# Patient Record
Sex: MALE | Race: WHITE | NOT HISPANIC OR LATINO | Employment: OTHER | URBAN - METROPOLITAN AREA
[De-identification: names, ages, dates, MRNs, and addresses within clinical notes are randomized per-mention and may not be internally consistent; named-entity substitution may affect disease eponyms.]

---

## 2017-01-24 ENCOUNTER — LAB VISIT (OUTPATIENT)
Dept: LAB | Facility: HOSPITAL | Age: 47
End: 2017-01-24
Payer: COMMERCIAL

## 2017-01-24 DIAGNOSIS — Z76.82 ORGAN TRANSPLANT CANDIDATE: ICD-10-CM

## 2017-01-24 PROCEDURE — 86829 HLA CLASS I/II ANTIBODY QUAL: CPT | Mod: 91,PO

## 2017-01-24 PROCEDURE — 86829 HLA CLASS I/II ANTIBODY QUAL: CPT | Mod: PO

## 2017-02-01 LAB — HPRA INTERPRETATION: NORMAL

## 2017-02-16 ENCOUNTER — LAB VISIT (OUTPATIENT)
Dept: LAB | Facility: HOSPITAL | Age: 47
End: 2017-02-16
Payer: COMMERCIAL

## 2017-02-16 DIAGNOSIS — Z76.82 AWAITING ORGAN TRANSPLANT STATUS: ICD-10-CM

## 2017-02-16 PROCEDURE — 86829 HLA CLASS I/II ANTIBODY QUAL: CPT | Mod: PO

## 2017-02-16 PROCEDURE — 86829 HLA CLASS I/II ANTIBODY QUAL: CPT | Mod: 91,PO

## 2017-02-16 PROCEDURE — 86833 HLA CLASS II HIGH DEFIN QUAL: CPT | Mod: PO

## 2017-03-17 LAB — HPRA INTERPRETATION: NORMAL

## 2017-03-27 ENCOUNTER — LAB VISIT (OUTPATIENT)
Dept: LAB | Facility: HOSPITAL | Age: 47
End: 2017-03-27
Payer: COMMERCIAL

## 2017-03-27 DIAGNOSIS — Z76.82 ORGAN TRANSPLANT CANDIDATE: ICD-10-CM

## 2017-03-29 DIAGNOSIS — Z76.82 ORGAN TRANSPLANT CANDIDATE: Primary | ICD-10-CM

## 2017-03-31 ENCOUNTER — LAB VISIT (OUTPATIENT)
Dept: LAB | Facility: HOSPITAL | Age: 47
End: 2017-03-31
Payer: COMMERCIAL

## 2017-03-31 DIAGNOSIS — Z76.82 ORGAN TRANSPLANT CANDIDATE: ICD-10-CM

## 2017-03-31 LAB
CLASS II ANTIBODY COMMENTS - LUMINEX: NORMAL
CPRA %: 0
SERUM COLLECTION DT - LUMINEX CLASS II: NORMAL
SPCL2 TESTING DATE: NORMAL

## 2017-03-31 PROCEDURE — 86826 HLA X-MATCH NONCYTOTOXC ADDL: CPT | Mod: PO

## 2017-03-31 PROCEDURE — 86825 HLA X-MATH NON-CYTOTOXIC: CPT | Mod: PO

## 2017-03-31 PROCEDURE — 86825 HLA X-MATH NON-CYTOTOXIC: CPT | Mod: 91,PO

## 2017-03-31 PROCEDURE — 86826 HLA X-MATCH NONCYTOTOXC ADDL: CPT | Mod: 91,PO

## 2017-04-03 LAB
B CELL RESULTS - XM ALLO: NEGATIVE
B CELL RESULTS - XM ALLO: NEGATIVE
B MCS AVERAGE - XM ALLO: 1
B MCS AVERAGE - XM ALLO: 15.25
DONOR MRN: NORMAL
DONOR MRN: NORMAL
FXMAL TESTING DATE: NORMAL
FXMAL TESTING DATE: NORMAL
HLA FLOW CROSSMATCH (ALLO) INTERPRETATION: NORMAL
SERUM COLLECTION DT - XM ALLO: NORMAL
SERUM COLLECTION DT - XM ALLO: NORMAL
T CELL RESULTS - XM ALLO: NEGATIVE
T CELL RESULTS - XM ALLO: NEGATIVE
T MCS AVERAGE - XM ALLO: 4.5
T MCS AVERAGE - XM ALLO: 9.5

## 2017-04-06 DIAGNOSIS — Z76.82 ORGAN TRANSPLANT CANDIDATE: Primary | ICD-10-CM

## 2017-04-07 ENCOUNTER — LAB VISIT (OUTPATIENT)
Dept: LAB | Facility: HOSPITAL | Age: 47
End: 2017-04-07
Payer: COMMERCIAL

## 2017-04-07 DIAGNOSIS — Z76.82 ORGAN TRANSPLANT CANDIDATE: ICD-10-CM

## 2017-04-07 PROCEDURE — 86826 HLA X-MATCH NONCYTOTOXC ADDL: CPT | Mod: 91,PO,TXP

## 2017-04-07 PROCEDURE — 86825 HLA X-MATH NON-CYTOTOXIC: CPT | Mod: PO,TXP

## 2017-04-07 PROCEDURE — 86825 HLA X-MATH NON-CYTOTOXIC: CPT | Mod: 91,PO,TXP

## 2017-04-07 PROCEDURE — 86826 HLA X-MATCH NONCYTOTOXC ADDL: CPT | Mod: PO,TXP

## 2017-04-10 LAB
B CELL RESULTS - XM ALLO: NEGATIVE
B CELL RESULTS - XM ALLO: NEGATIVE
B MCS AVERAGE - XM ALLO: -3.75
B MCS AVERAGE - XM ALLO: 26
DONOR MRN: NORMAL
DONOR MRN: NORMAL
FXMAL TESTING DATE: NORMAL
FXMAL TESTING DATE: NORMAL
HLA FLOW CROSSMATCH (ALLO) INTERPRETATION: NORMAL
SERUM COLLECTION DT - XM ALLO: NORMAL
SERUM COLLECTION DT - XM ALLO: NORMAL
T CELL RESULTS - XM ALLO: NEGATIVE
T CELL RESULTS - XM ALLO: NEGATIVE
T MCS AVERAGE - XM ALLO: 23
T MCS AVERAGE - XM ALLO: 5.5

## 2017-04-25 ENCOUNTER — LAB VISIT (OUTPATIENT)
Dept: LAB | Facility: HOSPITAL | Age: 47
End: 2017-04-25
Payer: COMMERCIAL

## 2017-04-25 DIAGNOSIS — Z76.82 ORGAN TRANSPLANT CANDIDATE: ICD-10-CM

## 2017-04-25 PROCEDURE — 86833 HLA CLASS II HIGH DEFIN QUAL: CPT | Mod: PO,TXP

## 2017-04-25 PROCEDURE — 86832 HLA CLASS I HIGH DEFIN QUAL: CPT | Mod: PO,TXP

## 2017-05-01 LAB — HPRA INTERPRETATION: NORMAL

## 2017-05-01 PROCEDURE — 86829 HLA CLASS I/II ANTIBODY QUAL: CPT | Mod: PO,TXP

## 2017-05-01 PROCEDURE — 86829 HLA CLASS I/II ANTIBODY QUAL: CPT | Mod: 91,PO,TXP

## 2017-05-24 LAB — HPRA INTERPRETATION: NORMAL

## 2017-05-30 ENCOUNTER — LAB VISIT (OUTPATIENT)
Dept: LAB | Facility: HOSPITAL | Age: 47
End: 2017-05-30
Payer: COMMERCIAL

## 2017-05-30 DIAGNOSIS — Z76.82 ORGAN TRANSPLANT CANDIDATE: ICD-10-CM

## 2017-06-07 LAB
CLASS I ANTIBODIES - LUMINEX: NEGATIVE
CLASS II ANTIBODIES - LUMINEX: NEGATIVE
CPRA %: 0
SERUM COLLECTION DT - LUMINEX CLASS I: NORMAL
SERUM COLLECTION DT - LUMINEX CLASS II: NORMAL
SPCL1 TESTING DATE: NORMAL
SPCL2 TESTING DATE: NORMAL

## 2017-06-20 ENCOUNTER — LAB VISIT (OUTPATIENT)
Dept: LAB | Facility: HOSPITAL | Age: 47
End: 2017-06-20
Payer: COMMERCIAL

## 2017-06-20 DIAGNOSIS — Z76.82 ORGAN TRANSPLANT CANDIDATE: ICD-10-CM

## 2017-06-20 PROCEDURE — 86829 HLA CLASS I/II ANTIBODY QUAL: CPT | Mod: PO,TXP

## 2017-06-20 PROCEDURE — 86829 HLA CLASS I/II ANTIBODY QUAL: CPT | Mod: 91,PO,TXP

## 2017-06-28 NOTE — LETTER
IMPORTANT      July 6, 2017    Bryant Azevedo  [address] [place] [address]     Re: [id number]    Dear Mr/Mrs Azevedo,    Thank you for choosing [facility] as your health care provider.  We are committed to assisting you with timely insurance filing and payment of your account.  To protect your liability, updated insurance information must be given to us at the time of service and we should be notified immediately if      · Your insurance benefits/plan changes.   You become eligible for any other benefits   Your current plan/coverage terms.    Also, please bring in a copy of your insurance premium payment if you have one of the following types of insurance:    · Coverage from a USP plan.  · Coverage from the Affordable Healthcare Act Plan.  · Coverage from a COBRA plan  · Premium paid by the [employer] Kid[employer].    To ensure we have the correct insurance (Medical/Pharmacy) on file and to answer any questions regarding your benefits, please call us at [phone] or [phone] and ask to speak to the kidney  indicated below:      Transplant Dept  [place]   Heart   Dayana Marinmiliza   Kidney (A-K) and Lung  Anishherbert Carmen    Kidney (L-Z)  Swetha Carpenter   Liver    We look forward to hearing from you soon.    Sincerely,      Transplant Financial Services

## 2017-07-06 PROCEDURE — 86829 HLA CLASS I/II ANTIBODY QUAL: CPT | Mod: 91,PO,TXP

## 2017-07-06 PROCEDURE — 86829 HLA CLASS I/II ANTIBODY QUAL: CPT | Mod: PO,TXP

## 2017-07-06 NOTE — ADDENDUM NOTE
Encounter addended by: Soledad Pederson on: 7/6/2017  2:28 PM<BR>    Actions taken: Letter status changed

## 2017-07-07 LAB — HPRA INTERPRETATION: NORMAL

## 2017-07-11 ENCOUNTER — HOSPITAL ENCOUNTER (OUTPATIENT)
Dept: RADIOLOGY | Facility: HOSPITAL | Age: 47
Discharge: HOME | End: 2017-07-11
Attending: INTERNAL MEDICINE
Payer: MEDICARE

## 2017-07-11 DIAGNOSIS — Z76.82 AWAITING ORGAN TRANSPLANT STATUS: Primary | ICD-10-CM

## 2017-07-11 DIAGNOSIS — Z76.82 ORGAN TRANSPLANT CANDIDATE: ICD-10-CM

## 2017-07-11 DIAGNOSIS — Z76.82 AWAITING ORGAN TRANSPLANT STATUS: ICD-10-CM

## 2017-07-11 PROCEDURE — 76770 US EXAM ABDO BACK WALL COMP: CPT | Mod: 26,GC,TXP, | Performed by: RADIOLOGY

## 2017-07-11 PROCEDURE — 76770 US EXAM ABDO BACK WALL COMP: CPT | Mod: TC,TXP

## 2017-07-13 ENCOUNTER — TELEPHONE (OUTPATIENT)
Dept: TRANSPLANT | Facility: CLINIC | Age: 47
End: 2017-07-13

## 2017-07-13 NOTE — TELEPHONE ENCOUNTER
Renal Transplant Attending Recipient Chart Review Note:    46 y.o. male with history of ESRD presumed secondary to HTN vs congenital issues who has been on PD since 11/10/14; last seen in listed RR clinic on 11/29/16.    No recent episodes of peritonitis. Echo ok from 10/21/16; normal LHC from 11/10/15.     Renal US 9/8/15 with ascites but he is on PD --> needs repeat renal US this year     Need to confirm caregiver availability (caregiver is his mother?).     History states patient has never been a smoker but COPD was written on problem list; CXR from 10/21/16 but not best quality film, not appearing to be consistent with COPD thus this diagnosed removed from problem list.     Will need to check pre-op PD fluid cell count and culture.     Residual UOP 1.5L     Labs (5/26/17) --> Hb 16; Hct 48, albumin 4.0, potassium 4.1, platelet count 180, WBC 7.05    Need to obtain PTH.     Latoya José MD  Renal Transplant Attending      ----- Message from Daniel Castaneda RN sent at 7/12/2017  2:51 PM CDT -----  Per review,    46 year old with ESRD due to HTN?    1.  PD since 11/10/14  2. Last since 11/29/16  3.  No recent Peritonitis  4.  Needs repeat Renal U/S  5.  Confirm caregiver plan  6.  Clarify why COPD is on problem list  7.  Will need PD culture prior to pre-op  8.  Labs from 5/26/17 ok but Need PTH

## 2017-07-14 LAB — HPRA INTERPRETATION: NORMAL

## 2017-07-19 PROCEDURE — 86832 HLA CLASS I HIGH DEFIN QUAL: CPT | Mod: PO,TXP

## 2017-07-19 PROCEDURE — 86833 HLA CLASS II HIGH DEFIN QUAL: CPT | Mod: PO,TXP

## 2017-07-20 ENCOUNTER — LAB VISIT (OUTPATIENT)
Dept: LAB | Facility: HOSPITAL | Age: 47
End: 2017-07-20
Payer: COMMERCIAL

## 2017-07-20 DIAGNOSIS — Z76.82 ORGAN TRANSPLANT CANDIDATE: ICD-10-CM

## 2017-07-20 PROCEDURE — 86829 HLA CLASS I/II ANTIBODY QUAL: CPT | Mod: 91,PO,TXP

## 2017-07-20 PROCEDURE — 86829 HLA CLASS I/II ANTIBODY QUAL: CPT | Mod: PO,TXP

## 2017-08-01 DIAGNOSIS — Z76.82 ORGAN TRANSPLANT CANDIDATE: ICD-10-CM

## 2017-08-18 LAB — HPRA INTERPRETATION: NORMAL

## 2017-08-24 ENCOUNTER — LAB VISIT (OUTPATIENT)
Dept: LAB | Facility: HOSPITAL | Age: 47
End: 2017-08-24
Payer: MEDICARE

## 2017-08-24 DIAGNOSIS — Z76.82 ORGAN TRANSPLANT CANDIDATE: ICD-10-CM

## 2017-08-24 PROCEDURE — 86829 HLA CLASS I/II ANTIBODY QUAL: CPT | Mod: PO,TXP

## 2017-08-24 PROCEDURE — 86832 HLA CLASS I HIGH DEFIN QUAL: CPT | Mod: PO,TXP

## 2017-08-24 PROCEDURE — 86833 HLA CLASS II HIGH DEFIN QUAL: CPT | Mod: PO,TXP

## 2017-08-24 PROCEDURE — 86829 HLA CLASS I/II ANTIBODY QUAL: CPT | Mod: 91,PO,TXP

## 2017-09-18 LAB — HPRA INTERPRETATION: NORMAL

## 2017-09-25 ENCOUNTER — LAB VISIT (OUTPATIENT)
Dept: LAB | Facility: HOSPITAL | Age: 47
End: 2017-09-25
Payer: MEDICARE

## 2017-09-25 DIAGNOSIS — Z76.82 ORGAN TRANSPLANT CANDIDATE: ICD-10-CM

## 2017-09-25 PROCEDURE — 86829 HLA CLASS I/II ANTIBODY QUAL: CPT | Mod: PO,TXP

## 2017-09-25 PROCEDURE — 86829 HLA CLASS I/II ANTIBODY QUAL: CPT | Mod: 91,PO,TXP

## 2017-09-27 DIAGNOSIS — Z76.82 ORGAN TRANSPLANT CANDIDATE: Primary | ICD-10-CM

## 2017-10-02 LAB
CLASS I ANTIBODIES - LUMINEX: NEGATIVE
CLASS II ANTIBODIES - LUMINEX: NEGATIVE
CPRA %: 0
SERUM COLLECTION DT - LUMINEX CLASS I: NORMAL
SERUM COLLECTION DT - LUMINEX CLASS II: NORMAL
SPCL1 TESTING DATE: NORMAL
SPCL2 TESTING DATE: NORMAL
SPCLU TESTING DATE: NORMAL

## 2017-10-04 ENCOUNTER — LAB VISIT (OUTPATIENT)
Dept: LAB | Facility: HOSPITAL | Age: 47
End: 2017-10-04
Payer: MEDICARE

## 2017-10-04 DIAGNOSIS — Z76.82 ORGAN TRANSPLANT CANDIDATE: ICD-10-CM

## 2017-10-04 PROCEDURE — 86825 HLA X-MATH NON-CYTOTOXIC: CPT | Mod: 91,PO,TXP

## 2017-10-04 PROCEDURE — 86825 HLA X-MATH NON-CYTOTOXIC: CPT | Mod: PO,TXP

## 2017-10-05 LAB
B CELL RESULTS - XM ALLO: NEGATIVE
B MCS AVERAGE - XM ALLO: 9.5
DONOR MRN: NORMAL
FXMAL TESTING DATE: NORMAL
HLA FLOW CROSSMATCH (ALLO) INTERPRETATION: NORMAL
SERUM COLLECTION DT - XM ALLO: NORMAL
T CELL RESULTS - XM ALLO: NEGATIVE
T MCS AVERAGE - XM ALLO: -6

## 2017-10-16 ENCOUNTER — LAB VISIT (OUTPATIENT)
Dept: LAB | Facility: HOSPITAL | Age: 47
End: 2017-10-16
Payer: COMMERCIAL

## 2017-10-16 DIAGNOSIS — Z76.82 ORGAN TRANSPLANT CANDIDATE: ICD-10-CM

## 2017-10-16 PROCEDURE — 86829 HLA CLASS I/II ANTIBODY QUAL: CPT | Mod: PO,TXP

## 2017-10-16 PROCEDURE — 86829 HLA CLASS I/II ANTIBODY QUAL: CPT | Mod: 91,PO,TXP

## 2017-10-18 LAB — HPRA INTERPRETATION: NORMAL

## 2017-10-25 DIAGNOSIS — Z76.82 ORGAN TRANSPLANT CANDIDATE: Primary | ICD-10-CM

## 2017-11-08 LAB — HPRA INTERPRETATION: NORMAL

## 2017-11-16 DIAGNOSIS — Z76.82 ORGAN TRANSPLANT CANDIDATE: Primary | ICD-10-CM

## 2017-11-21 DIAGNOSIS — Z76.82 AWAITING ORGAN TRANSPLANT STATUS: Primary | ICD-10-CM

## 2017-12-01 ENCOUNTER — HOSPITAL ENCOUNTER (OUTPATIENT)
Dept: RADIOLOGY | Facility: HOSPITAL | Age: 47
Discharge: HOME | End: 2017-12-01
Attending: INTERNAL MEDICINE
Payer: MEDICARE

## 2017-12-01 ENCOUNTER — TELEPHONE (OUTPATIENT)
Dept: CARDIOLOGY | Facility: CLINIC | Age: 47
End: 2017-12-01

## 2017-12-01 ENCOUNTER — OFFICE VISIT (OUTPATIENT)
Dept: TRANSPLANT | Facility: CLINIC | Age: 47
End: 2017-12-01
Payer: MEDICARE

## 2017-12-01 ENCOUNTER — HOSPITAL ENCOUNTER (OUTPATIENT)
Dept: CARDIOLOGY | Facility: CLINIC | Age: 47
Discharge: HOME | End: 2017-12-01
Attending: INTERNAL MEDICINE
Payer: MEDICARE

## 2017-12-01 VITALS
SYSTOLIC BLOOD PRESSURE: 136 MMHG | HEART RATE: 56 BPM | DIASTOLIC BLOOD PRESSURE: 87 MMHG | HEIGHT: 64 IN | RESPIRATION RATE: 18 BRPM | BODY MASS INDEX: 26.2 KG/M2 | OXYGEN SATURATION: 95 % | TEMPERATURE: 98 F | WEIGHT: 153.44 LBS

## 2017-12-01 DIAGNOSIS — I12.9 RENAL HYPERTENSION: Chronic | ICD-10-CM

## 2017-12-01 DIAGNOSIS — N18.6 ANEMIA IN ESRD (END-STAGE RENAL DISEASE): Chronic | ICD-10-CM

## 2017-12-01 DIAGNOSIS — N25.81 SECONDARY HYPERPARATHYROIDISM OF RENAL ORIGIN: Chronic | ICD-10-CM

## 2017-12-01 DIAGNOSIS — I35.9 NONRHEUMATIC AORTIC VALVE DISORDER: ICD-10-CM

## 2017-12-01 DIAGNOSIS — E79.0 HYPERURICEMIA: Chronic | ICD-10-CM

## 2017-12-01 DIAGNOSIS — J45.20 MILD INTERMITTENT CHRONIC ASTHMA WITHOUT COMPLICATION: Chronic | ICD-10-CM

## 2017-12-01 DIAGNOSIS — D63.1 ANEMIA IN ESRD (END-STAGE RENAL DISEASE): Chronic | ICD-10-CM

## 2017-12-01 DIAGNOSIS — Z76.82 PATIENT ON WAITING LIST FOR KIDNEY TRANSPLANT: Chronic | ICD-10-CM

## 2017-12-01 DIAGNOSIS — E78.49 OTHER HYPERLIPIDEMIA: ICD-10-CM

## 2017-12-01 DIAGNOSIS — N18.6 ESRD (END STAGE RENAL DISEASE): Primary | Chronic | ICD-10-CM

## 2017-12-01 DIAGNOSIS — Z76.82 ORGAN TRANSPLANT CANDIDATE: ICD-10-CM

## 2017-12-01 LAB
AORTIC VALVE REGURGITATION: NORMAL
DIASTOLIC DYSFUNCTION: NO
ESTIMATED PA SYSTOLIC PRESSURE: 23.98
RETIRED EF AND QEF - SEE NOTES: 55 (ref 55–65)
TRICUSPID VALVE REGURGITATION: NORMAL

## 2017-12-01 PROCEDURE — 99215 OFFICE O/P EST HI 40 MIN: CPT | Mod: S$PBB,TXP,, | Performed by: NURSE PRACTITIONER

## 2017-12-01 PROCEDURE — 99214 OFFICE O/P EST MOD 30 MIN: CPT | Mod: PBBFAC,25,TXP | Performed by: NURSE PRACTITIONER

## 2017-12-01 PROCEDURE — 93306 TTE W/DOPPLER COMPLETE: CPT | Mod: PBBFAC,TXP | Performed by: INTERNAL MEDICINE

## 2017-12-01 PROCEDURE — 99999 PR PBB SHADOW E&M-EST. PATIENT-LVL IV: CPT | Mod: PBBFAC,TXP,, | Performed by: NURSE PRACTITIONER

## 2017-12-01 PROCEDURE — 71020 XR CHEST PA AND LATERAL: CPT | Mod: 26,TXP,, | Performed by: RADIOLOGY

## 2017-12-01 PROCEDURE — 71020 XR CHEST PA AND LATERAL: CPT | Mod: TC,TXP

## 2017-12-01 NOTE — PROGRESS NOTES
"Transplant Recipient Adult Psychosocial Assessment Update    Bryant Azevedo  [address] [place] [address]    Telephone Information:   Mobile [phone]   Home  [phone] (home)  Work  There is no work phone number on file.  E-mail  [email or web address]    Sex: male  YOB: 1970  Age: 47 y.o.    Encounter Date: 2017  [place] Citizen: yes  Primary Language: English   Needed: no    Emergency Contact:  Name: Debo Azevedo  Relationship: mother  Address: [address] [address]  Phone Numbers:  [phone] (home), n/a (work), [phone] (mobile)    Family/Social Support:   Number of dependents/: none  Marital history: never , no children, not in current relationship  Other family dynamics: Pt's mother is living,  Father is , one 1/2 sister with whom he is not close.  Pt and mother report "very small family".  Pt states now has roommates that are supportive. Pt states due to his current home being small and having a roommate, he and his mother will stay at a hotel post transplant until pt can return to home. Pt states has already looked at hotels and can afford weekly stays at a few places in [facility].     Household Composition:    -Janes West, age 52, roommate, [facility]# [phone]       Do you and your caregivers have access to reliable transportation? yes     PRIMARY CAREGIVER: Debo Azevedo will be primary caregiver, phone number [phone].      provided in-depth information to patient and caregiver regarding pre- and post-transplant caregiver role.   strongly encourages patient and caregiver to have concrete plan regarding post-transplant care giving, including back-up caregiver(s) to ensure care giving needs are met as needed.    Patient and Caregiver states understanding all aspects of caregiver role/commitment and is able/willing/committed to being caregiver to the fullest extent " necessary.    Patient and Caregiver verbalizes understanding of the education provided today and caregiver responsibilities.         remains available. Patient and Caregiver agree to contact  in a timely manner if concerns arise.      Able to take time off work without financial concerns: mother states she is retired and can travel to [facility] at any time however, she states she is uncertain whether she will have anyone to accompany her and this is a concern..     Additional Significant Others who will Assist with Transplant:    -Janes West, age 52, roommate, [facility]# [phone]     -Sandrita Tsang, age 60, friend, [facility]# [phone]    Living Will: no  Healthcare Power of : no  Advance Directives on file: <<no information> per medical record.  Verbally reviewed LW/HCPA information.   provided patient with copy of LW/HCPA documents and provided education on completion of forms.    Living Donors: Carol Jean Marie, pt's friend is currently going through workup.     Highest Education Level: 12th grade  Reading Ability: 12th grade  Reports difficulty with: N/A  Learns Best By:  Written and demonstration     Status: no  VA Benefits: no     Working for Income: No  If no, reason not working: Disability  Patient is disabled.  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    Spouse/Significant Other Employment: mother is retired    Disabled: yes: date disability began: 11/10/14, due to: ESRD.    Monthly Income:   Disability: $1975  Able to afford all costs now and if transplanted, including medications: yes SW provided education on Medicare termination after 3 years post transplant if disability is due to ESRD and possible termination of Social Security Disability income within one year after transplant. SW discussed options and resources available at this time.  Patient and Caregiver  verbalizes understanding of personal responsibilities related to transplant costs and the importance of having a financial plan to ensure that patients transplant costs are fully covered.       provided fundraising information/education. Patient and Caregiververbalizes understanding.   remains available.    Insurance:   Payor/Plan Subscr  Sex Relation Sub. Ins. ID Effective Group Num   1. MEDICARE - ME* MARYJANE BRAND 1970 Male  [id number]                                    [address]   2. BLUE CROSS BL* MARYJANE BRAND 1970 Male  [id number]                                   [address] [address]     Primary Insurance (for UNOS reporting): Public Insurance - Medicare & Choice    Secondary Insurance (for UNOS reporting): Public Insurance - Medicare & Choice Pt states is currently paying premium, however is attempting to have AKF assist. Pt states can afford after transplant.     Patient and Caregiver verbalizes clear understanding that patient may experience difficulty obtaining and/or be denied insurance coverage post-surgery. This includes and is not limited to disability insurance, life insurance, health insurance, burial insurance, long term care insurance, and other insurances.      Patient and Caregiver also reports understanding that future health concerns related to or unrelated to transplantation may not be covered by patient's insurance.  Resources and information provided and reviewed.     Patient and Caregiver provides verbal permission to release any necessary information to outside resources for patient care and discharge planning.  Resources and information provided are reviewed.      Dialysis Adherence: Patient reports he is compliant with all treatment recommendations. Please see separate note for adherence check    Infusion Service: patient utilizing? no  Home Health: patient utilizing? no  DME: PD supplies  Pulmonary/Cardiac Rehab: none    ADLS:  Pt states ability to independently accomplish all adls.    Adherence:   Pt states current and expected compliance with all healthcare recommendations..  Adherence education and counseling provided.     Per History Section:  Past Medical History:   Diagnosis Date    Chronic asthma     ESRD since 11/2014 from HTN     Hyperlipidemia     Hyperuricemia     Renal hypertension     Secondary hyperparathyroidism of renal origin      Social History   Substance Use Topics    Smoking status: Never Smoker    Smokeless tobacco: Never Used    Alcohol use No     History   Drug Use No     History   Sexual Activity    Sexual activity: Not on file       Per Today's Psychosocial:  Tobacco: none, patient denies any use.  Alcohol: rare ETOH use.  Illicit Drugs/Non-prescribed Medications: none, patient denies any use.    Patient and Caregiver states clear understanding of the potential impact of substance use as it relates to transplant candidacy and is aware of possible random substance screening.  Substance abstinence/cessation counseling, education and resources provided and reviewed.     Arrests/DWI/Treatment/Rehab: patient denies    Psychiatric History:    Mental Health: Pt was started on Paxil in July 2015 of this year and added to Zoloft.  Pt states he is feeling better since medication change.  Psychiatrist/Counselor: PCP  Medications:  See above  Suicide/Homicide Issues: Pt denies current or past suicidal/homicidal ideation.   Safety at home: Pt denies any home safety concerns.      Knowledge: Patient and Caregiver states having clear understanding and realistic expectations regarding the potential risks and potential benefits of organ transplantation and organ donation and agrees to discuss with health care team members and support system members, as well as to utilize available resources and express questions and/or concerns in order to further facilitate the pt informed decision-making.  Resources and  information provided and reviewed.    Patient and Caregiver is aware of [facility]'s affiliation and/or partnership with agencies in home health care, LTAC, SNF, [facility], and other hospitals and clinics.    Understanding: Patient and Caregiver reports having a clear understanding of the many lifetime commitments involved with being a transplant recipient, including costs, compliance, medications, lab work, procedures, appointments, concrete and financial planning, preparedness, timely and appropriate communication of concerns, abstinence (ETOH, tobacco, illicit non-prescribed drugs), adherence to all health care team recommendations, support system and caregiver involvement, appropriate and timely resource utilization and follow-through, mental health counseling as needed/recommended, and patient and caregiver responsibilities.  Social Service Handbook, resources and detailed educational information provided and reviewed.  Educational information provided.    Patient and Caregiver also reports current and expected compliance with health care regime and states having a clear understanding of the importance of compliance.      Patient and Caregiver reports a clear understanding that risks and benefits may be involved with organ transplantation and with organ donation.       Patient and Caregiver also reports clear understanding that psychosocial risk factors may affect patient, and include but are not limited to feelings of depression, generalized anxiety, anxiety regarding dependence on others, post traumatic stress disorder, feelings of guilt and other emotional and/or mental concerns, and/or exacerbation of existing mental health concerns.  Detailed resources provided and discussed.      Patient and Caregiver agrees to access appropriate resources in a timely manner as needed and/or as recommended, and to communicate concerns appropriately.  Patient and Caregiver also reports a clear understanding of treatment options  available.     Patient and Caregiver received education in a group setting.   reviewed education, provided additional information, and answered questions.    Feelings or Concerns: Pt and mother denied concerns.    Coping: Pt states he is coping well with waiting on for transplant.      Goals: get off dialysis, return to work.  Patient referred to Vocational Rehabilitation.    Interview Behavior: Patient and Caregiver presents as alert and oriented x 4, pleasant, good eye contact, well groomed, recall good, concentration/judgement good, average intelligence, calm, communicative, cooperative, asking and answering questions appropriately and mother was tearful at times when speaking of uncertainty of her ability to be accompanied to [place]..          Transplant Social Work - Candidacy  Assessment/Plan:     Psychosocial Suitability: Patient presents as a suitable candidate for kidney transplant at this time. Based on psychosocial risk factors, patient presents as a low risk, due to pt: reports adequate caregiver/transportation plan, reports financial ability to afford transplant, reports compliance with current medical regimen, and reports general understanding and motivation for transplant with adequate coping skills.   .    Recommendations/Additional Comments: Pt resides locally, however pt states due to his current home being small and having a roommate, he and his mother will stay at a hotel post transplant until pt can return to home. Pt states has already looked at hotels and can afford weekly stays at a few places in [facility].   Maryse Lainez LMSW

## 2017-12-01 NOTE — LETTER
December 4, 2017        Leslie Roche Jr.  [address]; [address]  [place] [address]  Phone: [phone]  Fax: [phone]             [facility]  [address]  [facility] [address]  Phone: [phone]   Patient: Bryant Azevedo   MR Number: [id number]   YOB: 1970   Date of Visit: 12/1/2017       Dear Dr. Leslie Roche Jr.    Thank you for referring Bryant Azevedo to me for evaluation. Attached you will find relevant portions of my assessment and plan of care.    If you have questions, please do not hesitate to call me. I look forward to following Bryant Azevedo along with you.    Sincerely,    Carline Obrien, NP    Enclosure    If you would like to receive this communication electronically, please contact [email or web address] or [phone] to request Extend Health Link access.    Extend Health Link is a tool which provides read-only access to select patient information with whom you have a relationship. Its easy to use and provides real time access to review your patients record including encounter summaries, notes, results, and demographic information.    If you feel you have received this communication in error or would no longer like to receive these types of communications, please e-mail [email or web address]

## 2017-12-01 NOTE — PROGRESS NOTES
Kidney Transplant Recipient Reevalulation    Referring Physician: Leslie Roche Jr.  Current Nephrologist: Leslie Roche Jr.  Waitlist Status: active  Dialysis Start Date: 3/7/2016    Subjective:     CC:  Annual reassessment of kidney transplant candidacy.    HPI:  Mr. Azevedo is a 47 y.o. year old White male with ESRD secondary to HTN.  He has been on the wait list for a kidney transplant at [facility] since 11/10/2014. Patient is currently on peritoneal dialysis started on  3/7/2016. Patient is dialyzing on cyclic peritoneal dialysis.  Patient reports that he is tolerating dialysis well. . He has a PD catheter. Patient denies any recent hospitalizations or ED visits. Denies peritonitis.     Overall feels well. No health concerns today.   Denies chest pain, SOB, leg pain, abdominal pain or LUTs.    12/1/2017  Chest xray  Impression    No significant intrathoracic abnormality.  No significant detrimental interval change in the appearance of the chest since 10/21/16.       12/1/2017  2 D Echo  CONCLUSIONS     1 - Normal left ventricular systolic function (EF 55-60%).     2 - Concentric remodeling.     3 - Normal left ventricular diastolic function.     4 - Normal right ventricular systolic function .     5 - The estimated PA systolic pressure is 24 mmHg.     6 - Mild aortic regurgitation.     7 - Mild tricuspid regurgitation.       7/11/2017  Retroperitoneal US  Impression   Bilateral atrophic kidneys with findings compatible with chronic medical renal disease.         Lab Results   Component Value Date    PSA 1.9 12/01/2017    PSA 1.0 10/21/2016    PSA 1.4 09/08/2015       Past Medical History:   Diagnosis Date    Chronic asthma     ESRD since 11/2014 from HTN     Hyperlipidemia     Hyperuricemia     Renal hypertension     Secondary hyperparathyroidism of renal origin        Review of Systems   Constitutional: Negative for activity change, appetite change, chills, fatigue, fever and unexpected weight  "change.   HENT: Negative for congestion, facial swelling, postnasal drip, rhinorrhea, sinus pressure, sore throat and trouble swallowing.    Eyes: Negative for pain, redness and visual disturbance.   Respiratory: Negative for cough, chest tightness, shortness of breath and wheezing.    Cardiovascular: Negative.  Negative for chest pain, palpitations and leg swelling.   Gastrointestinal: Negative for abdominal pain, diarrhea, nausea and vomiting.   Genitourinary: Negative for dysuria, flank pain and urgency.   Musculoskeletal: Negative for gait problem, neck pain and neck stiffness.   Skin: Negative for rash.   Allergic/Immunologic: Negative for environmental allergies, food allergies and immunocompromised state.   Neurological: Negative for dizziness, weakness, light-headedness and headaches.   Psychiatric/Behavioral: Negative for agitation and confusion. The patient is not nervous/anxious.        Objective:   body mass index is 26 kg/m².  /87 (BP Location: Right arm, Patient Position: Sitting, BP Method: Medium (Automatic))   Pulse (!) 56   Temp 97.7 °F (36.5 °C) (Oral)   Resp 18   Ht 5' 4.41" (1.636 m)   Wt 69.6 kg (153 lb 7 oz)   SpO2 95%   BMI 26.00 kg/m²     Physical Exam   Constitutional: He is oriented to person, place, and time. He appears well-developed and well-nourished.   HENT:   Head: Normocephalic.   Mouth/Throat: Oropharynx is clear and moist. No oropharyngeal exudate.   Eyes: Conjunctivae and EOM are normal. Pupils are equal, round, and reactive to light. No scleral icterus.   Neck: Normal range of motion. Neck supple.   Cardiovascular: Normal rate, regular rhythm and normal heart sounds.    Pulmonary/Chest: Effort normal and breath sounds normal.   Abdominal: Soft. Normal appearance and bowel sounds are normal. He exhibits no distension and no mass. There is no splenomegaly or hepatomegaly. There is no tenderness. There is no rebound, no guarding, no CVA tenderness, no tenderness at " McBurney's point and negative Chapman's sign.       Musculoskeletal: Normal range of motion. He exhibits no edema.   Lymphadenopathy:     He has no cervical adenopathy.   Neurological: He is alert and oriented to person, place, and time. He exhibits normal muscle tone. Coordination normal.   Skin: Skin is warm and dry.   Psychiatric: He has a normal mood and affect. His behavior is normal.   Vitals reviewed.      Labs:  Lab Results   Component Value Date    WBC 6.54 11/10/2015    HGB 14.7 11/10/2015    HCT 42.9 11/10/2015     11/10/2015    K 3.8 11/10/2015     11/10/2015    CO2 29 11/10/2015    BUN 45 (H) 11/10/2015    CREATININE 4.1 (H) 11/10/2015    EGFRNONAA 16.2 (A) 11/10/2015    CALCIUM 9.0 11/10/2015    PHOS 4.4 09/08/2015    ALBUMIN 3.8 09/08/2015    AST 25 09/08/2015    ALT 25 09/08/2015    .0 (H) 07/11/2017       No results found for: PREALBUMIN, BILIRUBINUA, GGT, AMYLASE, LIPASE, PROTEINUA, NITRITE, RBCUA, WBCUA    No results found for: HLAABCTYPE    Lab Results   Component Value Date    CPRA 0 08/16/2017    FV3NSZX Negative 08/16/2017    CIIAB Negative 08/16/2017    ABCMT UNDETERMINED 02/09/2017       Labs were reviewed with the patient.    Pre-transplant Workup:   Reviewed with the patient.    Assessment:     1. ESRD since 11/2014 from HTN    2. Patient on waiting list for kidney transplant    3. Renal hypertension    4. Secondary hyperparathyroidism of renal origin    5. Hyperuricemia    6. Other hyperlipidemia    7. Mild intermittent chronic asthma without complication    8. Anemia in ESRD (end-stage renal disease)        Plan:   NM stress scheduled 12/4/2017      Transplant Candidacy:   Mr. Azevedo is a suitable kidney transplant candidate.  He remains in overall stable health, and will remain active on the transplant list.    Carline Obrien NP       Follow-up:   In addition to the tests noted in the plan, Mr. Azevedo will continue to have reevaluation as per the standing pre-kidney  transplant protocol:  1. Monthly blood for PRA  2. Annual return to clinic, except HIV positive, > 65 years of age, or pancreas transplant candidates who will be scheduled to see transplant every 6 months while in pre-transplant phase  3. Annual re-testing: CXR, EKG, yearly mammograms for women over 40 and PSA for males over 40, cardiology follow-up as recommended by initial cardiology pre-transplant evaluation  4. Renal ultrasound every 2 years  5. Baseline colonoscopy after age 50 and repeated as recommended    UNOS Patient Status  Functional Status: 80% - Normal activity with effort: some symptoms of disease  Physical Capacity: No Limitations

## 2017-12-01 NOTE — PROGRESS NOTES
Patient was seen in listed [facility] transplant clinic for continued evaluation for kidney, kidney/pancreas or pancreas only transplant. The patient attended a group education session that discussed/reviewed the following aspects of transplantation: evaluation and selection committee process, UNOS waitlist management/multiple listings, types of organs offered (KDPI < 85%, KDPI > 85%, PHS increased risk, DCD), financial aspects, surgical procedures, dietary instruction pre- and post-transplant, health maintenance pre- and post-transplant, post-transplant hospitalization and outpatient follow-up, potential to participate in a research protocol, and medication management and side effects. A question and answer session was provided after the presentation.     The patient was seen by all members of the multi-disciplinary team to include: Nephrologist/PA, , Transplant Coordinator, and .      The patient reviewed and signed all consents for evaluation which were witnessed and sent to scanning into the EPIC chart.     The patient was given an education book and plan for further evaluation based on her individual assessment.      The patient was encouraged to call with any questions or concerns.

## 2017-12-04 ENCOUNTER — CLINICAL SUPPORT (OUTPATIENT)
Dept: CARDIOLOGY | Facility: CLINIC | Age: 47
End: 2017-12-04
Attending: INTERNAL MEDICINE
Payer: MEDICARE

## 2017-12-04 DIAGNOSIS — Z76.82 ORGAN TRANSPLANT CANDIDATE: ICD-10-CM

## 2017-12-04 PROCEDURE — 93018 CV STRESS TEST I&R ONLY: CPT | Mod: S$PBB,TXP,, | Performed by: INTERNAL MEDICINE

## 2017-12-04 PROCEDURE — 93016 CV STRESS TEST SUPVJ ONLY: CPT | Mod: S$PBB,TXP,, | Performed by: INTERNAL MEDICINE

## 2017-12-04 PROCEDURE — 78452 HT MUSCLE IMAGE SPECT MULT: CPT | Mod: 26,S$PBB,TXP, | Performed by: INTERNAL MEDICINE

## 2017-12-05 ENCOUNTER — TELEPHONE (OUTPATIENT)
Dept: TRANSPLANT | Facility: HOSPITAL | Age: 47
End: 2017-12-05

## 2017-12-05 NOTE — TELEPHONE ENCOUNTER
"[facility] #[id number] Peritoneal      [address] [address] [phone] (office)  [phone] (fax)     SUNITHA contacted above dialysis unit in order to discuss pts adherence with PD. SUNITHA spoke with Cat HOLDER. Cat states "he is the perfect patient" and report no issues with follow ups or pt following prescribed treatment.   "

## 2017-12-28 ENCOUNTER — LAB VISIT (OUTPATIENT)
Dept: LAB | Facility: HOSPITAL | Age: 47
End: 2017-12-28
Payer: MEDICARE

## 2017-12-28 DIAGNOSIS — Z76.82 ORGAN TRANSPLANT CANDIDATE: ICD-10-CM

## 2017-12-28 PROCEDURE — 86829 HLA CLASS I/II ANTIBODY QUAL: CPT | Mod: PO,TXP

## 2017-12-28 PROCEDURE — 86829 HLA CLASS I/II ANTIBODY QUAL: CPT | Mod: 91,PO,TXP

## 2018-01-04 LAB — HPRA INTERPRETATION: NORMAL

## 2018-01-24 ENCOUNTER — LAB VISIT (OUTPATIENT)
Dept: LAB | Facility: HOSPITAL | Age: 48
End: 2018-01-24
Payer: MEDICARE

## 2018-01-24 DIAGNOSIS — Z76.82 ORGAN TRANSPLANT CANDIDATE: ICD-10-CM

## 2018-01-24 PROCEDURE — 86829 HLA CLASS I/II ANTIBODY QUAL: CPT | Mod: 91,PO,TXP

## 2018-01-24 PROCEDURE — 86829 HLA CLASS I/II ANTIBODY QUAL: CPT | Mod: PO,TXP

## 2018-01-31 LAB — HPRA INTERPRETATION: NORMAL

## 2018-03-29 DIAGNOSIS — Z76.82 AWAITING ORGAN TRANSPLANT STATUS: Primary | ICD-10-CM

## 2018-04-18 ENCOUNTER — LAB VISIT (OUTPATIENT)
Dept: LAB | Facility: HOSPITAL | Age: 48
End: 2018-04-18
Payer: MEDICARE

## 2018-04-18 DIAGNOSIS — Z76.82 ORGAN TRANSPLANT CANDIDATE: ICD-10-CM

## 2018-04-18 PROCEDURE — 86829 HLA CLASS I/II ANTIBODY QUAL: CPT | Mod: 91,PO,TXP

## 2018-04-18 PROCEDURE — 86829 HLA CLASS I/II ANTIBODY QUAL: CPT | Mod: PO,TXP

## 2018-05-08 LAB — HPRA INTERPRETATION: NORMAL

## 2018-07-12 ENCOUNTER — PATIENT MESSAGE (OUTPATIENT)
Dept: TRANSPLANT | Facility: CLINIC | Age: 48
End: 2018-07-12

## 2018-10-01 DIAGNOSIS — Z76.82 AWAITING ORGAN TRANSPLANT STATUS: Primary | ICD-10-CM

## 2018-10-01 DIAGNOSIS — Z76.82 ORGAN TRANSPLANT CANDIDATE: ICD-10-CM

## 2018-10-01 NOTE — PROGRESS NOTES
YEARLY LIST MANAGEMENT NOTE    Bryant Azevedo's kidney transplant listing status reviewed.  Patient is due for follow-up appointments in November 2018.  Appointments will be scheduled per protocol.  HLA sample is current and being rec'd on a regular basis.

## 2018-10-10 DIAGNOSIS — Z76.82 ORGAN TRANSPLANT CANDIDATE: ICD-10-CM

## 2018-10-25 ENCOUNTER — HOSPITAL ENCOUNTER (OUTPATIENT)
Dept: RADIOLOGY | Facility: HOSPITAL | Age: 48
Discharge: HOME | End: 2018-10-25
Attending: NURSE PRACTITIONER
Payer: MEDICARE

## 2018-10-25 ENCOUNTER — HOSPITAL ENCOUNTER (OUTPATIENT)
Dept: RADIOLOGY | Facility: HOSPITAL | Age: 48
Discharge: HOME | End: 2018-10-25
Attending: INTERNAL MEDICINE
Payer: MEDICARE

## 2018-10-25 ENCOUNTER — HOSPITAL ENCOUNTER (OUTPATIENT)
Dept: CARDIOLOGY | Facility: CLINIC | Age: 48
Discharge: HOME | End: 2018-10-25
Attending: NURSE PRACTITIONER
Payer: MEDICARE

## 2018-10-25 ENCOUNTER — OFFICE VISIT (OUTPATIENT)
Dept: TRANSPLANT | Facility: CLINIC | Age: 48
End: 2018-10-25
Payer: MEDICARE

## 2018-10-25 ENCOUNTER — HOSPITAL ENCOUNTER (OUTPATIENT)
Dept: RADIOLOGY | Facility: HOSPITAL | Age: 48
Discharge: HOME | End: 2018-10-25
Attending: TRANSPLANT SURGERY
Payer: MEDICARE

## 2018-10-25 VITALS
HEART RATE: 64 BPM | RESPIRATION RATE: 18 BRPM | BODY MASS INDEX: 26.46 KG/M2 | SYSTOLIC BLOOD PRESSURE: 127 MMHG | WEIGHT: 155 LBS | HEIGHT: 64 IN | OXYGEN SATURATION: 97 % | TEMPERATURE: 98 F | DIASTOLIC BLOOD PRESSURE: 87 MMHG

## 2018-10-25 DIAGNOSIS — Z76.82 ORGAN TRANSPLANT CANDIDATE: ICD-10-CM

## 2018-10-25 DIAGNOSIS — Z76.82 AWAITING ORGAN TRANSPLANT STATUS: ICD-10-CM

## 2018-10-25 DIAGNOSIS — I10 ESSENTIAL HYPERTENSION: ICD-10-CM

## 2018-10-25 DIAGNOSIS — E78.2 MIXED HYPERLIPIDEMIA: ICD-10-CM

## 2018-10-25 DIAGNOSIS — N18.6 ESRD (END STAGE RENAL DISEASE): Primary | Chronic | ICD-10-CM

## 2018-10-25 DIAGNOSIS — I51.7 CARDIOMEGALY: ICD-10-CM

## 2018-10-25 DIAGNOSIS — N25.81 SECONDARY HYPERPARATHYROIDISM OF RENAL ORIGIN: Chronic | ICD-10-CM

## 2018-10-25 LAB
ESTIMATED PA SYSTOLIC PRESSURE: 23.43
RETIRED EF AND QEF - SEE NOTES: 60 (ref 55–65)

## 2018-10-25 PROCEDURE — 71046 X-RAY EXAM CHEST 2 VIEWS: CPT | Mod: 26,TXP,, | Performed by: RADIOLOGY

## 2018-10-25 PROCEDURE — 71046 X-RAY EXAM CHEST 2 VIEWS: CPT | Mod: TC,TXP

## 2018-10-25 PROCEDURE — 99214 OFFICE O/P EST MOD 30 MIN: CPT | Mod: PBBFAC,25,TXP | Performed by: INTERNAL MEDICINE

## 2018-10-25 PROCEDURE — 76770 US EXAM ABDO BACK WALL COMP: CPT | Mod: TC,TXP

## 2018-10-25 PROCEDURE — 72170 X-RAY EXAM OF PELVIS: CPT | Mod: 26,TXP,, | Performed by: RADIOLOGY

## 2018-10-25 PROCEDURE — 99214 OFFICE O/P EST MOD 30 MIN: CPT | Mod: S$PBB,TXP,, | Performed by: INTERNAL MEDICINE

## 2018-10-25 PROCEDURE — 76770 US EXAM ABDO BACK WALL COMP: CPT | Mod: 26,TXP,, | Performed by: RADIOLOGY

## 2018-10-25 PROCEDURE — 93306 TTE W/DOPPLER COMPLETE: CPT | Mod: PBBFAC,TXP | Performed by: INTERNAL MEDICINE

## 2018-10-25 PROCEDURE — 72170 X-RAY EXAM OF PELVIS: CPT | Mod: TC,TXP

## 2018-10-25 PROCEDURE — 99999 PR PBB SHADOW E&M-EST. PATIENT-LVL IV: CPT | Mod: PBBFAC,TXP,, | Performed by: INTERNAL MEDICINE

## 2018-10-25 NOTE — LETTER
October 29, 2018        Leslie Roche Jr.  [address]; [address]  [facility] [address]  Phone: [phone]  Fax: [phone]             [facility]  [address]  [facility] [address]  Phone: [phone]   Patient: Bryant Azevedo   MR Number: [id number]   YOB: 1970   Date of Visit: 10/25/2018       Dear Dr. Leslie Roche Jr.    Thank you for referring Bryant Azevedo to me for evaluation. Attached you will find relevant portions of my assessment and plan of care.    If you have questions, please do not hesitate to call me. I look forward to following rByant Azevedo along with you.    Sincerely,    Prakash Winchester MD    Enclosure    If you would like to receive this communication electronically, please contact [email or web address] or [phone] to request [employer] access.    [employer] Link is a tool which provides read-only access to select patient information with whom you have a relationship. Its easy to use and provides real time access to review your patients record including encounter summaries, notes, results, and demographic information.    If you feel you have received this communication in error or would no longer like to receive these types of communications, please e-mail [email or web address]

## 2018-10-25 NOTE — PROGRESS NOTES
Kidney Transplant Recipient Reevalulation    Referring Physician: Leslie Roche Jr.  Current Nephrologist: Leslie Roche Jr.  Waitlist Status: active  Dialysis Start Date: 3/7/2016    Subjective:     CC:  Annual reassessment of kidney transplant candidacy.    HPI:  Mr. Azevedo is a 47 y.o. 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  He has been on the wait list for a kidney transplant at [facility] since 11/10/2014. Patient is currently on peritoneal dialysis started on 11/2014. Patient is dialyzing on cyclic peritoneal dialysis.  Patient reports that she is tolerating dialysis well.  He has a PD catheter. Patient denies any recent hospitalizations or ED visits.      Patient feels great and he has various questions including allocation policies in other countries and strstegies to increased donation in the [place]    I provided current data on this regard    No chest pain No SOB No Claudication. Good appetite No nausea vomit or diarrhea.     Work up for today:     Kidney US: looks ok today    Pelvis X Ry No significant calcifications    Chest x Ry looks acceptable    2D echo was acceptable. Normal EF. Acceptable PA pressure    Patient denied any admissions to the hospital. No peritonitis. No exit site infections    Refers a short stay to replace his PD catheter.    Review of Systems     Skin: no skin rash  CNS; no headaches, blurred vision, seizure, or syncope  ENT: No JVD,  Adenopathies,  nasal congestion. No oral lesions  Cardiac: No chest pain, dyspnea, claudication, edema or palpitations  Respiratory: No SOB, cough, hemoptysis   Gastro-intestinal: No diarrhea, constipation, abdominal pain, nausea, vomit. No ascitis  Genitourinary: no hematuria, dysuria, frequency, frequency  Musculoskeletal: joint pain, arthritis or vasculitic changes  Psych: alert awake, oriented, No cranial nerves deficit.      Objective:   body mass index is 26.79 kg/m².    Physical Exam  "    /87 (BP Location: Right arm, Patient Position: Sitting, BP Method: Medium (Automatic))   Pulse 64   Temp 97.6 °F (36.4 °C) (Oral)   Resp 18   Ht 5' 3.78" (1.62 m)   Wt 70.3 kg (154 lb 15.7 oz)   SpO2 97%   BMI 26.79 kg/m²       Head: normocephalic  Neck: No JVD, cervical axillary, or femoral adenopathies  Heart: no murmurs, Normal s1 and s2, No gallops, no rubs, No murmurs  Lungs; CTA, good respiratory effort, no crackles  Abdomen: soft, non tender, no splenomegaly or hepatomegaly, no massess, no bruits  Extremities: No edema, skin rash, joint pain  SNC: awake, alert oriented. Cranial nerves are intact, no focalized, sensitivity and strength preserved      Labs:  Lab Results   Component Value Date    WBC 6.54 11/10/2015    HGB 14.7 11/10/2015    HCT 42.9 11/10/2015     11/10/2015    K 3.8 11/10/2015     11/10/2015    CO2 29 11/10/2015    BUN 45 (H) 11/10/2015    CREATININE 4.1 (H) 11/10/2015    EGFRNONAA 16.2 (A) 11/10/2015    CALCIUM 9.0 11/10/2015    PHOS 4.4 09/08/2015    ALBUMIN 3.8 09/08/2015    AST 25 09/08/2015    ALT 25 09/08/2015    .0 (H) 10/25/2018       No results found for: PREALBUMIN, BILIRUBINUA, GGT, AMYLASE, LIPASE, PROTEINUA, NITRITE, RBCUA, WBCUA    No results found for: HLAABCTYPE    Lab Results   Component Value Date    CPRA 0 07/18/2018    CA7WQDI Negative 07/18/2018    CIIAB Negative 08/16/2017    ABCMT WEAK DP1 07/18/2018       Labs were reviewed with the patient.    Pre-transplant Workup:   Reviewed with the patient.    Assessment:     1. ESRD since 11/2014 from HTN    2. Secondary hyperparathyroidism of renal origin    3. Mixed hyperlipidemia    4. Essential hypertension        Plan:     Transplant Candidacy:   Mr. Azevedo is a suitable kidney transplant candidate. Patient is candidate for HCV+ Tiana donor offer - yes. He remains in overall stable health, and will remain active on the transplant list.    Data available were reviewed. No contra-indications " for kidney transplant     Living donation was advised    I spoke with patient and 2 family members, who state are also the care givers     Extensive education provided    All questions answered    Prakash Winchester MD       Follow-up:   In addition to the tests noted in the plan, Mr. Azevedo will continue to have reevaluation as per the standing pre-kidney transplant protocol:  1. Monthly blood for PRA  2. Annual return to clinic, except HIV positive, > 65 years of age, or pancreas transplant candidates who will be scheduled to see transplant every 6 months while in pre-transplant phase  3. Annual re-testing: CXR, EKG, yearly mammograms for women over 40 and PSA for males over 40, cardiology follow-up as recommended by initial cardiology pre-transplant evaluation  4. Renal ultrasound every 2 years  5. Baseline colonoscopy after age 50 and repeated as recommended    UNOS Patient Status  Functional Status: 70% - Cares for self: unable to carry on normal activity or active work  Physical Capacity: No Limitations

## 2018-10-30 ENCOUNTER — PATIENT MESSAGE (OUTPATIENT)
Dept: ENDOSCOPY | Facility: HOSPITAL | Age: 48
End: 2018-10-30

## 2018-10-30 DIAGNOSIS — Z76.82 AWAITING ORGAN TRANSPLANT STATUS: Primary | ICD-10-CM

## 2018-11-09 ENCOUNTER — TELEPHONE (OUTPATIENT)
Dept: TRANSPLANT | Facility: CLINIC | Age: 48
End: 2018-11-09

## 2018-11-09 NOTE — PROGRESS NOTES
"Transplant Recipient Adult Psychosocial Assessment Update    Bryant Azevedo  [address] [place] [address]  Telephone Information:   Mobile [phone]   Home  [phone] (home)  Work  There is no work phone number on file.  E-mail  [email or web address]    Sex: male  YOB: 1970  Age: 48 y.o.    Encounter Date: 10/25/2018  [place] Citizen: yes  Primary Language: English   Needed: no    Emergency Contact:  Name: Debo Azevedo  Relationship: mother  Address: [address] [address]  Phone Numbers:  [phone] (home), n/a (work), [phone] (mobile)    Family/Social Support:   Number of dependents/: none  Marital history: never , no children, not in current relationship  Other family dynamics: Pt's mother is living,  Father is , one 1/2 sister with whom he is not close.  Pt and mother report "very small family".  He denies any cousins or distant family members who he can rely upon.    Household Composition:    The patient lives with room mate, Branden Rowe, 50; [phone].  He drives.    Do you and your caregivers have access to reliable transportation? yes     PRIMARY CAREGIVER: Debo Azevedo will be primary caregiver, phone number [phone].      provided in-depth information to patient and caregiver regarding pre- and post-transplant caregiver role.   strongly encourages patient and caregiver to have concrete plan regarding post-transplant care giving, including back-up caregiver(s) to ensure care giving needs are met as needed.    Patient and Caregiver states understanding all aspects of caregiver role/commitment and is able/willing/committed to being caregiver to the fullest extent necessary.    Patient and Caregiver verbalizes understanding of the education provided today and caregiver responsibilities.         remains available. Patient and Caregiver agree to contact  in a timely manner " "if concerns arise.      Able to take time off work without financial concerns: mother states she is retired and can travel to [facility] at any time however, she states she is uncertain whether she will have anyone to accompany her and this is a concern..     Additional Significant Others who will Assist with Transplant:    Pt's caregiver support is very limited.  He has listed his room mate (above) and two other friends:    Mehreen Rich 45, friend, [place], [place], [phone], drives, not currently working  Ena Sharan, 65, friend, [place], [phone]    SW DISCUSSED WITH KIDNEY SW TEAM AND PT'S RN COORD.  PT'S MOTHER SHOULD BE CALLED AT THE TIME OF ORGAN OFFER TO DETERMINE CAREGIVER AVAILABILITY.    At the time of assessment, pt's mother appeared visibly ill, with frequent coughing and using a walker.  Mother stated she has been sick with "a sinus infection that got in my chest".   SW again expressed concern regarding pt's caregiver plan and encouraged pt to establish as many caregivers as possible in the event mother is unable to be primary.  Friend Mehreen, who was present, stated she will be available.      Living Will: no  Healthcare Power of : no  Advance Directives on file: <<no information> per medical record.  Verbally reviewed LW/HCPA information.   provided patient with copy of LW/HCPA documents and provided education on completion of forms.    Living Donors: No.    Highest Education Level: 12th grade  Reading Ability: 12th grade  Reports difficulty with: N/A  Learns Best By:  Written and demonstration     Status: no  VA Benefits: no     Working for Income: No  If no, reason not working: Disability  Patient is disabled.  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    Spouse/Significant Other Employment: mother is retired    Disabled: yes: date disability began: 11/10/14, due to: ESRD.    Monthly Income:  SS " Disability: $2100.00  Able to afford all costs now and if transplanted, including medications: yes  Patient and Caregiver verbalizes understanding of personal responsibilities related to transplant costs and the importance of having a financial plan to ensure that patients transplant costs are fully covered.       provided fundraising information/education. Patient and Caregiververbalizes understanding.   remains available.    Insurance:   Payor/Plan Subscr  Sex Relation Sub. Ins. ID Effective Group Num   1. MEDICARE - ME* MARYJANE BRAND 1970 Male  [id number]                                    [address]   2. [employer]* MARYJANE BRAND 1970 Male  [id number]                                    [employer]     Primary Insurance (for UNOS reporting): Private Insurance  [employer] is currently paying for supplement.  Pt verbalized awareness and understands he will need to begin payment for insurance upon transplant.  Secondary Insurance (for UNOS reporting): None  Patient and Caregiver verbalizes clear understanding that patient may experience difficulty obtaining and/or be denied insurance coverage post-surgery. This includes and is not limited to disability insurance, life insurance, health insurance, burial insurance, long term care insurance, and other insurances.      Patient and Caregiver also reports understanding that future health concerns related to or unrelated to transplantation may not be covered by patient's insurance.  Resources and information provided and reviewed.     Patient and Caregiver provides verbal permission to release any necessary information to outside resources for patient care and discharge planning.  Resources and information provided are reviewed.      Dialysis Adherence: Patient reports he is compliant with all treatment recommendations. Pt has good dialysis unit compliance report.    Infusion Service: patient utilizing?  no  Home Health: patient utilizing? no  DME: PD supplies  Pulmonary/Cardiac Rehab: none   ADLS:  Pt states ability to independently accomplish all adls.    Adherence:   Pt states current and expected compliance with all healthcare recommendations..  Adherence education and counseling provided.     Per History Section:  Past Medical History:   Diagnosis Date    Chronic asthma     ESRD since 11/2014 from HTN     Hyperlipidemia     Hyperuricemia     Renal hypertension     Secondary hyperparathyroidism of renal origin      Social History     Tobacco Use    Smoking status: Never Smoker    Smokeless tobacco: Never Used   Substance Use Topics    Alcohol use: No     Alcohol/week: 0.0 oz     Social History     Substance and Sexual Activity   Drug Use No     Social History     Substance and Sexual Activity   Sexual Activity Not on file       Per Today's Psychosocial:  Tobacco: none, patient denies any use.  Alcohol: rare ETOH use.  Illicit Drugs/Non-prescribed Medications: none, patient denies any use.    Patient and Caregiver states clear understanding of the potential impact of substance use as it relates to transplant candidacy and is aware of possible random substance screening.  Substance abstinence/cessation counseling, education and resources provided and reviewed.     Arrests/DWI/Treatment/Rehab: patient denies    Psychiatric History:    Mental Health: depression and Pt states current medication regimine is keeping symptoms controlled.  Psychiatrist/Counselor: PCP  Medications:  zoloft and paxil  Suicide/Homicide Issues: Pt denies current or past suicidal/homicidal ideation.   Safety at home: Pt denies any home safety concerns.      Knowledge: Patient and Caregiver states having clear understanding and realistic expectations regarding the potential risks and potential benefits of organ transplantation and organ donation and agrees to discuss with health care team members and support system members, as well as  to utilize available resources and express questions and/or concerns in order to further facilitate the pt informed decision-making.  Resources and information provided and reviewed.    Patient and Caregiver is aware of [facility]'s affiliation and/or partnership with agencies in home health care, LTAC, SNF, [facility], and other hospitals and clinics.    Understanding: Patient and Caregiver reports having a clear understanding of the many lifetime commitments involved with being a transplant recipient, including costs, compliance, medications, lab work, procedures, appointments, concrete and financial planning, preparedness, timely and appropriate communication of concerns, abstinence (ETOH, tobacco, illicit non-prescribed drugs), adherence to all health care team recommendations, support system and caregiver involvement, appropriate and timely resource utilization and follow-through, mental health counseling as needed/recommended, and patient and caregiver responsibilities.  Social Service Handbook, resources and detailed educational information provided and reviewed.  Educational information provided.    Patient and Caregiver also reports current and expected compliance with health care regime and states having a clear understanding of the importance of compliance.      Patient and Caregiver reports a clear understanding that risks and benefits may be involved with organ transplantation and with organ donation.       Patient and Caregiver also reports clear understanding that psychosocial risk factors may affect patient, and include but are not limited to feelings of depression, generalized anxiety, anxiety regarding dependence on others, post traumatic stress disorder, feelings of guilt and other emotional and/or mental concerns, and/or exacerbation of existing mental health concerns.  Detailed resources provided and discussed.      Patient and Caregiver agrees to access appropriate resources in a timely manner as needed  and/or as recommended, and to communicate concerns appropriately.  Patient and Caregiver also reports a clear understanding of treatment options available.     Patient and Caregiver received education in a group setting.   reviewed education, provided additional information, and answered questions.    Feelings or Concerns: Pt and mother denied concerns.    Coping: Pt states he is coping well with waiting on for transplant.  He states he keeps busy volunteering at arts festivals, going to [employer] and frequently traveling home to visit.    Goals: get off dialysis, return to work.  Patient referred to Vocational Rehabilitation.    Interview Behavior: Patient and Caregiver presents as alert and oriented x 4, pleasant, good eye contact, well groomed, recall good, concentration/judgement good, average intelligence, calm, communicative, cooperative, asking and answering questions appropriately .  He was accompanied by mother who was visibly ill and friend from [place], who both verbalized support for pt's pursuit of transplant.          Transplant Social Work - Candidacy  Assessment/Plan:     Psychosocial Suitability: Patient presents as a suitable candidate for kidney transplant at this time. Based on psychosocial risk factors, patient presents as moderate risk, due to limited caregiver support..    Recommendations/Additional Comments: Confirm caregiver at the time of organ offer; recommend fundraising and alternate caregiver plan.  May benefit from [address] post transplant.    Denise Santacruz LCSW

## 2018-11-12 DIAGNOSIS — Z76.82 AWAITING ORGAN TRANSPLANT STATUS: Primary | ICD-10-CM

## 2018-11-13 ENCOUNTER — TELEPHONE (OUTPATIENT)
Dept: TRANSPLANT | Facility: CLINIC | Age: 48
End: 2018-11-13

## 2018-11-13 NOTE — TELEPHONE ENCOUNTER
" Compliance check:  Dialysis unit reports in the past three months pt has had "none" no shows, "none"  AMAs, labs , all others within acceptable limits, transportation no concerns and family support no concerns.    Reported by Jael Coughlin LMSW via fax back form    "

## 2018-11-14 DIAGNOSIS — Z12.11 SPECIAL SCREENING FOR MALIGNANT NEOPLASMS, COLON: Primary | ICD-10-CM

## 2018-11-14 DIAGNOSIS — Z99.2 DIALYSIS PATIENT: Primary | ICD-10-CM

## 2018-11-14 RX ORDER — POLYETHYLENE GLYCOL 3350, SODIUM SULFATE ANHYDROUS, SODIUM BICARBONATE, SODIUM CHLORIDE, POTASSIUM CHLORIDE 236; 22.74; 6.74; 5.86; 2.97 G/4L; G/4L; G/4L; G/4L; G/4L
4 POWDER, FOR SOLUTION ORAL ONCE
Qty: 4000 ML | Refills: 0 | Status: SHIPPED | OUTPATIENT
Start: 2018-11-14 | End: 2018-12-05

## 2018-11-28 ENCOUNTER — TELEPHONE (OUTPATIENT)
Dept: TRANSPLANT | Facility: HOSPITAL | Age: 48
End: 2018-11-28

## 2018-11-28 NOTE — TELEPHONE ENCOUNTER
" Compliance check:  Dialysis unit reports in the past three months pt has had "n/a" no shows, "n/a" AMAs, labs , no other lab concerns, transportation no concerns and family support no concerns.    Reported by Jael Coughlin via fax back sheet    "

## 2018-12-12 ENCOUNTER — ANESTHESIA (OUTPATIENT)
Dept: ENDOSCOPY | Facility: HOSPITAL | Age: 48
End: 2018-12-12
Payer: MEDICARE

## 2018-12-12 ENCOUNTER — ANESTHESIA EVENT (OUTPATIENT)
Dept: ENDOSCOPY | Facility: HOSPITAL | Age: 48
End: 2018-12-12
Payer: MEDICARE

## 2018-12-12 PROBLEM — Z12.11 SCREENING FOR COLON CANCER: Status: ACTIVE | Noted: 2018-12-12

## 2018-12-12 PROCEDURE — E9220 PRA ENDO ANESTHESIA: HCPCS | Mod: TXP,,, | Performed by: NURSE ANESTHETIST, CERTIFIED REGISTERED

## 2018-12-12 PROCEDURE — 63600175 PHARM REV CODE 636 W HCPCS: Mod: TXP | Performed by: NURSE ANESTHETIST, CERTIFIED REGISTERED

## 2018-12-12 RX ORDER — PROPOFOL 10 MG/ML
VIAL (ML) INTRAVENOUS CONTINUOUS PRN
Status: DISCONTINUED | OUTPATIENT
Start: 2018-12-12 | End: 2018-12-12

## 2018-12-12 RX ORDER — LIDOCAINE HCL/PF 100 MG/5ML
SYRINGE (ML) INTRAVENOUS
Status: DISCONTINUED | OUTPATIENT
Start: 2018-12-12 | End: 2018-12-12

## 2018-12-12 RX ORDER — PROPOFOL 10 MG/ML
VIAL (ML) INTRAVENOUS
Status: DISCONTINUED | OUTPATIENT
Start: 2018-12-12 | End: 2018-12-12

## 2018-12-12 RX ADMIN — PROPOFOL 150 MCG/KG/MIN: 10 INJECTION, EMULSION INTRAVENOUS at 11:12

## 2018-12-12 RX ADMIN — PROPOFOL 30 MG: 10 INJECTION, EMULSION INTRAVENOUS at 11:12

## 2018-12-12 RX ADMIN — LIDOCAINE HYDROCHLORIDE 60 MG: 20 INJECTION, SOLUTION INTRAVENOUS at 11:12

## 2018-12-12 RX ADMIN — PROPOFOL 70 MG: 10 INJECTION, EMULSION INTRAVENOUS at 11:12

## 2018-12-12 NOTE — TRANSFER OF CARE
"Anesthesia Transfer of Care Note    Patient: Bryant Azevedo    Procedure(s) Performed: Procedure(s) (LRB):  COLONOSCOPY (N/A)    Patient location: PACU    Anesthesia Type: general    Transport from OR: Transported from OR on room air with adequate spontaneous ventilation    Post pain: adequate analgesia    Post assessment: no apparent anesthetic complications and tolerated procedure well    Post vital signs: stable    Level of consciousness: sedated and responds to stimulation    Nausea/Vomiting: no nausea/vomiting    Complications: none    Transfer of care protocol was followed      Last vitals:   Visit Vitals  BP (!) 143/87 (BP Location: Right leg, Patient Position: Lying)   Pulse 60   Temp 36.9 °C (98.4 °F) (Temporal)   Resp 14   Ht 5' 4" (1.626 m)   Wt 68 kg (150 lb)   SpO2 95%   BMI 25.75 kg/m²     "

## 2018-12-12 NOTE — ANESTHESIA POSTPROCEDURE EVALUATION
"Anesthesia Post Evaluation    Patient: Bryant Azevedo    Procedure(s) Performed: Procedure(s) (LRB):  COLONOSCOPY (N/A)    Final Anesthesia Type: general  Patient location during evaluation: GI PACU  Patient participation: Yes- Able to Participate  Level of consciousness: awake and alert  Post-procedure vital signs: reviewed and stable  Pain management: adequate  Airway patency: patent  PONV status at discharge: No PONV  Anesthetic complications: no      Cardiovascular status: blood pressure returned to baseline  Respiratory status: spontaneous ventilation  Hydration status: euvolemic  Follow-up not needed.        Visit Vitals  /72 (Patient Position: Lying)   Pulse 63   Temp 36.8 °C (98.2 °F) (Temporal)   Resp 16   Ht 5' 4" (1.626 m)   Wt 68 kg (150 lb)   SpO2 97%   BMI 25.75 kg/m²       Pain/Bruce Score: Bruce Score: 10 (12/12/2018 11:36 AM)        "

## 2018-12-12 NOTE — ANESTHESIA PREPROCEDURE EVALUATION
12/12/2018  Bryant Azevedo is a 48 y.o., male with renal failure on peritoneal dialysis daily (K = 3.6 today).  Had recent angiogram - no blockages per patient.  No cardiac hx.    Past Medical History:   Diagnosis Date    Chronic asthma     ESRD since 11/2014 from HTN     Hyperlipidemia     Hyperuricemia     Renal hypertension     Secondary hyperparathyroidism of renal origin      Lab Results   Component Value Date    WBC 6.54 11/10/2015    HGB 14.7 11/10/2015    HCT 42.9 11/10/2015    MCV 89 11/10/2015     (L) 11/10/2015       Chemistry        Component Value Date/Time     11/10/2015 0707    K 3.6 12/12/2018 0907     11/10/2015 0707    CO2 29 11/10/2015 0707    BUN 45 (H) 11/10/2015 0707    CREATININE 4.1 (H) 11/10/2015 0707    GLU 95 11/10/2015 0707        Component Value Date/Time    CALCIUM 9.0 11/10/2015 0707    ALKPHOS 53 (L) 09/08/2015 0720    AST 25 09/08/2015 0720    ALT 25 09/08/2015 0720    BILITOT 1.1 (H) 09/08/2015 0720    ESTGFRAFRICA 18.7 (A) 11/10/2015 0707    EGFRNONAA 16.2 (A) 11/10/2015 0707            Anesthesia Evaluation    I have reviewed the Patient Summary Reports.     I have reviewed the Medications.     Review of Systems  Anesthesia Hx:  No problems with previous Anesthesia    Cardiovascular:   Exercise tolerance: good Hypertension Denies CAD.    Denies CABG/stent.  Denies Dysrhythmias.     Pulmonary:   Asthma    Renal/:   Chronic Renal Disease, Dialysis        Physical Exam  General:  Well nourished    Airway/Jaw/Neck:  Airway Findings: Mouth Opening: Small, but > 3cm Tongue: Normal  General Airway Assessment: Adult  Mallampati: II  Jaw/Neck Findings:  Neck ROM: Normal ROM      Dental:  Dental Findings: In tact   Chest/Lungs:  Chest/Lungs Findings: Clear to auscultation, Normal Respiratory Rate     Heart/Vascular:  Heart Findings: Rate: Normal  Rhythm:  Regular Rhythm  Sounds: Normal        Mental Status:  Mental Status Findings:  Cooperative, Alert and Oriented         Anesthesia Plan  Type of Anesthesia, risks & benefits discussed:  Anesthesia Type:  general  Patient's Preference:   Intra-op Monitoring Plan: standard ASA monitors  Intra-op Monitoring Plan Comments:   Post Op Pain Control Plan: multimodal analgesia  Post Op Pain Control Plan Comments:   Induction:   IV  Beta Blocker:         Informed Consent: Patient understands risks and agrees with Anesthesia plan.  Questions answered. Anesthesia consent signed with patient.  ASA Score: 3     Day of Surgery Review of History & Physical:    H&P update referred to the surgeon.         Ready For Surgery From Anesthesia Perspective.

## 2018-12-19 ENCOUNTER — TELEPHONE (OUTPATIENT)
Dept: ENDOSCOPY | Facility: HOSPITAL | Age: 48
End: 2018-12-19

## 2019-08-06 ENCOUNTER — PATIENT MESSAGE (OUTPATIENT)
Dept: TRANSPLANT | Facility: CLINIC | Age: 49
End: 2019-08-06

## 2019-10-08 DIAGNOSIS — Z76.82 ORGAN TRANSPLANT CANDIDATE: Primary | ICD-10-CM

## 2019-10-08 NOTE — PROGRESS NOTES
YEARLY LIST MANAGEMENT NOTE    Bryant Azevedo's kidney transplant listing status reviewed.  Patient is due for follow-up appointments in November 2019.  Appointments will be scheduled per protocol.  HLA sample is current and being rec'd on a regular basis.

## 2019-10-09 DIAGNOSIS — Z76.82 ORGAN TRANSPLANT CANDIDATE: Primary | ICD-10-CM

## 2019-11-15 ENCOUNTER — TELEPHONE (OUTPATIENT)
Dept: TRANSPLANT | Facility: HOSPITAL | Age: 49
End: 2019-11-15

## 2019-11-15 ENCOUNTER — HOSPITAL ENCOUNTER (INPATIENT)
Facility: HOSPITAL | Age: 49
LOS: 1 days | Discharge: HOME | DRG: 682 | End: 2019-11-15
Attending: TRANSPLANT SURGERY | Admitting: TRANSPLANT SURGERY
Payer: MEDICARE

## 2019-11-15 DIAGNOSIS — N18.6 ESRD (END STAGE RENAL DISEASE): ICD-10-CM

## 2019-11-15 DIAGNOSIS — Z01.818 PRE-OP EVALUATION: ICD-10-CM

## 2019-11-15 DIAGNOSIS — Z76.82 AWAITING ORGAN TRANSPLANT STATUS: Primary | ICD-10-CM

## 2019-11-15 DIAGNOSIS — N18.6 ESRD (END STAGE RENAL DISEASE): Primary | Chronic | ICD-10-CM

## 2019-11-15 PROCEDURE — 20600001 HC STEP DOWN PRIVATE ROOM: Mod: NTX

## 2019-11-15 PROCEDURE — 99223 1ST HOSP IP/OBS HIGH 75: CPT | Mod: AI,NTX,, | Performed by: PHYSICIAN ASSISTANT

## 2019-11-15 PROCEDURE — 99223 PR INITIAL HOSPITAL CARE,LEVL III: ICD-10-PCS | Mod: AI,NTX,, | Performed by: PHYSICIAN ASSISTANT

## 2019-11-15 RX ORDER — ACETAMINOPHEN 650 MG/20.3ML
650 LIQUID ORAL EVERY 6 HOURS PRN
Status: DISCONTINUED | OUTPATIENT
Start: 2019-11-15 | End: 2019-11-16 | Stop reason: HOSPADM

## 2019-11-15 RX ORDER — DIPHENHYDRAMINE HYDROCHLORIDE 50 MG/ML
50 INJECTION INTRAMUSCULAR; INTRAVENOUS EVERY 6 HOURS PRN
Status: DISCONTINUED | OUTPATIENT
Start: 2019-11-15 | End: 2019-11-16 | Stop reason: HOSPADM

## 2019-11-15 RX ORDER — CEFAZOLIN SODIUM 1 G/3ML
2 INJECTION, POWDER, FOR SOLUTION INTRAMUSCULAR; INTRAVENOUS
Status: DISCONTINUED | OUTPATIENT
Start: 2019-11-15 | End: 2019-11-16 | Stop reason: HOSPADM

## 2019-11-15 RX ORDER — MUPIROCIN 20 MG/G
OINTMENT TOPICAL
Status: DISCONTINUED | OUTPATIENT
Start: 2019-11-15 | End: 2019-11-16 | Stop reason: HOSPADM

## 2019-11-15 RX ORDER — HEPARIN SODIUM 5000 [USP'U]/ML
5000 INJECTION, SOLUTION INTRAVENOUS; SUBCUTANEOUS ONCE
Status: DISCONTINUED | OUTPATIENT
Start: 2019-11-15 | End: 2019-11-16 | Stop reason: HOSPADM

## 2019-11-15 NOTE — HPI
50 y/o M with ESRD secondary to HTN and congenital abnormality (unknown details) who presents for KTx. He has been on the wait list for a kidney transplant at [facility] since 11/10/2014. Patient is currently on peritoneal dialysis started on 11/2014. Patient is dialyzing on cyclic peritoneal dialysis.  Patient reports that she is tolerating dialysis well.  He has a PD catheter. Patient denies any recent hospitalizations or ED visits. Donor kidney is DBD, CMV+, GLENYS testing -, KDPI 82%. Campath induction. No OR times yet- pt's case is to follow the first recipient's case occurring at 20:30. Pre op labs and imaging testing. Surgery and anesthesia to come consent pt shortly. Monitor.

## 2019-11-15 NOTE — ASSESSMENT & PLAN NOTE
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  - Patient is currently on cyclical peritoneal dialysis started on 11/2014.  - Donor kidney is DBD, CMV+, GLENYS testing -, KDPI 82%.   - Campath induction.   - No OR times yet- pt's case is to follow the first recipient's case occurring at 20:30. Pt is NPO  - Pre op labs and imaging testing.   - Surgery and anesthesia residents to come consent pt shortly  - monitor

## 2019-11-15 NOTE — TELEPHONE ENCOUNTER
"Caregiver Plan    Per inpatient nurse coordinator, Susan Rodrigez, pt will be called in for transplant tonight and needed his caregiver plan confirmed. SUNITHA spoke with pt's mother, Debo Azevedo (cell [phone], home [phone]) and confirmed pt's mother would be able to leave [place] in the next couple hours. Pt's mother reports taking 7 1/2 hours to drive and having mother's friend, Mehreen Rich (cell [phone]) accompany mother on drive to [place]. SW could hear Mehreen Rich in the background of call confirming she would ride with pt's mother. Pt's mother reports being prepared to stay as long as needed and is prepared to stay in the  apartments with supplies if they qualify. SUNITHA warned mother that pt doesn't automatically qualify since he lives within an hour of [place]. Pt's mother reports "I'm a poor lady, I can't afford anything but we'll figure it out once we get there." SUNITHA warned pt's mother to look into a hotel or staying at pt's home if needed. Pt's mother continuously reports being "a nervous wreck" and "shaking" as a result of excitement/anxiety surrounding pt getting transplant.     SUNITHA left pt's friend, Ena Zhou, [phone] a voicemail regarding confirming role as backup caregiver. SUNITHA also spoke with pt and confirmed being able to call pt's mother, Mehreen Rich and Ena Tsang. SW told pt to let caregivers know that SW would be calling. Pt confirmed and verbalized understanding.     SUNITHA remains available at [phone].  "

## 2019-11-16 NOTE — SUBJECTIVE & OBJECTIVE
Past Medical History:   Diagnosis Date    Chronic asthma     ESRD since 11/2014 from HTN     Hyperlipidemia     Hyperuricemia     Renal hypertension     Secondary hyperparathyroidism of renal origin        Past Surgical History:   Procedure Laterality Date    CHOLECYSTECTOMY      COLONOSCOPY N/A 12/12/2018    Procedure: COLONOSCOPY;  Surgeon: Pawel Gusman MD;  Location: [facility] ([address] [place] [address]);  Service: Endoscopy;  Laterality: N/A;  peritoneal dialysis daily-labs prior-MS    PERITONEAL CATHETER INSERTION      TONSILLECTOMY         Review of patient's allergies indicates:   Allergen Reactions    Codeine Other (See Comments)     Severe abdominal cramping       Family History     Problem Relation (Age of Onset)    Cancer Father    Heart disease Maternal Grandmother    Hypertension Mother        Tobacco Use    Smoking status: Never Smoker    Smokeless tobacco: Never Used   Substance and Sexual Activity    Alcohol use: No     Alcohol/week: 0.0 standard drinks    Drug use: No    Sexual activity: Not on file       PTA Medications   Medication Sig    allopurinol (ZYLOPRIM) 100 MG tablet Take 100 mg by mouth once daily.    amlodipine (NORVASC) 5 MG tablet Take 1 tablet (5 mg total) by mouth once daily. One-2 per day or as directed (Patient taking differently: Take 5 mg by mouth 2 (two) times daily. )    calcium acetate (PHOSLO) 667 mg capsule Take 667 mg by mouth 3 (three) times daily with meals.    ferrous sulfate 325 (65 FE) MG EC tablet Take 325 mg by mouth once daily.    fluticasone-salmeterol 250-50 mcg/dose (ADVAIR) 250-50 mcg/dose diskus inhaler Inhale 1 puff into the lungs 2 (two) times daily.    furosemide (LASIX) 80 MG tablet Take 80 mg by mouth once daily.    labetalol (NORMODYNE) 100 MG tablet Take 1 tablet (100 mg total) by mouth 3 (three) times daily.    montelukast (SINGULAIR) 10 mg tablet Take 10 mg by mouth once daily.    paroxetine (PAXIL) 20 MG tablet Take 20 mg by mouth every  "evening.    sertraline (ZOLOFT) 50 MG tablet Take 50 mg by mouth once daily.       Review of Systems  Objective:     Vital Signs (Most Recent):    Vital Signs (24h Range):           There is no height or weight on file to calculate BMI.    No intake or output data in the 24 hours ending 11/15/19 1955    Physical Exam    Laboratory:  {Results:11352::"Labs within the past 24 hours have been reviewed."}    Diagnostic Results:  {Select Imagin}  "

## 2019-11-16 NOTE — H&P
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 He has been on the wait list for a kidney transplant at [facility] since 11/10/2014. Patient is currently on peritoneal dialysis started on 11/2014. Patient is dialyzing on cyclic peritoneal dialysis.  Patient reports that she is tolerating dialysis well.  He has a PD catheter. Patient denies any recent hospitalizations or ED visits. Donor kidney is DBD, CMV+, GLENYS testing -, KDPI 82%. Campath induction. No OR times yet- pt's case is to follow the first recipient's case occurring at 20:30. Pre op labs and imaging testing. Surgery and anesthesia to come consent pt shortly. Monitor.    Dialysis History: Mr. Hurtado with ESRD, requiring chronic dialysis who is on peritoneal dialysis. Patient is dialyzing on cyclic peritoneal dialysis.  Patient reports that he is tolerating dialysis well.       PTA Medications   Medication Sig    allopurinol (ZYLOPRIM) 100 MG tablet Take 100 mg by mouth once daily.    amlodipine (NORVASC) 5 MG tablet Take 1 tablet (5 mg total) by mouth once daily. One-2 per day or as directed (Patient taking differently: Take 5 mg by mouth 2 (two) times daily. )    calcium acetate (PHOSLO) 667 mg capsule Take 667 mg by mouth 3 (three) times daily with meals.    ferrous sulfate 325 (65 FE) MG EC tablet Take 325 mg by mouth once daily.    fluticasone-salmeterol 250-50 mcg/dose (ADVAIR) 250-50 mcg/dose diskus inhaler Inhale 1 puff into the lungs 2 (two) times daily.    furosemide (LASIX) 80 MG tablet Take 80 mg by mouth once daily.    labetalol (NORMODYNE) 100 MG tablet Take 1 tablet (100 mg total) by mouth 3 (three) times daily.    montelukast (SINGULAIR) 10 mg tablet Take 10 mg by mouth once daily.    paroxetine (PAXIL) 20 MG tablet Take 20  mg by mouth every evening.    sertraline (ZOLOFT) 50 MG tablet Take 50 mg by mouth once daily.       Review of patient's allergies indicates:   Allergen Reactions    Codeine Other (See Comments)     Severe abdominal cramping       Past Medical History:   Diagnosis Date    Chronic asthma     ESRD since 11/2014 from HTN     Hyperlipidemia     Hyperuricemia     Renal hypertension     Secondary hyperparathyroidism of renal origin      Past Surgical History:   Procedure Laterality Date    CHOLECYSTECTOMY      COLONOSCOPY N/A 12/12/2018    Procedure: COLONOSCOPY;  Surgeon: Pawel Gusman MD;  Location: [address];  Service: Endoscopy;  Laterality: N/A;  peritoneal dialysis daily-labs prior-MS    PERITONEAL CATHETER INSERTION      TONSILLECTOMY       Family History     Problem Relation (Age of Onset)    Cancer Father    Heart disease Maternal Grandmother    Hypertension Mother        Tobacco Use    Smoking status: Never Smoker    Smokeless tobacco: Never Used   Substance and Sexual Activity    Alcohol use: No     Alcohol/week: 0.0 standard drinks    Drug use: No    Sexual activity: Not on file        Review of Systems   Reason unable to perform ROS: NA.   Psychiatric/Behavioral: Dysphoric mood: Pending.     Objective:     Vital Signs (Most Recent):           There is no height or weight on file to calculate BMI.     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  - Patient is currently on  cyclical peritoneal dialysis started on 11/2014.  - Donor kidney is DBD, CMV+, GLENYS testing -, KDPI 82%.   - Campath induction.   - No OR times yet- pt's case is to follow the first recipient's case occurring at 20:30. Pt is NPO  - Pre op labs and imaging testing.   - Surgery and anesthesia residents to come consent pt shortly  -  monitor            The patient presents for kidney transplant.  There are no apparent contraindications to proceeding with the planned transplant.  The patient understands that the transplant could potentially be cancelled pending detailed assessment of the donor organ.  He will receive Campath induction.  A complete discussion of the transplant procedure, including risks, complications, and alternatives, as well as any donor-specific risk factors requiring specific disclosure, will be carried out by the responsible staff surgeon prior to the procedure.     Ingris Scherer PA-C  Kidney Transplant  [facility]

## 2019-11-16 NOTE — TREATMENT PLAN
Pt notified that donor case had been shut down d/t to poor donor quality by KT coordinator. Pt verbalized understanding of situation. No new questions at this time.  VSS and pt in NAD. Pt will discharge home under his own care.

## 2019-11-16 NOTE — HPI
Mr. Azevedo is a 47 y.o. 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 He has been on the wait list for a kidney transplant at [facility] since 11/10/2014. Patient is currently on peritoneal dialysis started on 11/2014. Patient is dialyzing on cyclic peritoneal dialysis.  Patient reports that he is tolerating dialysis well.

## 2019-11-16 NOTE — DISCHARGE SUMMARY
[facility]  Kidney Transplant  Discharge Summary    Patient Name: rByant Azevedo  MRN: [id number]  Admission Date: 11/15/2019  Hospital Length of Stay: 0 days  Discharge Date and Time:  11/15/2019 8:09 PM  Attending Physician: Jesse Hernandez Jr., MD   Discharging Provider: Ingris Scherer PA-C  Primary Care Provider: Primary Doctor No    HPI:   50 y/o M with ESRD secondary to HTN and congenital abnormality (unknown details) who presents for KTx. He has been on the wait list for a kidney transplant at [facility] since 11/10/2014. Patient is currently on peritoneal dialysis started on 11/2014. Patient is dialyzing on cyclic peritoneal dialysis.  Patient reports that she is tolerating dialysis well.  He has a PD catheter. Patient denies any recent hospitalizations or ED visits. Donor kidney is DBD, CMV+, GLENYS testing -, KDPI 82%. Campath induction. No OR times yet- pt's case is to follow the first recipient's case occurring at 20:30. Pre op labs and imaging testing. Surgery and anesthesia to come consent pt shortly. Monitor.     Case shut down shortly after arrival due to organ donor quality. No work-up had been initiated. Patient discharged in stable condition           Final Active Diagnoses:    Diagnosis Date Noted POA    PRINCIPAL PROBLEM:  ESRD since 11/2014 from HTN [N18.6]  Yes     Chronic      Problems Resolved During this Admission:       Treatments: none        Pending Diagnostic Studies:     None        Significant Diagnostic Studies: none    Discharged Condition: good    Disposition:     Follow Up:    Patient Instructions:   No discharge procedures on file.  Medications:  Reconciled Home Medications:      Medication List      ASK your doctor about these medications    allopurinol 100 MG tablet  Commonly known as:  ZYLOPRIM  Take 100 mg by mouth once daily.     amLODIPine 5 MG tablet  Commonly known as:  NORVASC  Take 1 tablet (5 mg total) by mouth once daily. One-2 per day or as  directed     calcium acetate 667 mg capsule  Commonly known as:  PHOSLO  Take 667 mg by mouth 3 (three) times daily with meals.     ferrous sulfate 325 (65 FE) MG EC tablet  Take 325 mg by mouth once daily.     fluticasone-salmeterol 250-50 mcg/dose 250-50 mcg/dose diskus inhaler  Commonly known as:  ADVAIR  Inhale 1 puff into the lungs 2 (two) times daily.     furosemide 80 MG tablet  Commonly known as:  LASIX  Take 80 mg by mouth once daily.     labetalol 100 MG tablet  Commonly known as:  NORMODYNE  Take 1 tablet (100 mg total) by mouth 3 (three) times daily.     montelukast 10 mg tablet  Commonly known as:  SINGULAIR  Take 10 mg by mouth once daily.     paroxetine 20 MG tablet  Commonly known as:  PAXIL  Take 20 mg by mouth every evening.     sertraline 50 MG tablet  Commonly known as:  ZOLOFT  Take 50 mg by mouth once daily.          Time spent caring for patient (Greater than 1/2 spent in direct face-to-face contact): < 30 minutes    Ingris Scherer PA-C  Kidney Transplant  [facility]

## 2019-11-16 NOTE — SUBJECTIVE & OBJECTIVE
Subjective:     Chief Complaint/Reason for Admission: Kidney Transplant    History of Present Illness:  48 y/o M with ESRD secondary to HTN and congenital abnormality (unknown details) who presents for KTx. He has been on the wait list for a kidney transplant at [facility] since 11/10/2014. Patient is currently on peritoneal dialysis started on 11/2014. Patient is dialyzing on cyclic peritoneal dialysis.  Patient reports that she is tolerating dialysis well.  He has a PD catheter. Patient denies any recent hospitalizations or ED visits. Donor kidney is DBD, CMV+, GLENYS testing -, KDPI 82%. Campath induction. No OR times yet- pt's case is to follow the first recipient's case occurring at 20:30. Pre op labs and imaging testing. Surgery and anesthesia to come consent pt shortly. Monitor.    Dialysis History: Mr. Hurtado with ESRD, requiring chronic dialysis who is on peritoneal dialysis. Patient is dialyzing on cyclic peritoneal dialysis.  Patient reports that he is tolerating dialysis well.       PTA Medications   Medication Sig    allopurinol (ZYLOPRIM) 100 MG tablet Take 100 mg by mouth once daily.    amlodipine (NORVASC) 5 MG tablet Take 1 tablet (5 mg total) by mouth once daily. One-2 per day or as directed (Patient taking differently: Take 5 mg by mouth 2 (two) times daily. )    calcium acetate (PHOSLO) 667 mg capsule Take 667 mg by mouth 3 (three) times daily with meals.    ferrous sulfate 325 (65 FE) MG EC tablet Take 325 mg by mouth once daily.    fluticasone-salmeterol 250-50 mcg/dose (ADVAIR) 250-50 mcg/dose diskus inhaler Inhale 1 puff into the lungs 2 (two) times daily.    furosemide (LASIX) 80 MG tablet Take 80 mg by mouth once daily.    labetalol (NORMODYNE) 100 MG tablet Take 1 tablet (100 mg total) by mouth 3 (three) times daily.    montelukast (SINGULAIR) 10 mg tablet Take 10 mg by mouth once daily.    paroxetine (PAXIL) 20 MG tablet Take 20 mg by mouth every evening.    sertraline (ZOLOFT) 50 MG  tablet Take 50 mg by mouth once daily.       Review of patient's allergies indicates:   Allergen Reactions    Codeine Other (See Comments)     Severe abdominal cramping       Past Medical History:   Diagnosis Date    Chronic asthma     ESRD since 11/2014 from HTN     Hyperlipidemia     Hyperuricemia     Renal hypertension     Secondary hyperparathyroidism of renal origin      Past Surgical History:   Procedure Laterality Date    CHOLECYSTECTOMY      COLONOSCOPY N/A 12/12/2018    Procedure: COLONOSCOPY;  Surgeon: Pawel Gusman MD;  Location: [address]);  Service: Endoscopy;  Laterality: N/A;  peritoneal dialysis daily-labs prior-MS    PERITONEAL CATHETER INSERTION      TONSILLECTOMY       Family History     Problem Relation (Age of Onset)    Cancer Father    Heart disease Maternal Grandmother    Hypertension Mother        Tobacco Use    Smoking status: Never Smoker    Smokeless tobacco: Never Used   Substance and Sexual Activity    Alcohol use: No     Alcohol/week: 0.0 standard drinks    Drug use: No    Sexual activity: Not on file        Review of Systems   Reason unable to perform ROS: NA.   Psychiatric/Behavioral: Dysphoric mood: Pending.     Objective:     Vital Signs (Most Recent):           There is no height or weight on file to calculate BMI.     Physical Exam     Laboratory  pending    Diagnostic Results:  pending

## 2019-11-18 ENCOUNTER — TELEPHONE (OUTPATIENT)
Dept: TRANSPLANT | Facility: CLINIC | Age: 49
End: 2019-11-18

## 2019-11-18 NOTE — TELEPHONE ENCOUNTER
LATE ENTRY FROM 11/16/19  ON-CALL NOTE    UNOS# [id number]    Notified by Ellis John, , that Bryant Azevedo is eligible for kidney offer.  Spoke with patient and identified no acute medical issues in telephone assessment. Protocol script read to patient regarding N/A, standard donor offer. Patient verbalized understanding, all questions answered, patient accepts organ offer.      Current sample of blood is not-available for crossmatch, pt will have a fresh samples drawn on admit.  Patient reports no sensitizing event since last blood sample for PRA received.    Retrospective CMX will be done.  [facility]  ________________________________________________________________________  Coordinator received a call from procurement. The Kidney biopsy was not satisfactory  The case was shut down and the patient was d/c from the hospital

## 2019-11-29 ENCOUNTER — TELEPHONE (OUTPATIENT)
Dept: CARDIOLOGY | Facility: CLINIC | Age: 49
End: 2019-11-29

## 2019-11-29 DIAGNOSIS — Z76.82 AWAITING ORGAN TRANSPLANT STATUS: Primary | ICD-10-CM

## 2019-12-03 ENCOUNTER — HOSPITAL ENCOUNTER (OUTPATIENT)
Dept: CARDIOLOGY | Facility: CLINIC | Age: 49
Discharge: HOME | End: 2019-12-03
Attending: NURSE PRACTITIONER
Payer: MEDICARE

## 2019-12-03 ENCOUNTER — HOSPITAL ENCOUNTER (OUTPATIENT)
Dept: RADIOLOGY | Facility: HOSPITAL | Age: 49
Discharge: HOME | End: 2019-12-03
Attending: NURSE PRACTITIONER
Payer: MEDICARE

## 2019-12-03 ENCOUNTER — CLINICAL SUPPORT (OUTPATIENT)
Dept: CARDIOLOGY | Facility: CLINIC | Age: 49
End: 2019-12-03
Attending: NURSE PRACTITIONER
Payer: MEDICARE

## 2019-12-03 ENCOUNTER — OFFICE VISIT (OUTPATIENT)
Dept: TRANSPLANT | Facility: CLINIC | Age: 49
End: 2019-12-03
Payer: MEDICARE

## 2019-12-03 VITALS
DIASTOLIC BLOOD PRESSURE: 90 MMHG | SYSTOLIC BLOOD PRESSURE: 136 MMHG | RESPIRATION RATE: 16 BRPM | HEART RATE: 63 BPM | OXYGEN SATURATION: 98 % | WEIGHT: 158.5 LBS | HEIGHT: 65 IN | BODY MASS INDEX: 26.41 KG/M2 | TEMPERATURE: 99 F

## 2019-12-03 VITALS
BODY MASS INDEX: 26.49 KG/M2 | HEART RATE: 68 BPM | DIASTOLIC BLOOD PRESSURE: 90 MMHG | WEIGHT: 159 LBS | SYSTOLIC BLOOD PRESSURE: 136 MMHG | HEIGHT: 65 IN

## 2019-12-03 DIAGNOSIS — Z76.82 PATIENT ON WAITING LIST FOR KIDNEY TRANSPLANT: Primary | Chronic | ICD-10-CM

## 2019-12-03 DIAGNOSIS — R21 SKIN RASH: ICD-10-CM

## 2019-12-03 DIAGNOSIS — Z76.82 ORGAN TRANSPLANT CANDIDATE: ICD-10-CM

## 2019-12-03 DIAGNOSIS — N18.6 ANEMIA IN ESRD (END-STAGE RENAL DISEASE): Chronic | ICD-10-CM

## 2019-12-03 DIAGNOSIS — N25.81 SECONDARY HYPERPARATHYROIDISM OF RENAL ORIGIN: Chronic | ICD-10-CM

## 2019-12-03 DIAGNOSIS — N18.6 ESRD (END STAGE RENAL DISEASE): Chronic | ICD-10-CM

## 2019-12-03 DIAGNOSIS — D63.1 ANEMIA IN ESRD (END-STAGE RENAL DISEASE): Chronic | ICD-10-CM

## 2019-12-03 DIAGNOSIS — I12.9 RENAL HYPERTENSION: Chronic | ICD-10-CM

## 2019-12-03 LAB
ASCENDING AORTA: 3.47 CM
AV INDEX (PROSTH): 0.66
AV MEAN GRADIENT: 6 MMHG
AV PEAK GRADIENT: 11 MMHG
AV VALVE AREA: 2.54 CM2
AV VELOCITY RATIO: 0.69
BSA FOR ECHO PROCEDURE: 1.82 M2
CV ECHO LV RWT: 0.38 CM
CV PHARM DOSE: 0.4 MG
CV STRESS BASE HR: 54 BPM
DIASTOLIC BLOOD PRESSURE: 89 MMHG
DOP CALC AO PEAK VEL: 1.67 M/S
DOP CALC AO VTI: 35.29 CM
DOP CALC LVOT AREA: 3.9 CM2
DOP CALC LVOT DIAMETER: 2.22 CM
DOP CALC LVOT PEAK VEL: 1.16 M/S
DOP CALC LVOT STROKE VOLUME: 89.76 CM3
DOP CALCLVOT PEAK VEL VTI: 23.2 CM
E WAVE DECELERATION TIME: 219.21 MSEC
E/A RATIO: 0.88
E/E' RATIO: 9.78 M/S
ECHO LV POSTERIOR WALL: 0.97 CM (ref 0.6–1.1)
END DIASTOLIC INDEX-HIGH: 170 ML/M2
END SYSTOLIC INDEX-HIGH: 70 ML/M2
FRACTIONAL SHORTENING: 37 % (ref 28–44)
INTERVENTRICULAR SEPTUM: 1.03 CM (ref 0.6–1.1)
LA MAJOR: 5.51 CM
LA MINOR: 5.49 CM
LA WIDTH: 3.89 CM
LEFT ATRIUM SIZE: 3.97 CM
LEFT ATRIUM VOLUME INDEX: 40.2 ML/M2
LEFT ATRIUM VOLUME: 72.2 CM3
LEFT INTERNAL DIMENSION IN SYSTOLE: 3.23 CM (ref 2.1–4)
LEFT VENTRICLE DIASTOLIC VOLUME INDEX: 70.55 ML/M2
LEFT VENTRICLE DIASTOLIC VOLUME: 126.6 ML
LEFT VENTRICLE MASS INDEX: 106 G/M2
LEFT VENTRICLE SYSTOLIC VOLUME INDEX: 23.3 ML/M2
LEFT VENTRICLE SYSTOLIC VOLUME: 41.75 ML
LEFT VENTRICULAR INTERNAL DIMENSION IN DIASTOLE: 5.15 CM (ref 3.5–6)
LEFT VENTRICULAR MASS: 191.07 G
LV LATERAL E/E' RATIO: 8 M/S
LV SEPTAL E/E' RATIO: 12.57 M/S
MV PEAK A VEL: 1 M/S
MV PEAK E VEL: 0.88 M/S
NUC REST DIASTOLIC VOLUME INDEX: 123
NUC REST EJECTION FRACTION: 87
NUC REST SYSTOLIC VOLUME INDEX: 16
NUC STRESS DIASTOLIC VOLUME INDEX: 140
NUC STRESS EJECTION FRACTION: 75 %
NUC STRESS SYSTOLIC VOLUME INDEX: 35
OHS CV CPX 1 MINUTE RECOVERY HEART RATE: 70 BPM
OHS CV CPX 85 PERCENT MAX PREDICTED HEART RATE MALE: 144
OHS CV CPX MAX PREDICTED HEART RATE: 169
OHS CV CPX PATIENT IS FEMALE: 0
OHS CV CPX PATIENT IS MALE: 1
OHS CV CPX PEAK DIASTOLIC BLOOD PRESSURE: 95 MMHG
OHS CV CPX PEAK HEAR RATE: 58 BPM
OHS CV CPX PEAK RATE PRESSURE PRODUCT: 7888
OHS CV CPX PEAK SYSTOLIC BLOOD PRESSURE: 136 MMHG
OHS CV CPX PERCENT MAX PREDICTED HEART RATE ACHIEVED: 34
OHS CV CPX RATE PRESSURE PRODUCT PRESENTING: 7344
PISA TR MAX VEL: 2.54 M/S
PULM VEIN S/D RATIO: 1.22
PV PEAK D VEL: 0.6 M/S
PV PEAK S VEL: 0.73 M/S
RA MAJOR: 5.16 CM
RA PRESSURE: 3 MMHG
RA WIDTH: 3.03 CM
RETIRED EF AND QEF - SEE NOTES: 51 %
RIGHT VENTRICULAR END-DIASTOLIC DIMENSION: 3 CM
SINUS: 3.64 CM
STJ: 3.38 CM
STRESS ECHO TARGET HR: 144 BPM
SYSTOLIC BLOOD PRESSURE: 136 MMHG
TDI LATERAL: 0.11 M/S
TDI SEPTAL: 0.07 M/S
TDI: 0.09 M/S
TR MAX PG: 26 MMHG
TRICUSPID ANNULAR PLANE SYSTOLIC EXCURSION: 2.37 CM
TV REST PULMONARY ARTERY PRESSURE: 29 MMHG

## 2019-12-03 PROCEDURE — 99215 PR OFFICE/OUTPT VISIT, EST, LEVL V, 40-54 MIN: ICD-10-PCS | Mod: S$PBB,TXP,, | Performed by: NURSE PRACTITIONER

## 2019-12-03 PROCEDURE — 93017 CV STRESS TEST TRACING ONLY: CPT | Mod: PBBFAC,TXP | Performed by: INTERNAL MEDICINE

## 2019-12-03 PROCEDURE — 93016 CV STRESS TEST SUPVJ ONLY: CPT | Mod: S$PBB,TXP,, | Performed by: INTERNAL MEDICINE

## 2019-12-03 PROCEDURE — 93016 STRESS TEST WITH MYOCARDIAL PERFUSION (CUPID ONLY): ICD-10-PCS | Mod: S$PBB,TXP,, | Performed by: INTERNAL MEDICINE

## 2019-12-03 PROCEDURE — 93978 VASCULAR STUDY: CPT | Mod: 26,TXP,, | Performed by: RADIOLOGY

## 2019-12-03 PROCEDURE — 72170 X-RAY EXAM OF PELVIS: CPT | Mod: 26,TXP,, | Performed by: RADIOLOGY

## 2019-12-03 PROCEDURE — 93978 VASCULAR STUDY: CPT | Mod: TC,TXP

## 2019-12-03 PROCEDURE — 72170 XR PELVIS ROUTINE AP: ICD-10-PCS | Mod: 26,TXP,, | Performed by: RADIOLOGY

## 2019-12-03 PROCEDURE — 71046 X-RAY EXAM CHEST 2 VIEWS: CPT | Mod: 26,TXP,, | Performed by: RADIOLOGY

## 2019-12-03 PROCEDURE — 93018 CV STRESS TEST I&R ONLY: CPT | Mod: S$PBB,TXP,, | Performed by: INTERNAL MEDICINE

## 2019-12-03 PROCEDURE — 71046 XR CHEST PA AND LATERAL: ICD-10-PCS | Mod: 26,TXP,, | Performed by: RADIOLOGY

## 2019-12-03 PROCEDURE — 99215 OFFICE O/P EST HI 40 MIN: CPT | Mod: PBBFAC,25,TXP | Performed by: NURSE PRACTITIONER

## 2019-12-03 PROCEDURE — 78452 STRESS TEST WITH MYOCARDIAL PERFUSION (CUPID ONLY): ICD-10-PCS | Mod: 26,S$PBB,TXP, | Performed by: INTERNAL MEDICINE

## 2019-12-03 PROCEDURE — 99215 OFFICE O/P EST HI 40 MIN: CPT | Mod: S$PBB,TXP,, | Performed by: NURSE PRACTITIONER

## 2019-12-03 PROCEDURE — 99999 PR PBB SHADOW E&M-EST. PATIENT-LVL V: ICD-10-PCS | Mod: PBBFAC,TXP,, | Performed by: NURSE PRACTITIONER

## 2019-12-03 PROCEDURE — 93018 STRESS TEST WITH MYOCARDIAL PERFUSION (CUPID ONLY): ICD-10-PCS | Mod: S$PBB,TXP,, | Performed by: INTERNAL MEDICINE

## 2019-12-03 PROCEDURE — 93306 ECHO (CUPID ONLY): ICD-10-PCS | Mod: 26,S$PBB,TXP, | Performed by: INTERNAL MEDICINE

## 2019-12-03 PROCEDURE — 78452 HT MUSCLE IMAGE SPECT MULT: CPT | Mod: 26,S$PBB,TXP, | Performed by: INTERNAL MEDICINE

## 2019-12-03 PROCEDURE — 99999 PR PBB SHADOW E&M-EST. PATIENT-LVL V: CPT | Mod: PBBFAC,TXP,, | Performed by: NURSE PRACTITIONER

## 2019-12-03 PROCEDURE — 71046 X-RAY EXAM CHEST 2 VIEWS: CPT | Mod: TC,TXP

## 2019-12-03 PROCEDURE — 93978 US DOPP ILIACS BILATERAL: ICD-10-PCS | Mod: 26,TXP,, | Performed by: RADIOLOGY

## 2019-12-03 PROCEDURE — 93306 TTE W/DOPPLER COMPLETE: CPT | Mod: PBBFAC,TXP | Performed by: INTERNAL MEDICINE

## 2019-12-03 PROCEDURE — 72170 X-RAY EXAM OF PELVIS: CPT | Mod: TC,TXP

## 2019-12-03 RX ORDER — REGADENOSON 0.08 MG/ML
0.4 INJECTION, SOLUTION INTRAVENOUS
Status: COMPLETED | OUTPATIENT
Start: 2019-12-03 | End: 2019-12-03

## 2019-12-03 RX ADMIN — REGADENOSON 0.4 MG: 0.08 INJECTION, SOLUTION INTRAVENOUS at 09:12

## 2019-12-03 NOTE — PROGRESS NOTES
Kidney Transplant Recipient Reevalulation    Referring Physician: Leslie Roche Jr.  Current Nephrologist: Leslie Roche Jr.  Waitlist Status: active  Dialysis Start Date: 3/7/2016    Subjective:     CC:  Annual reassessment of kidney transplant candidacy.    HPI:  Mr. Azevedo is a 49 y.o. year old White male with ESRD secondary to HTN.  He has been on the wait list for a kidney transplant at [facility] since 11/10/2014. Patient is currently on peritoneal dialysis started on 3/7/2016. Patient is dialyzing on cyclic peritoneal dialysis.  Patient reports that he is tolerating dialysis well. . He has a PD catheter. Denies peritonitis Patient denies any recent hospitalizations or ED visits.    Overall feels well. No health concerns today.   Denies chest pain, SOB, leg pain, abdominal pain or LUTs.  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     Has rash to lower extremities that is erythematous , with some scabbing    Reports having eczema and followed by a dermatologist is John Hauser UV light tx and zinc oxide   Dr Prater [phone]   [address] [place] [address]          Review of Systems   Constitutional: Negative for activity change, appetite change, chills, fatigue, fever and unexpected weight change.   HENT: Negative for congestion, facial swelling, postnasal drip, rhinorrhea, sinus pressure, sore throat and trouble swallowing.    Eyes: Positive for visual disturbance. Negative for pain and redness.        Wears glasses   Respiratory: Negative for cough, chest tightness, shortness of breath and wheezing.    Cardiovascular: Negative.  Negative for chest pain, palpitations and leg swelling.   Gastrointestinal: Negative for abdominal pain, diarrhea, nausea and vomiting.   Genitourinary: Negative for dysuria, flank pain and urgency.        24 hour collection [id number] ML    Musculoskeletal: Negative for gait problem, neck pain and neck stiffness.   Skin:  "Negative for rash.   Allergic/Immunologic: Negative for environmental allergies, food allergies and immunocompromised state.   Neurological: Negative for dizziness, weakness, light-headedness and headaches.   Psychiatric/Behavioral: Negative for agitation and confusion. The patient is not nervous/anxious.        Objective:   body mass index is 26.38 kg/m².  BP (!) 136/90 (BP Location: Right arm, Patient Position: Sitting, BP Method: Medium (Automatic))   Pulse 63   Temp 98.6 °F (37 °C) (Oral)   Resp 16   Ht 5' 5" (1.651 m)   Wt 71.9 kg (158 lb 8.2 oz)   SpO2 98%   BMI 26.38 kg/m²     Physical Exam   Constitutional: He is oriented to person, place, and time. He appears well-developed and well-nourished.   HENT:   Head: Normocephalic.   Mouth/Throat: Oropharynx is clear and moist. No oropharyngeal exudate.   Eyes: Pupils are equal, round, and reactive to light. Conjunctivae and EOM are normal. No scleral icterus.   Neck: Normal range of motion. Neck supple.   Cardiovascular: Normal rate, regular rhythm and normal heart sounds.   Pulmonary/Chest: Effort normal and breath sounds normal.   Abdominal: Soft. Normal appearance and bowel sounds are normal. He exhibits no distension and no mass. There is no splenomegaly or hepatomegaly. There is no tenderness. There is no rebound, no guarding, no CVA tenderness, no tenderness at McBurney's point and negative Chapman's sign.       Musculoskeletal: Normal range of motion. He exhibits no edema.   Lymphadenopathy:     He has no cervical adenopathy.   Neurological: He is alert and oriented to person, place, and time. He exhibits normal muscle tone. Coordination normal.   Skin: Skin is warm and dry. Rash noted. Rash is maculopapular. There is erythema.        Lower extremity rash bilateral , raised , erythematous, some scabbing noted.      Psychiatric: He has a normal mood and affect. His behavior is normal.   Vitals reviewed.      Labs:  Lab Results   Component Value Date    " WBC 6.54 11/10/2015    HGB 14.7 11/10/2015    HCT 42.9 11/10/2015     11/10/2015    K 3.6 12/12/2018     11/10/2015    CO2 29 11/10/2015    BUN 45 (H) 11/10/2015    CREATININE 4.1 (H) 11/10/2015    EGFRNONAA 16.2 (A) 11/10/2015    CALCIUM 9.0 11/10/2015    PHOS 4.4 09/08/2015    ALBUMIN 3.8 09/08/2015    AST 25 09/08/2015    ALT 25 09/08/2015    .0 (H) 12/03/2019       No results found for: PREALBUMIN, BILIRUBINUA, GGT, AMYLASE, LIPASE, PROTEINUA, NITRITE, RBCUA, WBCUA    No results found for: HLAABCTYPE    Lab Results   Component Value Date    CPRA 0 01/17/2019    NT5RVLW Negative 01/17/2019    CIIAB Negative 01/17/2019    ABCMT WEAK-- DP1 10/18/2018       Labs were reviewed with the patient.    Pre-transplant Workup:   Reviewed with the patient.    Assessment:     1. Patient on waiting list for kidney transplant    2. Renal hypertension    3. ESRD since 11/2014 from HTN    4. Anemia in ESRD (end-stage renal disease)    5. Secondary hyperparathyroidism of renal origin    6. Skin rash        Plan:   Lexiscan cardiac stress test pending   CXR, PXR, iliac doppler study this afternoon    Colonoscopy completed 12/12/2018 at [facility] --results not visible in Epic   Add results to work up    Get records-->rash to lower extremities    Reports having eczema and followed by a dermatologist is John Hauser UV light tx and zinc oxide   Dr Prater [phone]   [address] [place] [id number]      Transplant Candidacy:   Mr. Azevedo is a suitable kidney transplant candidate.  Meets center eligibility for accepting HCV+ donor offer - yes.  Patient educated on HCV+ donors. Bryant is willing  to accept HCV+ donor offer -  no , Pt declines.   Patient is a candidate for KDPI > 85 kidney donor offer - yes.  He remains in overall stable health, and will remain active on the transplant list.    Carline Obrien NP       Follow-up:   In addition to the tests noted in the plan, Mr. Azevedo will continue to have  reevaluation as per the standing pre-kidney transplant protocol:  1. Monthly blood for PRA  2. Annual return to clinic, except HIV positive, > 65 years of age, or pancreas transplant candidates who will be scheduled to see transplant every 6 months while in pre-transplant phase  3. Annual re-testing: CXR, EKG, yearly mammograms for women over 40 and PSA for males over 40, cardiology follow-up as recommended by initial cardiology pre-transplant evaluation  4. Renal ultrasound every 2 years  5. Baseline colonoscopy after age 50 and repeated as recommended    UNOS Patient Status  Functional Status: 60% - Requires occasional assistance but is able to care for needs  Physical Capacity: No Limitations

## 2019-12-03 NOTE — PROGRESS NOTES
PHARM.D. PRE-TRANSPLANT NOTE:    This patient's medication therapy was evaluated as part of his pre-transplant evaluation.      The following general pharmacologic concerns were noted: Resume paxil and zoloft to prevent post op withdrawal.     The following pharmacologic concerns related to HCV therapy were noted: N/A      This patient's medication profile was reviewed for considerations for DAA Hepatitis C therapy:    [x]  No current inducers of CYP 3A4 or PGP  [x]  No amiodarone on this patient's EMR profile in the last 24 months  [x]  No past or current atrial fibrillation on this patient's EMR profile       Current Outpatient Medications   Medication Sig Dispense Refill    allopurinol (ZYLOPRIM) 100 MG tablet Take 100 mg by mouth once daily.      amlodipine (NORVASC) 5 MG tablet Take 1 tablet (5 mg total) by mouth once daily. One-2 per day or as directed (Patient taking differently: Take 5 mg by mouth 2 (two) times daily. ) 90 tablet 3    calcium acetate (PHOSLO) 667 mg capsule Take 667 mg by mouth 3 (three) times daily with meals.      ferrous sulfate 325 (65 FE) MG EC tablet Take 325 mg by mouth once daily.      fluticasone-salmeterol 250-50 mcg/dose (ADVAIR) 250-50 mcg/dose diskus inhaler Inhale 1 puff into the lungs 2 (two) times daily.      furosemide (LASIX) 80 MG tablet Take 80 mg by mouth once daily.      labetalol (NORMODYNE) 100 MG tablet Take 1 tablet (100 mg total) by mouth 3 (three) times daily. 270 tablet 3    montelukast (SINGULAIR) 10 mg tablet Take 10 mg by mouth once daily.      paroxetine (PAXIL) 20 MG tablet Take 20 mg by mouth every evening.      sertraline (ZOLOFT) 50 MG tablet Take 50 mg by mouth once daily.       No current facility-administered medications for this visit.          Currently Mr. Azevedo is responsible for preparing / administering this patient's medications on a daily basis.  I am available for consultation and can be contacted, as needed by the other members of  the Kidney Transplant team.

## 2019-12-03 NOTE — LETTER
December 5, 2019        Leslie Roche Jr.  [address]; [address]  [facility] [address]  Phone: [phone]  Fax: [phone]             [facility]  [address]  [facility] [address]  Phone: [phone]   Patient: Bryant Azevedo   MR Number: [id number]   YOB: 1970   Date of Visit: 12/3/2019       Dear Dr. Leslie Roche Jr.    Thank you for referring Bryant Azevedo to me for evaluation. Attached you will find relevant portions of my assessment and plan of care.    If you have questions, please do not hesitate to call me. I look forward to following Bryant Azevedo along with you.    Sincerely,    Carline Obrien, NP    Enclosure    If you would like to receive this communication electronically, please contact [email or web address] or [phone] to request Traansmission Link access.    Traansmission Link is a tool which provides read-only access to select patient information with whom you have a relationship. Its easy to use and provides real time access to review your patients record including encounter summaries, notes, results, and demographic information.    If you feel you have received this communication in error or would no longer like to receive these types of communications, please e-mail [email or web address]

## 2020-02-04 NOTE — PROGRESS NOTES
"Transplant Recipient Adult Psychosocial Assessment UPDATE (Last Assessment completed on 10/25/2018)     OF NOTE: Pt lives locally in a one bedroom apartments with one other person. Pt's caregivers are from [place]. Discussed with SW supervisor that pt WILL NOT be able to stay in [place] and will need to make arrangements for local lodging on his own.    Bryant Azevedo  [address] [address]  Telephone Information:   Mobile [phone]   Home  [phone] (home)  Work  There is no work phone number on file.  E-mail  [email or web address]    Sex: male  YOB: 1970  Age: 49 y.o.    Encounter Date: 12/3/2019  U[place] Citizen: yes  Primary Language: English   Needed: no    Emergency Contact:  Name: Debo Azevedo  Relationship: mother  Address: [address] [place] [address]  Phone Numbers:  [phone] (home), n/a (work), [phone] (mobile)    Family/Social Support:   Number of dependents/: none  Marital history: never , no children, not in current relationship  Other family dynamics: Pt's mother is living,  Father is , one 1/2 sister with whom he is not close.  Pt and mother report "very small family".  He denies any cousins or distant family members who he can rely upon.    Household Composition:  Name: Carlos Azevedo  Age: 49  Relationship: patient  Does person drive? yes    Name: Jagjit Watson  Age: 62  Relationship: roommate/friend  Does person drive? no      Do you and your caregivers have access to reliable transportation? yes     PRIMARY CAREGIVER: Debo Azevedo (mother) will be primary caregiver, phone number [phone].      provided in-depth information to patient and caregiver regarding pre- and post-transplant caregiver role.   strongly encourages patient and caregiver to have concrete plan regarding post-transplant care giving, including back-up caregiver(s) to ensure care giving needs are met as " needed.    Patient and Caregiver states understanding all aspects of caregiver role/commitment and is able/willing/committed to being caregiver to the fullest extent necessary.    Patient and Caregiver verbalizes understanding of the education provided today and caregiver responsibilities.         remains available. Patient and Caregiver agree to contact  in a timely manner if concerns arise.      Able to take time off work without financial concerns: mother states she is retired and can travel to [facility] at any time however, she states she is uncertain whether she will have anyone to accompany her and this is a concern..     Additional Significant Others who will Assist with Transplant:  Name: Ena Watkins  Age: 65  Phone: [phone]  City: [place] State: [place]  Relationship: friend  Does person drive? yes    Name: MauricioGallito  Age: 70s  Phone: [phone] (Mauricio); [phone] (Olivier)  City: [place] State: [place]  Relationship: friends  Does person drive? yes    Name: Mehreen Rich  Age: 47  Phone: [phone]  City: [place] State: [place]  Relationship: friend  Does person drive? yes    Name: Kaylyn Archer  Age: 60  Phone: [phone]  City: [place] State: [place]  Relationship: friend  Does person drive? yes     Pt also reports friends from [place] could assist if needed.    Living Will: no  Healthcare Power of : no  Advance Directives on file: <<no information> per medical record.  Verbally reviewed LW/HCPA information.   provided patient with copy of LW/HCPA documents and provided education on completion of forms.    Living Donors: No.    Highest Education Level: 12th grade  Reading Ability: 12th grade  Reports difficulty with: N/A  Learns Best By:  Written and demonstration     Status: no  VA Benefits: no     Working for Income: yes  If yes, working activity level: Working Part Time Due to Disability  Patient is employed as a  at [employer]..    Spouse/Significant  Other Employment: Pt's mother reports she is retired.    Disabled: yes: date disability began: 11/10/14, due to: ESRD.    Monthly Income:  Salary/Wages: $840  SS Disability: $2100.00     Able to afford all costs now and if transplanted, including medications: yes. Pt reports that he saves his wages for transplant. Pt is also aware that he will need to identify his own local lodging.  Patient and Caregiver verbalizes understanding of personal responsibilities related to transplant costs and the importance of having a financial plan to ensure that patients transplant costs are fully covered.       provided fundraising information/education. Patient and Caregiver verbalizes understanding.   remains available.    Insurance:   Payor/Plan Subscr  Sex Relation Sub. Ins. ID Effective Group Num   1. MEDICARE - ME* MARYJANE BRAND 1970 Male  [id number]                                    [address]   2. [employer]* MARYJANE BRAND 1970 Male  [id number]                                    [employer] [place] [employer]     Primary Insurance (for UNOS reporting): Public Insurance - Medicare FFS (Fee For Service)    Secondary Insurance (for UNOS reporting): Private Insurance Pt reports that he was previously getting assistance from the [facility], but reports that he has not gotten a reimbursement check in 6 months. SW encouraged pt to discuss with his dialysis unit SW. Patient verbalized understanding and agreement.     Patient and Caregiver verbalizes clear understanding that patient may experience difficulty obtaining and/or be denied insurance coverage post-surgery. This includes and is not limited to disability insurance, life insurance, health insurance, burial insurance, long term care insurance, and other insurances.      Patient and Caregiver also reports understanding that future health concerns related to or unrelated to transplantation may not be covered by patient's  insurance.  Resources and information provided and reviewed.     Patient and Caregiver provides verbal permission to release any necessary information to outside resources for patient care and discharge planning.  Resources and information provided are reviewed.      Dialysis Adherence: Patient reports being on peritoneal dialysis, attending all dialysis appointments, and staying for the entire course of treatment.     SW received a faxed adherence form from pt's dialysis center indicates over last three months that the patient has had no AMAs, not had any no-shows, and no issues with caregivers, transportation, or any other psychosocial concerns..    Form also indicates:    Last intact PTH from 09/18/2019 is reported as 530.    Current dry weight is reported as 70.5kg and Most Recent Pre-treatment weight is reported as 70.1kg on 09/25/2019.    Infusion Service: patient utilizing? no  Home Health: patient utilizing? no  DME: yes PD supplies and BP Cuff  Pulmonary/Cardiac Rehab: Pt denies  ADLS:  Pt reports no difficulties with driving, walking, bathing, cooking, housekeeping, eating, shopping, and taking medication.    Adherence:   Pt reports suitable adherence with medications, dialysis, and health regimen.  Adherence education and counseling provided.     Per History Section:  Past Medical History:   Diagnosis Date    Chronic asthma     ESRD since 11/2014 from HTN     Hyperlipidemia     Hyperuricemia     Renal hypertension     Secondary hyperparathyroidism of renal origin      Social History     Tobacco Use    Smoking status: Never Smoker    Smokeless tobacco: Never Used   Substance Use Topics    Alcohol use: No     Alcohol/week: 0.0 standard drinks     Social History     Substance and Sexual Activity   Drug Use No     Social History     Substance and Sexual Activity   Sexual Activity Not on file       Per Today's Psychosocial:  Tobacco: none, patient denies any use.  Alcohol: Pt reports rare ETOH  "use.  Illicit Drugs/Non-prescribed Medications: none, patient denies any use.    Patient and Caregiver states clear understanding of the potential impact of substance use as it relates to transplant candidacy and is aware of possible random substance screening.  Substance abstinence/cessation counseling, education and resources provided and reviewed.     Arrests/DWI/Treatment/Rehab: patient denies    Psychiatric History:    Mental Health: depression and Pt states current medication regimine is keeping symptoms controlled. Pt reports that his depression is related to his current medical condition and states, "I just deal with it when it comes up". Pt states that working part-time helps because it gives him a different environment to be in and gets him out of the house.  Psychiatrist/Counselor: Pt denies seeing a mental health professional and reports being open to seeing the psych department for talk therapy if necessary.  Medications:  Pt reports taking zoloft and paxil which is is prescribed by his nephrologist: Dr. Isrrael Portillo  Suicide/Homicide Issues: Pt denies any history of or current suicidal or homicidal ideations.   Safety at home: Pt reports no current or history of safety concerns in household; including mental, physical, verbal, or sexual abuse.    Knowledge: Patient and Caregiver states having clear understanding and realistic expectations regarding the potential risks and potential benefits of organ transplantation and organ donation and agrees to discuss with health care team members and support system members, as well as to utilize available resources and express questions and/or concerns in order to further facilitate the pt informed decision-making.  Resources and information provided and reviewed.    Patient and Caregiver is aware of [facility]'s affiliation and/or partnership with agencies in home health care, LTAC, SNF, [facility], and other hospitals and clinics.    Understanding: Patient and Caregiver " reports having a clear understanding of the many lifetime commitments involved with being a transplant recipient, including costs, compliance, medications, lab work, procedures, appointments, concrete and financial planning, preparedness, timely and appropriate communication of concerns, abstinence (ETOH, tobacco, illicit non-prescribed drugs), adherence to all health care team recommendations, support system and caregiver involvement, appropriate and timely resource utilization and follow-through, mental health counseling as needed/recommended, and patient and caregiver responsibilities.  Social Service Handbook, resources and detailed educational information provided and reviewed.  Educational information provided.    Patient and Caregiver also reports current and expected compliance with health care regime and states having a clear understanding of the importance of compliance.      Patient and Caregiver reports a clear understanding that risks and benefits may be involved with organ transplantation and with organ donation.       Patient and Caregiver also reports clear understanding that psychosocial risk factors may affect patient, and include but are not limited to feelings of depression, generalized anxiety, anxiety regarding dependence on others, post traumatic stress disorder, feelings of guilt and other emotional and/or mental concerns, and/or exacerbation of existing mental health concerns.  Detailed resources provided and discussed.      Patient and Caregiver agrees to access appropriate resources in a timely manner as needed and/or as recommended, and to communicate concerns appropriately.  Patient and Caregiver also reports a clear understanding of treatment options available.     Patient and Caregiver received education in a group setting.   reviewed education, provided additional information, and answered questions.    Feelings or Concerns: Patient and Caregiver did not express any  concerns at this time. Patient reports high motivation to pursue transplant at this time.    Coping: Pt reports coping well with the transplant process at this time and reports going to [employer], and going to the [employer] to hang out with his friends (who are also a ) and enjoy the view as ways to cope.    Goals: Pt reports returning to work getting off dialysis and traveling to [facility], [place], and [place] as goals for post transplant. Patient referred to Vocational Rehabilitation.    Interview Behavior: Patient and Caregiver presents as alert and oriented x 4, pleasant, good eye contact, well groomed, recall good, concentration/judgement good, average intelligence, calm, communicative, cooperative and asking and answering questions appropriately. Pt presents with Debo Azevedo, pt's mother and ally Archer, pt's friend at pt's request.          Transplant Social Work - Candidacy  Assessment/Plan:     Psychosocial Suitability: Patient presents as a suitable candidate for kidney transplant at this time. Based on psychosocial risk factors, patient presents as low risk, due to suitable caregiver plan in place, financial plan in place, and adequate dialysis adherence.    Recommendations/Additional Comments: SW recommends that pt conduct fundraising to assist pt with pay for cost of medications, food, gas, and other transplant related needs. SW recommends that pt remain aware of potential mental health concerns and contact the team if any concerns arise. SW recommends that pt remain abstinent from tobacco, ETOH, and drug use. SW supports pt's continued adherence. SW remains available to answer any questions or concerns that arise as the pt moves through the transplant process.       Georgette Norwood LCSW

## 2020-08-05 PROCEDURE — 86833 HLA CLASS II HIGH DEFIN QUAL: CPT | Mod: PO,TXP

## 2020-08-05 PROCEDURE — 86832 HLA CLASS I HIGH DEFIN QUAL: CPT | Mod: PO,TXP

## 2020-08-05 PROCEDURE — 99001 SPECIMEN HANDLING PT-LAB: CPT | Mod: PO,TXP

## 2020-08-05 PROCEDURE — 86977 RBC SERUM PRETX INCUBJ/INHIB: CPT | Mod: 59,PO

## 2020-08-12 ENCOUNTER — LAB VISIT (OUTPATIENT)
Dept: LAB | Facility: HOSPITAL | Age: 50
End: 2020-08-12
Payer: MEDICARE

## 2020-08-12 ENCOUNTER — TELEPHONE (OUTPATIENT)
Dept: TRANSPLANT | Facility: CLINIC | Age: 50
End: 2020-08-12

## 2020-08-12 DIAGNOSIS — Z76.82 ORGAN TRANSPLANT CANDIDATE: ICD-10-CM

## 2020-08-20 LAB
HLA FREEZE AND HOLD INTERPRETATION: NORMAL
HLAFH COLLECTION DATE: NORMAL
HPRA INTERPRETATION: NORMAL

## 2020-08-26 ENCOUNTER — TELEPHONE (OUTPATIENT)
Dept: TRANSPLANT | Facility: CLINIC | Age: 50
End: 2020-08-26

## 2020-08-26 ENCOUNTER — HOSPITAL ENCOUNTER (INPATIENT)
Facility: HOSPITAL | Age: 50
LOS: 7 days | Discharge: HOME | DRG: 652 | End: 2020-09-02
Attending: TRANSPLANT SURGERY | Admitting: TRANSPLANT SURGERY
Payer: MEDICARE

## 2020-08-26 ENCOUNTER — DOCUMENTATION ONLY (OUTPATIENT)
Dept: TRANSPLANT | Facility: HOSPITAL | Age: 50
End: 2020-08-26

## 2020-08-26 DIAGNOSIS — Z79.60 LONG-TERM USE OF IMMUNOSUPPRESSANT MEDICATION: ICD-10-CM

## 2020-08-26 DIAGNOSIS — Z76.82 AWAITING ORGAN TRANSPLANT STATUS: Primary | ICD-10-CM

## 2020-08-26 DIAGNOSIS — R00.1 BRADYCARDIA: ICD-10-CM

## 2020-08-26 DIAGNOSIS — D63.1 ANEMIA IN ESRD (END-STAGE RENAL DISEASE): Chronic | ICD-10-CM

## 2020-08-26 DIAGNOSIS — Z29.89 PROPHYLACTIC IMMUNOTHERAPY: ICD-10-CM

## 2020-08-26 DIAGNOSIS — Z91.89 AT RISK FOR OPPORTUNISTIC INFECTIONS: ICD-10-CM

## 2020-08-26 DIAGNOSIS — I12.9 RENAL HYPERTENSION: Chronic | ICD-10-CM

## 2020-08-26 DIAGNOSIS — N18.6 ANEMIA IN ESRD (END-STAGE RENAL DISEASE): Chronic | ICD-10-CM

## 2020-08-26 DIAGNOSIS — N18.6 ESRD (END STAGE RENAL DISEASE): ICD-10-CM

## 2020-08-26 DIAGNOSIS — Z94.0 S/P KIDNEY TRANSPLANT: Primary | ICD-10-CM

## 2020-08-26 DIAGNOSIS — Z01.811 PRE-OP CHEST EXAM: ICD-10-CM

## 2020-08-26 PROCEDURE — 20600001 HC STEP DOWN PRIVATE ROOM: Mod: NTX

## 2020-08-26 NOTE — ASSESSMENT & PLAN NOTE
- ESRD 2/2 HTN admitted for KTx 8/27  - PD since 2014; dry weight 70 kg; native UOP ~1200 ml/day  - Pre op labs, EKG, CXR, Covid test pending  - PD fluid to be sent for wbc, gram stain, cx  - NPO   - Simulect induction   - OR 0500 with Dr. Mendoza

## 2020-08-26 NOTE — TELEPHONE ENCOUNTER
ON-CALL NOTE    UNOS# [id number]    Notified by Amandeep John, , that Bryant Azevedo is eligible for kidney offer.  Spoke with patient and identified no acute medical issues with telephone assessment. Protocol script read to patient regarding N/A, standard donor offer. Patient verbalized understanding, all questions answered, patient accepts organ offer. Notified by Amandeep John that virtual crossmatch is negative.  Current sample of blood is available from date 8/6/20 for crossmatch.  Patient reports no sensitizing event since last blood sample for PRA received. Notified Derrek in [facility] Lab to perform a retrospective  crossmatch per guideline.    Patient notified of plan and states understanding. He is driving from [place], [place] and is expected to arrive between midnight and 1:00am.

## 2020-08-26 NOTE — HPI
Mr. Carlos Azevedo is a 48 y/o M with ESRD secondary to HTN and congenital abnormality (unknown details) who presents for kidney transplant He has been on the wait list for a kidney transplant at [facility] since 11/10/2014. Patient is currently on peritoneal dialysis started on 11/2014. Patient is dialyzing on cyclic peritoneal dialysis. Patient reports that she is tolerating dialysis well. Dry conrado ~70 kg. Native UOP ~1200 ml/day. He has a R PD catheter. Of note, patient has a rash to BLE that is erythematous, with some scabbing. He reports chronic contact dermatitis vs. eczema and is followed by a dermatologist (Dr. Prater) is [place]. He has been treated with UV light and zinc oxide in the past. He reports transplant team is aware of rash. Chart reviewed and rash was noted in transplant clinic 12/3/19 with photos appearing more severe than current rash. He was deemed a suitable transplant candidate at that time. Patient denies any recent hospitalizations or ED visits. Pre op labs and imaging. Tentative OR time 0500 with primary surgeon Dr. Mendoza.    Hospital course: Pt is now s/p DCD KTxp on 8/27/20 (CMV D+/R-, CIT ~ 22 hours; simulect induction). Surgery notable for 3 arteries. 5 day farah. 1 ELEUTERIO drain. Post op renal US satisfactory.     Interval history: NAEON. Cr slightly increased today. Output > intake. Encourage po intake. Fluid challenge today. Continue to monitor.

## 2020-08-26 NOTE — TELEPHONE ENCOUNTER
SW contact pt regarding his financial plan and caregiver plan.    Pt reports that his caregiver will still be his mother and she is planning to drive in from [facility].    Pt currently has no Part D Coverage until 9/1/2020. Team is aware and reports that Christopher Rich is available to use until pt's coverage becomes active.    Pt reports that he is currently staying with his mother since his roommate lost their job and pt unable to afford apartment on his own.       SW discussed plan with SW supervisor; KASSY Ray. CHRISTAL, MPH via phone and Dept. Manager: LAURE Alfonso regarding pt case.    SW remains available to pt, pt's family, and transplant team at [phone].

## 2020-08-26 NOTE — TELEPHONE ENCOUNTER
" SW contacted pt's mother, Debo Azevedo, who will be primary caregiver to discuss caregiver plan.  Mother stated she was packing at her home in [place], [place] and was headed to [place] with Mehreen Rich 45, friend, ISABEL Thayer, [phone].  Mother stated that pt was living with her at her home and had left to drive to [place] about 45 minutes ago (4:10 PM).  Mother repeatedly asked SW why she was asking questions about caregiver plan and also whether the donor kidney had been "checked out" prior to her driving to [place].  SW stated she was unable to determine the status of kidney and coordinator would keep in touch with pt regarding any potential issues.  Mother stated pt has not done fundraising and they plan to stay at [facility].  She had no further questions for sw.  SW remains available.   "

## 2020-08-27 ENCOUNTER — ANESTHESIA (OUTPATIENT)
Dept: SURGERY | Facility: HOSPITAL | Age: 50
DRG: 652 | End: 2020-08-27
Payer: MEDICARE

## 2020-08-27 ENCOUNTER — ANESTHESIA EVENT (OUTPATIENT)
Dept: SURGERY | Facility: HOSPITAL | Age: 50
DRG: 652 | End: 2020-08-27
Payer: MEDICARE

## 2020-08-27 LAB
25(OH)D3+25(OH)D2 SERPL-MCNC: 16 NG/ML (ref 30–96)
ABO + RH BLD: NORMAL
ALBUMIN SERPL BCP-MCNC: 2.8 G/DL (ref 3.5–5.2)
ALBUMIN SERPL BCP-MCNC: 2.9 G/DL (ref 3.5–5.2)
ALBUMIN SERPL BCP-MCNC: 3.1 G/DL (ref 3.5–5.2)
ALBUMIN SERPL BCP-MCNC: 3.4 G/DL (ref 3.5–5.2)
ALP SERPL-CCNC: 77 U/L (ref 55–135)
ALT SERPL W/O P-5'-P-CCNC: 27 U/L (ref 10–44)
ANION GAP SERPL CALC-SCNC: 11 MMOL/L (ref 8–16)
ANION GAP SERPL CALC-SCNC: 11 MMOL/L (ref 8–16)
ANION GAP SERPL CALC-SCNC: 12 MMOL/L (ref 8–16)
ANION GAP SERPL CALC-SCNC: 13 MMOL/L (ref 8–16)
APPEARANCE FLD: NORMAL
APTT BLDCRRT: 27.9 SEC (ref 21–32)
AST SERPL-CCNC: 34 U/L (ref 10–40)
BASOPHILS # BLD AUTO: 0.01 K/UL (ref 0–0.2)
BASOPHILS # BLD AUTO: 0.03 K/UL (ref 0–0.2)
BASOPHILS NFR BLD: 0.1 % (ref 0–1.9)
BASOPHILS NFR BLD: 0.3 % (ref 0–1.9)
BILIRUB SERPL-MCNC: 0.3 MG/DL (ref 0.1–1)
BLD GP AB SCN CELLS X3 SERPL QL: NORMAL
BODY FLD TYPE: NORMAL
BUN SERPL-MCNC: 64 MG/DL (ref 6–20)
BUN SERPL-MCNC: 65 MG/DL (ref 6–20)
BUN SERPL-MCNC: 66 MG/DL (ref 6–20)
BUN SERPL-MCNC: 70 MG/DL (ref 6–20)
CALCIUM SERPL-MCNC: 7.7 MG/DL (ref 8.7–10.5)
CALCIUM SERPL-MCNC: 7.7 MG/DL (ref 8.7–10.5)
CALCIUM SERPL-MCNC: 7.8 MG/DL (ref 8.7–10.5)
CALCIUM SERPL-MCNC: 8.8 MG/DL (ref 8.7–10.5)
CHLORIDE SERPL-SCNC: 103 MMOL/L (ref 95–110)
CHLORIDE SERPL-SCNC: 104 MMOL/L (ref 95–110)
CHLORIDE SERPL-SCNC: 105 MMOL/L (ref 95–110)
CHLORIDE SERPL-SCNC: 105 MMOL/L (ref 95–110)
CHOLEST SERPL-MCNC: 146 MG/DL (ref 120–199)
CHOLEST/HDLC SERPL: 5.2 {RATIO} (ref 2–5)
CLASS I ANTIBODIES - LUMINEX: NEGATIVE
CLASS II ANTIBODIES - LUMINEX: NEGATIVE
CO2 SERPL-SCNC: 20 MMOL/L (ref 23–29)
CO2 SERPL-SCNC: 21 MMOL/L (ref 23–29)
CO2 SERPL-SCNC: 22 MMOL/L (ref 23–29)
CO2 SERPL-SCNC: 23 MMOL/L (ref 23–29)
COLOR FLD: NORMAL
CPRA %: 0
CREAT SERPL-MCNC: 6.6 MG/DL (ref 0.5–1.4)
CREAT SERPL-MCNC: 6.7 MG/DL (ref 0.5–1.4)
CREAT SERPL-MCNC: 6.7 MG/DL (ref 0.5–1.4)
CREAT SERPL-MCNC: 7.2 MG/DL (ref 0.5–1.4)
CREAT UR-MCNC: 120 MG/DL (ref 23–375)
DIFFERENTIAL METHOD: ABNORMAL
DIFFERENTIAL METHOD: ABNORMAL
EOSINOPHIL # BLD AUTO: 0 K/UL (ref 0–0.5)
EOSINOPHIL # BLD AUTO: 0.7 K/UL (ref 0–0.5)
EOSINOPHIL NFR BLD: 0 % (ref 0–8)
EOSINOPHIL NFR BLD: 7.3 % (ref 0–8)
EOSINOPHIL NFR FLD MANUAL: 6 %
ERYTHROCYTE [DISTWIDTH] IN BLOOD BY AUTOMATED COUNT: 12.6 % (ref 11.5–14.5)
ERYTHROCYTE [DISTWIDTH] IN BLOOD BY AUTOMATED COUNT: 12.8 % (ref 11.5–14.5)
EST. GFR  (AFRICAN AMERICAN): 10.1 ML/MIN/1.73 M^2
EST. GFR  (AFRICAN AMERICAN): 10.1 ML/MIN/1.73 M^2
EST. GFR  (AFRICAN AMERICAN): 10.2 ML/MIN/1.73 M^2
EST. GFR  (AFRICAN AMERICAN): 9.2 ML/MIN/1.73 M^2
EST. GFR  (NON AFRICAN AMERICAN): 8 ML/MIN/1.73 M^2
EST. GFR  (NON AFRICAN AMERICAN): 8.7 ML/MIN/1.73 M^2
EST. GFR  (NON AFRICAN AMERICAN): 8.7 ML/MIN/1.73 M^2
EST. GFR  (NON AFRICAN AMERICAN): 8.9 ML/MIN/1.73 M^2
GLUCOSE SERPL-MCNC: 140 MG/DL (ref 70–110)
GLUCOSE SERPL-MCNC: 161 MG/DL (ref 70–110)
GLUCOSE SERPL-MCNC: 165 MG/DL (ref 70–110)
GLUCOSE SERPL-MCNC: 93 MG/DL (ref 70–110)
HBV CORE IGM SERPL QL IA: NEGATIVE
HBV SURFACE AG SERPL QL IA: NEGATIVE
HCT VFR BLD AUTO: 36.5 % (ref 40–54)
HCT VFR BLD AUTO: 38.5 % (ref 40–54)
HCT VFR BLD AUTO: 40.6 % (ref 40–54)
HDLC SERPL-MCNC: 28 MG/DL (ref 40–75)
HDLC SERPL: 19.2 % (ref 20–50)
HGB BLD-MCNC: 12.7 G/DL (ref 14–18)
HGB BLD-MCNC: 13.2 G/DL (ref 14–18)
HIV 1+2 AB+HIV1 P24 AG SERPL QL IA: NEGATIVE
IMM GRANULOCYTES # BLD AUTO: 0.03 K/UL (ref 0–0.04)
IMM GRANULOCYTES # BLD AUTO: 0.1 K/UL (ref 0–0.04)
IMM GRANULOCYTES NFR BLD AUTO: 0.3 % (ref 0–0.5)
IMM GRANULOCYTES NFR BLD AUTO: 0.6 % (ref 0–0.5)
INR PPP: 1 (ref 0.8–1.2)
LDH SERPL L TO P-CCNC: 223 U/L (ref 110–260)
LDLC SERPL CALC-MCNC: 58.4 MG/DL (ref 63–159)
LYMPHOCYTES # BLD AUTO: 0.5 K/UL (ref 1–4.8)
LYMPHOCYTES # BLD AUTO: 1.7 K/UL (ref 1–4.8)
LYMPHOCYTES NFR BLD: 19 % (ref 18–48)
LYMPHOCYTES NFR BLD: 2.6 % (ref 18–48)
LYMPHOCYTES NFR FLD MANUAL: 9 %
MAGNESIUM SERPL-MCNC: 2.3 MG/DL (ref 1.6–2.6)
MCH RBC QN AUTO: 30.8 PG (ref 27–31)
MCH RBC QN AUTO: 31 PG (ref 27–31)
MCHC RBC AUTO-ENTMCNC: 32.5 G/DL (ref 32–36)
MCHC RBC AUTO-ENTMCNC: 33 G/DL (ref 32–36)
MCV RBC AUTO: 93 FL (ref 82–98)
MCV RBC AUTO: 95 FL (ref 82–98)
MESOTHL CELL NFR FLD MANUAL: 1 %
MONOCYTES # BLD AUTO: 0.4 K/UL (ref 0.3–1)
MONOCYTES # BLD AUTO: 0.8 K/UL (ref 0.3–1)
MONOCYTES NFR BLD: 2.1 % (ref 4–15)
MONOCYTES NFR BLD: 8.6 % (ref 4–15)
MONOS+MACROS NFR FLD MANUAL: 82 %
NEUTROPHILS # BLD AUTO: 16.6 K/UL (ref 1.8–7.7)
NEUTROPHILS # BLD AUTO: 5.9 K/UL (ref 1.8–7.7)
NEUTROPHILS NFR BLD: 64.5 % (ref 38–73)
NEUTROPHILS NFR BLD: 94.6 % (ref 38–73)
NEUTROPHILS NFR FLD MANUAL: 2 %
NONHDLC SERPL-MCNC: 118 MG/DL
NRBC BLD-RTO: 0 /100 WBC
NRBC BLD-RTO: 0 /100 WBC
PHOSPHATE SERPL-MCNC: 3.3 MG/DL (ref 2.7–4.5)
PHOSPHATE SERPL-MCNC: 3.4 MG/DL (ref 2.7–4.5)
PHOSPHATE SERPL-MCNC: 3.7 MG/DL (ref 2.7–4.5)
PHOSPHATE SERPL-MCNC: 7.1 MG/DL (ref 2.7–4.5)
PLATELET # BLD AUTO: 159 K/UL (ref 150–350)
PLATELET # BLD AUTO: 164 K/UL (ref 150–350)
PMV BLD AUTO: 11.7 FL (ref 9.2–12.9)
PMV BLD AUTO: 12 FL (ref 9.2–12.9)
POCT GLUCOSE: 148 MG/DL (ref 70–110)
POCT GLUCOSE: 185 MG/DL (ref 70–110)
POTASSIUM SERPL-SCNC: 3.2 MMOL/L (ref 3.5–5.1)
POTASSIUM SERPL-SCNC: 3.3 MMOL/L (ref 3.5–5.1)
POTASSIUM SERPL-SCNC: 3.9 MMOL/L (ref 3.5–5.1)
POTASSIUM SERPL-SCNC: 4 MMOL/L (ref 3.5–5.1)
PROT SERPL-MCNC: 6.3 G/DL (ref 6–8.4)
PROT UR-MCNC: 27 MG/DL (ref 0–15)
PROT/CREAT UR: 0.23 MG/G{CREAT} (ref 0–0.2)
PROTHROMBIN TIME: 11 SEC (ref 9–12.5)
PTH-INTACT SERPL-MCNC: 325 PG/ML (ref 9–77)
RBC # BLD AUTO: 4.13 M/UL (ref 4.6–6.2)
RBC # BLD AUTO: 4.26 M/UL (ref 4.6–6.2)
SARS-COV-2 RDRP RESP QL NAA+PROBE: NEGATIVE
SERUM COLLECTION DT - LUMINEX CLASS I: NORMAL
SERUM COLLECTION DT - LUMINEX CLASS II: NORMAL
SODIUM SERPL-SCNC: 136 MMOL/L (ref 136–145)
SODIUM SERPL-SCNC: 136 MMOL/L (ref 136–145)
SODIUM SERPL-SCNC: 138 MMOL/L (ref 136–145)
SODIUM SERPL-SCNC: 140 MMOL/L (ref 136–145)
SPCL1 TESTING DATE: NORMAL
SPCL2 TESTING DATE: NORMAL
SPLUA TESTING DATE: NORMAL
TRIGL SERPL-MCNC: 298 MG/DL (ref 30–150)
URATE SERPL-MCNC: 6.5 MG/DL (ref 3.4–7)
WBC # BLD AUTO: 17.5 K/UL (ref 3.9–12.7)
WBC # BLD AUTO: 9.15 K/UL (ref 3.9–12.7)
WBC # FLD: 122 /CU MM

## 2020-08-27 PROCEDURE — 63600175 PHARM REV CODE 636 W HCPCS

## 2020-08-27 PROCEDURE — 82306 VITAMIN D 25 HYDROXY: CPT

## 2020-08-27 PROCEDURE — 50605 PR URETEROTOMY TO INSERT STENT: ICD-10-PCS | Mod: 51,LT,GC, | Performed by: TRANSPLANT SURGERY

## 2020-08-27 PROCEDURE — 25000003 PHARM REV CODE 250: Performed by: TRANSPLANT SURGERY

## 2020-08-27 PROCEDURE — D9220A PRA ANESTHESIA: ICD-10-PCS | Mod: CRNA,,, | Performed by: NURSE ANESTHETIST, CERTIFIED REGISTERED

## 2020-08-27 PROCEDURE — 83615 LACTATE (LD) (LDH) ENZYME: CPT

## 2020-08-27 PROCEDURE — 27100025 HC TUBING, SET FLUID WARMER: Mod: NTX | Performed by: ANESTHESIOLOGY

## 2020-08-27 PROCEDURE — 80069 RENAL FUNCTION PANEL: CPT

## 2020-08-27 PROCEDURE — 84156 ASSAY OF PROTEIN URINE: CPT

## 2020-08-27 PROCEDURE — C2617 STENT, NON-COR, TEM W/O DEL: HCPCS | Performed by: TRANSPLANT SURGERY

## 2020-08-27 PROCEDURE — 85025 COMPLETE CBC W/AUTO DIFF WBC: CPT | Mod: 91

## 2020-08-27 PROCEDURE — 63600175 PHARM REV CODE 636 W HCPCS: Performed by: SURGERY

## 2020-08-27 PROCEDURE — U0002 COVID-19 LAB TEST NON-CDC: HCPCS

## 2020-08-27 PROCEDURE — 50323 PR TRANSPLANT,PREP CADAVER RENAL GRAFT: ICD-10-PCS | Mod: 51,LT,GC, | Performed by: TRANSPLANT SURGERY

## 2020-08-27 PROCEDURE — 63600175 PHARM REV CODE 636 W HCPCS: Performed by: TRANSPLANT SURGERY

## 2020-08-27 PROCEDURE — 87205 SMEAR GRAM STAIN: CPT

## 2020-08-27 PROCEDURE — 81200001 HC KIDNEY ACQUISITION - CADAVER

## 2020-08-27 PROCEDURE — 71000033 HC RECOVERY, INTIAL HOUR: Performed by: TRANSPLANT SURGERY

## 2020-08-27 PROCEDURE — 63600175 PHARM REV CODE 636 W HCPCS: Performed by: NURSE ANESTHETIST, CERTIFIED REGISTERED

## 2020-08-27 PROCEDURE — 87070 CULTURE OTHR SPECIMN AEROBIC: CPT

## 2020-08-27 PROCEDURE — 86706 HEP B SURFACE ANTIBODY: CPT

## 2020-08-27 PROCEDURE — 83970 ASSAY OF PARATHORMONE: CPT

## 2020-08-27 PROCEDURE — 86705 HEP B CORE ANTIBODY IGM: CPT

## 2020-08-27 PROCEDURE — 71000015 HC POSTOP RECOV 1ST HR: Performed by: TRANSPLANT SURGERY

## 2020-08-27 PROCEDURE — 85730 THROMBOPLASTIN TIME PARTIAL: CPT

## 2020-08-27 PROCEDURE — 80053 COMPREHEN METABOLIC PANEL: CPT

## 2020-08-27 PROCEDURE — 71000016 HC POSTOP RECOV ADDL HR: Performed by: TRANSPLANT SURGERY

## 2020-08-27 PROCEDURE — 63600175 PHARM REV CODE 636 W HCPCS: Performed by: PHYSICIAN ASSISTANT

## 2020-08-27 PROCEDURE — 25000003 PHARM REV CODE 250: Performed by: SURGERY

## 2020-08-27 PROCEDURE — 80061 LIPID PANEL: CPT

## 2020-08-27 PROCEDURE — 89051 BODY FLUID CELL COUNT: CPT

## 2020-08-27 PROCEDURE — 93010 EKG 12-LEAD: ICD-10-PCS | Mod: ,,, | Performed by: INTERNAL MEDICINE

## 2020-08-27 PROCEDURE — 63600175 PHARM REV CODE 636 W HCPCS: Performed by: ANESTHESIOLOGY

## 2020-08-27 PROCEDURE — 83735 ASSAY OF MAGNESIUM: CPT

## 2020-08-27 PROCEDURE — 86703 HIV-1/HIV-2 1 RESULT ANTBDY: CPT

## 2020-08-27 PROCEDURE — 25000003 PHARM REV CODE 250: Performed by: PHYSICIAN ASSISTANT

## 2020-08-27 PROCEDURE — 87340 HEPATITIS B SURFACE AG IA: CPT

## 2020-08-27 PROCEDURE — 87075 CULTR BACTERIA EXCEPT BLOOD: CPT

## 2020-08-27 PROCEDURE — 20600001 HC STEP DOWN PRIVATE ROOM

## 2020-08-27 PROCEDURE — D9220A PRA ANESTHESIA: Mod: CRNA,,, | Performed by: NURSE ANESTHETIST, CERTIFIED REGISTERED

## 2020-08-27 PROCEDURE — 50605 INSERT URETERAL SUPPORT: CPT | Mod: 51,LT,GC, | Performed by: TRANSPLANT SURGERY

## 2020-08-27 PROCEDURE — 25000003 PHARM REV CODE 250: Performed by: NURSE ANESTHETIST, CERTIFIED REGISTERED

## 2020-08-27 PROCEDURE — 81300002 HC KIDNEY TRANSPORT, GROUND 4-5 HOURS

## 2020-08-27 PROCEDURE — 84100 ASSAY OF PHOSPHORUS: CPT

## 2020-08-27 PROCEDURE — 93005 ELECTROCARDIOGRAM TRACING: CPT | Mod: NTX

## 2020-08-27 PROCEDURE — D9220A PRA ANESTHESIA: Mod: ANES,,, | Performed by: ANESTHESIOLOGY

## 2020-08-27 PROCEDURE — S5010 5% DEXTROSE AND 0.45% SALINE: HCPCS | Performed by: SURGERY

## 2020-08-27 PROCEDURE — 93010 ELECTROCARDIOGRAM REPORT: CPT | Mod: ,,, | Performed by: INTERNAL MEDICINE

## 2020-08-27 PROCEDURE — 25000003 PHARM REV CODE 250: Mod: NTX | Performed by: PHYSICIAN ASSISTANT

## 2020-08-27 PROCEDURE — 37000009 HC ANESTHESIA EA ADD 15 MINS: Performed by: TRANSPLANT SURGERY

## 2020-08-27 PROCEDURE — 50360 PR TRANSPLANTATION OF KIDNEY: ICD-10-PCS | Mod: LT,GC,, | Performed by: TRANSPLANT SURGERY

## 2020-08-27 PROCEDURE — 36415 COLL VENOUS BLD VENIPUNCTURE: CPT

## 2020-08-27 PROCEDURE — D9220A PRA ANESTHESIA: ICD-10-PCS | Mod: ANES,,, | Performed by: ANESTHESIOLOGY

## 2020-08-27 PROCEDURE — 85014 HEMATOCRIT: CPT

## 2020-08-27 PROCEDURE — 86850 RBC ANTIBODY SCREEN: CPT

## 2020-08-27 PROCEDURE — 63600175 PHARM REV CODE 636 W HCPCS: Mod: NTX | Performed by: PHYSICIAN ASSISTANT

## 2020-08-27 PROCEDURE — 27200950 HC CAPD SUPPORT: Mod: NTX

## 2020-08-27 PROCEDURE — 37000008 HC ANESTHESIA 1ST 15 MINUTES: Performed by: TRANSPLANT SURGERY

## 2020-08-27 PROCEDURE — 85610 PROTHROMBIN TIME: CPT

## 2020-08-27 PROCEDURE — 50360 RNL ALTRNSPLJ W/O RCP NFRCT: CPT | Mod: LT,GC,, | Performed by: TRANSPLANT SURGERY

## 2020-08-27 PROCEDURE — C1729 CATH, DRAINAGE: HCPCS | Performed by: TRANSPLANT SURGERY

## 2020-08-27 PROCEDURE — 87522 HEPATITIS C REVRS TRNSCRPJ: CPT

## 2020-08-27 PROCEDURE — 36000931 HC OR TIME LEV VII EA ADD 15 MIN: Performed by: TRANSPLANT SURGERY

## 2020-08-27 PROCEDURE — 27201423 OPTIME MED/SURG SUP & DEVICES STERILE SUPPLY: Performed by: TRANSPLANT SURGERY

## 2020-08-27 PROCEDURE — 84550 ASSAY OF BLOOD/URIC ACID: CPT

## 2020-08-27 PROCEDURE — 50328 PR TRANSPLANT,PREP RENAL GRAFT/ARTERIAL: ICD-10-PCS | Mod: 51,LT,GC, | Performed by: TRANSPLANT SURGERY

## 2020-08-27 PROCEDURE — 80069 RENAL FUNCTION PANEL: CPT | Mod: 91

## 2020-08-27 PROCEDURE — 36000930 HC OR TIME LEV VII 1ST 15 MIN: Performed by: TRANSPLANT SURGERY

## 2020-08-27 DEVICE — STENT DOUBLE J 7FRX12CM
Type: IMPLANTABLE DEVICE | Site: URETER | Status: NON-FUNCTIONAL
Removed: 2020-09-14

## 2020-08-27 RX ORDER — PREGABALIN 75 MG/1
75 CAPSULE ORAL
Status: DISCONTINUED | OUTPATIENT
Start: 2020-08-27 | End: 2020-08-27 | Stop reason: HOSPADM

## 2020-08-27 RX ORDER — VALGANCICLOVIR 450 MG/1
450 TABLET, FILM COATED ORAL EVERY MORNING
Status: DISCONTINUED | OUTPATIENT
Start: 2020-09-06 | End: 2020-09-02 | Stop reason: HOSPADM

## 2020-08-27 RX ORDER — VALGANCICLOVIR 450 MG/1
900 TABLET, FILM COATED ORAL DAILY
Qty: 60 TABLET | Refills: 5 | Status: SHIPPED | OUTPATIENT
Start: 2020-08-27 | End: 2020-09-02

## 2020-08-27 RX ORDER — SULFAMETHOXAZOLE AND TRIMETHOPRIM 400; 80 MG/1; MG/1
1 TABLET ORAL DAILY
Qty: 30 TABLET | Refills: 11 | Status: SHIPPED | OUTPATIENT
Start: 2020-08-27 | End: 2020-09-02

## 2020-08-27 RX ORDER — CEFAZOLIN SODIUM 1 G/3ML
INJECTION, POWDER, FOR SOLUTION INTRAMUSCULAR; INTRAVENOUS
Status: DISCONTINUED | OUTPATIENT
Start: 2020-08-27 | End: 2020-08-27 | Stop reason: HOSPADM

## 2020-08-27 RX ORDER — PROPOFOL 10 MG/ML
VIAL (ML) INTRAVENOUS
Status: DISCONTINUED | OUTPATIENT
Start: 2020-08-27 | End: 2020-08-27

## 2020-08-27 RX ORDER — FAMOTIDINE 20 MG/1
20 TABLET, FILM COATED ORAL NIGHTLY
Status: DISCONTINUED | OUTPATIENT
Start: 2020-08-27 | End: 2020-09-02 | Stop reason: HOSPADM

## 2020-08-27 RX ORDER — SULFAMETHOXAZOLE AND TRIMETHOPRIM 400; 80 MG/1; MG/1
1 TABLET ORAL EVERY MORNING
Status: DISCONTINUED | OUTPATIENT
Start: 2020-08-28 | End: 2020-08-31

## 2020-08-27 RX ORDER — LIDOCAINE HCL/PF 100 MG/5ML
SYRINGE (ML) INTRAVENOUS
Status: DISCONTINUED | OUTPATIENT
Start: 2020-08-27 | End: 2020-08-27

## 2020-08-27 RX ORDER — MYCOPHENOLATE MOFETIL 250 MG/1
1000 CAPSULE ORAL 2 TIMES DAILY
Qty: 240 CAPSULE | Refills: 11 | Status: SHIPPED | OUTPATIENT
Start: 2020-08-27 | End: 2020-10-15 | Stop reason: SDUPTHER

## 2020-08-27 RX ORDER — FENTANYL CITRATE 50 UG/ML
25 INJECTION, SOLUTION INTRAMUSCULAR; INTRAVENOUS EVERY 5 MIN PRN
Status: COMPLETED | OUTPATIENT
Start: 2020-08-27 | End: 2020-08-27

## 2020-08-27 RX ORDER — DIPHENHYDRAMINE HYDROCHLORIDE 50 MG/ML
INJECTION INTRAMUSCULAR; INTRAVENOUS
Status: DISCONTINUED | OUTPATIENT
Start: 2020-08-27 | End: 2020-08-27

## 2020-08-27 RX ORDER — PREDNISONE 5 MG/1
TABLET ORAL
Qty: 120 TABLET | Refills: 11 | Status: SHIPPED | OUTPATIENT
Start: 2020-08-27 | End: 2020-10-15 | Stop reason: SDUPTHER

## 2020-08-27 RX ORDER — OXYCODONE HYDROCHLORIDE 5 MG/1
5 TABLET ORAL EVERY 6 HOURS PRN
Status: DISCONTINUED | OUTPATIENT
Start: 2020-08-27 | End: 2020-09-02 | Stop reason: HOSPADM

## 2020-08-27 RX ORDER — MUPIROCIN 20 MG/G
OINTMENT TOPICAL
Status: DISCONTINUED | OUTPATIENT
Start: 2020-08-27 | End: 2020-08-27 | Stop reason: HOSPADM

## 2020-08-27 RX ORDER — TRAMADOL HYDROCHLORIDE 50 MG/1
50 TABLET ORAL EVERY 6 HOURS PRN
Status: DISCONTINUED | OUTPATIENT
Start: 2020-08-27 | End: 2020-09-02 | Stop reason: HOSPADM

## 2020-08-27 RX ORDER — FENTANYL CITRATE 50 UG/ML
INJECTION, SOLUTION INTRAMUSCULAR; INTRAVENOUS
Status: COMPLETED
Start: 2020-08-27 | End: 2020-08-27

## 2020-08-27 RX ORDER — HEPARIN SODIUM 5000 [USP'U]/ML
5000 INJECTION, SOLUTION INTRAVENOUS; SUBCUTANEOUS ONCE
Status: COMPLETED | OUTPATIENT
Start: 2020-08-27 | End: 2020-08-27

## 2020-08-27 RX ORDER — BUPIVACAINE HYDROCHLORIDE 2.5 MG/ML
INJECTION, SOLUTION EPIDURAL; INFILTRATION; INTRACAUDAL
Status: DISCONTINUED | OUTPATIENT
Start: 2020-08-27 | End: 2020-08-27 | Stop reason: HOSPADM

## 2020-08-27 RX ORDER — KETAMINE HCL IN 0.9 % NACL 50 MG/5 ML
SYRINGE (ML) INTRAVENOUS
Status: DISCONTINUED | OUTPATIENT
Start: 2020-08-27 | End: 2020-08-27

## 2020-08-27 RX ORDER — BISACODYL 5 MG
10 TABLET, DELAYED RELEASE (ENTERIC COATED) ORAL NIGHTLY
Status: DISCONTINUED | OUTPATIENT
Start: 2020-08-27 | End: 2020-09-02 | Stop reason: HOSPADM

## 2020-08-27 RX ORDER — IBUPROFEN 200 MG
24 TABLET ORAL
Status: DISCONTINUED | OUTPATIENT
Start: 2020-08-27 | End: 2020-09-02 | Stop reason: HOSPADM

## 2020-08-27 RX ORDER — MONTELUKAST SODIUM 10 MG/1
10 TABLET ORAL DAILY
Status: DISCONTINUED | OUTPATIENT
Start: 2020-08-27 | End: 2020-09-02 | Stop reason: HOSPADM

## 2020-08-27 RX ORDER — MUPIROCIN 20 MG/G
1 OINTMENT TOPICAL 2 TIMES DAILY
Status: DISCONTINUED | OUTPATIENT
Start: 2020-08-27 | End: 2020-09-01

## 2020-08-27 RX ORDER — ACETAMINOPHEN 650 MG/20.3ML
LIQUID ORAL
Status: DISCONTINUED | OUTPATIENT
Start: 2020-08-27 | End: 2020-08-27

## 2020-08-27 RX ORDER — CISATRACURIUM BESYLATE 10 MG/ML
INJECTION, SOLUTION INTRAVENOUS
Status: DISCONTINUED | OUTPATIENT
Start: 2020-08-27 | End: 2020-08-27

## 2020-08-27 RX ORDER — MYCOPHENOLATE MOFETIL 250 MG/1
1000 CAPSULE ORAL 2 TIMES DAILY
Status: DISCONTINUED | OUTPATIENT
Start: 2020-08-27 | End: 2020-09-02 | Stop reason: HOSPADM

## 2020-08-27 RX ORDER — TACROLIMUS 1 MG/1
3 CAPSULE ORAL 2 TIMES DAILY
Status: DISCONTINUED | OUTPATIENT
Start: 2020-08-27 | End: 2020-08-28

## 2020-08-27 RX ORDER — IBUPROFEN 200 MG
16 TABLET ORAL
Status: DISCONTINUED | OUTPATIENT
Start: 2020-08-27 | End: 2020-09-02 | Stop reason: HOSPADM

## 2020-08-27 RX ORDER — GLYCOPYRROLATE 0.2 MG/ML
INJECTION INTRAMUSCULAR; INTRAVENOUS
Status: DISCONTINUED | OUTPATIENT
Start: 2020-08-27 | End: 2020-08-27

## 2020-08-27 RX ORDER — HEPARIN SODIUM 10000 [USP'U]/ML
INJECTION, SOLUTION INTRAVENOUS; SUBCUTANEOUS
Status: DISCONTINUED | OUTPATIENT
Start: 2020-08-27 | End: 2020-08-27 | Stop reason: HOSPADM

## 2020-08-27 RX ORDER — INSULIN ASPART 100 [IU]/ML
0-5 INJECTION, SOLUTION INTRAVENOUS; SUBCUTANEOUS
Status: DISCONTINUED | OUTPATIENT
Start: 2020-08-27 | End: 2020-09-02 | Stop reason: HOSPADM

## 2020-08-27 RX ORDER — SODIUM CHLORIDE 9 MG/ML
INJECTION, SOLUTION INTRAVENOUS CONTINUOUS
Status: DISCONTINUED | OUTPATIENT
Start: 2020-08-27 | End: 2020-08-28

## 2020-08-27 RX ORDER — POTASSIUM CHLORIDE 7.45 MG/ML
10 INJECTION INTRAVENOUS
Status: COMPLETED | OUTPATIENT
Start: 2020-08-27 | End: 2020-08-27

## 2020-08-27 RX ORDER — ONDANSETRON 2 MG/ML
4 INJECTION INTRAMUSCULAR; INTRAVENOUS ONCE AS NEEDED
Status: DISCONTINUED | OUTPATIENT
Start: 2020-08-27 | End: 2020-09-02 | Stop reason: HOSPADM

## 2020-08-27 RX ORDER — GLUCAGON 1 MG
1 KIT INJECTION CONTINUOUS PRN
Status: DISCONTINUED | OUTPATIENT
Start: 2020-08-27 | End: 2020-09-02 | Stop reason: HOSPADM

## 2020-08-27 RX ORDER — FUROSEMIDE 10 MG/ML
INJECTION INTRAMUSCULAR; INTRAVENOUS
Status: DISCONTINUED | OUTPATIENT
Start: 2020-08-27 | End: 2020-08-27

## 2020-08-27 RX ORDER — EPHEDRINE SULFATE 50 MG/ML
INJECTION, SOLUTION INTRAVENOUS
Status: DISCONTINUED | OUTPATIENT
Start: 2020-08-27 | End: 2020-08-27

## 2020-08-27 RX ORDER — MANNITOL 20 G/100ML
INJECTION, SOLUTION INTRAVENOUS
Status: DISCONTINUED | OUTPATIENT
Start: 2020-08-27 | End: 2020-08-27

## 2020-08-27 RX ORDER — ACETAMINOPHEN 500 MG
1000 TABLET ORAL EVERY 8 HOURS
Status: COMPLETED | OUTPATIENT
Start: 2020-08-27 | End: 2020-08-29

## 2020-08-27 RX ORDER — LABETALOL 100 MG/1
100 TABLET, FILM COATED ORAL 3 TIMES DAILY
Status: ACTIVE | OUTPATIENT
Start: 2020-08-27 | End: 2020-08-27

## 2020-08-27 RX ORDER — HEPARIN SODIUM 5000 [USP'U]/ML
5000 INJECTION, SOLUTION INTRAVENOUS; SUBCUTANEOUS EVERY 8 HOURS
Status: DISCONTINUED | OUTPATIENT
Start: 2020-08-27 | End: 2020-09-02 | Stop reason: HOSPADM

## 2020-08-27 RX ORDER — FENTANYL CITRATE 50 UG/ML
INJECTION, SOLUTION INTRAMUSCULAR; INTRAVENOUS
Status: DISCONTINUED | OUTPATIENT
Start: 2020-08-27 | End: 2020-08-27

## 2020-08-27 RX ORDER — MIDAZOLAM HYDROCHLORIDE 1 MG/ML
INJECTION INTRAMUSCULAR; INTRAVENOUS
Status: DISCONTINUED | OUTPATIENT
Start: 2020-08-27 | End: 2020-08-27

## 2020-08-27 RX ORDER — PHENYLEPHRINE HYDROCHLORIDE 10 MG/ML
INJECTION INTRAVENOUS
Status: DISCONTINUED | OUTPATIENT
Start: 2020-08-27 | End: 2020-08-27

## 2020-08-27 RX ORDER — NEOSTIGMINE METHYLSULFATE 0.5 MG/ML
INJECTION, SOLUTION INTRAVENOUS
Status: DISCONTINUED | OUTPATIENT
Start: 2020-08-27 | End: 2020-08-27

## 2020-08-27 RX ORDER — AMLODIPINE BESYLATE 5 MG/1
5 TABLET ORAL DAILY
Status: DISCONTINUED | OUTPATIENT
Start: 2020-08-27 | End: 2020-09-02

## 2020-08-27 RX ORDER — ONDANSETRON 2 MG/ML
INJECTION INTRAMUSCULAR; INTRAVENOUS
Status: DISCONTINUED | OUTPATIENT
Start: 2020-08-27 | End: 2020-08-27

## 2020-08-27 RX ORDER — DOCUSATE SODIUM 100 MG/1
100 CAPSULE, LIQUID FILLED ORAL 3 TIMES DAILY
Status: DISCONTINUED | OUTPATIENT
Start: 2020-08-27 | End: 2020-09-02 | Stop reason: HOSPADM

## 2020-08-27 RX ORDER — SODIUM CHLORIDE 0.9 % (FLUSH) 0.9 %
3 SYRINGE (ML) INJECTION
Status: DISCONTINUED | OUTPATIENT
Start: 2020-08-27 | End: 2020-08-27 | Stop reason: HOSPADM

## 2020-08-27 RX ORDER — PREDNISONE 20 MG/1
20 TABLET ORAL DAILY
Status: DISCONTINUED | OUTPATIENT
Start: 2020-08-30 | End: 2020-09-02 | Stop reason: HOSPADM

## 2020-08-27 RX ORDER — METHYLPREDNISOLONE SOD SUCC 125 MG
125 VIAL (EA) INJECTION ONCE
Status: COMPLETED | OUTPATIENT
Start: 2020-08-29 | End: 2020-08-29

## 2020-08-27 RX ORDER — CEFAZOLIN SODIUM 1 G/3ML
2 INJECTION, POWDER, FOR SOLUTION INTRAMUSCULAR; INTRAVENOUS
Status: COMPLETED | OUTPATIENT
Start: 2020-08-27 | End: 2020-08-27

## 2020-08-27 RX ORDER — NYSTATIN 100000 [USP'U]/ML
500000 SUSPENSION ORAL
Status: DISCONTINUED | OUTPATIENT
Start: 2020-08-27 | End: 2020-09-02 | Stop reason: HOSPADM

## 2020-08-27 RX ORDER — NYSTATIN 100000 [USP'U]/ML
500000 SUSPENSION ORAL
Qty: 480 ML | Refills: 0 | Status: SHIPPED | OUTPATIENT
Start: 2020-08-27 | End: 2020-11-04 | Stop reason: ALTCHOICE

## 2020-08-27 RX ORDER — TACROLIMUS 1 MG/1
6 CAPSULE ORAL EVERY 12 HOURS
Qty: 360 CAPSULE | Refills: 11 | Status: SHIPPED | OUTPATIENT
Start: 2020-08-27 | End: 2020-09-10 | Stop reason: SDUPTHER

## 2020-08-27 RX ORDER — METHYLPREDNISOLONE SODIUM SUCCINATE 1 G/16ML
INJECTION INTRAMUSCULAR; INTRAVENOUS
Status: DISCONTINUED | OUTPATIENT
Start: 2020-08-27 | End: 2020-08-27

## 2020-08-27 RX ORDER — DEXTROSE MONOHYDRATE AND SODIUM CHLORIDE 5; .45 G/100ML; G/100ML
INJECTION, SOLUTION INTRAVENOUS CONTINUOUS
Status: DISCONTINUED | OUTPATIENT
Start: 2020-08-27 | End: 2020-08-28

## 2020-08-27 RX ORDER — SERTRALINE HYDROCHLORIDE 50 MG/1
50 TABLET, FILM COATED ORAL DAILY
Status: DISCONTINUED | OUTPATIENT
Start: 2020-08-27 | End: 2020-08-31

## 2020-08-27 RX ADMIN — EPHEDRINE SULFATE 5 MG: 50 INJECTION INTRAVENOUS at 09:08

## 2020-08-27 RX ADMIN — SODIUM CHLORIDE 20 MG: 9 INJECTION, SOLUTION INTRAVENOUS at 06:08

## 2020-08-27 RX ADMIN — PHENYLEPHRINE HYDROCHLORIDE 50 MCG: 10 INJECTION INTRAVENOUS at 07:08

## 2020-08-27 RX ADMIN — PHENYLEPHRINE HYDROCHLORIDE 100 MCG: 10 INJECTION INTRAVENOUS at 08:08

## 2020-08-27 RX ADMIN — CISATRACURIUM BESYLATE 2 MG: 10 INJECTION INTRAVENOUS at 07:08

## 2020-08-27 RX ADMIN — ACETAMINOPHEN 1000 MG: 500 TABLET ORAL at 01:08

## 2020-08-27 RX ADMIN — METHYLPREDNISOLONE SODIUM SUCCINATE 500 MG: 1 INJECTION, POWDER, LYOPHILIZED, FOR SOLUTION INTRAMUSCULAR; INTRAVENOUS at 06:08

## 2020-08-27 RX ADMIN — DEXTROSE AND SODIUM CHLORIDE: 5; .45 INJECTION, SOLUTION INTRAVENOUS at 10:08

## 2020-08-27 RX ADMIN — SODIUM CHLORIDE, SODIUM GLUCONATE, SODIUM ACETATE, POTASSIUM CHLORIDE, MAGNESIUM CHLORIDE, SODIUM PHOSPHATE, DIBASIC, AND POTASSIUM PHOSPHATE: .53; .5; .37; .037; .03; .012; .00082 INJECTION, SOLUTION INTRAVENOUS at 05:08

## 2020-08-27 RX ADMIN — POTASSIUM CHLORIDE 10 MEQ: 7.46 INJECTION, SOLUTION INTRAVENOUS at 01:08

## 2020-08-27 RX ADMIN — EPHEDRINE SULFATE 5 MG: 50 INJECTION INTRAVENOUS at 07:08

## 2020-08-27 RX ADMIN — NYSTATIN 500000 UNITS: 100000 SUSPENSION ORAL at 01:08

## 2020-08-27 RX ADMIN — HEPARIN SODIUM 5000 UNITS: 5000 INJECTION INTRAVENOUS; SUBCUTANEOUS at 01:08

## 2020-08-27 RX ADMIN — MYCOPHENOLATE MOFETIL 1000 MG: 250 CAPSULE ORAL at 10:08

## 2020-08-27 RX ADMIN — EPHEDRINE SULFATE 5 MG: 50 INJECTION INTRAVENOUS at 06:08

## 2020-08-27 RX ADMIN — SODIUM CHLORIDE, SODIUM GLUCONATE, SODIUM ACETATE, POTASSIUM CHLORIDE, MAGNESIUM CHLORIDE, SODIUM PHOSPHATE, DIBASIC, AND POTASSIUM PHOSPHATE: .53; .5; .37; .037; .03; .012; .00082 INJECTION, SOLUTION INTRAVENOUS at 08:08

## 2020-08-27 RX ADMIN — CISATRACURIUM BESYLATE 12 MG: 10 INJECTION INTRAVENOUS at 05:08

## 2020-08-27 RX ADMIN — Medication 20 MG: at 06:08

## 2020-08-27 RX ADMIN — SODIUM CHLORIDE 75 ML: 0.9 INJECTION, SOLUTION INTRAVENOUS at 10:08

## 2020-08-27 RX ADMIN — DIPHENHYDRAMINE HYDROCHLORIDE 50 MG: 50 INJECTION, SOLUTION INTRAMUSCULAR; INTRAVENOUS at 05:08

## 2020-08-27 RX ADMIN — FENTANYL CITRATE 25 MCG: 50 INJECTION INTRAMUSCULAR; INTRAVENOUS at 12:08

## 2020-08-27 RX ADMIN — PHENYLEPHRINE HYDROCHLORIDE 50 MCG: 10 INJECTION INTRAVENOUS at 08:08

## 2020-08-27 RX ADMIN — DOCUSATE SODIUM 100 MG: 100 CAPSULE, LIQUID FILLED ORAL at 01:08

## 2020-08-27 RX ADMIN — FENTANYL CITRATE 100 MCG: 50 INJECTION, SOLUTION INTRAMUSCULAR; INTRAVENOUS at 05:08

## 2020-08-27 RX ADMIN — CISATRACURIUM BESYLATE 2 MG: 10 INJECTION INTRAVENOUS at 08:08

## 2020-08-27 RX ADMIN — ACETAMINOPHEN 1000 MG: 500 TABLET ORAL at 08:08

## 2020-08-27 RX ADMIN — DOCUSATE SODIUM 100 MG: 100 CAPSULE, LIQUID FILLED ORAL at 08:08

## 2020-08-27 RX ADMIN — HEPARIN SODIUM 5000 UNITS: 5000 INJECTION INTRAVENOUS; SUBCUTANEOUS at 08:08

## 2020-08-27 RX ADMIN — ACETAMINOPHEN 650 MG: 160 SOLUTION ORAL at 05:08

## 2020-08-27 RX ADMIN — FENTANYL CITRATE 25 MCG: 50 INJECTION, SOLUTION INTRAMUSCULAR; INTRAVENOUS at 09:08

## 2020-08-27 RX ADMIN — THERA TABS 1 TABLET: TAB at 10:08

## 2020-08-27 RX ADMIN — GLYCOPYRROLATE 0.6 MG: 0.2 INJECTION, SOLUTION INTRAMUSCULAR; INTRAVENOUS at 09:08

## 2020-08-27 RX ADMIN — POTASSIUM CHLORIDE 10 MEQ: 7.46 INJECTION, SOLUTION INTRAVENOUS at 03:08

## 2020-08-27 RX ADMIN — CEFAZOLIN 2 G: 330 INJECTION, POWDER, FOR SOLUTION INTRAMUSCULAR; INTRAVENOUS at 09:08

## 2020-08-27 RX ADMIN — NYSTATIN 500000 UNITS: 100000 SUSPENSION ORAL at 08:08

## 2020-08-27 RX ADMIN — FAMOTIDINE 20 MG: 20 TABLET, FILM COATED ORAL at 08:08

## 2020-08-27 RX ADMIN — AMLODIPINE BESYLATE 5 MG: 5 TABLET ORAL at 05:08

## 2020-08-27 RX ADMIN — MONTELUKAST 10 MG: 10 TABLET, FILM COATED ORAL at 05:08

## 2020-08-27 RX ADMIN — FENTANYL CITRATE 25 MCG: 50 INJECTION INTRAMUSCULAR; INTRAVENOUS at 11:08

## 2020-08-27 RX ADMIN — FENTANYL CITRATE 25 MCG: 50 INJECTION, SOLUTION INTRAMUSCULAR; INTRAVENOUS at 10:08

## 2020-08-27 RX ADMIN — MYCOPHENOLATE MOFETIL 1000 MG: 250 CAPSULE ORAL at 08:08

## 2020-08-27 RX ADMIN — SERTRALINE HYDROCHLORIDE 50 MG: 50 TABLET ORAL at 11:08

## 2020-08-27 RX ADMIN — PROPOFOL 180 MG: 10 INJECTION, EMULSION INTRAVENOUS at 05:08

## 2020-08-27 RX ADMIN — NYSTATIN 500000 UNITS: 100000 SUSPENSION ORAL at 05:08

## 2020-08-27 RX ADMIN — TACROLIMUS 3 MG: 1 CAPSULE ORAL at 05:08

## 2020-08-27 RX ADMIN — GLYCOPYRROLATE 0.2 MG: 0.2 INJECTION, SOLUTION INTRAMUSCULAR; INTRAVENOUS at 06:08

## 2020-08-27 RX ADMIN — Medication 10 MG: at 08:08

## 2020-08-27 RX ADMIN — MIDAZOLAM HYDROCHLORIDE 2 MG: 1 INJECTION, SOLUTION INTRAMUSCULAR; INTRAVENOUS at 05:08

## 2020-08-27 RX ADMIN — MUPIROCIN 1 G: 20 OINTMENT TOPICAL at 08:08

## 2020-08-27 RX ADMIN — OXYCODONE 5 MG: 5 TABLET ORAL at 10:08

## 2020-08-27 RX ADMIN — ONDANSETRON 4 MG: 2 INJECTION, SOLUTION INTRAMUSCULAR; INTRAVENOUS at 09:08

## 2020-08-27 RX ADMIN — LIDOCAINE HYDROCHLORIDE 80 MG: 20 INJECTION, SOLUTION INTRAVENOUS at 05:08

## 2020-08-27 RX ADMIN — FUROSEMIDE 100 MG: 10 INJECTION, SOLUTION INTRAMUSCULAR; INTRAVENOUS at 07:08

## 2020-08-27 RX ADMIN — CISATRACURIUM BESYLATE 2 MG: 10 INJECTION INTRAVENOUS at 06:08

## 2020-08-27 RX ADMIN — Medication 10 MG: at 07:08

## 2020-08-27 RX ADMIN — SODIUM CHLORIDE, SODIUM GLUCONATE, SODIUM ACETATE, POTASSIUM CHLORIDE, MAGNESIUM CHLORIDE, SODIUM PHOSPHATE, DIBASIC, AND POTASSIUM PHOSPHATE: .53; .5; .37; .037; .03; .012; .00082 INJECTION, SOLUTION INTRAVENOUS at 06:08

## 2020-08-27 RX ADMIN — MUPIROCIN 1 G: 20 OINTMENT TOPICAL at 11:08

## 2020-08-27 RX ADMIN — PHENYLEPHRINE HYDROCHLORIDE 50 MCG: 10 INJECTION INTRAVENOUS at 09:08

## 2020-08-27 RX ADMIN — MANNITOL 25 G: 20 INJECTION, SOLUTION INTRAVENOUS at 07:08

## 2020-08-27 RX ADMIN — NEOSTIGMINE METHYLSULFATE 4.5 MG: 0.5 INJECTION INTRAVENOUS at 09:08

## 2020-08-27 RX ADMIN — SODIUM CHLORIDE 500 ML: 0.9 INJECTION, SOLUTION INTRAVENOUS at 04:08

## 2020-08-27 RX ADMIN — CEFAZOLIN 2 G: 330 INJECTION, POWDER, FOR SOLUTION INTRAMUSCULAR; INTRAVENOUS at 06:08

## 2020-08-27 RX ADMIN — TACROLIMUS 3 MG: 1 CAPSULE ORAL at 10:08

## 2020-08-27 RX ADMIN — BISACODYL 10 MG: 5 TABLET, COATED ORAL at 08:08

## 2020-08-27 RX ADMIN — EPHEDRINE SULFATE 5 MG: 50 INJECTION INTRAVENOUS at 05:08

## 2020-08-27 RX ADMIN — OXYCODONE 5 MG: 5 TABLET ORAL at 04:08

## 2020-08-27 NOTE — ANESTHESIA PROCEDURE NOTES
Intubation  Performed by: Sushma Kong CRNA  Authorized by: Alex Bah MD     Intubation:     Induction:  Intravenous    Intubated:  Postinduction    Mask Ventilation:  Easy with oral airway    Attempts:  1    Attempted By:  CRNA    Method of Intubation:  Direct    Blade:  De Jesus 2    Laryngeal View Grade: Grade I - full view of chords      Difficult Airway Encountered?: No      Complications:  None    Airway Device:  Oral endotracheal tube    Airway Device Size:  7.5    Style/Cuff Inflation:  Cuffed    Inflation Amount (mL):  6    Tube secured:  22    Secured at:  The lips    Placement Verified By:  Capnometry    Complicating Factors:  None    Findings Post-Intubation:  BS equal bilateral and atraumatic/condition of teeth unchanged

## 2020-08-27 NOTE — OP NOTE
Pre-operative Discussion Note  Kidney Transplant Surgery    Pre op discussion occurred in the pre-op area at 0500    Bryant Azevedo is a 49 y.o. male with ESRD, requiring chronic dialysis admitted for kidney transplant.  I discussed the planned procedure in detail, including expected hospital course and outcomes, benefits, risks, and potential complications.  Complications discussed included death, graft failure, bleeding, infection, vascular thrombosis, and rejection.  I discussed the risks of anesthesia, as well as the potential need for re-operation.  The possibility of other complications not specifically mentioned was also discussed.  Also, I discussed the need for lifelong immunosuppression and the possibility of serious complications from immunosuppressive drugs.    The discussion included the risks that the patient will incur if he elects to not have the proposed procedure.    Relevant donor-specific risk factors were disclosed and discussed with the patient, including:   DCD: I discussed the use of organs recovered by donation after cardiac death (DCD), including an increased chance of delayed graft function, with similar long-term outcomes to non-DCD organs.  The patient is willing to consider such grafts.    Specific PHS Increased Risk Behavior criteria for the organ donor include:  None    COVID-19: I discussed the possibility of COVID-19 transmission with the patient. Although GLENYS testing is available for the virus using a technique which in theory should be very accurate, there is no data yet regarding the likelihood of a  false-negative test leading to virus transmission. Based on accuracy of testing for other viruses, it is expected that this risk is extremely small.     I also discussed that transplant immunosuppression will increase susceptibility to COVID-19 and other viruses, and that although we use stringent precautions to protect patients from infection, it is possible for a transplant  recipient to contract this infection. If COVID-19 infection should occur, it would be a serious matter with a significant risk of death.    All questions were answered.  The patient and available family members voice understanding and agree to proceed with the transplant.    UNOS Patient Status  Note on scores:  ICU = 10 = total assistance  TSU = 20-30 = partial assistance  Outpatient admitted for transplant requiring medical care in last year = 40-50 = partial assistance  Scores 60 or higher indicate no assistance, meaning no need for medical care in last year. This would be very unusual for a transplant candidate.    UNOS Patient Status  Functional Status: 50% - Requires considerable assistance and frequent medical care  Physical Capacity: No Limitations

## 2020-08-27 NOTE — OP NOTE
Operative Report    Date of Procedure: 8/27/2020    Surgeon: Seferino Mendoza MD  First Assistant: Brittni Vazquez MD    Pre-operative Diagnosis: Allograft kidney for transplantation  Post-operative Diagnosis: Same    Procedure(s) Performed: Back Table Preparation of Kidney, Complex    Anesthesia: Not applicable  Estimated Blood Loss: Not applicable  Fluids Administered: Not applicable    Findings: as described below   Drains: not applicable    Preamble  Indications: This report describes only the backbench preparation of the kidney prior to transplantation.  The transplant operation itself is described in a separate report.    ABO Confirmation: Immediately following arrival of the donor organ and prior to implantation, a formal ABO confirmation was done according to hospital and UNOS policies.  I confirmed the UNOS ID number of the donor organ and the donor and recipient ABO types, directly verifying these data by comparison with the UNOS Match Run report.  This confirmation was personally done by an attending surgeon and circulating nurse, and is officially documented elsewhere.    Time-Out: A complete time out was carried out prior to the procedure, with confirmation of patient identity, correct procedure, correct operative site, appropriate antibiotic prophylaxis, review of any known allergies, and presence of all needed equipment.    Procedure in Detail  Prior to starting the operation, the left kidney  was prepared on the back table. Arterial anatomy was triple. Two main arteries on a patch and a smaller lower pole artery on a patch. The aortic patch between the vessels was resected and the patches joined at the common edge with 6-0 Prolene, creating a single aortic patch with all 3 vessels. Venous anatomy was single. Ureteral anatomy was single. Back table vascular reconstruction was required, consisting of arterial reconstruction.  Unneeded fat was removed from the kidney, the vessels were cleaned of adherent  tissue and tested for leaks, and the kidney was maintained at ice temperature in organ preservation solution until it was brought to the operative field.

## 2020-08-27 NOTE — SUBJECTIVE & OBJECTIVE
Subjective:     Chief Complaint/Reason for Admission: ESRD; Kidney Transplant    History of Present Illness:  Mr. Carlos Azevedo is a 50 y/o M with ESRD secondary to HTN and congenital abnormality (unknown details) who presents for kidney transplant He has been on the wait list for a kidney transplant at [facility] since 11/10/2014. Patient is currently on peritoneal dialysis started on 11/2014. Patient is dialyzing on cyclic peritoneal dialysis. Patient reports that she is tolerating dialysis well. Dry conrado ~70 kg. Native UOP ~1200 ml/day. He has a R PD catheter. Of note, patient has a rash to BLE that is erythematous, with some scabbing. He reports chronic contact dermatitis vs. eczema and is followed by a dermatologist (Dr. Prater) is [place]. He has been treated with UV light and zinc oxide in the past. He reports transplant team is aware of rash. Chart reviewed and rash was noted in transplant clinic 12/3/19 with photos appearing more severe than current rash. He was deemed a suitable transplant candidate at that time. Patient denies any recent hospitalizations or ED visits. Pre op labs and imaging. Tentative OR time 0500 with primary surgeon Dr. Mendoza.    Dialysis History: Mr. Hurtado with ESRD, requiring chronic dialysis who is on peritoneal dialysis started on 11/10/14. Patient is dialyzing on cyclic peritoneal dialysis.  Patient reports that he is tolerating dialysis well.   Date of Last Dialysis: 8/26    Native urine output per day: ~1200 mL    Previous Transplant: no    PTA Medications   Medication Sig    allopurinol (ZYLOPRIM) 100 MG tablet Take 100 mg by mouth once daily.    amlodipine (NORVASC) 5 MG tablet Take 1 tablet (5 mg total) by mouth once daily. One-2 per day or as directed (Patient taking differently: Take 5 mg by mouth 2 (two) times daily. )    calcium acetate (PHOSLO) 667 mg capsule Take 667 mg by mouth 3 (three) times daily with meals.    ferrous sulfate 325 (65 FE) MG EC tablet Take 325 mg  by mouth once daily.    fluticasone-salmeterol 250-50 mcg/dose (ADVAIR) 250-50 mcg/dose diskus inhaler Inhale 1 puff into the lungs 2 (two) times daily.    furosemide (LASIX) 80 MG tablet Take 80 mg by mouth once daily.    labetalol (NORMODYNE) 100 MG tablet Take 1 tablet (100 mg total) by mouth 3 (three) times daily.    montelukast (SINGULAIR) 10 mg tablet Take 10 mg by mouth once daily.    paroxetine (PAXIL) 20 MG tablet Take 20 mg by mouth every evening.    sertraline (ZOLOFT) 50 MG tablet Take 50 mg by mouth once daily.       Review of patient's allergies indicates:   Allergen Reactions    Antihistamines - alkylamine Other (See Comments)     End stage renal     Codeine Other (See Comments)     Severe abdominal cramping    Nsaids (non-steroidal anti-inflammatory drug) Other (See Comments)     End stage renal        Past Medical History:   Diagnosis Date    Chronic asthma     ESRD since 11/2014 from HTN     Hyperlipidemia     Hyperuricemia     Renal hypertension     Secondary hyperparathyroidism of renal origin      Past Surgical History:   Procedure Laterality Date    CHOLECYSTECTOMY      COLONOSCOPY N/A 12/12/2018    Procedure: COLONOSCOPY;  Surgeon: Pawel Gusman MD;  Location: [address]);  Service: Endoscopy;  Laterality: N/A;  peritoneal dialysis daily-labs prior-MS    PERITONEAL CATHETER INSERTION      TONSILLECTOMY       Family History     Problem Relation (Age of Onset)    Cancer Father    Heart disease Maternal Grandmother    Hypertension Mother        Tobacco Use    Smoking status: Never Smoker    Smokeless tobacco: Never Used   Substance and Sexual Activity    Alcohol use: No     Alcohol/week: 0.0 standard drinks    Drug use: No    Sexual activity: Not on file        Review of Systems   Constitutional: Negative for activity change, appetite change, chills and fever.   HENT: Negative for facial swelling and trouble swallowing.    Eyes: Negative for photophobia and  "redness.   Respiratory: Negative for cough, shortness of breath, wheezing and stridor.    Cardiovascular: Positive for leg swelling. Negative for chest pain and palpitations.   Gastrointestinal: Negative for abdominal pain, constipation, nausea and vomiting.   Genitourinary: Negative for difficulty urinating and dysuria.   Musculoskeletal: Negative for neck pain and neck stiffness.   Skin: Positive for rash (BLE).   Neurological: Negative for dizziness, tremors, light-headedness and headaches.   Psychiatric/Behavioral: Negative for agitation, behavioral problems, confusion, decreased concentration and dysphoric mood.     Objective:     Vital Signs (Most Recent):  Temp: 98.6 °F (37 °C) (08/27/20 0015)  Pulse: 84 (08/27/20 0015)  Resp: 17 (08/27/20 0015)  BP: (!) 144/84 (08/27/20 0015)  SpO2: 100 % (08/27/20 0015)  Height: 5' 4" (162.6 cm)  Weight: 68.2 kg (150 lb 6.4 oz)  Body mass index is 25.82 kg/m².     Physical Exam  Vitals signs and nursing note reviewed.   Constitutional:       General: He is not in acute distress.  HENT:      Head: Normocephalic and atraumatic.   Eyes:      General: No scleral icterus.        Right eye: No discharge.         Left eye: No discharge.      Pupils: Pupils are equal, round, and reactive to light.   Cardiovascular:      Rate and Rhythm: Normal rate and regular rhythm.      Pulses: Normal pulses.      Heart sounds: No murmur.   Pulmonary:      Effort: Pulmonary effort is normal. No respiratory distress.      Breath sounds: No stridor. Wheezing (few scattered) present. No rales.   Abdominal:      General: Bowel sounds are normal. There is no distension.      Tenderness: There is no abdominal tenderness. There is no guarding.      Comments: R PD catheter no s/s/i   Musculoskeletal:         General: No tenderness.      Comments: Trace BLE edema   Skin:     General: Skin is warm.          Neurological:      General: No focal deficit present.      Mental Status: He is alert and oriented " to person, place, and time.   Psychiatric:         Mood and Affect: Mood normal.         Behavior: Behavior normal.         Thought Content: Thought content normal.         Laboratory  CBC:   Recent Labs   Lab 08/27/20  0049   WBC 9.15   RBC 4.13*   HGB 12.7*   HCT 38.5*      MCV 93   MCH 30.8   MCHC 33.0     CMP:   Pending     Diagnostic Results:  pending

## 2020-08-27 NOTE — PROGRESS NOTES
PD samples taken from peritoneal catheter, drained about 20mls. Patient manually drained himself prior to admission.    Lab samples given to primary nurse.

## 2020-08-27 NOTE — NURSING
Pt arrived to TSU from PACU via pt bed and 2 transporters. JR Cabezas notified of pts arrival to floor and PA came to bedside. Pt AAOx4, VSS, afebrile. Sats 93-94% on RA - 2L NC placed and sats 98%. Pt c/o incisional pain - RLQ incision CDI with surgical dsg intact - no drainage. RLQ ELEUTERIO with sanguinous output and clots - JR Kenyon aware - dsg over site CDI. Teds/SCDs in place - wounds noted to BLE - wound care consult in place and team aware per admit note. Godwin to gravity in place - CDI - clear yellow urine. I=O continued - instructed to notify transplant team if UO <100 in one hour. Peripheral IV's CDI. Pt oriented to room and instructed to use call light for assistance - pt demonstrated and verbalized understanding. Safety precautions maintained. Will continue to monitor pt, proactively round on pt, and adjust care as needed.

## 2020-08-27 NOTE — CONSULTS
"  [facility]  Adult Nutrition  Consult Note  Shannon Cheng, Provisional LDN  SUMMARY     Recommendations    1. Continue regular diet to encourage PO intake   2. RD will monitor    Goals: 1. PO intake >75% over the course of 7 days  Nutrition Goal Status: new  Communication of RD Recs: (POC)    Reason for Assessment    Reason For Assessment: consult  Diagnosis: (Post kidney transplant)  Relevant Medical History: ESRD, HTN, asthma  Interdisciplinary Rounds: did not attend  General Information Comments: 50 y/o male post kidney transplant. Will f/u with pt after transport to [facility] to provide post transplant nutrition education and NFPE.  Nutrition Discharge Planning: Will provide post transplant nutrition education at RD f/u    Nutrition Risk Screen    Nutrition Risk Screen: no indicators present    Nutrition/Diet History    Patient Reported Diet/Restrictions/Preferences: (Unable to obtain)  Food Allergies: NKFA    Anthropometrics    Temp: 98.2 °F (36.8 °C)  Height Method: Stated  Height: 5' 4" (162.6 cm)  Height (inches): 64 in  Weight Method: Standard Scale  Weight: 68.2 kg (150 lb 5.7 oz)  Weight (lb): 150.36 lb  Ideal Body Weight (IBW), Male: 130 lb  % Ideal Body Weight, Male (lb): 115.66 %  BMI (Calculated): 25.8  BMI Grade: 25 - 29.9 - overweight       Lab/Procedures/Meds    Pertinent Labs Reviewed: reviewed  Pertinent Labs Comments: K 3.2, CO2 22, BUN 64, glu 140, GFR 10.2  Pertinent Medications Reviewed: reviewed  Pertinent Medications Comments: amlodipine, docusate Na, heparin, methylprednisolone, MVT, KCl, tacrolimus, predisone, mycophenolate, D5    Estimated/Assessed Needs    Weight Used For Calorie Calculations: 68.2 kg (150 lb 5.7 oz)  Energy Calorie Requirements (kcal): 1705 kcals/day (25 kcals/kg)  Energy Need Method: Kcal/kg  Protein Requirements:  g (1.2-1.5 g/kg)  Weight Used For Protein Calculations: 68.2 kg (150 lb 5.7 oz)  Fluid Requirements (mL): 1 mL/kcal or per " MD  Estimated Fluid Requirement Method: RDA Method  RDA Method (mL): 1705  CHO Requirement: N/A      Nutrition Prescription Ordered    Current Diet Order: Regular    Evaluation of Received Nutrient/Fluid Intake    Energy Calories Required: not meeting needs  Protein Required: not meeting needs  Fluid Required: not meeting needs  Comments: LBM: 8.26.20  % Intake of Estimated Energy Needs: 0 - 25 %  % Meal Intake: 0 - 25 %    Nutrition Risk    Level of Risk/Frequency of Follow-up: high(2 x weekly)     Assessment and Plan    Nutrition Problem  Inadequate oral intake    Related to (etiology):   Recent diet advancement    Signs and Symptoms (as evidenced by):   S/p kidney transplant    Interventions (treatment strategy):  1.) Collaboration with providers  2.) General/healthful diet    Nutrition Diagnosis Status:   New      Monitor and Evaluation    Food and Nutrient Intake: energy intake  Food and Nutrient Adminstration: diet order  Anthropometric Measurements: weight  Biochemical Data, Medical Tests and Procedures: electrolyte and renal panel, glucose/endocrine profile  Nutrition-Focused Physical Findings: overall appearance     Malnutrition Assessment  Koby Score: 22    Nutrition Follow-Up    RD Follow-up?: Yes

## 2020-08-27 NOTE — OP NOTE
Operative Report    Date of Procedure: 8/27/2020  Date of Transplant (UNOS): 8/27/20    Surgeons:  Surgeon(s) and Role:     * Seferino Mendoza MD - Primary     * ROSHAN Ahumada MD - Resident - Assisting     * Brittni Vazquez MD - Fellow    First Assistant Attestation:  The indicated resident served as first assistant for this procedure.    Pre-operative Diagnosis: ESRD, requiring chronic dialysis secondary to Other, Specify - unknown  Post-operative Diagnosis: Same    Procedure(s) Performed:   1. Back Table Preparation of Left Kidney    2. Donation after Circulatory Death  Kidney transplant    Anesthesia: General endotracheal    Preamble  Indications and Patient Counseling: The patient is a 49 y.o. year-old male with end-stage kidney disease secondary to Other, Specify - unknown who has been evaluated for a kidney transplant.  The procedure was thoroughly discussed with the patient, including potential risks, complications, and alternatives.  Specific complications mentioned included death, graft non-function, bleeding, infection, and rejection, as well as the possibility of other complications not specifically mentioned.    Donor Risk Factors: Prior to the operation, the patient was advised of any donor-specific risk factors requiring specific disclosure.  Factors in this case included donation after cardiac death.      Specific PHS Increased Risk Behavior criteria for the organ donor include:  None    All questions were answered, the patient voiced appropriate understanding, and he agreed to proceed with the planned procedure.    ABO Confirmation: Immediately following arrival of the donor organ and prior to implantation, a formal ABO confirmation was done according to hospital and UNOS policies.  I confirmed the UNOS ID number ([id number]) of the donor organ and the donor and recipient ABO types, directly verifying these data by comparison with the UNOS Match Run report ([id number]).  This confirmation was  personally done by an attending surgeon and circulating nurse, and is officially documented elsewhere.    Time-Out: A complete time out was carried out prior to incision, with confirmation of patient identity, correct procedure, correct operative site, appropriate antibiotic prophylaxis, review of any known allergies, and presence of all needed equipment.    Procedure in Detail  Prior to starting the operation, the left kidney  was prepared on the back table. Arterial anatomy was triple. Venous anatomy was single. Ureteral anatomy was single. Back table vascular reconstruction was required, consisting of arterial reconstruction.  Unneeded fat was removed from the kidney, the vessels were cleaned of adherent tissue and tested for leaks, and the kidney was maintained at ice temperature in organ preservation solution until it was brought to the operative field.     The patient was brought into the operating room and placed in a supine position on the OR table.  After the induction of general endotracheal anesthesia, lines were placed by the anesthesiologist.  The urinary bladder was catheterized and irrigated with antibiotic solution.  There was no tension on the axillae and all pressure points were padded.  Sequential compression boots were used as were Hugo Huggers.  The abdomen was prepped and draped in the usual sterile fashion.  Skin was incised over the right with a knife and deepened with electrocautery.  The peritoneum and its contents were swept medially, exposing the right external iliac artery and the right external iliac vein.  The Bookwalter retractor was used to provide exposure.  Overlying lymphatics were ligated or cauterized and the vessels were dissected free for a length compatible with anastomosis.  The kidney was brought to the OR table at 8/27/2020  7:11 AM.  Venous control was obtained with a vascular clamp.  A venotomy was made, the vein irrigated, and an end renal to right external iliac vein  anastomosis was created with 5-0 polypropylene.  Arterial control was obtained with a vascular clamp.  Arteriotomy was made, the artery irrigated, and an end renal to right external iliac artery anastomosis was created with 5-0 polypropylene.  The kidney was unclamped and reperfused at 8/27/2020  7:49 AM.  Reperfusion quality was good. Intraoperative urine production was not observed.  After hemostasis was obtained, a Lich uretero-neocystostomy was created.  The bladder was filled and identified, opened, and the anastomosis created using 6-0 PDS.  The bladder muscle was closed over the distal ureter to create an antireflux tunnel.  A ureteral stent was used.  With the kidney well perfused and sitting appropriately without tension on the anastomoses, viscera were replaced in their usual position.  The wound was closed after a final check for hemostasis.  Overall, the graft quality was assessed to be good. At the end of the case the needle, sponge and instrument counts were all correct.  Sterile dressings were applied and the patient was brought to the recovery room/ICU in good condition.    Estimated Blood Loss: 100 mL  Fluids Administered:    Drains: 15fBlake drains x 1  Specimens: none    Findings:    Organ Transplanted: Left Kidney    Arterial Anatomy: triple  Number of Arteries: 3  Configuration of Multiple Arteries: common patch   Venous Anatomy: single  Number of Veins: 1  Ureteral Anatomy: single  Number of Ureters: 1  Reperfusion Quality: good  Overall Graft Quality: good  Intraoperative Urine Production: no  Godwin: not to be removed before 5 days.  Ureteral Stent: Yes    Ischemic Times:   Anastomosis (warm ischemia) time: 38 minutes   Cold ischemia time: 1,261 minutes  Total ischemia time: 1299 minutes    Donor Data:  UNOS ID: [id number]   UNOS Match Run: [id number]   Donor Type: Donation after Circulatory Death    Donor CMV Status: Positive   Donor HBcAB: Negative   Donor HCV Status: Negative

## 2020-08-27 NOTE — PLAN OF CARE
Shannon Cheng, Provisional LDN    Recommendations    1. Continue regular diet to encourage PO intake   2. RD will monitor    Goals: 1. PO intake >75% over the course of 7 days  Nutrition Goal Status: new  Communication of RD Recs: (POC)

## 2020-08-27 NOTE — PLAN OF CARE
Pt is AAOx3.Pt is ambulatory and independent.Pt able to perform all ADL's without assistance. Pt is in good spirits.  Pt denies pian and/or discomfort at this time.Pt turns independently, pt is aware of bony area and pressure reduction positions V/S stable, within normal limits.Pt remains injury and fall free, non skid footwear donned, call light within reach, personal items within reach, bed in low/locked position, pt able to voice needs all needs voiced have been met at this time.

## 2020-08-27 NOTE — H&P
[facility]  Kidney Transplant  H&P      Subjective:     Chief Complaint/Reason for Admission: ESRD; Kidney Transplant    History of Present Illness:  Mr. Carlos Azevedo is a 50 y/o M with ESRD secondary to HTN and congenital abnormality (unknown details) who presents for kidney transplant He has been on the wait list for a kidney transplant at [facility] since 11/10/2014. Patient is currently on peritoneal dialysis started on 11/2014. Patient is dialyzing on cyclic peritoneal dialysis. Patient reports that she is tolerating dialysis well. Dry conrado ~70 kg. Native UOP ~1200 ml/day. He has a R PD catheter. Of note, patient has a rash to BLE that is erythematous, with some scabbing. He reports chronic contact dermatitis vs. eczema and is followed by a dermatologist (Dr. Prater) is [place]. He has been treated with UV light and zinc oxide in the past. He reports transplant team is aware of rash. Chart reviewed and rash was noted in transplant clinic 12/3/19 with photos appearing more severe than current rash. He was deemed a suitable transplant candidate at that time. Patient denies any recent hospitalizations or ED visits. Pre op labs and imaging. Tentative OR time 0500 with primary surgeon Dr. Mendoza.    Dialysis History: Mr. Hurtado with ESRD, requiring chronic dialysis who is on peritoneal dialysis started on 11/10/14. Patient is dialyzing on cyclic peritoneal dialysis.  Patient reports that he is tolerating dialysis well.   Date of Last Dialysis: 8/26    Native urine output per day: ~1200 mL    Previous Transplant: no    PTA Medications   Medication Sig    allopurinol (ZYLOPRIM) 100 MG tablet Take 100 mg by mouth once daily.    amlodipine (NORVASC) 5 MG tablet Take 1 tablet (5 mg total) by mouth once daily. One-2 per day or as directed (Patient taking differently: Take 5 mg by mouth 2 (two) times daily. )    calcium acetate (PHOSLO) 667 mg capsule Take 667 mg by mouth 3 (three) times daily with  meals.    ferrous sulfate 325 (65 FE) MG EC tablet Take 325 mg by mouth once daily.    fluticasone-salmeterol 250-50 mcg/dose (ADVAIR) 250-50 mcg/dose diskus inhaler Inhale 1 puff into the lungs 2 (two) times daily.    furosemide (LASIX) 80 MG tablet Take 80 mg by mouth once daily.    labetalol (NORMODYNE) 100 MG tablet Take 1 tablet (100 mg total) by mouth 3 (three) times daily.    montelukast (SINGULAIR) 10 mg tablet Take 10 mg by mouth once daily.    paroxetine (PAXIL) 20 MG tablet Take 20 mg by mouth every evening.    sertraline (ZOLOFT) 50 MG tablet Take 50 mg by mouth once daily.       Review of patient's allergies indicates:   Allergen Reactions    Antihistamines - alkylamine Other (See Comments)     End stage renal     Codeine Other (See Comments)     Severe abdominal cramping    Nsaids (non-steroidal anti-inflammatory drug) Other (See Comments)     End stage renal        Past Medical History:   Diagnosis Date    Chronic asthma     ESRD since 11/2014 from HTN     Hyperlipidemia     Hyperuricemia     Renal hypertension     Secondary hyperparathyroidism of renal origin      Past Surgical History:   Procedure Laterality Date    CHOLECYSTECTOMY      COLONOSCOPY N/A 12/12/2018    Procedure: COLONOSCOPY;  Surgeon: Pawel Gusman MD;  Location: [facility] ([address] [place] [address]);  Service: Endoscopy;  Laterality: N/A;  peritoneal dialysis daily-labs prior-MS    PERITONEAL CATHETER INSERTION      TONSILLECTOMY       Family History     Problem Relation (Age of Onset)    Cancer Father    Heart disease Maternal Grandmother    Hypertension Mother        Tobacco Use    Smoking status: Never Smoker    Smokeless tobacco: Never Used   Substance and Sexual Activity    Alcohol use: No     Alcohol/week: 0.0 standard drinks    Drug use: No    Sexual activity: Not on file        Review of Systems   Constitutional: Negative for activity change, appetite change, chills and fever.   HENT: Negative for facial swelling and  "trouble swallowing.    Eyes: Negative for photophobia and redness.   Respiratory: Negative for cough, shortness of breath, wheezing and stridor.    Cardiovascular: Positive for leg swelling. Negative for chest pain and palpitations.   Gastrointestinal: Negative for abdominal pain, constipation, nausea and vomiting.   Genitourinary: Negative for difficulty urinating and dysuria.   Musculoskeletal: Negative for neck pain and neck stiffness.   Skin: Positive for rash (BLE).   Neurological: Negative for dizziness, tremors, light-headedness and headaches.   Psychiatric/Behavioral: Negative for agitation, behavioral problems, confusion, decreased concentration and dysphoric mood.     Objective:     Vital Signs (Most Recent):  Temp: 98.6 °F (37 °C) (08/27/20 0015)  Pulse: 84 (08/27/20 0015)  Resp: 17 (08/27/20 0015)  BP: (!) 144/84 (08/27/20 0015)  SpO2: 100 % (08/27/20 0015)  Height: 5' 4" (162.6 cm)  Weight: 68.2 kg (150 lb 6.4 oz)  Body mass index is 25.82 kg/m².     Physical Exam  Vitals signs and nursing note reviewed.   Constitutional:       General: He is not in acute distress.  HENT:      Head: Normocephalic and atraumatic.   Eyes:      General: No scleral icterus.        Right eye: No discharge.         Left eye: No discharge.      Pupils: Pupils are equal, round, and reactive to light.   Cardiovascular:      Rate and Rhythm: Normal rate and regular rhythm.      Pulses: Normal pulses.      Heart sounds: No murmur.   Pulmonary:      Effort: Pulmonary effort is normal. No respiratory distress.      Breath sounds: No stridor. Wheezing (few scattered) present. No rales.   Abdominal:      General: Bowel sounds are normal. There is no distension.      Tenderness: There is no abdominal tenderness. There is no guarding.      Comments: R PD catheter no s/s/i   Musculoskeletal:         General: No tenderness.      Comments: Trace BLE edema   Skin:     General: Skin is warm.          Neurological:      General: No focal " deficit present.      Mental Status: He is alert and oriented to person, place, and time.   Psychiatric:         Mood and Affect: Mood normal.         Behavior: Behavior normal.         Thought Content: Thought content normal.         Laboratory  CBC:   Recent Labs   Lab 08/27/20  0049   WBC 9.15   RBC 4.13*   HGB 12.7*   HCT 38.5*      MCV 93   MCH 30.8   MCHC 33.0     CMP:   Pending     Diagnostic Results:  pending    Assessment/Plan:     Renal hypertension  - Amlodipine and Labetalol AM doses ordered  - Monitor BP post op and resume meds as appropriate    ESRD since 11/2014 from HTN  - ESRD 2/2 HTN admitted for KTx 8/27  - PD since 2014; dry weight 70 kg; native UOP ~1200 ml/day  - Pre op labs, EKG, CXR, Covid test pending  - PD fluid to be sent for wbc, gram stain, cx  - NPO   - Simulect induction   - OR 0500 with Dr. Mendoza            The patient presents for kidney transplant.  There are no apparent contraindications to proceeding with the planned transplant.  The patient understands that the transplant could potentially be cancelled pending detailed assessment of the donor organ.  He will receive Simulect induction.  A complete discussion of the transplant procedure, including risks, complications, and alternatives, as well as any donor-specific risk factors requiring specific disclosure, will be carried out by the responsible staff surgeon prior to the procedure.       Sydnie Streeter PA-C  Kidney Transplant  [facility]

## 2020-08-27 NOTE — PROGRESS NOTES
TRANSPLANT NOTE:      ORGAN: LEFT KIDNEY    Disease Etiology: Other, Specify - unknown  Donor CMV Status:  Positive  Donor HCV Status:  Negative  Donor HBcAb:  Negative  Donor HBV GLENYS: Negative  Donor HCV GLENYS: Negative  Peak cPRA % (within 1 year of transplant): 0%      Bryant Azevedo is a 49 y.o. male s/p  Donation after Circulatory Death    kidney transplant on 8/27/2020 (Kidney) for Other, Specify - unknown.   This patient received methylprednisolone with basiliximab for induction.  This patients immunosuppression will include a steroid taper per protocol to 5mg daily, Prograf, and Cellcept for maintenance.  Opportunistic infection prophylaxis will include Valcyte for 6 months (CMV D + / R - ), Bactrim for 1 year, and nystatin for 4 weeks.  Patient is to begin self medications today, and I plan to meet with this patient and his/her support person on POD #2 to review the medication section of the Kidney Transplant Education Manual.  I have reviewed the pre-op medications and have restarted those, as appropriate.      Vera Taylor, PharmD  PGY1 Pharmacy Practice Resident

## 2020-08-27 NOTE — TRANSFER OF CARE
"Anesthesia Transfer of Care Note    Patient: Bryant Azevedo    Procedure(s) Performed: Procedure(s) (LRB):  TRANSPLANT, KIDNEY (N/A)    Patient location: PACU    Anesthesia Type: general    Transport from OR: Transported from OR on 6-10 L/min O2 by face mask with adequate spontaneous ventilation    Post pain: adequate analgesia    Post assessment: no apparent anesthetic complications and tolerated procedure well    Post vital signs: stable    Level of consciousness: sedated    Nausea/Vomiting: no nausea/vomiting    Complications: none    Transfer of care protocol was followed      Last vitals:   Visit Vitals  /80 (BP Location: Right arm, Patient Position: Sitting)   Pulse 70   Temp 37.2 °C (99 °F) (Oral)   Resp 17   Ht 5' 4" (1.626 m)   Wt 68.2 kg (150 lb 6.4 oz)   SpO2 98%   BMI 25.82 kg/m²     "

## 2020-08-27 NOTE — ANESTHESIA POSTPROCEDURE EVALUATION
Anesthesia Post Evaluation    Patient: Bryant Azevedo    Procedure(s) Performed: Procedure(s) (LRB):  TRANSPLANT, KIDNEY (N/A)    Final Anesthesia Type: general    Patient location during evaluation: PACU  Patient participation: Yes- Able to Participate  Level of consciousness: awake and alert and oriented  Post-procedure vital signs: reviewed and stable  Pain management: adequate  Airway patency: patent    PONV status at discharge: No PONV  Anesthetic complications: no      Cardiovascular status: hemodynamically stable  Respiratory status: unassisted and spontaneous ventilation  Hydration status: euvolemic  Follow-up not needed.          Vitals Value Taken Time   /76 08/27/20 1155   Temp 36.8 °C (98.2 °F) 08/27/20 1100   Pulse 78 08/27/20 1157   Resp 15 08/27/20 1157   SpO2 94 % 08/27/20 1157   Vitals shown include unvalidated device data.      No case tracking events are documented in the log.      Pain/Bruce Score: Pain Rating Prior to Med Admin: 8 (8/27/2020 11:25 AM)  Bruce Score: 9 (8/27/2020 11:15 AM)

## 2020-08-27 NOTE — ANESTHESIA PREPROCEDURE EVALUATION
[facility]  Anesthesia Pre-Operative Evaluation         Patient Name: Bryant Azevedo  YOB: 1970  MRN: [id number]    SUBJECTIVE:     Pre-operative evaluation for Procedure(s) (LRB):  TRANSPLANT, KIDNEY (N/A)     08/27/2020    Bryant Azevedo is a 49 y.o. male w/ a significant PMHx of ESRD, HTN, asthma who presents for kidney transplant. Pt has been on peritoneal dialysis since 11/10/14.    Patient now presents for the above procedure(s).      LDA: None documented.    Prev airway: None documented.    Drips: None documented.    Patient Active Problem List   Diagnosis    ESRD since 11/2014 from HTN    Renal hypertension    Chronic asthma    Hyperuricemia    Secondary hyperparathyroidism of renal origin    Renal dialysis status    Abnormal stress echocardiogram    Hyperlipidemia    Preop cardiovascular exam    Patient on waiting list for kidney transplant    Anemia in ESRD (end-stage renal disease)    Screening for colon cancer       Review of patient's allergies indicates:   Allergen Reactions    Antihistamines - alkylamine Other (See Comments)     End stage renal     Codeine Other (See Comments)     Severe abdominal cramping    Nsaids (non-steroidal anti-inflammatory drug) Other (See Comments)     End stage renal        Current Outpatient Medications:    Current Facility-Administered Medications:     amLODIPine tablet 5 mg, 5 mg, Oral, Daily, Sydnie Streeter PA-C    basiliximab (SIMULECT) 20 mg in sodium chloride 0.9% 50 mL infusion, 20 mg, Intravenous, Once, Sydnie Streeter PA-C, Stopped at 08/27/20 0130    ceFAZolin injection 2 g, 2 g, Intravenous, On Call Procedure, Sydnie Streeter PA-C    labetaloL tablet 100 mg, 100 mg, Oral, TID, Sydnie Streeter PA-C    methylPREDNISolone sodium succinate (SOLU-MEDROL) 500 mg in sodium chloride 0.9% 100 mL IVPB, 500 mg, Intravenous, Once, Sydnie Streeter PA-C, Stopped at 08/27/20 0100    mupirocin 2 %  ointment, , Nasal, On Call Procedure, Sydnie Streeter PA-C    pregabalin capsule 75 mg, 75 mg, Oral, On Call Procedure, Sydnie Streeter PA-C    sertraline tablet 50 mg, 50 mg, Oral, Daily, Sydnie Streeter PA-C    Past Surgical History:   Procedure Laterality Date    CHOLECYSTECTOMY      COLONOSCOPY N/A 12/12/2018    Procedure: COLONOSCOPY;  Surgeon: Pawel Gusman MD;  Location: [address];  Service: Endoscopy;  Laterality: N/A;  peritoneal dialysis daily-labs prior-MS    PERITONEAL CATHETER INSERTION      TONSILLECTOMY         Social History     Socioeconomic History    Marital status: Single     Spouse name: Not on file    Number of children: Not on file    Years of education: Not on file    Highest education level: Not on file   Occupational History    Not on file   Social Needs    Financial resource strain: Not on file    Food insecurity     Worry: Not on file     Inability: Not on file    Transportation needs     Medical: Not on file     Non-medical: Not on file   Tobacco Use    Smoking status: Never Smoker    Smokeless tobacco: Never Used   Substance and Sexual Activity    Alcohol use: No     Alcohol/week: 0.0 standard drinks    Drug use: No    Sexual activity: Not on file   Lifestyle    Physical activity     Days per week: Not on file     Minutes per session: Not on file    Stress: Not on file   Relationships    Social connections     Talks on phone: Not on file     Gets together: Not on file     Attends Zoroastrian service: Not on file     Active member of club or organization: Not on file     Attends meetings of clubs or organizations: Not on file     Relationship status: Not on file   Other Topics Concern    Not on file   Social History Narrative    Worked as  until July 2010 (released after most recent physical)-noted fatigue. 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    Does a lot of volunteer work ([employer], [employer], kidney action  day).       OBJECTIVE:     Vital Signs Range (Last 24H):  Temp:  [37 °C (98.6 °F)]   Pulse:  [84]   Resp:  [17]   BP: (144)/(84)   SpO2:  [100 %]       Significant Labs:  Lab Results   Component Value Date    WBC 9.15 08/27/2020    HGB 12.7 (L) 08/27/2020    HCT 38.5 (L) 08/27/2020     08/27/2020    CHOL 146 08/27/2020    TRIG 298 (H) 08/27/2020    HDL 28 (L) 08/27/2020    ALT 27 08/27/2020    AST 34 08/27/2020     08/27/2020    K 3.3 (L) 08/27/2020     08/27/2020    CREATININE 7.2 (H) 08/27/2020    BUN 70 (H) 08/27/2020    CO2 23 08/27/2020    PSA 1.5 12/03/2019    INR 1.0 08/27/2020       Diagnostic Studies: No relevant studies.    EKG:   Results for orders placed or performed during the hospital encounter of 11/10/15   EKG 12-LEAD on arrival to floor    Collection Time: 11/10/15 10:12 AM    Narrative    Test Reason : R94.39  Blood Pressure : / mmHG  Vent. Rate : 059 BPM     Atrial Rate : 059 BPM     P-R Int : 188 ms          QRS Dur : 094 ms      QT Int : 440 ms       P-R-T Axes : 063 -17 068 degrees     QTc Int : 435 ms    Sinus bradycardia  Otherwise normal ECG  When compared with ECG of 10-NOV-2015 07:10,  No significant change was found  Confirmed by Nadeen Cage MD ([id number]) on 11/10/2015 11:58:47 AM    Referred By: CAROLYN GUILLORY           Confirmed By:Nadeen Cage MD       2D ECHO:  TTE:  Results for orders placed or performed during the hospital encounter of 12/03/19   Echo Color Flow Doppler? Yes   Result Value Ref Range    Ascending aorta 3.47 cm    STJ 3.38 cm    AV mean gradient 6 mmHg    Ao peak barney 1.67 m/s    Ao VTI 35.29 cm    IVS 1.03 0.6 - 1.1 cm    LA size 3.97 cm    Left Atrium Major Axis 5.51 cm    Left Atrium Minor Axis 5.49 cm    LVIDD 5.15 3.5 - 6.0 cm    LVIDS 3.23 2.1 - 4.0 cm    LVOT diameter 2.22 cm    LVOT peak VTI 23.20 cm    PW 0.97 0.6 - 1.1 cm    MV Peak A Barney 1.00 m/s    E wave decelartion time 219.21 msec    MV Peak E Barney 0.88 m/s    PV Peak D Barney 0.60 m/s    PV  Peak S Barney 0.73 m/s    RA Major Axis 5.16 cm    RA Width 3.03 cm    RVDD 3.00 cm    Sinus 3.64 cm    TAPSE 2.37 cm    TR Max Barney 2.54 m/s    TDI LATERAL 0.11 m/s    TDI SEPTAL 0.07 m/s    LA WIDTH 3.89 cm    LV Diastolic Volume 126.60 mL    LV Systolic Volume 41.75 mL    LVOT peak barney 1.16 m/s    LV LATERAL E/E' RATIO 8.00 m/s    LV SEPTAL E/E' RATIO 12.57 m/s    FS 37 %    LA volume 72.20 cm3    LV mass 191.07 g    Left Ventricle Relative Wall Thickness 0.38 cm    AV valve area 2.54 cm2    AV Velocity Ratio 0.69     AV index (prosthetic) 0.66     E/A ratio 0.88     Mean e' 0.09 m/s    Pulm vein S/D ratio 1.22     LVOT area 3.9 cm2    LVOT stroke volume 89.76 cm3    AV peak gradient 11 mmHg    E/E' ratio 9.78 m/s    LV Systolic Volume Index 23.3 mL/m2    LV Diastolic Volume Index 70.55 mL/m2    LA Volume Index 40.2 mL/m2    LV Mass Index 106 g/m2    Triscuspid Valve Regurgitation Peak Gradient 26 mmHg    BSA 1.82 m2    Right Atrial Pressure (from IVC) 3 mmHg    TV rest pulmonary artery pressure 29 mmHg    Narrative    · Normal left ventricular systolic function. The estimated ejection   fraction is 60%  · Normal LV diastolic function.  · No wall motion abnormalities.  · Mild left atrial enlargement.  · Normal right ventricular systolic function.  · Mild mitral regurgitation.  · Mild tricuspid regurgitation.  · The estimated PA systolic pressure is 29 mm Hg  · Normal central venous pressure (3 mm Hg).          WILLI:  No results found for this or any previous visit.    ASSESSMENT/PLAN:         Anesthesia Evaluation    I have reviewed the Patient Summary Reports.      I have reviewed the Medications.     Review of Systems  Anesthesia Hx:  Neg history of prior surgery. Denies Family Hx of Anesthesia complications.   Denies Personal Hx of Anesthesia complications.   Hematology/Oncology:  Hematology Normal   Oncology Normal     EENT/Dental:EENT/Dental Normal   Cardiovascular:   Hypertension    Pulmonary:   Asthma     Renal/:   Chronic Renal Disease, ESRD    Hepatic/GI:  Hepatic/GI Normal    Musculoskeletal:  Musculoskeletal Normal    Neurological:  Neurology Normal    Endocrine:  Endocrine Normal    Dermatological:  Skin Normal    Psych:  Psychiatric Normal           Physical Exam  General:  Well nourished    Airway/Jaw/Neck:  Airway Findings: Mouth Opening: Normal Tongue: Normal  General Airway Assessment: Adult  Mallampati: II  TM Distance: Normal, at least 6 cm  Jaw/Neck Findings:  Neck ROM: Normal ROM  Neck Findings: Normal    Eyes/Ears/Nose:  EYES/EARS/NOSE FINDINGS: Normal   Dental:  Dental Findings: In tact   Chest/Lungs:  Chest/Lungs Findings: Clear to auscultation, Normal Respiratory Rate     Heart/Vascular:  Heart Findings: Rate: Normal  Rhythm: Regular Rhythm        Mental Status:  Mental Status Findings: Normal        Anesthesia Plan  Type of Anesthesia, risks & benefits discussed:  Anesthesia Type:  general  Patient's Preference:   Intra-op Monitoring Plan: standard ASA monitors, arterial line and central line  Intra-op Monitoring Plan Comments:   Post Op Pain Control Plan: multimodal analgesia, IV/PO Opioids PRN and per primary service following discharge from PACU  Post Op Pain Control Plan Comments:   Induction:   IV  Beta Blocker:  Patient is not currently on a Beta-Blocker (No further documentation required).       Informed Consent: Patient understands risks and agrees with Anesthesia plan.  Questions answered. Anesthesia consent signed with patient.  ASA Score: 3     Day of Surgery Review of History & Physical: I have interviewed and examined the patient. I have reviewed the patient's H&P dated:  There are no significant changes.  H&P update referred to the surgeon.         Ready For Surgery From Anesthesia Perspective.

## 2020-08-27 NOTE — PROGRESS NOTES
Wound care consult received from nursing for assessment of BLE and left forearm. Attempted to see patient today. Nurse David reported that patient was in surgery for kidney transplant. Will follow up with patient at a later date [id number]

## 2020-08-27 NOTE — NURSING TRANSFER
Nursing Transfer Note      8/27/2020     Transfer To: [id number]    Transfer via bed    Transfer with ivf    Transported by pct x 2     Medicines sent: ointment    Chart send with patient: Yes    Notified: mother    Patient reassessed at: 1300 8/27/2020

## 2020-08-28 PROBLEM — N18.6 ESRD (END STAGE RENAL DISEASE): Status: ACTIVE | Noted: 2017-12-01

## 2020-08-28 PROBLEM — Z79.60 LONG-TERM USE OF IMMUNOSUPPRESSANT MEDICATION: Status: ACTIVE | Noted: 2020-08-28

## 2020-08-28 PROBLEM — Z94.0 S/P KIDNEY TRANSPLANT: Status: ACTIVE | Noted: 2020-08-28

## 2020-08-28 PROBLEM — Z76.82 PATIENT ON WAITING LIST FOR KIDNEY TRANSPLANT: Chronic | Status: RESOLVED | Noted: 2017-12-01 | Resolved: 2020-08-28

## 2020-08-28 PROBLEM — Z91.89 AT RISK FOR OPPORTUNISTIC INFECTIONS: Status: ACTIVE | Noted: 2020-08-28

## 2020-08-28 PROBLEM — Z12.11 SCREENING FOR COLON CANCER: Status: RESOLVED | Noted: 2018-12-12 | Resolved: 2020-08-28

## 2020-08-28 PROBLEM — Z29.89 PROPHYLACTIC IMMUNOTHERAPY: Status: ACTIVE | Noted: 2020-08-28

## 2020-08-28 LAB
ALBUMIN SERPL BCP-MCNC: 2.7 G/DL (ref 3.5–5.2)
ALBUMIN SERPL BCP-MCNC: 2.7 G/DL (ref 3.5–5.2)
ANION GAP SERPL CALC-SCNC: 10 MMOL/L (ref 8–16)
ANION GAP SERPL CALC-SCNC: 11 MMOL/L (ref 8–16)
BASOPHILS # BLD AUTO: 0.02 K/UL (ref 0–0.2)
BASOPHILS NFR BLD: 0.1 % (ref 0–1.9)
BUN SERPL-MCNC: 67 MG/DL (ref 6–20)
BUN SERPL-MCNC: 68 MG/DL (ref 6–20)
CALCIUM SERPL-MCNC: 7.6 MG/DL (ref 8.7–10.5)
CALCIUM SERPL-MCNC: 7.9 MG/DL (ref 8.7–10.5)
CHLORIDE SERPL-SCNC: 108 MMOL/L (ref 95–110)
CHLORIDE SERPL-SCNC: 108 MMOL/L (ref 95–110)
CMV IGG SERPL QL IA: NORMAL
CO2 SERPL-SCNC: 20 MMOL/L (ref 23–29)
CO2 SERPL-SCNC: 20 MMOL/L (ref 23–29)
CREAT SERPL-MCNC: 6.4 MG/DL (ref 0.5–1.4)
CREAT SERPL-MCNC: 6.7 MG/DL (ref 0.5–1.4)
DIFFERENTIAL METHOD: ABNORMAL
EOSINOPHIL # BLD AUTO: 0 K/UL (ref 0–0.5)
EOSINOPHIL NFR BLD: 0 % (ref 0–8)
ERYTHROCYTE [DISTWIDTH] IN BLOOD BY AUTOMATED COUNT: 13.1 % (ref 11.5–14.5)
EST. GFR  (AFRICAN AMERICAN): 10.1 ML/MIN/1.73 M^2
EST. GFR  (AFRICAN AMERICAN): 10.6 ML/MIN/1.73 M^2
EST. GFR  (NON AFRICAN AMERICAN): 8.7 ML/MIN/1.73 M^2
EST. GFR  (NON AFRICAN AMERICAN): 9.2 ML/MIN/1.73 M^2
GLUCOSE SERPL-MCNC: 117 MG/DL (ref 70–110)
GLUCOSE SERPL-MCNC: 119 MG/DL (ref 70–110)
HCT VFR BLD AUTO: 36.9 % (ref 40–54)
HGB BLD-MCNC: 12.2 G/DL (ref 14–18)
IMM GRANULOCYTES # BLD AUTO: 0.12 K/UL (ref 0–0.04)
IMM GRANULOCYTES NFR BLD AUTO: 0.7 % (ref 0–0.5)
LYMPHOCYTES # BLD AUTO: 0.7 K/UL (ref 1–4.8)
LYMPHOCYTES NFR BLD: 4.3 % (ref 18–48)
MAGNESIUM SERPL-MCNC: 2.3 MG/DL (ref 1.6–2.6)
MCH RBC QN AUTO: 31.1 PG (ref 27–31)
MCHC RBC AUTO-ENTMCNC: 33.1 G/DL (ref 32–36)
MCV RBC AUTO: 94 FL (ref 82–98)
MONOCYTES # BLD AUTO: 0.9 K/UL (ref 0.3–1)
MONOCYTES NFR BLD: 5 % (ref 4–15)
NEUTROPHILS # BLD AUTO: 15.3 K/UL (ref 1.8–7.7)
NEUTROPHILS NFR BLD: 89.9 % (ref 38–73)
NRBC BLD-RTO: 0 /100 WBC
PHOSPHATE SERPL-MCNC: 4.4 MG/DL (ref 2.7–4.5)
PHOSPHATE SERPL-MCNC: 5.5 MG/DL (ref 2.7–4.5)
PHOSPHATE SERPL-MCNC: 5.5 MG/DL (ref 2.7–4.5)
PLATELET # BLD AUTO: 153 K/UL (ref 150–350)
PMV BLD AUTO: 12.3 FL (ref 9.2–12.9)
POCT GLUCOSE: 114 MG/DL (ref 70–110)
POCT GLUCOSE: 123 MG/DL (ref 70–110)
POCT GLUCOSE: 144 MG/DL (ref 70–110)
POCT GLUCOSE: 147 MG/DL (ref 70–110)
POTASSIUM SERPL-SCNC: 3.9 MMOL/L (ref 3.5–5.1)
POTASSIUM SERPL-SCNC: 4 MMOL/L (ref 3.5–5.1)
RBC # BLD AUTO: 3.92 M/UL (ref 4.6–6.2)
SODIUM SERPL-SCNC: 138 MMOL/L (ref 136–145)
SODIUM SERPL-SCNC: 139 MMOL/L (ref 136–145)
TACROLIMUS BLD-MCNC: 8.1 NG/ML (ref 5–15)
WBC # BLD AUTO: 17.04 K/UL (ref 3.9–12.7)

## 2020-08-28 PROCEDURE — 97802 MEDICAL NUTRITION INDIV IN: CPT

## 2020-08-28 PROCEDURE — 25000003 PHARM REV CODE 250: Performed by: PHYSICIAN ASSISTANT

## 2020-08-28 PROCEDURE — 94799 UNLISTED PULMONARY SVC/PX: CPT

## 2020-08-28 PROCEDURE — 99233 PR SUBSEQUENT HOSPITAL CARE,LEVL III: ICD-10-PCS | Mod: ,,, | Performed by: PHYSICIAN ASSISTANT

## 2020-08-28 PROCEDURE — 83735 ASSAY OF MAGNESIUM: CPT

## 2020-08-28 PROCEDURE — 63600175 PHARM REV CODE 636 W HCPCS: Performed by: PHYSICIAN ASSISTANT

## 2020-08-28 PROCEDURE — 80197 ASSAY OF TACROLIMUS: CPT

## 2020-08-28 PROCEDURE — 94761 N-INVAS EAR/PLS OXIMETRY MLT: CPT

## 2020-08-28 PROCEDURE — 36415 COLL VENOUS BLD VENIPUNCTURE: CPT

## 2020-08-28 PROCEDURE — 20600001 HC STEP DOWN PRIVATE ROOM

## 2020-08-28 PROCEDURE — 36620 ARTERIAL: ICD-10-PCS | Mod: 59,,, | Performed by: ANESTHESIOLOGY

## 2020-08-28 PROCEDURE — 80069 RENAL FUNCTION PANEL: CPT | Mod: 91

## 2020-08-28 PROCEDURE — 99233 SBSQ HOSP IP/OBS HIGH 50: CPT | Mod: ,,, | Performed by: PHYSICIAN ASSISTANT

## 2020-08-28 PROCEDURE — 80069 RENAL FUNCTION PANEL: CPT

## 2020-08-28 PROCEDURE — 36620 INSERTION CATHETER ARTERY: CPT | Mod: 59,,, | Performed by: ANESTHESIOLOGY

## 2020-08-28 PROCEDURE — 86644 CMV ANTIBODY: CPT

## 2020-08-28 PROCEDURE — 63600175 PHARM REV CODE 636 W HCPCS: Performed by: SURGERY

## 2020-08-28 PROCEDURE — 85025 COMPLETE CBC W/AUTO DIFF WBC: CPT

## 2020-08-28 PROCEDURE — 25000003 PHARM REV CODE 250: Performed by: SURGERY

## 2020-08-28 RX ORDER — PAROXETINE HYDROCHLORIDE 20 MG/1
20 TABLET, FILM COATED ORAL NIGHTLY
Status: DISCONTINUED | OUTPATIENT
Start: 2020-08-28 | End: 2020-09-02 | Stop reason: HOSPADM

## 2020-08-28 RX ORDER — SODIUM BICARBONATE 650 MG/1
1300 TABLET ORAL 2 TIMES DAILY
Status: DISCONTINUED | OUTPATIENT
Start: 2020-08-28 | End: 2020-08-30

## 2020-08-28 RX ORDER — CALCITRIOL 0.5 UG/1
1 CAPSULE ORAL DAILY
Status: DISCONTINUED | OUTPATIENT
Start: 2020-08-28 | End: 2020-09-02 | Stop reason: HOSPADM

## 2020-08-28 RX ORDER — TACROLIMUS 1 MG/1
2 CAPSULE ORAL 2 TIMES DAILY
Status: DISCONTINUED | OUTPATIENT
Start: 2020-08-28 | End: 2020-08-30

## 2020-08-28 RX ORDER — ERGOCALCIFEROL 1.25 MG/1
50000 CAPSULE ORAL
Status: DISCONTINUED | OUTPATIENT
Start: 2020-08-28 | End: 2020-09-02 | Stop reason: HOSPADM

## 2020-08-28 RX ORDER — SODIUM CHLORIDE 9 MG/ML
INJECTION, SOLUTION INTRAVENOUS CONTINUOUS
Status: DISCONTINUED | OUTPATIENT
Start: 2020-08-28 | End: 2020-08-29

## 2020-08-28 RX ADMIN — SODIUM CHLORIDE: 0.9 INJECTION, SOLUTION INTRAVENOUS at 10:08

## 2020-08-28 RX ADMIN — MUPIROCIN 1 G: 20 OINTMENT TOPICAL at 10:08

## 2020-08-28 RX ADMIN — DOCUSATE SODIUM 100 MG: 100 CAPSULE, LIQUID FILLED ORAL at 08:08

## 2020-08-28 RX ADMIN — HEPARIN SODIUM 5000 UNITS: 5000 INJECTION INTRAVENOUS; SUBCUTANEOUS at 06:08

## 2020-08-28 RX ADMIN — NYSTATIN 500000 UNITS: 100000 SUSPENSION ORAL at 02:08

## 2020-08-28 RX ADMIN — SERTRALINE HYDROCHLORIDE 50 MG: 50 TABLET ORAL at 08:08

## 2020-08-28 RX ADMIN — DOCUSATE SODIUM 100 MG: 100 CAPSULE, LIQUID FILLED ORAL at 02:08

## 2020-08-28 RX ADMIN — OXYCODONE 5 MG: 5 TABLET ORAL at 08:08

## 2020-08-28 RX ADMIN — MYCOPHENOLATE MOFETIL 1000 MG: 250 CAPSULE ORAL at 08:08

## 2020-08-28 RX ADMIN — SODIUM BICARBONATE 650 MG TABLET 1300 MG: at 10:08

## 2020-08-28 RX ADMIN — HEPARIN SODIUM 5000 UNITS: 5000 INJECTION INTRAVENOUS; SUBCUTANEOUS at 02:08

## 2020-08-28 RX ADMIN — DEXTROSE 250 MG: 5 SOLUTION INTRAVENOUS at 08:08

## 2020-08-28 RX ADMIN — AMLODIPINE BESYLATE 5 MG: 5 TABLET ORAL at 08:08

## 2020-08-28 RX ADMIN — FAMOTIDINE 20 MG: 20 TABLET, FILM COATED ORAL at 08:08

## 2020-08-28 RX ADMIN — NYSTATIN 500000 UNITS: 100000 SUSPENSION ORAL at 07:08

## 2020-08-28 RX ADMIN — SULFAMETHOXAZOLE AND TRIMETHOPRIM 1 TABLET: 400; 80 TABLET ORAL at 08:08

## 2020-08-28 RX ADMIN — ACETAMINOPHEN 1000 MG: 500 TABLET ORAL at 06:08

## 2020-08-28 RX ADMIN — ERGOCALCIFEROL 50000 UNITS: 1.25 CAPSULE ORAL at 12:08

## 2020-08-28 RX ADMIN — ACETAMINOPHEN 500 MG: 500 TABLET ORAL at 08:08

## 2020-08-28 RX ADMIN — NYSTATIN 500000 UNITS: 100000 SUSPENSION ORAL at 08:08

## 2020-08-28 RX ADMIN — HEPARIN SODIUM 5000 UNITS: 5000 INJECTION INTRAVENOUS; SUBCUTANEOUS at 10:08

## 2020-08-28 RX ADMIN — MUPIROCIN 1 G: 20 OINTMENT TOPICAL at 08:08

## 2020-08-28 RX ADMIN — CALCITRIOL 1 MCG: 0.5 CAPSULE, LIQUID FILLED ORAL at 12:08

## 2020-08-28 RX ADMIN — TACROLIMUS 2 MG: 1 CAPSULE ORAL at 05:08

## 2020-08-28 RX ADMIN — BISACODYL 10 MG: 5 TABLET, COATED ORAL at 08:08

## 2020-08-28 RX ADMIN — SODIUM BICARBONATE 650 MG TABLET 1300 MG: at 08:08

## 2020-08-28 RX ADMIN — MONTELUKAST 10 MG: 10 TABLET, FILM COATED ORAL at 08:08

## 2020-08-28 RX ADMIN — THERA TABS 1 TABLET: TAB at 08:08

## 2020-08-28 RX ADMIN — PAROXETINE HYDROCHLORIDE 20 MG: 20 TABLET, FILM COATED ORAL at 08:08

## 2020-08-28 RX ADMIN — ACETAMINOPHEN 1000 MG: 500 TABLET ORAL at 02:08

## 2020-08-28 RX ADMIN — TACROLIMUS 3 MG: 1 CAPSULE ORAL at 08:08

## 2020-08-28 RX ADMIN — NYSTATIN 500000 UNITS: 100000 SUSPENSION ORAL at 05:08

## 2020-08-28 NOTE — ASSESSMENT & PLAN NOTE
- continue prograf, cellcept, and steroid taper per protocol  - will check daily prograf levels, monitor for toxic side effects, and adjust for therapeutic dose

## 2020-08-28 NOTE — PLAN OF CARE
Patient AAOx4, vitals WDL, afebrile.   R forearm 20g in place, infusing NS @ 75 mL/hr.  L forearm 16g in place, saline flushed and locked, dressing CDI.  Godwin in place with catheter care completed this shift, output of 1425 mL.  RLQ incision XUAN with staples intact, painted with betadine by patient.   R ELEUTERIO in place with output of 130 mL for shift of serosanguinous drainage with some clots.   Self med teaching completed this shift, patient pulled 100% successfully.   Able to get up to chair with stand-by assist.   Complaints of incisional pain 1x this shift, relieved with 5mg oxy.   Patient remains free from injury for shift, no acute skin breakdown noted.  WCTM.

## 2020-08-28 NOTE — ASSESSMENT & PLAN NOTE
- valcyte for cmv prophylaxis (start pod 10 or on d/c)  - bactrim for pcp prophylaxis  - nystatin for thrush prophylaxis

## 2020-08-28 NOTE — ASSESSMENT & PLAN NOTE
- s/p DCD KTxp on 8/28/20 (simulect induction, CMV mismatch, CIT ~ 22 hours)  - Surgery notable for 3 arteries - renal US performed and satisfactory.   - 5 day farah and 1 ELEUTERIO drain in place  - Cr with some improvement. Adequate urine output  - Continue IVF - likely d/c soon if drinking/eating well  - Daily labs, strict intake/output, daily weights

## 2020-08-28 NOTE — PROGRESS NOTES
EDUCATION NOTE:    Met with Bryant OLGA Roberto Carlos and his caregivers to provide teaching re: immunosuppressant medications.  Reviewed medication section of the Kidney Transplant Education book that was provided.  Emphasized the importance of compliance, role of the blue medication card, concerns for drug interactions, and process of obtaining refills.  Counseled regarding Prograf, Cellcept , prednisone, including directions for use, monitoring, how to handle missed doses, and side effects.  Patient and caregiver verbalized understanding and had the opportunity to ask questions.      Raghav MccrayD  PGY1 Pharmacy Practice Resident  Ext. [phone]

## 2020-08-28 NOTE — CONSULTS
[facility]  Kidney Transplant  Consult Note    Inpatient consult to Transplant Nephrology (KTM)  Consult performed by: Madai Del Valle PA-C  Consult ordered by: Brittni Vazquez MD            Subjective:     History of Present Illness:   Mr. Carlos Azevedo is a 48 y/o M with ESRD secondary to HTN and congenital abnormality (unknown details) who presents for kidney transplant He has been on the wait list for a kidney transplant at [facility] since 11/10/2014. Patient is currently on peritoneal dialysis started on 11/2014. Patient is dialyzing on cyclic peritoneal dialysis. Patient reports that she is tolerating dialysis well. Dry conrado ~70 kg. Native UOP ~1200 ml/day. He has a R PD catheter. Of note, patient has a rash to BLE that is erythematous, with some scabbing. He reports chronic contact dermatitis vs. eczema and is followed by a dermatologist (Dr. Prater) is [place]. He has been treated with UV light and zinc oxide in the past. He reports transplant team is aware of rash. Chart reviewed and rash was noted in transplant clinic 12/3/19 with photos appearing more severe than current rash. He was deemed a suitable transplant candidate at that time. Patient denies any recent hospitalizations or ED visits. Pre op labs and imaging. Tentative OR time 0500 with primary surgeon Dr. Mendoza.    Interval history: Pt is now s/p DCD KTxp on 8/27/20 (CMV D+/R-, CIT ~ 22 hours; simulect induction). Surgery notable for 3 arteries. 5 day farah. 1 ELEUTERIO drain. Post op renal US satisfactory. Cr with some improvement. Adequate urine output. Drinking fluids and tolerating diet - transition to NS @ 75 cc/hr. Electrolytes stable. Continue to monitor.       Past Medical and Surgical History: Mr. Azevedo has a past medical history of Chronic asthma, ESRD since 11/2014 from HTN, Hyperlipidemia, Hyperuricemia, Renal hypertension, and Secondary hyperparathyroidism of renal origin.  He has a past surgical history that  includes Peritoneal catheter insertion; Cholecystectomy; Tonsillectomy; Colonoscopy (N/A, 12/12/2018); and Kidney transplant (N/A, 8/27/2020).    Past Social and Family History: Mr. Azevedo reports that he has never smoked. He has never used smokeless tobacco. He reports that he does not drink alcohol or use drugs.His family history includes Cancer in his father; Heart disease in his maternal grandmother; Hypertension in his mother.    Intake/Output - Last 3 Shifts       08/26 0700 - 08/27 0659 08/27 0700 - 08/28 0659 08/28 0700 - 08/29 0659    P.O. 0 1200 598    I.V. (mL/kg) 1000 (14.7) 3858.3 (56.7) 325 (4.8)    IV Piggyback  700     Total Intake(mL/kg) 1000 (14.7) 5758.3 (84.7) 923 (13.6)    Urine (mL/kg/hr) 175 3265 (2) 1025 (2.4)    Drains  230 110    Stool 0 0     Blood  50     Total Output 175 3545 1135    Net +825 +2213.3 -212           Stool Occurrence 0 x 0 x            Review of Systems   Constitutional: Positive for activity change. Negative for appetite change, chills and fever.   Respiratory: Negative for cough and shortness of breath.    Cardiovascular: Negative for chest pain, palpitations and leg swelling.   Gastrointestinal: Positive for abdominal pain (appropriate post op pain). Negative for abdominal distention, constipation, diarrhea, nausea and vomiting.   Genitourinary: Negative for decreased urine volume, difficulty urinating, dysuria and hematuria.   Skin: Positive for rash and wound.   Allergic/Immunologic: Positive for immunocompromised state.   Neurological: Negative for dizziness and weakness.   Psychiatric/Behavioral: Negative for agitation and confusion.     Objective:     Vital Signs (Most Recent):  Temp: 98 °F (36.7 °C) (08/28/20 1117)  Pulse: 72 (08/28/20 1000)  Resp: 17 (08/28/20 1000)  BP: (!) 131/90 (08/28/20 1000)  SpO2: 96 % (08/28/20 1000) Vital Signs (24h Range):  Temp:  [97.6 °F (36.4 °C)-98.4 °F (36.9 °C)] 98 °F (36.7 °C)  Pulse:  [70-88] 72  Resp:  [13-19] 17  SpO2:  [94  "%-99 %] 96 %  BP: (113-149)/(78-91) 131/90     Weight: 68 kg (149 lb 14.6 oz)  Height: 5' 4" (162.6 cm)  Body mass index is 25.73 kg/m².    Physical Exam  Vitals signs and nursing note reviewed.   Constitutional:       General: He is not in acute distress.     Appearance: He is not diaphoretic.   HENT:      Head: Normocephalic and atraumatic.   Eyes:      General: No scleral icterus.        Right eye: No discharge.         Left eye: No discharge.   Neck:      Musculoskeletal: Normal range of motion and neck supple.   Cardiovascular:      Rate and Rhythm: Normal rate and regular rhythm.      Heart sounds: Normal heart sounds.   Pulmonary:      Effort: Pulmonary effort is normal.      Breath sounds: Normal breath sounds. No wheezing or rales.   Abdominal:      General: Bowel sounds are normal. There is no distension.      Palpations: Abdomen is soft.      Tenderness: There is abdominal tenderness (incisional). There is no guarding.       Musculoskeletal:      Right lower leg: No edema.      Left lower leg: No edema.   Skin:     General: Skin is warm and dry.      Capillary Refill: Capillary refill takes less than 2 seconds.      Findings: Lesion (BLE) and rash (BLE erythematous rash - chronic) present.   Neurological:      Mental Status: He is alert and oriented to person, place, and time.      Cranial Nerves: No cranial nerve deficit.   Psychiatric:         Thought Content: Thought content normal.         Judgment: Judgment normal.         Significant Labs:  CBC:   Recent Labs   Lab 08/27/20  0049 08/27/20  1015 08/27/20  1528 08/28/20  0625   WBC 9.15  --  17.50* 17.04*   RBC 4.13*  --  4.26* 3.92*   HGB 12.7*  --  13.2* 12.2*   HCT 38.5* 36.5* 40.6 36.9*     --  159 153   MCV 93  --  95 94   MCH 30.8  --  31.0 31.1*   MCHC 33.0  --  32.5 33.1     CMP:   Recent Labs   Lab 08/27/20  0050  08/27/20  1528 08/27/20  2147 08/28/20  0625   GLU 93   < > 161* 165* 117*   CALCIUM 8.8   < > 7.8* 7.7* 7.6*   ALBUMIN " 3.4*   < > 3.1* 2.8* 2.7*   PROT 6.3  --   --   --   --       < > 136 136 139   K 3.3*   < > 3.9 4.0 4.0   CO2 23   < > 21* 20* 20*      < > 104 105 108   BUN 70*   < > 65* 66* 68*   CREATININE 7.2*   < > 6.7* 6.7* 6.7*   ALKPHOS 77  --   --   --   --    ALT 27  --   --   --   --    AST 34  --   --   --   --     < > = values in this interval not displayed.     Labs within the past 24 hours have been reviewed.    Diagnostics:  US - Kidney:   Results for orders placed during the hospital encounter of 08/26/20   US Transplant Kidney With Doppler    Narrative EXAMINATION:  US TRANSPLANT KIDNEY WITH DOPPLER    CLINICAL HISTORY:  post op kidney transplant with 3 arteries;    TECHNIQUE:  Transplant renal ultrasound of the right lower quadrant with color flow doppler and duplex analysis.    COMPARISON:  None.    FINDINGS:  The renal transplant demonstrates no focal parenchymal abnormality. No transplant hydronephrosis. No peritransplant fluid.    Renal arterial resistive indices are as follows: Interlobar 0.65, segmental Up 0.63, segmental Mid 0.66, segmental Low 0.65.  There is no evidence of a tardus parvus waveform. The maximum velocity in the main renal artery is 78 cm/sec.  The renal artery to iliac ratio 0.58    The renal transplant venous system is unremarkable.  Partially visualized ureteral stent.  The urinary bladder is collapsed around Farah catheter.      Impression Satisfactory sonographic and Doppler evaluation of the renal allograft.    Electronically signed by resident: Chris Nuno  Date:    08/27/2020  Time:    13:39    Electronically signed by: Isrrael Johnson MD  Date:    08/27/2020  Time:    13:44     Assessment/Plan:     S/P kidney transplant  - s/p DCD KTxp on 8/28/20 (simulect induction, CMV mismatch, CIT ~ 22 hours)  - Surgery notable for 3 arteries - renal US performed and satisfactory.   - 5 day farah and 1 ELEUTERIO drain in place  - Cr with some improvement. Adequate urine output  -  Continue IVF - likely d/c soon if drinking/eating well  - Daily labs, strict intake/output, daily weights     At risk for opportunistic infections  - valcyte for cmv prophylaxis (start pod 10 or on d/c)  - bactrim for pcp prophylaxis  - nystatin for thrush prophylaxis        Long-term use of immunosuppressant medication  - see prophylactic immunotherapy      Prophylactic immunotherapy  - continue prograf, cellcept, and steroid taper per protocol  - will check daily prograf levels, monitor for toxic side effects, and adjust for therapeutic dose        Anemia in ESRD (end-stage renal disease)  - H&H stable  - no active signs/symptoms of bleeding  - monitor with daily CBC      Renal hypertension  - Continue amlodipine   - BP stable  - Monitor               Madai Del Valle PA-C  Kidney Transplant  [facility]

## 2020-08-28 NOTE — SUBJECTIVE & OBJECTIVE
Subjective:     History of Present Illness:   Mr. Carlos Azevedo is a 48 y/o M with ESRD secondary to HTN and congenital abnormality (unknown details) who presents for kidney transplant He has been on the wait list for a kidney transplant at [facility] since 11/10/2014. Patient is currently on peritoneal dialysis started on 11/2014. Patient is dialyzing on cyclic peritoneal dialysis. Patient reports that she is tolerating dialysis well. Dry conrado ~70 kg. Native UOP ~1200 ml/day. He has a R PD catheter. Of note, patient has a rash to BLE that is erythematous, with some scabbing. He reports chronic contact dermatitis vs. eczema and is followed by a dermatologist (Dr. Prater) is [place]. He has been treated with UV light and zinc oxide in the past. He reports transplant team is aware of rash. Chart reviewed and rash was noted in transplant clinic 12/3/19 with photos appearing more severe than current rash. He was deemed a suitable transplant candidate at that time. Patient denies any recent hospitalizations or ED visits. Pre op labs and imaging. Tentative OR time 0500 with primary surgeon Dr. Mendoza.    Interval history: Pt is now s/p DCD KTxp on 8/27/20 (CMV D+/R-, CIT ~ 22 hours; simulect induction). Surgery notable for 3 arteries. 5 day farah. 1 ELEUTERIO drain. Post op renal US satisfactory. Cr with some improvement. Adequate urine output. Drinking fluids and tolerating diet - transition to NS @ 75 cc/hr. Electrolytes stable. Continue to monitor.       Past Medical and Surgical History: Mr. Azevedo has a past medical history of Chronic asthma, ESRD since 11/2014 from HTN, Hyperlipidemia, Hyperuricemia, Renal hypertension, and Secondary hyperparathyroidism of renal origin.  He has a past surgical history that includes Peritoneal catheter insertion; Cholecystectomy; Tonsillectomy; Colonoscopy (N/A, 12/12/2018); and Kidney transplant (N/A, 8/27/2020).    Past Social and Family History: Mr. Azevedo reports that he has never smoked.  "He has never used smokeless tobacco. He reports that he does not drink alcohol or use drugs.His family history includes Cancer in his father; Heart disease in his maternal grandmother; Hypertension in his mother.    Intake/Output - Last 3 Shifts       08/26 0700 - 08/27 0659 08/27 0700 - 08/28 0659 08/28 0700 - 08/29 0659    P.O. 0 1200 598    I.V. (mL/kg) 1000 (14.7) 3858.3 (56.7) 325 (4.8)    IV Piggyback  700     Total Intake(mL/kg) 1000 (14.7) 5758.3 (84.7) 923 (13.6)    Urine (mL/kg/hr) 175 3265 (2) 1025 (2.4)    Drains  230 110    Stool 0 0     Blood  50     Total Output 175 3545 1135    Net +825 +2213.3 -212           Stool Occurrence 0 x 0 x            Review of Systems   Constitutional: Positive for activity change. Negative for appetite change, chills and fever.   Respiratory: Negative for cough and shortness of breath.    Cardiovascular: Negative for chest pain, palpitations and leg swelling.   Gastrointestinal: Positive for abdominal pain (appropriate post op pain). Negative for abdominal distention, constipation, diarrhea, nausea and vomiting.   Genitourinary: Negative for decreased urine volume, difficulty urinating, dysuria and hematuria.   Skin: Positive for rash and wound.   Allergic/Immunologic: Positive for immunocompromised state.   Neurological: Negative for dizziness and weakness.   Psychiatric/Behavioral: Negative for agitation and confusion.     Objective:     Vital Signs (Most Recent):  Temp: 98 °F (36.7 °C) (08/28/20 1117)  Pulse: 72 (08/28/20 1000)  Resp: 17 (08/28/20 1000)  BP: (!) 131/90 (08/28/20 1000)  SpO2: 96 % (08/28/20 1000) Vital Signs (24h Range):  Temp:  [97.6 °F (36.4 °C)-98.4 °F (36.9 °C)] 98 °F (36.7 °C)  Pulse:  [70-88] 72  Resp:  [13-19] 17  SpO2:  [94 %-99 %] 96 %  BP: (113-149)/(78-91) 131/90     Weight: 68 kg (149 lb 14.6 oz)  Height: 5' 4" (162.6 cm)  Body mass index is 25.73 kg/m².    Physical Exam  Vitals signs and nursing note reviewed.   Constitutional:       " General: He is not in acute distress.     Appearance: He is not diaphoretic.   HENT:      Head: Normocephalic and atraumatic.   Eyes:      General: No scleral icterus.        Right eye: No discharge.         Left eye: No discharge.   Neck:      Musculoskeletal: Normal range of motion and neck supple.   Cardiovascular:      Rate and Rhythm: Normal rate and regular rhythm.      Heart sounds: Normal heart sounds.   Pulmonary:      Effort: Pulmonary effort is normal.      Breath sounds: Normal breath sounds. No wheezing or rales.   Abdominal:      General: Bowel sounds are normal. There is no distension.      Palpations: Abdomen is soft.      Tenderness: There is abdominal tenderness (incisional). There is no guarding.       Musculoskeletal:      Right lower leg: No edema.      Left lower leg: No edema.   Skin:     General: Skin is warm and dry.      Capillary Refill: Capillary refill takes less than 2 seconds.      Findings: Lesion (BLE) and rash (BLE erythematous rash - chronic) present.   Neurological:      Mental Status: He is alert and oriented to person, place, and time.      Cranial Nerves: No cranial nerve deficit.   Psychiatric:         Thought Content: Thought content normal.         Judgment: Judgment normal.         Significant Labs:  CBC:   Recent Labs   Lab 08/27/20  0049 08/27/20  1015 08/27/20  1528 08/28/20  0625   WBC 9.15  --  17.50* 17.04*   RBC 4.13*  --  4.26* 3.92*   HGB 12.7*  --  13.2* 12.2*   HCT 38.5* 36.5* 40.6 36.9*     --  159 153   MCV 93  --  95 94   MCH 30.8  --  31.0 31.1*   MCHC 33.0  --  32.5 33.1     CMP:   Recent Labs   Lab 08/27/20  0050  08/27/20  1528 08/27/20  2147 08/28/20  0625   GLU 93   < > 161* 165* 117*   CALCIUM 8.8   < > 7.8* 7.7* 7.6*   ALBUMIN 3.4*   < > 3.1* 2.8* 2.7*   PROT 6.3  --   --   --   --       < > 136 136 139   K 3.3*   < > 3.9 4.0 4.0   CO2 23   < > 21* 20* 20*      < > 104 105 108   BUN 70*   < > 65* 66* 68*   CREATININE 7.2*   < > 6.7*  6.7* 6.7*   ALKPHOS 77  --   --   --   --    ALT 27  --   --   --   --    AST 34  --   --   --   --     < > = values in this interval not displayed.     Labs within the past 24 hours have been reviewed.    Diagnostics:  US - Kidney:   Results for orders placed during the hospital encounter of 08/26/20   US Transplant Kidney With Doppler    Narrative EXAMINATION:  US TRANSPLANT KIDNEY WITH DOPPLER    CLINICAL HISTORY:  post op kidney transplant with 3 arteries;    TECHNIQUE:  Transplant renal ultrasound of the right lower quadrant with color flow doppler and duplex analysis.    COMPARISON:  None.    FINDINGS:  The renal transplant demonstrates no focal parenchymal abnormality. No transplant hydronephrosis. No peritransplant fluid.    Renal arterial resistive indices are as follows: Interlobar 0.65, segmental Up 0.63, segmental Mid 0.66, segmental Low 0.65.  There is no evidence of a tardus parvus waveform. The maximum velocity in the main renal artery is 78 cm/sec.  The renal artery to iliac ratio 0.58    The renal transplant venous system is unremarkable.  Partially visualized ureteral stent.  The urinary bladder is collapsed around Godwin catheter.      Impression Satisfactory sonographic and Doppler evaluation of the renal allograft.    Electronically signed by resident: Chris Nuno  Date:    08/27/2020  Time:    13:39    Electronically signed by: Isrrael Johnson MD  Date:    08/27/2020  Time:    13:44
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
## 2020-08-28 NOTE — PLAN OF CARE
Problem: Oral Intake Inadequate  Goal: Improved Oral Intake  Outcome: Ongoing, Progressing   Recommendations     1. Continue low phosphorus diet as needed.   2. If po intake <50% x 24 hrs, provide Novasource renal ONS BID.  Goals: 1. Pt's intake meals >50% by RD follow up.  Nutrition Goal Status: new  Communication of RD Recs: (POC)

## 2020-08-28 NOTE — PROGRESS NOTES
Patient admitted for Kidney transplant.Transplant coordinator met with the patient on rounds to introduce self and explain the coordinator role. The post-transplant teaching book was given. Transplant Coordinator explained that she will follow the patient while in the hospital and assist with discharge.
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
## 2020-08-28 NOTE — PROGRESS NOTES
Admit Note     Met with patient and friend to assess needs. Patient is a 49 y.o. single male, admitted for for kidney transplant.      Patient admitted from home on 8/26/2020 .  At this time, patient presents as alert and oriented x 4, pleasant, good eye contact, well groomed, recall good, concentration/judgement good, average intelligence, calm, communicative, cooperative and asking and answering questions appropriately.  At this time, patients caregiver presents as alert and oriented x 4, pleasant, good eye contact, well groomed, recall good, concentration/judgement good, average intelligence, calm, communicative, cooperative and asking and answering questions appropriately.    Household/Family Systems     Patient resides with patient's mother, at [address] [place] [address].  Support system includes pt's mother and friend.  Patient does not have dependents that are need of being cared for.     Patients primary caregiver is Mehreen Rich, patients friend, phone number [phone] and Debo Azevedo, pt's mother, [phone].  Confirmed patients contact information is [phone] (home);   Telephone Information:   Mobile [phone]   .    During admission, patient's caregiver plans to stay in patient's room.  Confirmed patient and patients caregivers do have access to reliable transportation.    Cognitive Status/Learning     Patient reports reading ability as 12th grade and states patient does not have difficulty with reading, writing, seeing, hearing, comprehension, learning and memory.  Patient reports patient learns best by combination of verbal, written and hands on demonstration.   Needed: No.   Highest education level: High School (9-12) or GED    Vocation/Disability   .  Working for Income: No  If no, reason not working: Inability to Find Work  Patient is unable to work due to COVID. Pt was employed as a  in [place] up until COVID.    Adherence     Patient reports a high  Right wrist R-Band removed, no bleeding or swelling. Sterile hemostatic dressing applied, safety splint applied. Normal radial pulse, normal distal circulation and neuro check. Safety instructions reviewed with the patient. "level of adherence to patients health care regimen.  Adherence counseling and education provided. Patient verbalizes understanding.    Substance Use    Patient reports the following substance usage.    Tobacco: Pt reports last use was 51 years ago. Pt denies current use.  Alcohol: Pt reports "rarely" having a sip of a mixed drink.  Illicit Drugs/Non-prescribed Medications: none, patient denies any use.  Patient states clear understanding of the potential impact of substance use.  Substance abstinence/cessation counseling, education and resources provided and reviewed.     Services Utilizing/ADLS    Infusion Service: Prior to admission, patient utilizing? no  Home Health: Prior to admission, patient utilizing? no  DME: Prior to admission, yes PD equipment  Pulmonary/Cardiac Rehab: Prior to admission, no  Dialysis:  Prior to admission, yes PD  Transplant Specialty Pharmacy:  Prior to admission, no.    Prior to admission, patient reports patient was independent with ADLS and was driving.  Patient reports patient is not able to care for self at this time due to compromised medical condition (as documented in medical record) and physical weakness..  Patient indicates a willingness to care for self once medically cleared to do so.    Insurance/Medications    Insured by   Payor/Plan Subscr  Sex Relation Sub. Ins. ID Effective Group Num   1. [place] - [place]* MARYJANE BRAND 1970 Male  [id number]                                    [address]   2. [facility] MARYJANE BRAND 1970 Male  [id number]                                    [facility] ADDY      Primary Insurance (for UNOS reporting): Public Insurance - Medicare FFS (Fee For Service)  Secondary Insurance (for UNOS reporting): Private Insurance     PT DOES NOT HAVE PART D COVERAGE. Pt reports needing to complete Part D application through [employer] (PDP). Nurse grabbed pt an iPad to complete application online " today. Pt verbalized understanding importance of completing application today. Pt reports two days ago he was told by rep. that coverage would start on September 1, 2020. Pt unaware if pt's coverage will still start on September 1, 2020 if application is completed by today. Pt reports only income is SSD $2,100/month. Pt reports his mother supports herself with her [employer] from being a teacher and SSI. Pt reports having no backup plan while staying locally and being dependent on prescription drug coverage starting. Pt reports if Part D doesn't start, looking into [facility] in [place], [place] for assistance with prescription drugs.     Pt reports original drug coverage terminated because of an issue setting up auto-draft pay. Pt verbalized understanding and agreeing to obtain coverage ASAP.     Patient reports patient is able to obtain and afford medications at this time and at time of discharge pending Part D plan is finalized.    Update: Pt reports completing Medicare Part D coverage today and reports coverage should take effect on September 1, 2020. Pt reports his confirmation number is [id number].     Living Will/Healthcare Power of     Patient states patient does not have a LW and/or HCPA.   provided education regarding LW and HCPA and the completion of forms.    Coping/Mental Health    Patient is coping adequately with the aid of  family members and friends. Pt reports being frustrated with transplant process in the past due to 13 living donor offers not being compatible or work out for pt. Pt reports now feeling like a weight is lifted off his chest.  Patient denies mental health difficulties.     Discharge Planning    At time of discharge, patient plans to return to [facility] apartments under the care of pt's friend and mother.  Patients friend and mother will transport patient.  Pt's friend checking into LR today at 2:30pm at a fee of $0. Pt's caregiver reports sleeping in car  previously. Per rounds today, expected discharge date has not been medically determined at this time. Patient and patients caregiver  verbalize understanding and are involved in treatment planning and discharge process.    Additional Concerns    Patient's caretaker denies additional needs and/or concerns at this time. Patient is being followed for needs, education, resources, information, emotional support, supportive counseling, and for supportive and skilled discharge plan of care.  providing ongoing psychosocial support, education, resources and d/c planning as needed.  SW remains available.  provided resource list, patient choice, psychosocial and supportive counseling, resources, education, assistance and discharge planning with patient and caregiver involvement, ongoing SW availability and services as appropriate.  remains available. Patient's caregiver verbalizes understanding and agreement with information reviewed,  availability and how to access available resources as needed.

## 2020-08-28 NOTE — PROGRESS NOTES
"[facility]  Adult Nutrition  Progress Note    SUMMARY       Recommendations    1. Continue low phosphorus diet as needed.   2. If po intake <50% x 24 hrs, provide Novasource renal ONS BID.  Goals: 1. Pt's intake meals >50% by RD follow up.  Nutrition Goal Status: new  Communication of RD Recs: (POC)    Reason for Assessment    Reason For Assessment: RD follow-up  Diagnosis: (Post kidney transplant)  Relevant Medical History: ESRD, HTN, asthma  Interdisciplinary Rounds: did not attend  General Information Comments: Pt s/p KTx 8/27 is advanced to diet with 25% intake dinner last night and breakfast this morning. Pt on PD since 2014,reports good po intake and 10 lb wt loss x 3 months PTA due to increased physical activity, per pt. Dry wt 70 kg.  Noted with b/l LE and L arm rash. Completed NFPE today 8/28: pt appears nourished.   Nutrition Discharge Planning: Post transplant nutrition education provided 8/28/20. Food safety/drug interactions emphasized. General healthy/low salt diet recommended. Education material left.  No other needs identified. Caregiver present.      Nutrition Risk Screen    Nutrition Risk Screen: no indicators present    Nutrition/Diet History    Patient Reported Diet/Restrictions/Preferences: renal, low sodium  Food Allergies: NKFA    Anthropometrics    Temp: 98 °F (36.7 °C)  Height Method: Stated  Height: 5' 4" (162.6 cm)  Height (inches): 64 in  Weight Method: Standard Scale  Weight: 68 kg (149 lb 14.6 oz)  Weight (lb): 149.91 lb  Ideal Body Weight (IBW), Male: 130 lb  % Ideal Body Weight, Male (lb): 115.66 %  BMI (Calculated): 25.7  BMI Grade: 25 - 29.9 - overweight       Lab/Procedures/Meds    Pertinent Labs Reviewed: reviewed  Pertinent Labs Comments: CO2 20, BUN 68, Cre 6.7, Glu 117, Alb 2.7  Pertinent Medications Reviewed: reviewed  Pertinent Medications Comments: MVI, prednisone, sodium bicarb, tactrolimus, IVF, calcitriol, colace, vitamin D, famotidine, " methylprednisolone    Estimated/Assessed Needs    Weight Used For Calorie Calculations: 68.2 kg (150 lb 5.7 oz)  Energy Calorie Requirements (kcal): 1705 kcals/day (25 kcals/kg)  Energy Need Method: Kcal/kg  Protein Requirements:  g (1.2-1.5 g/kg)  Weight Used For Protein Calculations: 68.2 kg (150 lb 5.7 oz)  Fluid Requirements (mL): 1 mL/kcal or per MD  Estimated Fluid Requirement Method: RDA Method  RDA Method (mL): 1705  CHO Requirement: N/A      Nutrition Prescription Ordered    Current Diet Order: low phosphorus    Evaluation of Received Nutrient/Fluid Intake    Energy Calories Required: not meeting needs  Protein Required: not meeting needs  Fluid Required: not meeting needs  Comments: LBM 8/26  % Intake of Estimated Energy Needs: 25 - 50 %  % Meal Intake: 25 - 50 %    Nutrition Risk    Level of Risk/Frequency of Follow-up: high     Assessment and Plan    Nutrition Problem  Inadequate oral intake    Related to (etiology):   Decreased appetite    Signs and Symptoms (as evidenced by):   Pt s/p kidney transplant 8/27/20    Interventions(treatment strategy):  Collaboration of care with providers.  Nutrition education: post transplant food safety and medication interactions    Nutrition Diagnosis Status:   New       Monitor and Evaluation    Food and Nutrient Intake: energy intake, food and beverage intake  Food and Nutrient Adminstration: diet order  Knowledge/Beliefs/Attitudes: food and nutrition knowledge/skill  Anthropometric Measurements: weight, weight change, body mass index  Biochemical Data, Medical Tests and Procedures: electrolyte and renal panel, gastrointestinal profile, glucose/endocrine profile, inflammatory profile, lipid profile  Nutrition-Focused Physical Findings: overall appearance, extremities, muscles and bones, head and eyes, skin     Malnutrition Assessment             Weight Loss (Malnutrition): (6.3% x 3 months)   Orbital Region (Subcutaneous Fat Loss): well nourished  Upper Arm  Region (Subcutaneous Fat Loss): well nourished  Thoracic and Lumbar Region: well nourished   [place] Region (Muscle Loss): well nourished  Clavicle Bone Region (Muscle Loss): well nourished  Clavicle and Acromion Bone Region (Muscle Loss): well nourished  Scapular Bone Region (Muscle Loss): well nourished  Dorsal Hand (Muscle Loss): well nourished  Patellar Region (Muscle Loss): well nourished  Anterior Thigh Region (Muscle Loss): well nourished  Posterior Calf Region (Muscle Loss): well nourished                 Nutrition Follow-Up    RD Follow-up?: Yes

## 2020-08-29 LAB
ALBUMIN SERPL BCP-MCNC: 2.5 G/DL (ref 3.5–5.2)
ANION GAP SERPL CALC-SCNC: 10 MMOL/L (ref 8–16)
BASOPHILS # BLD AUTO: 0.01 K/UL (ref 0–0.2)
BASOPHILS NFR BLD: 0.1 % (ref 0–1.9)
BUN SERPL-MCNC: 76 MG/DL (ref 6–20)
CALCIUM SERPL-MCNC: 7.7 MG/DL (ref 8.7–10.5)
CHLORIDE SERPL-SCNC: 110 MMOL/L (ref 95–110)
CO2 SERPL-SCNC: 18 MMOL/L (ref 23–29)
CREAT SERPL-MCNC: 6.2 MG/DL (ref 0.5–1.4)
DIFFERENTIAL METHOD: ABNORMAL
EOSINOPHIL # BLD AUTO: 0 K/UL (ref 0–0.5)
EOSINOPHIL NFR BLD: 0 % (ref 0–8)
ERYTHROCYTE [DISTWIDTH] IN BLOOD BY AUTOMATED COUNT: 13.2 % (ref 11.5–14.5)
EST. GFR  (AFRICAN AMERICAN): 11 ML/MIN/1.73 M^2
EST. GFR  (NON AFRICAN AMERICAN): 9.6 ML/MIN/1.73 M^2
GLUCOSE SERPL-MCNC: 112 MG/DL (ref 70–110)
HCT VFR BLD AUTO: 36.3 % (ref 40–54)
HGB BLD-MCNC: 11.8 G/DL (ref 14–18)
IMM GRANULOCYTES # BLD AUTO: 0.14 K/UL (ref 0–0.04)
IMM GRANULOCYTES NFR BLD AUTO: 0.9 % (ref 0–0.5)
LYMPHOCYTES # BLD AUTO: 0.8 K/UL (ref 1–4.8)
LYMPHOCYTES NFR BLD: 5.4 % (ref 18–48)
MAGNESIUM SERPL-MCNC: 2.3 MG/DL (ref 1.6–2.6)
MCH RBC QN AUTO: 31.6 PG (ref 27–31)
MCHC RBC AUTO-ENTMCNC: 32.5 G/DL (ref 32–36)
MCV RBC AUTO: 97 FL (ref 82–98)
MONOCYTES # BLD AUTO: 0.8 K/UL (ref 0.3–1)
MONOCYTES NFR BLD: 5.1 % (ref 4–15)
NEUTROPHILS # BLD AUTO: 13.4 K/UL (ref 1.8–7.7)
NEUTROPHILS NFR BLD: 88.5 % (ref 38–73)
NRBC BLD-RTO: 0 /100 WBC
PHOSPHATE SERPL-MCNC: 5.8 MG/DL (ref 2.7–4.5)
PLATELET # BLD AUTO: 144 K/UL (ref 150–350)
PMV BLD AUTO: 12.1 FL (ref 9.2–12.9)
POCT GLUCOSE: 108 MG/DL (ref 70–110)
POCT GLUCOSE: 119 MG/DL (ref 70–110)
POCT GLUCOSE: 132 MG/DL (ref 70–110)
POCT GLUCOSE: 141 MG/DL (ref 70–110)
POTASSIUM SERPL-SCNC: 4 MMOL/L (ref 3.5–5.1)
RBC # BLD AUTO: 3.74 M/UL (ref 4.6–6.2)
SODIUM SERPL-SCNC: 138 MMOL/L (ref 136–145)
TACROLIMUS BLD-MCNC: 7.4 NG/ML (ref 5–15)
WBC # BLD AUTO: 15.15 K/UL (ref 3.9–12.7)

## 2020-08-29 PROCEDURE — 80197 ASSAY OF TACROLIMUS: CPT

## 2020-08-29 PROCEDURE — 25000003 PHARM REV CODE 250: Performed by: PHYSICIAN ASSISTANT

## 2020-08-29 PROCEDURE — 85025 COMPLETE CBC W/AUTO DIFF WBC: CPT

## 2020-08-29 PROCEDURE — 94761 N-INVAS EAR/PLS OXIMETRY MLT: CPT

## 2020-08-29 PROCEDURE — 63600175 PHARM REV CODE 636 W HCPCS: Performed by: SURGERY

## 2020-08-29 PROCEDURE — 80069 RENAL FUNCTION PANEL: CPT

## 2020-08-29 PROCEDURE — 36415 COLL VENOUS BLD VENIPUNCTURE: CPT

## 2020-08-29 PROCEDURE — 20600001 HC STEP DOWN PRIVATE ROOM

## 2020-08-29 PROCEDURE — 25000003 PHARM REV CODE 250: Performed by: SURGERY

## 2020-08-29 PROCEDURE — 63600175 PHARM REV CODE 636 W HCPCS: Performed by: PHYSICIAN ASSISTANT

## 2020-08-29 PROCEDURE — 99900035 HC TECH TIME PER 15 MIN (STAT)

## 2020-08-29 PROCEDURE — 83735 ASSAY OF MAGNESIUM: CPT

## 2020-08-29 RX ADMIN — SULFAMETHOXAZOLE AND TRIMETHOPRIM 1 TABLET: 400; 80 TABLET ORAL at 08:08

## 2020-08-29 RX ADMIN — NYSTATIN 500000 UNITS: 100000 SUSPENSION ORAL at 02:08

## 2020-08-29 RX ADMIN — MUPIROCIN 1 G: 20 OINTMENT TOPICAL at 08:08

## 2020-08-29 RX ADMIN — SODIUM BICARBONATE 650 MG TABLET 1300 MG: at 08:08

## 2020-08-29 RX ADMIN — TACROLIMUS 2 MG: 1 CAPSULE ORAL at 05:08

## 2020-08-29 RX ADMIN — NYSTATIN 500000 UNITS: 100000 SUSPENSION ORAL at 05:08

## 2020-08-29 RX ADMIN — SERTRALINE HYDROCHLORIDE 50 MG: 50 TABLET ORAL at 08:08

## 2020-08-29 RX ADMIN — HEPARIN SODIUM 5000 UNITS: 5000 INJECTION INTRAVENOUS; SUBCUTANEOUS at 06:08

## 2020-08-29 RX ADMIN — CALCITRIOL 1 MCG: 0.5 CAPSULE, LIQUID FILLED ORAL at 09:08

## 2020-08-29 RX ADMIN — TACROLIMUS 2 MG: 1 CAPSULE ORAL at 08:08

## 2020-08-29 RX ADMIN — MONTELUKAST 10 MG: 10 TABLET, FILM COATED ORAL at 08:08

## 2020-08-29 RX ADMIN — METHYLPREDNISOLONE SODIUM SUCCINATE 125 MG: 125 INJECTION, POWDER, FOR SOLUTION INTRAMUSCULAR; INTRAVENOUS at 08:08

## 2020-08-29 RX ADMIN — HEPARIN SODIUM 5000 UNITS: 5000 INJECTION INTRAVENOUS; SUBCUTANEOUS at 08:08

## 2020-08-29 RX ADMIN — PAROXETINE HYDROCHLORIDE 20 MG: 20 TABLET, FILM COATED ORAL at 08:08

## 2020-08-29 RX ADMIN — OXYCODONE 5 MG: 5 TABLET ORAL at 10:08

## 2020-08-29 RX ADMIN — THERA TABS 1 TABLET: TAB at 08:08

## 2020-08-29 RX ADMIN — NYSTATIN 500000 UNITS: 100000 SUSPENSION ORAL at 08:08

## 2020-08-29 RX ADMIN — DOCUSATE SODIUM 100 MG: 100 CAPSULE, LIQUID FILLED ORAL at 08:08

## 2020-08-29 RX ADMIN — AMLODIPINE BESYLATE 5 MG: 5 TABLET ORAL at 08:08

## 2020-08-29 RX ADMIN — HEPARIN SODIUM 5000 UNITS: 5000 INJECTION INTRAVENOUS; SUBCUTANEOUS at 02:08

## 2020-08-29 RX ADMIN — MYCOPHENOLATE MOFETIL 1000 MG: 250 CAPSULE ORAL at 08:08

## 2020-08-29 RX ADMIN — ACETAMINOPHEN 1000 MG: 500 TABLET ORAL at 06:08

## 2020-08-29 RX ADMIN — FAMOTIDINE 20 MG: 20 TABLET, FILM COATED ORAL at 08:08

## 2020-08-29 NOTE — ANESTHESIA PROCEDURE NOTES
Arterial    Diagnosis: renal failure    Patient location during procedure: done in OR  Procedure start time: 8/28/2020 5:21 AM  Timeout: 8/28/2020 5:20 AM  Procedure end time: 8/28/2020 5:26 AM    Staffing  Authorizing Provider: Suzette Mac MD  Performing Provider: Alex Bah MD    Anesthesiologist was present at the time of the procedure.    Preanesthetic Checklist  Completed: patient identified, site marked, surgical consent, pre-op evaluation, timeout performed, IV checked, risks and benefits discussed, monitors and equipment checked and anesthesia consent givenArterial  Skin Prep: chlorhexidine gluconate and isopropyl alcohol  Local Infiltration: none  Orientation: left  Location: radial  Catheter Size: 20 G  Catheter placement by Anatomical landmarks. Heme positive aspiration all ports.Insertion Attempts: 1  Assessment  Dressing: secured with tape and tegaderm

## 2020-08-29 NOTE — PROGRESS NOTES
Kidney Transplant   Progress Note      HPI: Mr. Azevedo is a 49 y.o. year old male who is s/p Kidney transplant on 8/27/20.         Interval History: No acute events overnight.       Sub/Obj: Feeling well this AM. Sleepy.  No new complaints. No nausea or vomiting. Tolerating PO intake.       Physical Exam  Vitals signs and nursing note reviewed.   Constitutional:       General: He is not in acute distress.     Appearance: He is not diaphoretic.   HENT:      Head: Normocephalic and atraumatic.   Eyes:      General: No scleral icterus.        Right eye: No discharge.         Left eye: No discharge.   Neck:      Musculoskeletal: Normal range of motion and neck supple.   Cardiovascular:      Rate and Rhythm: Normal rate and regular rhythm.      Heart sounds: Normal heart sounds.   Pulmonary:      Effort: Pulmonary effort is normal.      Breath sounds: Normal breath sounds. No wheezing or rales.   Abdominal:      General: Bowel sounds are normal. There is no distension.      Palpations: Abdomen is soft.      Tenderness: There is abdominal tenderness (incisional). There is no guarding.       Musculoskeletal:      Right lower leg: No edema.      Left lower leg: No edema.   Skin:     General: Skin is warm and dry.      Capillary Refill: Capillary refill takes less than 2 seconds.      Findings: Lesion (BLE) and rash (BLE erythematous rash - chronic) present.   Neurological:      Mental Status: He is alert and oriented to person, place, and time.      Cranial Nerves: No cranial nerve deficit.   Psychiatric:         Thought Content: Thought content normal.         Judgment: Judgment normal.       Labs:   CBC:   Recent Labs   Lab 08/29/20 0627   WBC 15.15*   RBC 3.74*   HGB 11.8*   HCT 36.3*   *   MCV 97   MCH 31.6*   MCHC 32.5     BMP:   Recent Labs   Lab 08/29/20 0627   *      K 4.0      CO2 18*   BUN 76*   CREATININE 6.2*   CALCIUM 7.7*     Coagulation:   Recent Labs   Lab 08/27/20  0050   INR 1.0    APTT 27.9     Labs within the past 24 hours have been reviewed.        Assessment/Plan:     1. S/p Kidney transplant   s/p DCD KTxp on 8/28/20 (simulect induction, CMV mismatch, CIT ~ 22 hours)  - Surgery notable for 3 arteries - renal US performed and satisfactory.   - 5 day farah and 1 ELEUTERIO drain in place  - Cr with some improvement. Adequate urine output  - Tolerating Po intake  - Daily labs, strict intake/output, daily weights      2. Long Term Immunosuppression- Cont with tacro. Draw tacro daily and adjust dose as needed to maintain a therapeutic level.     3. At Risk for Opportunistic Infections- Cont with OI prophylaxis as per protocol.     4. Anemia of chronic disease- H&H stable. Monitor.     5. Will continue to monitor      Pt seen, examined and discussed with collaborating staff transplant nephrologist.

## 2020-08-29 NOTE — PLAN OF CARE
AAOx4.  VSS on visi.  Up to toilet independently.  Pt reports passing gas but no BM yet.  Godwin catheter in place.  Good u/o - clear yellow.  RLQ incision XUAN w/ staples - taught caregiver how to paint with betadine.  RLQ ELEUTERIO site clean and dry - XUAN - minimal serosanguinous drainage.  H/H stable.  Cr trending down - 6.2 today.  Self-meds pulled by caregiver 100%.  Pt up in chair.  Call light in reach.  Instructed to call for assistance.  Will continue to monitor.

## 2020-08-30 LAB
ALBUMIN SERPL BCP-MCNC: 2.6 G/DL (ref 3.5–5.2)
ANION GAP SERPL CALC-SCNC: 11 MMOL/L (ref 8–16)
BASOPHILS # BLD AUTO: 0.04 K/UL (ref 0–0.2)
BASOPHILS NFR BLD: 0.2 % (ref 0–1.9)
BUN SERPL-MCNC: 86 MG/DL (ref 6–20)
CALCIUM SERPL-MCNC: 8.2 MG/DL (ref 8.7–10.5)
CHLORIDE SERPL-SCNC: 111 MMOL/L (ref 95–110)
CO2 SERPL-SCNC: 17 MMOL/L (ref 23–29)
CREAT SERPL-MCNC: 5.9 MG/DL (ref 0.5–1.4)
DIFFERENTIAL METHOD: ABNORMAL
EOSINOPHIL # BLD AUTO: 0.1 K/UL (ref 0–0.5)
EOSINOPHIL NFR BLD: 0.3 % (ref 0–8)
ERYTHROCYTE [DISTWIDTH] IN BLOOD BY AUTOMATED COUNT: 13.1 % (ref 11.5–14.5)
EST. GFR  (AFRICAN AMERICAN): 11.7 ML/MIN/1.73 M^2
EST. GFR  (NON AFRICAN AMERICAN): 10.1 ML/MIN/1.73 M^2
GLUCOSE SERPL-MCNC: 111 MG/DL (ref 70–110)
GLUCOSE SERPL-MCNC: 84 MG/DL (ref 70–110)
HCO3 UR-SCNC: 21.6 MMOL/L (ref 24–28)
HCT VFR BLD AUTO: 37.8 % (ref 40–54)
HCT VFR BLD CALC: 32 %PCV (ref 36–54)
HGB BLD-MCNC: 12.1 G/DL (ref 14–18)
IMM GRANULOCYTES # BLD AUTO: 0.2 K/UL (ref 0–0.04)
IMM GRANULOCYTES NFR BLD AUTO: 1.2 % (ref 0–0.5)
LYMPHOCYTES # BLD AUTO: 1.2 K/UL (ref 1–4.8)
LYMPHOCYTES NFR BLD: 7.6 % (ref 18–48)
MAGNESIUM SERPL-MCNC: 2.3 MG/DL (ref 1.6–2.6)
MCH RBC QN AUTO: 31.5 PG (ref 27–31)
MCHC RBC AUTO-ENTMCNC: 32 G/DL (ref 32–36)
MCV RBC AUTO: 98 FL (ref 82–98)
MONOCYTES # BLD AUTO: 1 K/UL (ref 0.3–1)
MONOCYTES NFR BLD: 6.3 % (ref 4–15)
NEUTROPHILS # BLD AUTO: 13.6 K/UL (ref 1.8–7.7)
NEUTROPHILS NFR BLD: 84.4 % (ref 38–73)
NRBC BLD-RTO: 0 /100 WBC
PCO2 BLDA: 36 MMHG (ref 35–45)
PH SMN: 7.39 [PH] (ref 7.35–7.45)
PHOSPHATE SERPL-MCNC: 5 MG/DL (ref 2.7–4.5)
PLATELET # BLD AUTO: 139 K/UL (ref 150–350)
PMV BLD AUTO: 12 FL (ref 9.2–12.9)
PO2 BLDA: 92 MMHG (ref 80–100)
POC BE: -3 MMOL/L
POC IONIZED CALCIUM: 1.11 MMOL/L (ref 1.06–1.42)
POC SATURATED O2: 97 % (ref 95–100)
POC TCO2: 23 MMOL/L (ref 23–27)
POCT GLUCOSE: 145 MG/DL (ref 70–110)
POCT GLUCOSE: 76 MG/DL (ref 70–110)
POCT GLUCOSE: 86 MG/DL (ref 70–110)
POCT GLUCOSE: 97 MG/DL (ref 70–110)
POTASSIUM BLD-SCNC: 3.2 MMOL/L (ref 3.5–5.1)
POTASSIUM SERPL-SCNC: 3.8 MMOL/L (ref 3.5–5.1)
RBC # BLD AUTO: 3.84 M/UL (ref 4.6–6.2)
SAMPLE: ABNORMAL
SODIUM BLD-SCNC: 140 MMOL/L (ref 136–145)
SODIUM SERPL-SCNC: 139 MMOL/L (ref 136–145)
TACROLIMUS BLD-MCNC: 5.1 NG/ML (ref 5–15)
WBC # BLD AUTO: 16.18 K/UL (ref 3.9–12.7)

## 2020-08-30 PROCEDURE — 83735 ASSAY OF MAGNESIUM: CPT

## 2020-08-30 PROCEDURE — 25000003 PHARM REV CODE 250: Performed by: INTERNAL MEDICINE

## 2020-08-30 PROCEDURE — 80069 RENAL FUNCTION PANEL: CPT

## 2020-08-30 PROCEDURE — 25000003 PHARM REV CODE 250: Performed by: PHYSICIAN ASSISTANT

## 2020-08-30 PROCEDURE — 63600175 PHARM REV CODE 636 W HCPCS: Performed by: PHYSICIAN ASSISTANT

## 2020-08-30 PROCEDURE — 63600175 PHARM REV CODE 636 W HCPCS: Performed by: SURGERY

## 2020-08-30 PROCEDURE — 85025 COMPLETE CBC W/AUTO DIFF WBC: CPT

## 2020-08-30 PROCEDURE — 36415 COLL VENOUS BLD VENIPUNCTURE: CPT

## 2020-08-30 PROCEDURE — 63600175 PHARM REV CODE 636 W HCPCS: Performed by: INTERNAL MEDICINE

## 2020-08-30 PROCEDURE — 25000003 PHARM REV CODE 250: Performed by: SURGERY

## 2020-08-30 PROCEDURE — 20600001 HC STEP DOWN PRIVATE ROOM

## 2020-08-30 PROCEDURE — 80197 ASSAY OF TACROLIMUS: CPT

## 2020-08-30 RX ORDER — TACROLIMUS 1 MG/1
1 CAPSULE ORAL ONCE
Status: COMPLETED | OUTPATIENT
Start: 2020-08-30 | End: 2020-08-30

## 2020-08-30 RX ORDER — SODIUM BICARBONATE 650 MG/1
1300 TABLET ORAL 3 TIMES DAILY
Status: DISCONTINUED | OUTPATIENT
Start: 2020-08-30 | End: 2020-09-02 | Stop reason: HOSPADM

## 2020-08-30 RX ORDER — TACROLIMUS 1 MG/1
3 CAPSULE ORAL 2 TIMES DAILY
Status: DISCONTINUED | OUTPATIENT
Start: 2020-08-30 | End: 2020-09-01

## 2020-08-30 RX ADMIN — FAMOTIDINE 20 MG: 20 TABLET, FILM COATED ORAL at 08:08

## 2020-08-30 RX ADMIN — MYCOPHENOLATE MOFETIL 1000 MG: 250 CAPSULE ORAL at 08:08

## 2020-08-30 RX ADMIN — TACROLIMUS 3 MG: 1 CAPSULE ORAL at 06:08

## 2020-08-30 RX ADMIN — THERA TABS 1 TABLET: TAB at 08:08

## 2020-08-30 RX ADMIN — MUPIROCIN 1 G: 20 OINTMENT TOPICAL at 08:08

## 2020-08-30 RX ADMIN — TACROLIMUS 2 MG: 1 CAPSULE ORAL at 08:08

## 2020-08-30 RX ADMIN — SODIUM BICARBONATE 650 MG TABLET 1300 MG: at 02:08

## 2020-08-30 RX ADMIN — AMLODIPINE BESYLATE 5 MG: 5 TABLET ORAL at 08:08

## 2020-08-30 RX ADMIN — SULFAMETHOXAZOLE AND TRIMETHOPRIM 1 TABLET: 400; 80 TABLET ORAL at 08:08

## 2020-08-30 RX ADMIN — PREDNISONE 20 MG: 20 TABLET ORAL at 08:08

## 2020-08-30 RX ADMIN — DOCUSATE SODIUM 100 MG: 100 CAPSULE, LIQUID FILLED ORAL at 08:08

## 2020-08-30 RX ADMIN — MONTELUKAST 10 MG: 10 TABLET, FILM COATED ORAL at 08:08

## 2020-08-30 RX ADMIN — NYSTATIN 500000 UNITS: 100000 SUSPENSION ORAL at 02:08

## 2020-08-30 RX ADMIN — PAROXETINE HYDROCHLORIDE 20 MG: 20 TABLET, FILM COATED ORAL at 08:08

## 2020-08-30 RX ADMIN — CALCITRIOL 1 MCG: 0.5 CAPSULE, LIQUID FILLED ORAL at 10:08

## 2020-08-30 RX ADMIN — NYSTATIN 500000 UNITS: 100000 SUSPENSION ORAL at 06:08

## 2020-08-30 RX ADMIN — OXYCODONE 5 MG: 5 TABLET ORAL at 09:08

## 2020-08-30 RX ADMIN — NYSTATIN 500000 UNITS: 100000 SUSPENSION ORAL at 08:08

## 2020-08-30 RX ADMIN — TACROLIMUS 1 MG: 1 CAPSULE ORAL at 10:08

## 2020-08-30 RX ADMIN — HEPARIN SODIUM 5000 UNITS: 5000 INJECTION INTRAVENOUS; SUBCUTANEOUS at 02:08

## 2020-08-30 RX ADMIN — SODIUM BICARBONATE 650 MG TABLET 1300 MG: at 08:08

## 2020-08-30 RX ADMIN — HEPARIN SODIUM 5000 UNITS: 5000 INJECTION INTRAVENOUS; SUBCUTANEOUS at 08:08

## 2020-08-30 NOTE — PLAN OF CARE
Patient AAOx4, vitals WDL, afebrile.   R forearm 20g and L forearm 16g in place, both saline flushed and locked, dressing CDI.  Godwin in place with output of 1750 mL for shift, catheter care performed.   RLQ incision XUAN with staples intact, painted with betadine by patient this shift.  RLQ ELEUTERIO drain with output of 20mL serosanguinous drainage.  Self meds pulled 100% successfully.  Visi monitoring in place.   Able to ambulate independently, remains free from injury for shift, no acute skin breakdown noted.

## 2020-08-30 NOTE — PROGRESS NOTES
Kidney Transplant   Progress Note    HPI: Mr. Azevedo is a 49 y.o. year old male who is s/p Kidney transplant on 8/27/20.            Interval History: No acute events overnight.         Sub/Obj: Feeling well this AM.  No new complaints. Tolerating PO intake without nausea or vomiting.  Sitting up in chair.         Physical Exam  Vitals signs and nursing note reviewed.   Constitutional:       General: He is not in acute distress.     Appearance: He is not diaphoretic.   HENT:      Head: Normocephalic and atraumatic.   Eyes:      General: No scleral icterus.        Right eye: No discharge.         Left eye: No discharge.   Neck:      Musculoskeletal: Normal range of motion and neck supple.   Cardiovascular:      Rate and Rhythm: Normal rate and regular rhythm.      Heart sounds: Normal heart sounds.   Pulmonary:      Effort: Pulmonary effort is normal.      Breath sounds: Normal breath sounds. No wheezing or rales.   Abdominal:      General: Bowel sounds are normal. There is no distension.      Palpations: Abdomen is soft.      Tenderness: There is abdominal tenderness (incisional). There is no guarding.       Musculoskeletal:      Right lower leg: No edema.      Left lower leg: No edema.   Skin:     General: Skin is warm and dry.      Capillary Refill: Capillary refill takes less than 2 seconds.      Findings: Lesion (BLE) and rash (BLE erythematous rash - chronic) present.   Neurological:      Mental Status: He is alert and oriented to person, place, and time.      Cranial Nerves: No cranial nerve deficit.   Psychiatric:         Thought Content: Thought content normal.         Judgment: Judgment normal.              Labs:   CBC:   Recent Labs   Lab 08/30/20  0619   WBC 16.18*   RBC 3.84*   HGB 12.1*   HCT 37.8*   *   MCV 98   MCH 31.5*   MCHC 32.0     CMP:   Recent Labs   Lab 08/27/20  0050  08/30/20  0618   GLU 93   < > 84   CALCIUM 8.8   < > 8.2*   ALBUMIN 3.4*   < > 2.6*   PROT 6.3  --   --       < >  139   K 3.3*   < > 3.8   CO2 23   < > 17*      < > 111*   BUN 70*   < > 86*   CREATININE 7.2*   < > 5.9*   ALKPHOS 77  --   --    ALT 27  --   --    AST 34  --   --    BILITOT 0.3  --   --     < > = values in this interval not displayed.     Labs within the past 24 hours have been reviewed.        Assessment/Plan:     1. S/p Kidney transplant   s/p DCD KTxp on 8/28/20 (simulect induction, CMV mismatch, CIT ~ 22 hours)  - Surgery notable for 3 arteries - renal US performed and satisfactory.   - 5 day farah and 1 ELEUTERIO drain in place  - Cr with some improvement. Adequate urine output  - Tolerating Po intake  - Daily labs, strict intake/output, daily weights      2. Long Term Immunosuppression- Cont with tacro. Draw tacro daily and adjust dose as needed to maintain a therapeutic level.      3. At Risk for Opportunistic Infections- Cont with OI prophylaxis as per protocol.      4. Anemia of chronic disease- H&H stable. Monitor.      5. Will continue to monitor     Possible d/c Monday     Pt seen, examined and discussed with collaborating staff transplant nephrologist.

## 2020-08-31 PROBLEM — N18.6 ESRD (END STAGE RENAL DISEASE): Status: RESOLVED | Noted: 2017-12-01 | Resolved: 2020-08-31

## 2020-08-31 LAB
ALBUMIN SERPL BCP-MCNC: 2.5 G/DL (ref 3.5–5.2)
ANION GAP SERPL CALC-SCNC: 8 MMOL/L (ref 8–16)
BACTERIA FLD AEROBE CULT: NO GROWTH
BASOPHILS # BLD AUTO: 0.05 K/UL (ref 0–0.2)
BASOPHILS NFR BLD: 0.4 % (ref 0–1.9)
BUN SERPL-MCNC: 92 MG/DL (ref 6–20)
CALCIUM SERPL-MCNC: 8.5 MG/DL (ref 8.7–10.5)
CHLORIDE SERPL-SCNC: 112 MMOL/L (ref 95–110)
CO2 SERPL-SCNC: 20 MMOL/L (ref 23–29)
CREAT SERPL-MCNC: 5.6 MG/DL (ref 0.5–1.4)
DIFFERENTIAL METHOD: ABNORMAL
EOSINOPHIL # BLD AUTO: 0.4 K/UL (ref 0–0.5)
EOSINOPHIL NFR BLD: 3 % (ref 0–8)
ERYTHROCYTE [DISTWIDTH] IN BLOOD BY AUTOMATED COUNT: 12.9 % (ref 11.5–14.5)
EST. GFR  (AFRICAN AMERICAN): 12.5 ML/MIN/1.73 M^2
EST. GFR  (NON AFRICAN AMERICAN): 10.8 ML/MIN/1.73 M^2
GLUCOSE SERPL-MCNC: 84 MG/DL (ref 70–110)
GRAM STN SPEC: NORMAL
GRAM STN SPEC: NORMAL
HBV SURFACE AB SER QL IA: POSITIVE
HBV SURFACE AB SERPL IA-ACNC: NORMAL MIU/ML
HCT VFR BLD AUTO: 37.2 % (ref 40–54)
HCV RNA SERPL NAA+PROBE-LOG IU: <1.08 LOG (10) IU/ML
HCV RNA SERPL QL NAA+PROBE: NOT DETECTED IU/ML
HCV RNA SPEC NAA+PROBE-ACNC: <12 IU/ML
HGB BLD-MCNC: 12 G/DL (ref 14–18)
IMM GRANULOCYTES # BLD AUTO: 0.2 K/UL (ref 0–0.04)
IMM GRANULOCYTES NFR BLD AUTO: 1.7 % (ref 0–0.5)
LYMPHOCYTES # BLD AUTO: 1.1 K/UL (ref 1–4.8)
LYMPHOCYTES NFR BLD: 9 % (ref 18–48)
MAGNESIUM SERPL-MCNC: 2.2 MG/DL (ref 1.6–2.6)
MCH RBC QN AUTO: 31.5 PG (ref 27–31)
MCHC RBC AUTO-ENTMCNC: 32.3 G/DL (ref 32–36)
MCV RBC AUTO: 98 FL (ref 82–98)
MONOCYTES # BLD AUTO: 1.1 K/UL (ref 0.3–1)
MONOCYTES NFR BLD: 9.5 % (ref 4–15)
NEUTROPHILS # BLD AUTO: 9 K/UL (ref 1.8–7.7)
NEUTROPHILS NFR BLD: 76.4 % (ref 38–73)
NRBC BLD-RTO: 0 /100 WBC
PHOSPHATE SERPL-MCNC: 4.1 MG/DL (ref 2.7–4.5)
PLATELET # BLD AUTO: 144 K/UL (ref 150–350)
PMV BLD AUTO: 12.1 FL (ref 9.2–12.9)
POCT GLUCOSE: 122 MG/DL (ref 70–110)
POCT GLUCOSE: 131 MG/DL (ref 70–110)
POCT GLUCOSE: 84 MG/DL (ref 70–110)
POCT GLUCOSE: 94 MG/DL (ref 70–110)
POTASSIUM SERPL-SCNC: 4.2 MMOL/L (ref 3.5–5.1)
RBC # BLD AUTO: 3.81 M/UL (ref 4.6–6.2)
SODIUM SERPL-SCNC: 140 MMOL/L (ref 136–145)
TACROLIMUS BLD-MCNC: 5.1 NG/ML (ref 5–15)
WBC # BLD AUTO: 11.73 K/UL (ref 3.9–12.7)

## 2020-08-31 PROCEDURE — 93010 ELECTROCARDIOGRAM REPORT: CPT | Mod: ,,, | Performed by: INTERNAL MEDICINE

## 2020-08-31 PROCEDURE — 94761 N-INVAS EAR/PLS OXIMETRY MLT: CPT

## 2020-08-31 PROCEDURE — 63600175 PHARM REV CODE 636 W HCPCS: Performed by: INTERNAL MEDICINE

## 2020-08-31 PROCEDURE — 80197 ASSAY OF TACROLIMUS: CPT

## 2020-08-31 PROCEDURE — 85025 COMPLETE CBC W/AUTO DIFF WBC: CPT

## 2020-08-31 PROCEDURE — 99233 PR SUBSEQUENT HOSPITAL CARE,LEVL III: ICD-10-PCS | Mod: ,,, | Performed by: PHYSICIAN ASSISTANT

## 2020-08-31 PROCEDURE — 83735 ASSAY OF MAGNESIUM: CPT

## 2020-08-31 PROCEDURE — 25000003 PHARM REV CODE 250: Performed by: PHYSICIAN ASSISTANT

## 2020-08-31 PROCEDURE — 93005 ELECTROCARDIOGRAM TRACING: CPT

## 2020-08-31 PROCEDURE — 63600175 PHARM REV CODE 636 W HCPCS: Performed by: SURGERY

## 2020-08-31 PROCEDURE — 97803 MED NUTRITION INDIV SUBSEQ: CPT

## 2020-08-31 PROCEDURE — 20600001 HC STEP DOWN PRIVATE ROOM

## 2020-08-31 PROCEDURE — 94799 UNLISTED PULMONARY SVC/PX: CPT

## 2020-08-31 PROCEDURE — 99233 SBSQ HOSP IP/OBS HIGH 50: CPT | Mod: ,,, | Performed by: PHYSICIAN ASSISTANT

## 2020-08-31 PROCEDURE — 25000003 PHARM REV CODE 250: Performed by: SURGERY

## 2020-08-31 PROCEDURE — 36415 COLL VENOUS BLD VENIPUNCTURE: CPT

## 2020-08-31 PROCEDURE — 93010 EKG 12-LEAD: ICD-10-PCS | Mod: ,,, | Performed by: INTERNAL MEDICINE

## 2020-08-31 PROCEDURE — 25000003 PHARM REV CODE 250: Performed by: INTERNAL MEDICINE

## 2020-08-31 PROCEDURE — 80069 RENAL FUNCTION PANEL: CPT

## 2020-08-31 RX ORDER — SULFAMETHOXAZOLE AND TRIMETHOPRIM 400; 80 MG/1; MG/1
1 TABLET ORAL
Status: DISCONTINUED | OUTPATIENT
Start: 2020-09-02 | End: 2020-09-02 | Stop reason: HOSPADM

## 2020-08-31 RX ORDER — SERTRALINE HYDROCHLORIDE 50 MG/1
50 TABLET, FILM COATED ORAL NIGHTLY
Status: DISCONTINUED | OUTPATIENT
Start: 2020-09-01 | End: 2020-09-02 | Stop reason: HOSPADM

## 2020-08-31 RX ADMIN — NYSTATIN 500000 UNITS: 100000 SUSPENSION ORAL at 02:08

## 2020-08-31 RX ADMIN — FAMOTIDINE 20 MG: 20 TABLET, FILM COATED ORAL at 08:08

## 2020-08-31 RX ADMIN — CALCITRIOL 1 MCG: 0.5 CAPSULE, LIQUID FILLED ORAL at 11:08

## 2020-08-31 RX ADMIN — SODIUM BICARBONATE 650 MG TABLET 1300 MG: at 08:08

## 2020-08-31 RX ADMIN — HEPARIN SODIUM 5000 UNITS: 5000 INJECTION INTRAVENOUS; SUBCUTANEOUS at 02:08

## 2020-08-31 RX ADMIN — NYSTATIN 500000 UNITS: 100000 SUSPENSION ORAL at 08:08

## 2020-08-31 RX ADMIN — HEPARIN SODIUM 5000 UNITS: 5000 INJECTION INTRAVENOUS; SUBCUTANEOUS at 08:08

## 2020-08-31 RX ADMIN — MYCOPHENOLATE MOFETIL 1000 MG: 250 CAPSULE ORAL at 08:08

## 2020-08-31 RX ADMIN — PREDNISONE 20 MG: 20 TABLET ORAL at 08:08

## 2020-08-31 RX ADMIN — MONTELUKAST 10 MG: 10 TABLET, FILM COATED ORAL at 08:08

## 2020-08-31 RX ADMIN — NYSTATIN 500000 UNITS: 100000 SUSPENSION ORAL at 05:08

## 2020-08-31 RX ADMIN — SODIUM BICARBONATE 650 MG TABLET 1300 MG: at 02:08

## 2020-08-31 RX ADMIN — TACROLIMUS 3 MG: 1 CAPSULE ORAL at 05:08

## 2020-08-31 RX ADMIN — THERA TABS 1 TABLET: TAB at 08:08

## 2020-08-31 RX ADMIN — SODIUM CHLORIDE 20 MG: 9 INJECTION, SOLUTION INTRAVENOUS at 09:08

## 2020-08-31 RX ADMIN — TACROLIMUS 3 MG: 1 CAPSULE ORAL at 08:08

## 2020-08-31 RX ADMIN — SULFAMETHOXAZOLE AND TRIMETHOPRIM 1 TABLET: 400; 80 TABLET ORAL at 08:08

## 2020-08-31 NOTE — PROGRESS NOTES
[facility]  Kidney Transplant  Progress Note      Reason for Follow-up: Reassessment of Kidney Transplant - 8/27/2020  (#1) recipient and management of immunosuppression.    ORGAN: LEFT KIDNEY    Donor Type: Donation after Circulatory Death     Donor CMV Status: Positive  Donor HBcAB:Negative  Donor HCV Status:Negative  Donor HBV GLENYS: Negative  Donor HCV GLENYS: Negative        Subjective:     History of Present Illness:   Mr. Carlos Azevedo is a 48 y/o M with ESRD secondary to HTN and congenital abnormality (unknown details) who presents for kidney transplant He has been on the wait list for a kidney transplant at [facility] since 11/10/2014. Patient is currently on peritoneal dialysis started on 11/2014. Patient is dialyzing on cyclic peritoneal dialysis. Patient reports that she is tolerating dialysis well. Dry conrado ~70 kg. Native UOP ~1200 ml/day. He has a R PD catheter. Of note, patient has a rash to BLE that is erythematous, with some scabbing. He reports chronic contact dermatitis vs. eczema and is followed by a dermatologist (Dr. Prater) is [place]. He has been treated with UV light and zinc oxide in the past. He reports transplant team is aware of rash. Chart reviewed and rash was noted in transplant clinic 12/3/19 with photos appearing more severe than current rash. He was deemed a suitable transplant candidate at that time. Patient denies any recent hospitalizations or ED visits. Pre op labs and imaging. Tentative OR time 0500 with primary surgeon Dr. Mendoza.    Interval history: Pt is now s/p DCD KTxp on 8/27/20 (CMV D+/R-, CIT ~ 22 hours; simulect induction). Surgery notable for 3 arteries. 5 day farah. 1 ELEUTERIO drain. Post op renal US satisfactory. Cr improving with good uop. Bps labile- continue to monitor standing Bps. Encourage PO intake.     Past Medical and Surgical History: Mr. Azevedo has a past medical history of Chronic asthma, ESRD since 11/2014 from HTN, Hyperlipidemia, Hyperuricemia,  Renal hypertension, and Secondary hyperparathyroidism of renal origin.  He has a past surgical history that includes Peritoneal catheter insertion; Cholecystectomy; Tonsillectomy; Colonoscopy (N/A, 12/12/2018); and Kidney transplant (N/A, 8/27/2020).    Past Social and Family History: Mr. Azevedo reports that he has never smoked. He has never used smokeless tobacco. He reports that he does not drink alcohol or use drugs.His family history includes Cancer in his father; Heart disease in his maternal grandmother; Hypertension in his mother.    Intake/Output - Last 3 Shifts       08/29 0700 - 08/30 0659 08/30 0700 - 08/31 0659 08/31 0700 - 09/01 0659    P.O. 1420 900 940    I.V. (mL/kg)       Total Intake(mL/kg) 1420 (20.9) 900 (13.2) 940 (13.8)    Urine (mL/kg/hr) 3400 (2.1) 3800 (2.3) 575 (1.4)    Drains 115 65 0    Stool 0 0 0    Total Output 3515 3865 575    Net -2095 -2965 +365           Stool Occurrence 2 x 0 x 1 x           Review of Systems   Constitutional: Positive for activity change and appetite change. Negative for chills and fever.   Respiratory: Negative for cough and shortness of breath.    Cardiovascular: Negative for chest pain, palpitations and leg swelling.   Gastrointestinal: Positive for abdominal pain (appropriate post op pain). Negative for abdominal distention, constipation, diarrhea, nausea and vomiting.   Genitourinary: Negative for decreased urine volume, difficulty urinating, dysuria and hematuria.   Skin: Positive for rash and wound.   Allergic/Immunologic: Positive for immunocompromised state.   Neurological: Negative for dizziness and weakness.   Psychiatric/Behavioral: Negative for agitation and confusion.     Objective:     Vital Signs (Most Recent):  Temp: 98.6 °F (37 °C) (08/31/20 1133)  Pulse: (!) 55 (08/31/20 1244)  Resp: 16 (08/31/20 1244)  BP: (!) 150/88 (08/31/20 1244)  SpO2: 97 % (08/31/20 1244) Vital Signs (24h Range):  Temp:  [97.9 °F (36.6 °C)-98.6 °F (37 °C)] 98.6 °F (37  "°C)  Pulse:  [52-76] 55  Resp:  [16-20] 16  SpO2:  [92 %-97 %] 97 %  BP: (107-184)/(63-96) 150/88     Weight: 68 kg (149 lb 14.6 oz)  Height: 5' 4" (162.6 cm)  Body mass index is 25.73 kg/m².    Physical Exam  Vitals signs and nursing note reviewed.   Constitutional:       General: He is not in acute distress.     Appearance: He is not diaphoretic.   HENT:      Head: Normocephalic and atraumatic.   Eyes:      General: No scleral icterus.        Right eye: No discharge.         Left eye: No discharge.   Neck:      Musculoskeletal: Normal range of motion and neck supple.   Cardiovascular:      Rate and Rhythm: Normal rate and regular rhythm.      Heart sounds: Normal heart sounds.   Pulmonary:      Effort: Pulmonary effort is normal.      Breath sounds: Normal breath sounds. No wheezing or rales.   Abdominal:      General: Bowel sounds are normal. There is no distension.      Palpations: Abdomen is soft.      Tenderness: There is abdominal tenderness (incisional). There is no guarding.       Musculoskeletal:      Right lower leg: No edema.      Left lower leg: No edema.   Skin:     General: Skin is warm and dry.      Capillary Refill: Capillary refill takes less than 2 seconds.      Findings: Lesion (BLE) and rash (BLE erythematous rash - chronic) present.   Neurological:      Mental Status: He is alert and oriented to person, place, and time.      Cranial Nerves: No cranial nerve deficit.   Psychiatric:         Thought Content: Thought content normal.         Judgment: Judgment normal.         Significant Labs:  CBC:   Recent Labs   Lab 08/29/20 0627 08/30/20  0619 08/31/20  0613   WBC 15.15* 16.18* 11.73   RBC 3.74* 3.84* 3.81*   HGB 11.8* 12.1* 12.0*   HCT 36.3* 37.8* 37.2*   * 139* 144*   MCV 97 98 98   MCH 31.6* 31.5* 31.5*   MCHC 32.5 32.0 32.3     CMP:   Recent Labs   Lab 08/27/20  0050  08/29/20  0627 08/30/20  0618 08/31/20  0613   GLU 93   < > 112* 84 84   CALCIUM 8.8   < > 7.7* 8.2* 8.5*   ALBUMIN " 3.4*   < > 2.5* 2.6* 2.5*   PROT 6.3  --   --   --   --       < > 138 139 140   K 3.3*   < > 4.0 3.8 4.2   CO2 23   < > 18* 17* 20*      < > 110 111* 112*   BUN 70*   < > 76* 86* 92*   CREATININE 7.2*   < > 6.2* 5.9* 5.6*   ALKPHOS 77  --   --   --   --    ALT 27  --   --   --   --    AST 34  --   --   --   --     < > = values in this interval not displayed.     Labs within the past 24 hours have been reviewed.    Diagnostics:  US - Kidney:   Results for orders placed during the hospital encounter of 08/26/20   US Transplant Kidney With Doppler    Narrative EXAMINATION:  US TRANSPLANT KIDNEY WITH DOPPLER    CLINICAL HISTORY:  post op kidney transplant with 3 arteries;    TECHNIQUE:  Transplant renal ultrasound of the right lower quadrant with color flow doppler and duplex analysis.    COMPARISON:  None.    FINDINGS:  The renal transplant demonstrates no focal parenchymal abnormality. No transplant hydronephrosis. No peritransplant fluid.    Renal arterial resistive indices are as follows: Interlobar 0.65, segmental Up 0.63, segmental Mid 0.66, segmental Low 0.65.  There is no evidence of a tardus parvus waveform. The maximum velocity in the main renal artery is 78 cm/sec.  The renal artery to iliac ratio 0.58    The renal transplant venous system is unremarkable.  Partially visualized ureteral stent.  The urinary bladder is collapsed around Farah catheter.      Impression Satisfactory sonographic and Doppler evaluation of the renal allograft.    Electronically signed by resident: Chris Nuno  Date:    08/27/2020  Time:    13:39    Electronically signed by: Isrrael Johnson MD  Date:    08/27/2020  Time:    13:44     Assessment/Plan:     S/P kidney transplant  - s/p DCD KTxp on 8/28/20 (simulect induction, CMV mismatch, CIT ~ 22 hours)  - Surgery notable for 3 arteries - renal US performed and satisfactory.   - 5 day farah and 1 ELEUTERIO drain in place  - Cr improving with good uop  - Daily labs, strict  intake/output, daily weights     At risk for opportunistic infections  - valcyte for cmv prophylaxis (start pod 10 or on d/c)  - bactrim for pcp prophylaxis  - nystatin for thrush prophylaxis        Long-term use of immunosuppressant medication  - see prophylactic immunotherapy      Prophylactic immunotherapy  - continue prograf, cellcept, and steroid taper per protocol  - will check daily prograf levels, monitor for toxic side effects, and adjust for therapeutic dose        Renal hypertension  - +orthostatic this morning, held norvasc  - check standing Bps only  - encourage PO intake        Discharge Planning: Not a candidate for discharge at this time.       Madai Del Valle PA-C  Kidney Transplant  [facility]

## 2020-08-31 NOTE — PROGRESS NOTES
Update    SUNITHA met with pt and friend to assess for coping, continuity of care and any needs. Pt presented alert and oriented x4, sitting up in chair at bedside and communicative. Pt's caregiver reports having some issues at the [place] with their window. SW alerted [place], manager at [place] [facility] and she reports having maintenance look into issue today. Pt denied mental health difficulties. Pt denied any concerns or needs. Financial coordinators to confirm pt's Part D coverage tomorrow.     SUNITHA remains available at [phone].

## 2020-08-31 NOTE — SUBJECTIVE & OBJECTIVE
Subjective:     History of Present Illness:   Mr. Carlos Azevedo is a 50 y/o M with ESRD secondary to HTN and congenital abnormality (unknown details) who presents for kidney transplant He has been on the wait list for a kidney transplant at [facility] since 11/10/2014. Patient is currently on peritoneal dialysis started on 11/2014. Patient is dialyzing on cyclic peritoneal dialysis. Patient reports that she is tolerating dialysis well. Dry conrado ~70 kg. Native UOP ~1200 ml/day. He has a R PD catheter. Of note, patient has a rash to BLE that is erythematous, with some scabbing. He reports chronic contact dermatitis vs. eczema and is followed by a dermatologist (Dr. Prater) is [place]. He has been treated with UV light and zinc oxide in the past. He reports transplant team is aware of rash. Chart reviewed and rash was noted in transplant clinic 12/3/19 with photos appearing more severe than current rash. He was deemed a suitable transplant candidate at that time. Patient denies any recent hospitalizations or ED visits. Pre op labs and imaging. Tentative OR time 0500 with primary surgeon Dr. Mendoza.    Interval history: Pt is now s/p DCD KTxp on 8/27/20 (CMV D+/R-, CIT ~ 22 hours; simulect induction). Surgery notable for 3 arteries. 5 day farah. 1 ELEUTERIO drain. Post op renal US satisfactory. Cr improving with good uop. Bps labile- continue to monitor standing Bps. Encourage PO intake.     Past Medical and Surgical History: Mr. Azevedo has a past medical history of Chronic asthma, ESRD since 11/2014 from HTN, Hyperlipidemia, Hyperuricemia, Renal hypertension, and Secondary hyperparathyroidism of renal origin.  He has a past surgical history that includes Peritoneal catheter insertion; Cholecystectomy; Tonsillectomy; Colonoscopy (N/A, 12/12/2018); and Kidney transplant (N/A, 8/27/2020).    Past Social and Family History: Mr. Azevedo reports that he has never smoked. He has never used smokeless tobacco. He reports that he does not  "drink alcohol or use drugs.His family history includes Cancer in his father; Heart disease in his maternal grandmother; Hypertension in his mother.    Intake/Output - Last 3 Shifts       08/29 0700 - 08/30 0659 08/30 0700 - 08/31 0659 08/31 0700 - 09/01 0659    P.O. 1420 900 940    I.V. (mL/kg)       Total Intake(mL/kg) 1420 (20.9) 900 (13.2) 940 (13.8)    Urine (mL/kg/hr) 3400 (2.1) 3800 (2.3) 575 (1.4)    Drains 115 65 0    Stool 0 0 0    Total Output 3515 3865 575    Net -2095 -2965 +365           Stool Occurrence 2 x 0 x 1 x           Review of Systems   Constitutional: Positive for activity change and appetite change. Negative for chills and fever.   Respiratory: Negative for cough and shortness of breath.    Cardiovascular: Negative for chest pain, palpitations and leg swelling.   Gastrointestinal: Positive for abdominal pain (appropriate post op pain). Negative for abdominal distention, constipation, diarrhea, nausea and vomiting.   Genitourinary: Negative for decreased urine volume, difficulty urinating, dysuria and hematuria.   Skin: Positive for rash and wound.   Allergic/Immunologic: Positive for immunocompromised state.   Neurological: Negative for dizziness and weakness.   Psychiatric/Behavioral: Negative for agitation and confusion.     Objective:     Vital Signs (Most Recent):  Temp: 98.6 °F (37 °C) (08/31/20 1133)  Pulse: (!) 55 (08/31/20 1244)  Resp: 16 (08/31/20 1244)  BP: (!) 150/88 (08/31/20 1244)  SpO2: 97 % (08/31/20 1244) Vital Signs (24h Range):  Temp:  [97.9 °F (36.6 °C)-98.6 °F (37 °C)] 98.6 °F (37 °C)  Pulse:  [52-76] 55  Resp:  [16-20] 16  SpO2:  [92 %-97 %] 97 %  BP: (107-184)/(63-96) 150/88     Weight: 68 kg (149 lb 14.6 oz)  Height: 5' 4" (162.6 cm)  Body mass index is 25.73 kg/m².    Physical Exam  Vitals signs and nursing note reviewed.   Constitutional:       General: He is not in acute distress.     Appearance: He is not diaphoretic.   HENT:      Head: Normocephalic and atraumatic. "   Eyes:      General: No scleral icterus.        Right eye: No discharge.         Left eye: No discharge.   Neck:      Musculoskeletal: Normal range of motion and neck supple.   Cardiovascular:      Rate and Rhythm: Normal rate and regular rhythm.      Heart sounds: Normal heart sounds.   Pulmonary:      Effort: Pulmonary effort is normal.      Breath sounds: Normal breath sounds. No wheezing or rales.   Abdominal:      General: Bowel sounds are normal. There is no distension.      Palpations: Abdomen is soft.      Tenderness: There is abdominal tenderness (incisional). There is no guarding.       Musculoskeletal:      Right lower leg: No edema.      Left lower leg: No edema.   Skin:     General: Skin is warm and dry.      Capillary Refill: Capillary refill takes less than 2 seconds.      Findings: Lesion (BLE) and rash (BLE erythematous rash - chronic) present.   Neurological:      Mental Status: He is alert and oriented to person, place, and time.      Cranial Nerves: No cranial nerve deficit.   Psychiatric:         Thought Content: Thought content normal.         Judgment: Judgment normal.         Significant Labs:  CBC:   Recent Labs   Lab 08/29/20  0627 08/30/20  0619 08/31/20  0613   WBC 15.15* 16.18* 11.73   RBC 3.74* 3.84* 3.81*   HGB 11.8* 12.1* 12.0*   HCT 36.3* 37.8* 37.2*   * 139* 144*   MCV 97 98 98   MCH 31.6* 31.5* 31.5*   MCHC 32.5 32.0 32.3     CMP:   Recent Labs   Lab 08/27/20  0050  08/29/20  0627 08/30/20  0618 08/31/20  0613   GLU 93   < > 112* 84 84   CALCIUM 8.8   < > 7.7* 8.2* 8.5*   ALBUMIN 3.4*   < > 2.5* 2.6* 2.5*   PROT 6.3  --   --   --   --       < > 138 139 140   K 3.3*   < > 4.0 3.8 4.2   CO2 23   < > 18* 17* 20*      < > 110 111* 112*   BUN 70*   < > 76* 86* 92*   CREATININE 7.2*   < > 6.2* 5.9* 5.6*   ALKPHOS 77  --   --   --   --    ALT 27  --   --   --   --    AST 34  --   --   --   --     < > = values in this interval not displayed.     Labs within the past 24  hours have been reviewed.    Diagnostics:  US - Kidney:   Results for orders placed during the hospital encounter of 08/26/20   US Transplant Kidney With Doppler    Narrative EXAMINATION:  US TRANSPLANT KIDNEY WITH DOPPLER    CLINICAL HISTORY:  post op kidney transplant with 3 arteries;    TECHNIQUE:  Transplant renal ultrasound of the right lower quadrant with color flow doppler and duplex analysis.    COMPARISON:  None.    FINDINGS:  The renal transplant demonstrates no focal parenchymal abnormality. No transplant hydronephrosis. No peritransplant fluid.    Renal arterial resistive indices are as follows: Interlobar 0.65, segmental Up 0.63, segmental Mid 0.66, segmental Low 0.65.  There is no evidence of a tardus parvus waveform. The maximum velocity in the main renal artery is 78 cm/sec.  The renal artery to iliac ratio 0.58    The renal transplant venous system is unremarkable.  Partially visualized ureteral stent.  The urinary bladder is collapsed around Godwin catheter.      Impression Satisfactory sonographic and Doppler evaluation of the renal allograft.    Electronically signed by resident: Chris Nuno  Date:    08/27/2020  Time:    13:39    Electronically signed by: Isrrael Johnson MD  Date:    08/27/2020  Time:    13:44

## 2020-08-31 NOTE — PLAN OF CARE
Problem: Oral Intake Inadequate  Goal: Improved Oral Intake  Outcome: Ongoing, Progressing   Recommendations     1. Continue regular diet.  Goals: 1. Pt's intake meals >50% by RD follow up.  Nutrition Goal Status: progressing towards goal  Communication of RD Recs: (POC)

## 2020-08-31 NOTE — PROGRESS NOTES
Orthostatic VS done.     Lyin/90, HR 55  Sittin/96, HR 74  Standin/72, HR 76    GLORIA Betts NP notified.

## 2020-08-31 NOTE — ASSESSMENT & PLAN NOTE
- s/p DCD KTxp on 8/28/20 (simulect induction, CMV mismatch, CIT ~ 22 hours)  - Surgery notable for 3 arteries - renal US performed and satisfactory.   - 5 day farah and 1 ELEUTERIO drain in place  - Cr improving with good uop  - Daily labs, strict intake/output, daily weights

## 2020-08-31 NOTE — PROGRESS NOTES
"[facility]  Adult Nutrition  Progress Note    SUMMARY       Recommendations    1. Continue regular diet.  Goals: 1. Pt's intake meals >50% by RD follow up.  Nutrition Goal Status: progressing towards goal  Communication of RD Recs: (POC)    Reason for Assessment    Reason For Assessment: RD follow-up  Diagnosis: (Post kidney transplant)  Relevant Medical History: ESRD, HTN, asthma  Interdisciplinary Rounds: did not attend  General Information Comments: Pt s/p KTx 8/27 has improved po intake (75%) meals, nourished per NFPE 8/28. Pt reports good po intake and no questions regarding post transplant nutrition education.  Nutrition Discharge Planning: Post transplant nutrition education provided 8/28/20. Food safety/drug interactions emphasized. General healthy/low salt diet recommended. Education material left.  No other needs identified. Caregiver present.      Nutrition Risk Screen    Nutrition Risk Screen: no indicators present    Nutrition/Diet History    Patient Reported Diet/Restrictions/Preferences: low salt, renal  Food Allergies: NKFA    Anthropometrics    Temp: 97.9 °F (36.6 °C)  Height Method: Stated  Height: 5' 4" (162.6 cm)  Height (inches): 64 in  Weight Method: Standard Scale  Weight: 68 kg (149 lb 14.6 oz)  Weight (lb): 149.91 lb  Ideal Body Weight (IBW), Male: 130 lb  % Ideal Body Weight, Male (lb): 115.66 %  BMI (Calculated): 25.7  BMI Grade: 25 - 29.9 - overweight       Lab/Procedures/Meds    Pertinent Labs Reviewed: reviewed  Pertinent Labs Comments: Cl 112, BUN 92, Cre 5.6, Alb 2.5  Pertinent Medications Reviewed: reviewed  Pertinent Medications Comments: colace, calcitriol, famotidine, MVI, prednisone, tacrolimus    Estimated/Assessed Needs    Weight Used For Calorie Calculations: 68.2 kg (150 lb 5.7 oz)  Energy Calorie Requirements (kcal): 1705 kcals/day (25 kcals/kg)  Energy Need Method: Kcal/kg  Protein Requirements:  g (1.2-1.5 g/kg)  Weight Used For Protein " Calculations: 68.2 kg (150 lb 5.7 oz)  Fluid Requirements (mL): 1 mL/kcal or per MD  Estimated Fluid Requirement Method: RDA Method  RDA Method (mL): 1705  CHO Requirement: N/A      Nutrition Prescription Ordered    Current Diet Order: regular    Evaluation of Received Nutrient/Fluid Intake    Energy Calories Required: meeting needs  Protein Required: meeting needs  Fluid Required: meeting needs  Comments: LBM 8/31  % Intake of Estimated Energy Needs: 75 - 100 %  % Meal Intake: 75 - 100 %    Nutrition Risk    Level of Risk/Frequency of Follow-up: low     Assessment and Plan    Nutrition Problem  Inadequate oral intake     Related to (etiology):   Decreased appetite     Signs and Symptoms (as evidenced by):   Pt s/p kidney transplant 8/27/20, now with improved appetite and intake.     Interventions(treatment strategy):  Collaboration of care with providers.  Nutrition education: post transplant food safety and medication interactions    Nutrition Diagnosis Status:   Resolved      Monitor and Evaluation    Food and Nutrient Intake: energy intake, food and beverage intake  Food and Nutrient Adminstration: diet order  Knowledge/Beliefs/Attitudes: food and nutrition knowledge/skill  Anthropometric Measurements: weight, weight change, body mass index  Biochemical Data, Medical Tests and Procedures: electrolyte and renal panel, gastrointestinal profile, glucose/endocrine profile, inflammatory profile, lipid profile  Nutrition-Focused Physical Findings: overall appearance, extremities, muscles and bones, head and eyes, skin     Malnutrition Assessment             Weight Loss (Malnutrition): (6.3% x 3 months)   Orbital Region (Subcutaneous Fat Loss): well nourished  Upper Arm Region (Subcutaneous Fat Loss): well nourished  Thoracic and Lumbar Region: well nourished   Confucianism Region (Muscle Loss): well nourished  Clavicle Bone Region (Muscle Loss): well nourished  Clavicle and Acromion Bone Region (Muscle Loss): well  nourished  Scapular Bone Region (Muscle Loss): well nourished  Dorsal Hand (Muscle Loss): well nourished  Patellar Region (Muscle Loss): well nourished  Anterior Thigh Region (Muscle Loss): well nourished  Posterior Calf Region (Muscle Loss): well nourished                 Nutrition Follow-Up    RD Follow-up?: Yes

## 2020-08-31 NOTE — PLAN OF CARE
AAOx4.  Pt is orthostatic - standing BPs only - held AM amlodipine.  BG monitored ACHS - no insulin needed.  Up independently.  Godwin catheter in place - good u/o.  Denies pain.  IV simulect given today post-op day 4.  RLQ incision XUAN w/ staples - painting with betadine TID.  ELEUTREIO drain to bulb suction putting out minimal serosanguinous drainage.  Self-meds 100%.  Caregiver @ bedside and attentive to pt.  Possible d/c tomorrow.  Instructed to call for assistance.  Will continue to monitor.

## 2020-09-01 LAB
ALBUMIN SERPL BCP-MCNC: 2.5 G/DL (ref 3.5–5.2)
ANION GAP SERPL CALC-SCNC: 9 MMOL/L (ref 8–16)
BASOPHILS # BLD AUTO: 0.03 K/UL (ref 0–0.2)
BASOPHILS NFR BLD: 0.3 % (ref 0–1.9)
BUN SERPL-MCNC: 96 MG/DL (ref 6–20)
CALCIUM SERPL-MCNC: 8.3 MG/DL (ref 8.7–10.5)
CHLORIDE SERPL-SCNC: 110 MMOL/L (ref 95–110)
CO2 SERPL-SCNC: 20 MMOL/L (ref 23–29)
CREAT SERPL-MCNC: 5.7 MG/DL (ref 0.5–1.4)
DIFFERENTIAL METHOD: ABNORMAL
EOSINOPHIL # BLD AUTO: 0.8 K/UL (ref 0–0.5)
EOSINOPHIL NFR BLD: 7.4 % (ref 0–8)
ERYTHROCYTE [DISTWIDTH] IN BLOOD BY AUTOMATED COUNT: 12.7 % (ref 11.5–14.5)
EST. GFR  (AFRICAN AMERICAN): 12.2 ML/MIN/1.73 M^2
EST. GFR  (NON AFRICAN AMERICAN): 10.6 ML/MIN/1.73 M^2
GLUCOSE SERPL-MCNC: 80 MG/DL (ref 70–110)
HCT VFR BLD AUTO: 37.3 % (ref 40–54)
HGB BLD-MCNC: 11.8 G/DL (ref 14–18)
IMM GRANULOCYTES # BLD AUTO: 0.48 K/UL (ref 0–0.04)
IMM GRANULOCYTES NFR BLD AUTO: 4.3 % (ref 0–0.5)
LYMPHOCYTES # BLD AUTO: 1.4 K/UL (ref 1–4.8)
LYMPHOCYTES NFR BLD: 12.5 % (ref 18–48)
MAGNESIUM SERPL-MCNC: 2.4 MG/DL (ref 1.6–2.6)
MCH RBC QN AUTO: 30.7 PG (ref 27–31)
MCHC RBC AUTO-ENTMCNC: 31.6 G/DL (ref 32–36)
MCV RBC AUTO: 97 FL (ref 82–98)
MONOCYTES # BLD AUTO: 1.1 K/UL (ref 0.3–1)
MONOCYTES NFR BLD: 9.5 % (ref 4–15)
NEUTROPHILS # BLD AUTO: 7.4 K/UL (ref 1.8–7.7)
NEUTROPHILS NFR BLD: 66 % (ref 38–73)
NRBC BLD-RTO: 0 /100 WBC
PHOSPHATE SERPL-MCNC: 5.2 MG/DL (ref 2.7–4.5)
PLATELET # BLD AUTO: 150 K/UL (ref 150–350)
PLATELET BLD QL SMEAR: ABNORMAL
PMV BLD AUTO: 12 FL (ref 9.2–12.9)
POCT GLUCOSE: 103 MG/DL (ref 70–110)
POCT GLUCOSE: 121 MG/DL (ref 70–110)
POCT GLUCOSE: 183 MG/DL (ref 70–110)
POCT GLUCOSE: 82 MG/DL (ref 70–110)
POTASSIUM SERPL-SCNC: 3.8 MMOL/L (ref 3.5–5.1)
RBC # BLD AUTO: 3.84 M/UL (ref 4.6–6.2)
SODIUM SERPL-SCNC: 139 MMOL/L (ref 136–145)
TACROLIMUS BLD-MCNC: 4.4 NG/ML (ref 5–15)
WBC # BLD AUTO: 11.14 K/UL (ref 3.9–12.7)

## 2020-09-01 PROCEDURE — 63600175 PHARM REV CODE 636 W HCPCS: Performed by: INTERNAL MEDICINE

## 2020-09-01 PROCEDURE — 25000003 PHARM REV CODE 250: Performed by: PHYSICIAN ASSISTANT

## 2020-09-01 PROCEDURE — 99233 SBSQ HOSP IP/OBS HIGH 50: CPT | Mod: ,,, | Performed by: PHYSICIAN ASSISTANT

## 2020-09-01 PROCEDURE — 85025 COMPLETE CBC W/AUTO DIFF WBC: CPT

## 2020-09-01 PROCEDURE — 36415 COLL VENOUS BLD VENIPUNCTURE: CPT

## 2020-09-01 PROCEDURE — 63600175 PHARM REV CODE 636 W HCPCS: Performed by: SURGERY

## 2020-09-01 PROCEDURE — 80197 ASSAY OF TACROLIMUS: CPT

## 2020-09-01 PROCEDURE — 20600001 HC STEP DOWN PRIVATE ROOM

## 2020-09-01 PROCEDURE — 99233 PR SUBSEQUENT HOSPITAL CARE,LEVL III: ICD-10-PCS | Mod: ,,, | Performed by: PHYSICIAN ASSISTANT

## 2020-09-01 PROCEDURE — 80069 RENAL FUNCTION PANEL: CPT

## 2020-09-01 PROCEDURE — 25000003 PHARM REV CODE 250: Performed by: INTERNAL MEDICINE

## 2020-09-01 PROCEDURE — 83735 ASSAY OF MAGNESIUM: CPT

## 2020-09-01 PROCEDURE — 25000003 PHARM REV CODE 250: Performed by: SURGERY

## 2020-09-01 PROCEDURE — 63600175 PHARM REV CODE 636 W HCPCS: Performed by: PHYSICIAN ASSISTANT

## 2020-09-01 RX ORDER — LIDOCAINE HYDROCHLORIDE 10 MG/ML
1 INJECTION INFILTRATION; PERINEURAL ONCE
Status: COMPLETED | OUTPATIENT
Start: 2020-09-01 | End: 2020-09-01

## 2020-09-01 RX ORDER — TACROLIMUS 1 MG/1
2 CAPSULE ORAL ONCE
Status: COMPLETED | OUTPATIENT
Start: 2020-09-01 | End: 2020-09-01

## 2020-09-01 RX ORDER — TACROLIMUS 1 MG/1
5 CAPSULE ORAL 2 TIMES DAILY
Status: DISCONTINUED | OUTPATIENT
Start: 2020-09-01 | End: 2020-09-02

## 2020-09-01 RX ADMIN — FAMOTIDINE 20 MG: 20 TABLET, FILM COATED ORAL at 08:09

## 2020-09-01 RX ADMIN — HEPARIN SODIUM 5000 UNITS: 5000 INJECTION INTRAVENOUS; SUBCUTANEOUS at 02:09

## 2020-09-01 RX ADMIN — SODIUM BICARBONATE 650 MG TABLET 1300 MG: at 08:09

## 2020-09-01 RX ADMIN — NYSTATIN 500000 UNITS: 100000 SUSPENSION ORAL at 08:09

## 2020-09-01 RX ADMIN — SODIUM CHLORIDE 500 ML: 0.9 INJECTION, SOLUTION INTRAVENOUS at 05:09

## 2020-09-01 RX ADMIN — PREDNISONE 20 MG: 20 TABLET ORAL at 08:09

## 2020-09-01 RX ADMIN — THERA TABS 1 TABLET: TAB at 08:09

## 2020-09-01 RX ADMIN — HEPARIN SODIUM 5000 UNITS: 5000 INJECTION INTRAVENOUS; SUBCUTANEOUS at 05:09

## 2020-09-01 RX ADMIN — SODIUM CHLORIDE 500 ML: 0.9 INJECTION, SOLUTION INTRAVENOUS at 10:09

## 2020-09-01 RX ADMIN — SERTRALINE HYDROCHLORIDE 50 MG: 50 TABLET ORAL at 08:09

## 2020-09-01 RX ADMIN — SODIUM BICARBONATE 650 MG TABLET 1300 MG: at 02:09

## 2020-09-01 RX ADMIN — NYSTATIN 500000 UNITS: 100000 SUSPENSION ORAL at 02:09

## 2020-09-01 RX ADMIN — TACROLIMUS 2 MG: 1 CAPSULE ORAL at 12:09

## 2020-09-01 RX ADMIN — LIDOCAINE HYDROCHLORIDE 1 ML: 10 INJECTION, SOLUTION INFILTRATION; PERINEURAL at 12:09

## 2020-09-01 RX ADMIN — TACROLIMUS 3 MG: 1 CAPSULE ORAL at 08:09

## 2020-09-01 RX ADMIN — CALCITRIOL 1 MCG: 0.5 CAPSULE, LIQUID FILLED ORAL at 09:09

## 2020-09-01 RX ADMIN — NYSTATIN 500000 UNITS: 100000 SUSPENSION ORAL at 05:09

## 2020-09-01 RX ADMIN — PAROXETINE HYDROCHLORIDE 20 MG: 20 TABLET, FILM COATED ORAL at 08:09

## 2020-09-01 RX ADMIN — TACROLIMUS 5 MG: 1 CAPSULE ORAL at 05:09

## 2020-09-01 RX ADMIN — MYCOPHENOLATE MOFETIL 1000 MG: 250 CAPSULE ORAL at 08:09

## 2020-09-01 RX ADMIN — MONTELUKAST 10 MG: 10 TABLET, FILM COATED ORAL at 08:09

## 2020-09-01 NOTE — ASSESSMENT & PLAN NOTE
- s/p DCD KTxp on 8/28/20 (simulect induction, CMV mismatch, CIT ~ 22 hours)  - Surgery notable for 3 arteries - renal US performed and satisfactory.   - 5 day farah and 1 ELEUTERIO drain in place  - farah removed without issues and patient able to void after removal  - Plan to remove ELEUTERIO drain this afternoon  - Cr slightly elevated. Hydrate with IVF  - Daily labs, strict intake/output, daily weights

## 2020-09-01 NOTE — PLAN OF CARE
Pt AAOx4, VSS, sats stable on RA, afebrile. Cr 5.7 this AM - 2 boluses of NS administered per order. Godwin removed this AM - pt able to void per urinal. Pt denies c/o pain. RLQ ELEUTERIO drain to be removed today. RLQ incision XUAN with staples - pt and pts wife completed wound care. BG monitored ACHS. Peripheral IV's CDI. Pt ambulates to the bathroom to void independently - pt with 1 BM so far this shift. Pt instructed to use call light for assistance - pt demonstrated and verbalized understanding - call bell placed within pt reach. Safety precautions maintained throughout shift - non skid footwear used while out of bed. Pt in no apparent distress - will continue to monitor pt, proactively round on pt, and adjust care as needed.

## 2020-09-01 NOTE — SUBJECTIVE & OBJECTIVE
Subjective:     History of Present Illness:   Mr. Carlos Azevedo is a 48 y/o M with ESRD secondary to HTN and congenital abnormality (unknown details) who presents for kidney transplant He has been on the wait list for a kidney transplant at [facility] since 11/10/2014. Patient is currently on peritoneal dialysis started on 11/2014. Patient is dialyzing on cyclic peritoneal dialysis. Patient reports that she is tolerating dialysis well. Dry conrado ~70 kg. Native UOP ~1200 ml/day. He has a R PD catheter. Of note, patient has a rash to BLE that is erythematous, with some scabbing. He reports chronic contact dermatitis vs. eczema and is followed by a dermatologist (Dr. Prater) is [place]. He has been treated with UV light and zinc oxide in the past. He reports transplant team is aware of rash. Chart reviewed and rash was noted in transplant clinic 12/3/19 with photos appearing more severe than current rash. He was deemed a suitable transplant candidate at that time. Patient denies any recent hospitalizations or ED visits. Pre op labs and imaging. Tentative OR time 0500 with primary surgeon Dr. Mendoza.    Hospital course: Pt is now s/p DCD KTxp on 8/27/20 (CMV D+/R-, CIT ~ 22 hours; simulect induction). Surgery notable for 3 arteries. 5 day farah. 1 ELEUTERIO drain. Post op renal US satisfactory.      Interval history: NAEON. Cr slightly increased today. Output > intake. Encourage po intake. Fluid challenge today. Continue to monitor.    Past Medical and Surgical History: Mr. Azevedo has a past medical history of Chronic asthma, ESRD since 11/2014 from HTN, Hyperlipidemia, Hyperuricemia, Renal hypertension, and Secondary hyperparathyroidism of renal origin.  He has a past surgical history that includes Peritoneal catheter insertion; Cholecystectomy; Tonsillectomy; Colonoscopy (N/A, 12/12/2018); and Kidney transplant (N/A, 8/27/2020).    Past Social and Family History: Mr. Azevedo reports that he has never smoked. He has never used  "smokeless tobacco. He reports that he does not drink alcohol or use drugs.His family history includes Cancer in his father; Heart disease in his maternal grandmother; Hypertension in his mother.    Intake/Output - Last 3 Shifts       08/30 0700 - 08/31 0659 08/31 0700 - 09/01 0659 09/01 0700 - 09/02 0659    P.O. 900 1800 354    IV Piggyback   500    Total Intake(mL/kg) 900 (13.2) 1800 (26.5) 854 (12.3)    Urine (mL/kg/hr) 3800 (2.3) 3150 (1.9) 400 (1.2)    Drains 65 25     Stool 0 0 0    Total Output 3865 3175 400    Net -2965 -1375 +454           Stool Occurrence 0 x 1 x 1 x           Review of Systems   Constitutional: Positive for activity change and appetite change. Negative for chills and fever.   Respiratory: Negative for cough and shortness of breath.    Cardiovascular: Negative for chest pain, palpitations and leg swelling.   Gastrointestinal: Positive for abdominal pain (appropriate post op pain). Negative for abdominal distention, constipation, diarrhea, nausea and vomiting.   Genitourinary: Negative for decreased urine volume, difficulty urinating, dysuria and hematuria.   Skin: Positive for rash and wound.   Allergic/Immunologic: Positive for immunocompromised state.   Neurological: Negative for dizziness and weakness.   Psychiatric/Behavioral: Negative for agitation and confusion.     Objective:     Vital Signs (Most Recent):  Temp: 97.7 °F (36.5 °C) (09/01/20 0810)  Pulse: 70 (09/01/20 0810)  Resp: 18 (09/01/20 0810)  BP: 117/80 (09/01/20 0810)  SpO2: 98 % (09/01/20 0810) Vital Signs (24h Range):  Temp:  [97.7 °F (36.5 °C)-98.2 °F (36.8 °C)] 97.7 °F (36.5 °C)  Pulse:  [55-80] 70  Resp:  [16-18] 18  SpO2:  [95 %-98 %] 98 %  BP: (117-150)/(80-98) 117/80     Weight: 69.7 kg (153 lb 10.6 oz)  Height: 5' 4" (162.6 cm)  Body mass index is 26.38 kg/m².    Physical Exam  Vitals signs and nursing note reviewed.   Constitutional:       General: He is not in acute distress.     Appearance: He is not diaphoretic. "   HENT:      Head: Normocephalic and atraumatic.   Eyes:      General: No scleral icterus.        Right eye: No discharge.         Left eye: No discharge.   Neck:      Musculoskeletal: Normal range of motion and neck supple.   Cardiovascular:      Rate and Rhythm: Normal rate and regular rhythm.      Heart sounds: Normal heart sounds.   Pulmonary:      Effort: Pulmonary effort is normal.      Breath sounds: Normal breath sounds. No wheezing or rales.   Abdominal:      General: Bowel sounds are normal. There is no distension.      Palpations: Abdomen is soft.      Tenderness: There is abdominal tenderness (incisional). There is no guarding.       Musculoskeletal:      Right lower leg: No edema.      Left lower leg: No edema.   Skin:     General: Skin is warm and dry.      Capillary Refill: Capillary refill takes less than 2 seconds.      Findings: Lesion (BLE) and rash (BLE erythematous rash - chronic) present.   Neurological:      Mental Status: He is alert and oriented to person, place, and time.      Cranial Nerves: No cranial nerve deficit.   Psychiatric:         Thought Content: Thought content normal.         Judgment: Judgment normal.         Significant Labs:  CBC:   Recent Labs   Lab 08/30/20  0619 08/31/20  0613 09/01/20  0612   WBC 16.18* 11.73 11.14   RBC 3.84* 3.81* 3.84*   HGB 12.1* 12.0* 11.8*   HCT 37.8* 37.2* 37.3*   * 144* 150   MCV 98 98 97   MCH 31.5* 31.5* 30.7   MCHC 32.0 32.3 31.6*     CMP:   Recent Labs   Lab 08/27/20  0050  08/30/20  0618 08/31/20  0613 09/01/20  0612   GLU 93   < > 84 84 80   CALCIUM 8.8   < > 8.2* 8.5* 8.3*   ALBUMIN 3.4*   < > 2.6* 2.5* 2.5*   PROT 6.3  --   --   --   --       < > 139 140 139   K 3.3*   < > 3.8 4.2 3.8   CO2 23   < > 17* 20* 20*      < > 111* 112* 110   BUN 70*   < > 86* 92* 96*   CREATININE 7.2*   < > 5.9* 5.6* 5.7*   ALKPHOS 77  --   --   --   --    ALT 27  --   --   --   --    AST 34  --   --   --   --     < > = values in this  interval not displayed.     Labs within the past 24 hours have been reviewed.    Diagnostics:  Imaging within the past 24 hours has been reviewed.

## 2020-09-01 NOTE — PROGRESS NOTES
Transplant Teaching Book given to patient, Bryant Azevedo, on 9/28/2020.  During the course of the hospital stay the patient received information regarding kidney transplant. Teaching and instruction were completed.  Areas that were discussed included: how to contact the Transplant Team, the importance of measuring intake of fluids and urine output, and monitoring vital signs such as blood pressure, temperature, and daily weights.  Parameters for which to report abnormal findings were given.  Appointment were provided along with the rational for the importance of lab work and clinic visits.  A written medication list was provided.  The importance of immunosuppressive medications, their common side effects, and treatment to prevent or minimize side effects has been reviewed.  Signs and symptoms of rejection and infection along with various treatments were reviewed.  The need to avoid infection was discussed.  Wound care and special consideration regarding activities of daily living were explained.  Written and verbal teaching of the above information was given.      Education was provided to the patient and his caregiver Mehreen

## 2020-09-01 NOTE — PROGRESS NOTES
Update    SW met with pt and friend, Mehreen, to assess for coping, continuity of care and any needs. Pt presented alert and oriented x4, sitting up in chair at bedside and communicative. Per Dayana, pt's Part D Medicare coverage did not start today. SW asked pt to confirm policy number for Part D coverage so financial coordinators could confirm Part D plan. When pt tried to confirm Part D policy number through [employer] (PDP), company was unable to find documentation of pt's policy information or confirmation of completed application. Pt reports if Medicare is unable to confirm Part D, he would have to complete another application and it wouldn't take effect until October 1, 2020. Pt reports continuing to look into issue and call SW back once issue is resolved. Pt reports worst case scenario, being able to put $500 cost estimate of medications on credit card. SW to follow up. SW alerted , Dayana and supervisor, Yusef.     SW remains available at [phone].

## 2020-09-01 NOTE — PROGRESS NOTES
[facility]  Kidney Transplant  Progress Note      Reason for Follow-up: Reassessment of Kidney Transplant - 8/27/2020  (#1) recipient and management of immunosuppression.    ORGAN: LEFT KIDNEY    Donor Type: Donation after Circulatory Death     Donor CMV Status: Positive  Donor HBcAB:Negative  Donor HCV Status:Negative  Donor HBV GLENYS: Negative  Donor HCV GLENYS: Negative        Subjective:     History of Present Illness:   Mr. Carlos Azevedo is a 48 y/o M with ESRD secondary to HTN and congenital abnormality (unknown details) who presents for kidney transplant He has been on the wait list for a kidney transplant at [facility] since 11/10/2014. Patient is currently on peritoneal dialysis started on 11/2014. Patient is dialyzing on cyclic peritoneal dialysis. Patient reports that she is tolerating dialysis well. Dry conrado ~70 kg. Native UOP ~1200 ml/day. He has a R PD catheter. Of note, patient has a rash to BLE that is erythematous, with some scabbing. He reports chronic contact dermatitis vs. eczema and is followed by a dermatologist (Dr. Prater) is [place]. He has been treated with UV light and zinc oxide in the past. He reports transplant team is aware of rash. Chart reviewed and rash was noted in transplant clinic 12/3/19 with photos appearing more severe than current rash. He was deemed a suitable transplant candidate at that time. Patient denies any recent hospitalizations or ED visits. Pre op labs and imaging. Tentative OR time 0500 with primary surgeon Dr. Mendoza.    Hospital course: Pt is now s/p DCD KTxp on 8/27/20 (CMV D+/R-, CIT ~ 22 hours; simulect induction). Surgery notable for 3 arteries. 5 day farah. 1 ELEUTERIO drain. Post op renal US satisfactory.      Interval history: NAEON. Cr slightly increased today. Output > intake. Encourage po intake. Fluid challenge today. Continue to monitor.    Drain removed:  Site cleaned with alcohol, lidocaine 1% used to numb area to place prolene 3.0 suture to  site.  Drain intact at time of removal.  Patient tolerated procedure well.  Will continue to monitor for any complications.      Past Medical and Surgical History: Mr. Azevedo has a past medical history of Chronic asthma, ESRD since 11/2014 from HTN, Hyperlipidemia, Hyperuricemia, Renal hypertension, and Secondary hyperparathyroidism of renal origin.  He has a past surgical history that includes Peritoneal catheter insertion; Cholecystectomy; Tonsillectomy; Colonoscopy (N/A, 12/12/2018); and Kidney transplant (N/A, 8/27/2020).    Past Social and Family History: Mr. Azevedo reports that he has never smoked. He has never used smokeless tobacco. He reports that he does not drink alcohol or use drugs.His family history includes Cancer in his father; Heart disease in his maternal grandmother; Hypertension in his mother.    Intake/Output - Last 3 Shifts       08/30 0700 - 08/31 0659 08/31 0700 - 09/01 0659 09/01 0700 - 09/02 0659    P.O. 900 1800 354    IV Piggyback   500    Total Intake(mL/kg) 900 (13.2) 1800 (26.5) 854 (12.3)    Urine (mL/kg/hr) 3800 (2.3) 3150 (1.9) 400 (1.2)    Drains 65 25     Stool 0 0 0    Total Output 3865 3175 400    Net -2965 -1375 +454           Stool Occurrence 0 x 1 x 1 x           Review of Systems   Constitutional: Positive for activity change and appetite change. Negative for chills and fever.   Respiratory: Negative for cough and shortness of breath.    Cardiovascular: Negative for chest pain, palpitations and leg swelling.   Gastrointestinal: Positive for abdominal pain (appropriate post op pain). Negative for abdominal distention, constipation, diarrhea, nausea and vomiting.   Genitourinary: Negative for decreased urine volume, difficulty urinating, dysuria and hematuria.   Skin: Positive for rash and wound.   Allergic/Immunologic: Positive for immunocompromised state.   Neurological: Negative for dizziness and weakness.   Psychiatric/Behavioral: Negative for agitation and confusion.  "    Objective:     Vital Signs (Most Recent):  Temp: 97.7 °F (36.5 °C) (09/01/20 0810)  Pulse: 70 (09/01/20 0810)  Resp: 18 (09/01/20 0810)  BP: 117/80 (09/01/20 0810)  SpO2: 98 % (09/01/20 0810) Vital Signs (24h Range):  Temp:  [97.7 °F (36.5 °C)-98.2 °F (36.8 °C)] 97.7 °F (36.5 °C)  Pulse:  [55-80] 70  Resp:  [16-18] 18  SpO2:  [95 %-98 %] 98 %  BP: (117-150)/(80-98) 117/80     Weight: 69.7 kg (153 lb 10.6 oz)  Height: 5' 4" (162.6 cm)  Body mass index is 26.38 kg/m².    Physical Exam  Vitals signs and nursing note reviewed.   Constitutional:       General: He is not in acute distress.     Appearance: He is not diaphoretic.   HENT:      Head: Normocephalic and atraumatic.   Eyes:      General: No scleral icterus.        Right eye: No discharge.         Left eye: No discharge.   Neck:      Musculoskeletal: Normal range of motion and neck supple.   Cardiovascular:      Rate and Rhythm: Normal rate and regular rhythm.      Heart sounds: Normal heart sounds.   Pulmonary:      Effort: Pulmonary effort is normal.      Breath sounds: Normal breath sounds. No wheezing or rales.   Abdominal:      General: Bowel sounds are normal. There is no distension.      Palpations: Abdomen is soft.      Tenderness: There is abdominal tenderness (incisional). There is no guarding.       Musculoskeletal:      Right lower leg: No edema.      Left lower leg: No edema.   Skin:     General: Skin is warm and dry.      Capillary Refill: Capillary refill takes less than 2 seconds.      Findings: Lesion (BLE) and rash (BLE erythematous rash - chronic) present.   Neurological:      Mental Status: He is alert and oriented to person, place, and time.      Cranial Nerves: No cranial nerve deficit.   Psychiatric:         Thought Content: Thought content normal.         Judgment: Judgment normal.         Significant Labs:  CBC:   Recent Labs   Lab 08/30/20  0619 08/31/20  0613 09/01/20  0612   WBC 16.18* 11.73 11.14   RBC 3.84* 3.81* 3.84*   HGB " 12.1* 12.0* 11.8*   HCT 37.8* 37.2* 37.3*   * 144* 150   MCV 98 98 97   MCH 31.5* 31.5* 30.7   MCHC 32.0 32.3 31.6*     CMP:   Recent Labs   Lab 08/27/20  0050  08/30/20  0618 08/31/20  0613 09/01/20  0612   GLU 93   < > 84 84 80   CALCIUM 8.8   < > 8.2* 8.5* 8.3*   ALBUMIN 3.4*   < > 2.6* 2.5* 2.5*   PROT 6.3  --   --   --   --       < > 139 140 139   K 3.3*   < > 3.8 4.2 3.8   CO2 23   < > 17* 20* 20*      < > 111* 112* 110   BUN 70*   < > 86* 92* 96*   CREATININE 7.2*   < > 5.9* 5.6* 5.7*   ALKPHOS 77  --   --   --   --    ALT 27  --   --   --   --    AST 34  --   --   --   --     < > = values in this interval not displayed.     Labs within the past 24 hours have been reviewed.    Diagnostics:  Imaging within the past 24 hours has been reviewed.     Assessment/Plan:     S/P kidney transplant  - s/p DCD KTxp on 8/28/20 (simulect induction, CMV mismatch, CIT ~ 22 hours)  - Surgery notable for 3 arteries - renal US performed and satisfactory.   - 5 day farah and 1 ELEUTERIO drain in place  - farah removed without issues and patient able to void after removal  - Plan to remove ELEUTERIO drain this afternoon  - Cr slightly elevated. Hydrate with IVF  - Daily labs, strict intake/output, daily weights     At risk for opportunistic infections  - valcyte for cmv prophylaxis (start pod 10 or on d/c)  - bactrim for pcp prophylaxis  - nystatin for thrush prophylaxis        Long-term use of immunosuppressant medication  - see prophylactic immunotherapy      Prophylactic immunotherapy  - continue prograf, cellcept, and steroid taper per protocol  - will check daily prograf levels, monitor for toxic side effects, and adjust for therapeutic dose        Renal hypertension  - +orthostatic this morning, held norvasc  - check standing Bps only  - encourage PO intake        Discharge Planning: Not a candidate for discharge at this time.       Madai Del Valle PA-C  Kidney Transplant  [facility]

## 2020-09-01 NOTE — PLAN OF CARE
AAOX4.  VSS.  No complaints of pain.  Farah is draining pink urine.  Will remove farah at 0600.  ELEUTERIO is draining SS fluid.  Accuchecks AC, HS, nighttime BS was 122.  Bed is in lowest position, call bell is in reach, and pt instructed to call nurse when needing assistance.  Will continue to monitor.

## 2020-09-02 VITALS
HEIGHT: 64 IN | OXYGEN SATURATION: 98 % | DIASTOLIC BLOOD PRESSURE: 78 MMHG | HEART RATE: 80 BPM | WEIGHT: 154 LBS | TEMPERATURE: 98 F | SYSTOLIC BLOOD PRESSURE: 117 MMHG | BODY MASS INDEX: 26.29 KG/M2 | RESPIRATION RATE: 18 BRPM

## 2020-09-02 DIAGNOSIS — Z94.0 KIDNEY REPLACED BY TRANSPLANT: Primary | ICD-10-CM

## 2020-09-02 LAB
ALBUMIN SERPL BCP-MCNC: 2.5 G/DL (ref 3.5–5.2)
ANION GAP SERPL CALC-SCNC: 8 MMOL/L (ref 8–16)
BASOPHILS # BLD AUTO: 0.09 K/UL (ref 0–0.2)
BASOPHILS NFR BLD: 0.8 % (ref 0–1.9)
BUN SERPL-MCNC: 93 MG/DL (ref 6–20)
CALCIUM SERPL-MCNC: 8.2 MG/DL (ref 8.7–10.5)
CHLORIDE SERPL-SCNC: 112 MMOL/L (ref 95–110)
CO2 SERPL-SCNC: 19 MMOL/L (ref 23–29)
CREAT SERPL-MCNC: 5.2 MG/DL (ref 0.5–1.4)
DIFFERENTIAL METHOD: ABNORMAL
EOSINOPHIL # BLD AUTO: 0.9 K/UL (ref 0–0.5)
EOSINOPHIL NFR BLD: 8.1 % (ref 0–8)
ERYTHROCYTE [DISTWIDTH] IN BLOOD BY AUTOMATED COUNT: 12.8 % (ref 11.5–14.5)
EST. GFR  (AFRICAN AMERICAN): 13.7 ML/MIN/1.73 M^2
EST. GFR  (NON AFRICAN AMERICAN): 11.8 ML/MIN/1.73 M^2
GLUCOSE SERPL-MCNC: 92 MG/DL (ref 70–110)
HCT VFR BLD AUTO: 35.9 % (ref 40–54)
HGB BLD-MCNC: 11.7 G/DL (ref 14–18)
IMM GRANULOCYTES # BLD AUTO: 0.56 K/UL (ref 0–0.04)
IMM GRANULOCYTES NFR BLD AUTO: 5 % (ref 0–0.5)
LYMPHOCYTES # BLD AUTO: 1.5 K/UL (ref 1–4.8)
LYMPHOCYTES NFR BLD: 13.3 % (ref 18–48)
MAGNESIUM SERPL-MCNC: 2.2 MG/DL (ref 1.6–2.6)
MCH RBC QN AUTO: 31.5 PG (ref 27–31)
MCHC RBC AUTO-ENTMCNC: 32.6 G/DL (ref 32–36)
MCV RBC AUTO: 97 FL (ref 82–98)
MONOCYTES # BLD AUTO: 1 K/UL (ref 0.3–1)
MONOCYTES NFR BLD: 9 % (ref 4–15)
NEUTROPHILS # BLD AUTO: 7.2 K/UL (ref 1.8–7.7)
NEUTROPHILS NFR BLD: 63.8 % (ref 38–73)
NRBC BLD-RTO: 0 /100 WBC
PHOSPHATE SERPL-MCNC: 4.9 MG/DL (ref 2.7–4.5)
PLATELET # BLD AUTO: 157 K/UL (ref 150–350)
PMV BLD AUTO: 11.9 FL (ref 9.2–12.9)
POCT GLUCOSE: 109 MG/DL (ref 70–110)
POCT GLUCOSE: 91 MG/DL (ref 70–110)
POTASSIUM SERPL-SCNC: 3.7 MMOL/L (ref 3.5–5.1)
RBC # BLD AUTO: 3.71 M/UL (ref 4.6–6.2)
SODIUM SERPL-SCNC: 139 MMOL/L (ref 136–145)
TACROLIMUS BLD-MCNC: 4.6 NG/ML (ref 5–15)
WBC # BLD AUTO: 11.24 K/UL (ref 3.9–12.7)

## 2020-09-02 PROCEDURE — 80197 ASSAY OF TACROLIMUS: CPT

## 2020-09-02 PROCEDURE — 25000003 PHARM REV CODE 250: Performed by: PHYSICIAN ASSISTANT

## 2020-09-02 PROCEDURE — 36415 COLL VENOUS BLD VENIPUNCTURE: CPT

## 2020-09-02 PROCEDURE — 63600175 PHARM REV CODE 636 W HCPCS: Performed by: PHYSICIAN ASSISTANT

## 2020-09-02 PROCEDURE — 80069 RENAL FUNCTION PANEL: CPT

## 2020-09-02 PROCEDURE — 25000003 PHARM REV CODE 250: Performed by: SURGERY

## 2020-09-02 PROCEDURE — 99239 HOSP IP/OBS DSCHRG MGMT >30: CPT | Mod: 24,,, | Performed by: PHYSICIAN ASSISTANT

## 2020-09-02 PROCEDURE — 85027 COMPLETE CBC AUTOMATED: CPT

## 2020-09-02 PROCEDURE — 25000003 PHARM REV CODE 250: Performed by: INTERNAL MEDICINE

## 2020-09-02 PROCEDURE — 99239 PR HOSPITAL DISCHARGE DAY,>30 MIN: ICD-10-PCS | Mod: 24,,, | Performed by: PHYSICIAN ASSISTANT

## 2020-09-02 PROCEDURE — 63600175 PHARM REV CODE 636 W HCPCS: Performed by: SURGERY

## 2020-09-02 PROCEDURE — 85007 BL SMEAR W/DIFF WBC COUNT: CPT

## 2020-09-02 PROCEDURE — 83735 ASSAY OF MAGNESIUM: CPT

## 2020-09-02 RX ORDER — TACROLIMUS 1 MG/1
6 CAPSULE ORAL 2 TIMES DAILY
Status: DISCONTINUED | OUTPATIENT
Start: 2020-09-02 | End: 2020-09-02 | Stop reason: HOSPADM

## 2020-09-02 RX ORDER — CALCITRIOL 0.5 UG/1
1 CAPSULE ORAL DAILY
Qty: 60 CAPSULE | Refills: 11 | Status: SHIPPED | OUTPATIENT
Start: 2020-09-03 | End: 2020-09-30 | Stop reason: ALTCHOICE

## 2020-09-02 RX ORDER — SULFAMETHOXAZOLE AND TRIMETHOPRIM 400; 80 MG/1; MG/1
1 TABLET ORAL
Qty: 12 TABLET | Refills: 11 | Status: SHIPPED | OUTPATIENT
Start: 2020-09-02 | End: 2020-09-30

## 2020-09-02 RX ORDER — SULFAMETHOXAZOLE AND TRIMETHOPRIM 400; 80 MG/1; MG/1
1 TABLET ORAL
Qty: 30 TABLET | Refills: 11 | Status: CANCELLED | OUTPATIENT
Start: 2020-09-02

## 2020-09-02 RX ORDER — FAMOTIDINE 20 MG/1
20 TABLET, FILM COATED ORAL NIGHTLY
Qty: 30 TABLET | Refills: 0 | Status: SHIPPED | OUTPATIENT
Start: 2020-09-02 | End: 2020-10-15 | Stop reason: SDUPTHER

## 2020-09-02 RX ORDER — TACROLIMUS 1 MG/1
1 CAPSULE ORAL ONCE
Status: COMPLETED | OUTPATIENT
Start: 2020-09-02 | End: 2020-09-02

## 2020-09-02 RX ORDER — OXYCODONE HYDROCHLORIDE 5 MG/1
5 TABLET ORAL EVERY 6 HOURS PRN
Qty: 10 TABLET | Refills: 0 | Status: CANCELLED | OUTPATIENT
Start: 2020-09-02

## 2020-09-02 RX ORDER — VALGANCICLOVIR 450 MG/1
450 TABLET, FILM COATED ORAL
Qty: 12 TABLET | Refills: 5 | Status: SHIPPED | OUTPATIENT
Start: 2020-09-02 | End: 2020-09-30

## 2020-09-02 RX ORDER — ERGOCALCIFEROL 1.25 MG/1
50000 CAPSULE ORAL
Qty: 4 CAPSULE | Refills: 5 | Status: SHIPPED | OUTPATIENT
Start: 2020-09-04 | End: 2021-02-08 | Stop reason: SDUPTHER

## 2020-09-02 RX ORDER — DOCUSATE SODIUM 100 MG/1
100 CAPSULE, LIQUID FILLED ORAL 3 TIMES DAILY PRN
Refills: 0 | COMMUNITY
Start: 2020-09-02 | End: 2023-03-24

## 2020-09-02 RX ORDER — OXYCODONE HYDROCHLORIDE 5 MG/1
5 TABLET ORAL EVERY 6 HOURS PRN
Qty: 10 TABLET | Refills: 0 | Status: SHIPPED | OUTPATIENT
Start: 2020-09-02 | End: 2020-11-04 | Stop reason: ALTCHOICE

## 2020-09-02 RX ORDER — SODIUM BICARBONATE 650 MG/1
1300 TABLET ORAL 3 TIMES DAILY
Qty: 180 TABLET | Refills: 11 | Status: SHIPPED | OUTPATIENT
Start: 2020-09-02 | End: 2020-09-30

## 2020-09-02 RX ADMIN — CALCITRIOL 1 MCG: 0.5 CAPSULE, LIQUID FILLED ORAL at 08:09

## 2020-09-02 RX ADMIN — SULFAMETHOXAZOLE AND TRIMETHOPRIM 1 TABLET: 400; 80 TABLET ORAL at 08:09

## 2020-09-02 RX ADMIN — SODIUM BICARBONATE 650 MG TABLET 1300 MG: at 08:09

## 2020-09-02 RX ADMIN — NYSTATIN 500000 UNITS: 100000 SUSPENSION ORAL at 02:09

## 2020-09-02 RX ADMIN — HEPARIN SODIUM 5000 UNITS: 5000 INJECTION INTRAVENOUS; SUBCUTANEOUS at 04:09

## 2020-09-02 RX ADMIN — THERA TABS 1 TABLET: TAB at 08:09

## 2020-09-02 RX ADMIN — TACROLIMUS 1 MG: 1 CAPSULE ORAL at 12:09

## 2020-09-02 RX ADMIN — PREDNISONE 20 MG: 20 TABLET ORAL at 08:09

## 2020-09-02 RX ADMIN — NYSTATIN 500000 UNITS: 100000 SUSPENSION ORAL at 08:09

## 2020-09-02 RX ADMIN — TACROLIMUS 5 MG: 1 CAPSULE ORAL at 08:09

## 2020-09-02 RX ADMIN — SODIUM BICARBONATE 650 MG TABLET 1300 MG: at 02:09

## 2020-09-02 RX ADMIN — MYCOPHENOLATE MOFETIL 1000 MG: 250 CAPSULE ORAL at 08:09

## 2020-09-02 RX ADMIN — MONTELUKAST 10 MG: 10 TABLET, FILM COATED ORAL at 08:09

## 2020-09-02 NOTE — PLAN OF CARE
Pt d/c off unit to [facility]. Pharmacy dropped off meds to pt's bedside and explained blue card to pt and pt's caregiver. Txp coordinator met with pt and pt's caregiver- both verbalized understanding of future appointments, medications, and post-transplant care. AVS printed, given to, and explained to pt and pt's caregiver- both verbalized understanding of all contents of the AVS packet and denies questions at this time. Peripheral IV's removed- catheters intact. Pt in no apparent distress.

## 2020-09-02 NOTE — PROGRESS NOTES
DISCHARGE NOTE:    Bryant Azevedo is a 49 y.o. male s/p LEFT KIDNEY   Donation after Circulatory Death    transplant on 8/27/2020 (Kidney) for ESRD secondary to Other, Specify - unknown.      Past Medical History:   Diagnosis Date    Chronic asthma     ESRD since 11/2014 from HTN     Hyperlipidemia     Hyperuricemia     Renal hypertension     Secondary hyperparathyroidism of renal origin        Hospital Course: Mr Azevedo is s/p DCD kidney transplant from 8/27/20.  Patient progressed slowly post transplant but has slowly improving SCr and with good UOP.  Patient also noted to have orthostatic hypotension but is asymptomatic - home antihypertensives held.  Patient otherwise well at time of discharge.  Of note, pt only with medicare part A and B, part D to be re-instated 10/1/20 (pt high risk for CMV, and pre-emptive prophylaxis with weekly CMV PCRs alone is not recommended).    Allergies:   Review of patient's allergies indicates:   Allergen Reactions    Antihistamines - alkylamine Other (See Comments)     End stage renal     Codeine Other (See Comments)     Severe abdominal cramping    Nsaids (non-steroidal anti-inflammatory drug) Other (See Comments)     End stage renal        Patient Pharmacy: [facility]    Discharge Medications:   Bryant Azevedo   Home Medication Instructions KEILA:[id number]    Printed on:09/02/20 1403   Medication Information                      calcitRIOL (ROCALTROL) 0.5 MCG Cap  Take 2 capsules (1 mcg total) by mouth once daily.             docusate sodium (COLACE) 100 MG capsule  Take 1 capsule (100 mg total) by mouth 3 (three) times daily as needed for Constipation.             ergocalciferol (ERGOCALCIFEROL) 50,000 unit Cap  Take 1 capsule (50,000 Units total) by mouth every 7 days.             famotidine (PEPCID) 20 MG tablet  Take 1 tablet (20 mg total) by mouth every evening.             fluticasone-salmeterol 250-50 mcg/dose (ADVAIR) 250-50 mcg/dose diskus inhaler  Inhale 1 puff  into the lungs 2 (two) times daily.             montelukast (SINGULAIR) 10 mg tablet  Take 10 mg by mouth once daily.             mycophenolate (CELLCEPT) 250 mg Cap  Take 4 capsules (1,000 mg total) by mouth 2 (two) times daily.             nystatin (MYCOSTATIN) 100,000 unit/mL suspension  Take 5 mLs (500,000 Units total) by mouth 2 hours after meals and at bedtime. STOP 10/1/20             oxyCODONE (ROXICODONE) 5 MG immediate release tablet  Take 1 tablet (5 mg total) by mouth every 6 (six) hours as needed for Pain.             paroxetine (PAXIL) 20 MG tablet  Take 20 mg by mouth every evening.             predniSONE (DELTASONE) 5 MG tablet  Take by mouth daily:  20mg 8/30-9/30, 15mg 10/1-10/31, 10mg 11/1-11/30, then 5mg daily beginning 12/1             sertraline (ZOLOFT) 50 MG tablet  Take 50 mg by mouth once daily.             sodium bicarbonate 650 MG tablet  Take 2 tablets (1,300 mg total) by mouth 3 (three) times daily.             sulfamethoxazole-trimethoprim 400-80mg (BACTRIM,SEPTRA) 400-80 mg per tablet  Take 1 tablet by mouth every Mon, Wed, Fri. STOP 8/21/21             tacrolimus (PROGRAF) 1 MG Cap  Take 6 capsules (6 mg total) by mouth every 12 (twelve) hours.             valGANciclovir (VALCYTE) 450 mg Tab  Take 1 tablet (450 mg total) by mouth every Mon, Wed, Fri. STOP 2/23/21                 Pharmacy Interventions/Recommendations:  1) Transplant Immunosuppression: tacro 6/6, mycophenolate, pred taper    2) Opportunistic Infection prophylaxis: bactrim (STOP 8/21/21), valcyte (STOP 2/23/21), nystatin (STOP 10/1/20)    3) Patient Counseling/Education:  Patient and caregiver educated.  Demonstrated the use of the BP cuff, thermometer.    4) Follow-Up/Discharge Needs:  none    5) Patient received prescriptions for:      E-rx's:  [facility]         Printed rx's:  none      Faxed / Phoned in rx's:  none  To none pharmacy per patient request.    6) Patient Assistance Information: Medicare Part D to go in effect  10/1/20.  Patient on valcyte (+/-), renally dosed - may need patient assistance with future refills while awaiting Rx insurance coverage.    7) The following medications have been placed on HOLD and should be restarted in the outpatient setting (when appropriate): none    Bryant and his caregiver verbalized their understanding and had the opportunity to ask questions.

## 2020-09-02 NOTE — PROGRESS NOTES
09/02/20 1025 09/02/20 1028 09/02/20 1031   Vital Signs   Pulse (!) 55 61 67   Heart Rate Source Monitor Monitor Monitor   BP (!) 173/91 129/88 111/76   MAP (mmHg) 126 105 89   BP Location Right arm Right arm Right arm   BP Method Automatic Automatic Automatic   Patient Position Lying Sitting Standing   Orthostatic VS taken per order. Pt denies dizziness, fatigue, and lightheadedness while changing position or standing. Ambulated with pt around unit per order - pt also denies dizziness, fatigue, SOB, and lightheadedness while ambulating.

## 2020-09-02 NOTE — PLAN OF CARE
AAOX4.  VSS.  No complaints of pain. Pt pulled self meds with 100@ accuracy.  Creatinine was 5.7, bolus of NS administered during the day.  Pending creatinine results this morning, will DC.  Pt ambulatory.  Bed is in lowest position, call bell is in reach, and pt instructed to call nurse when needing assistance.  Will continue to monitor.

## 2020-09-02 NOTE — DISCHARGE SUMMARY
[facility]  Kidney Transplant  Discharge Summary    Patient Name: Bryant Azevedo  MRN: [id number]  Admission Date: 8/26/2020  Hospital Length of Stay: 7 days  Discharge Date and Time:  09/02/2020 2:25 PM  Attending Physician: Seferino Mendoza MD   Discharging Provider: Renita Mena PA-C  Primary Care Provider: Primary Doctor No    HPI:   Mr. Carlos Azevedo is a 48 y/o M with ESRD secondary to HTN and congenital abnormality (unknown details) who presents for kidney transplant He has been on the wait list for a kidney transplant at [facility] since 11/10/2014. Patient is currently on peritoneal dialysis started on 11/2014. Patient is dialyzing on cyclic peritoneal dialysis. Patient reports that she is tolerating dialysis well. Dry conrado ~70 kg. Native UOP ~1200 ml/day. He has a R PD catheter. Of note, patient has a rash to BLE that is erythematous, with some scabbing. He reports chronic contact dermatitis vs. eczema and is followed by a dermatologist (Dr. Prater) is [place]. He has been treated with UV light and zinc oxide in the past. He reports transplant team is aware of rash. Chart reviewed and rash was noted in transplant clinic 12/3/19 with photos appearing more severe than current rash. He was deemed a suitable transplant candidate at that time. Patient denies any recent hospitalizations or ED visits. Pre op labs and imaging. Tentative OR time 0500 with primary surgeon Dr. Mendoza.    Hospital course: Pt is now s/p DCD KTxp on 8/27/20 (CMV D+/R-, CIT ~ 22 hours; simulect induction). Surgery notable for 3 arteries. 5 day farah. 1 ELEUTERIO drain. Post op renal US satisfactory. Patient progressed slowly but well after transplant. Creatinine was slow to improve but is now gradually improving with good UOP. He was noted with intermittent hypotension post operatively but asymptomatic. His antihypertensives were stopped post op. On day of discharge, patient feeling well, tolerating diet, ambulating without  assistance, pain well controlled. Godwin catheter removed on POD#5, voiding without issue. Surgical drain removed prior to discharge. He is stable and ready for discharge. He will follow up with labs and clinic appointment tomorrow 9/3/20. Patient verbalized his understanding prior to discharge.        Procedure(s) (LRB):  TRANSPLANT, KIDNEY (N/A)     No notes on file    Final Active Diagnoses:    Diagnosis Date Noted POA    PRINCIPAL PROBLEM:  S/P kidney transplant [Z94.0] 08/28/2020 Not Applicable    Prophylactic immunotherapy [Z29.8] 08/28/2020 Not Applicable    Long-term use of immunosuppressant medication [Z79.899] 08/28/2020 Not Applicable    At risk for opportunistic infections [Z91.89] 08/28/2020 Yes    Renal hypertension [I12.9]  Yes     Chronic      Problems Resolved During this Admission:    Diagnosis Date Noted Date Resolved POA    ESRD (end stage renal disease) [N18.6] 12/01/2017 08/31/2020 No       Treatments: As above    Consults (From admission, onward)        Status Ordering Provider     Inpatient consult to Registered Dietitian/Nutritionist  Once     Provider:  (Not yet assigned)    Completed YOSEF GUTIERREZ     Inpatient consult to Registered Dietitian/Nutritionist  Once     Provider:  (Not yet assigned)    Completed SOLIS WILSON     Inpatient consult to Transplant Nephrology (KTM)  Once     Provider:  (Not yet assigned)    SOLIS Palumbo          Pending Diagnostic Studies:     None        Significant Diagnostic Studies: Labs:   BMP:   Recent Labs   Lab 09/01/20  0612 09/02/20  0709   GLU 80 92    139   K 3.8 3.7    112*   CO2 20* 19*   BUN 96* 93*   CREATININE 5.7* 5.2*   CALCIUM 8.3* 8.2*   MG 2.4 2.2    and CBC   Recent Labs   Lab 09/01/20  0612 09/02/20  0709   WBC 11.14 11.24   HGB 11.8* 11.7*   HCT 37.3* 35.9*    157       Discharged Condition: good    Disposition: Home or Self Care    Follow Up: As above    Patient Instructions:      Diet  Adult Regular     Lifting restrictions   Order Comments: Do not lift greater than 10 lbs for 6 weeks from time of transplant     Notify your health care provider if you experience any of the following:  temperature >100.4     Notify your health care provider if you experience any of the following:  persistent nausea and vomiting or diarrhea     Notify your health care provider if you experience any of the following:  severe uncontrolled pain     Notify your health care provider if you experience any of the following:  redness, tenderness, or signs of infection (pain, swelling, redness, odor or green/yellow discharge around incision site)     Notify your health care provider if you experience any of the following:  difficulty breathing or increased cough     Notify your health care provider if you experience any of the following:  severe persistent headache     Notify your health care provider if you experience any of the following:  worsening rash     Notify your health care provider if you experience any of the following:  persistent dizziness, light-headedness, or visual disturbances     Notify your health care provider if you experience any of the following:  increased confusion or weakness     Notify your health care provider if you experience any of the following:   Order Comments: For any other concerning signs or symptoms     Weight bearing restrictions (specify):   Order Comments: Do not lift greater than 10 lbs for 6 weeks from time of transplant     Medications:  Reconciled Home Medications:      Medication List      START taking these medications    calcitRIOL 0.5 MCG Cap  Commonly known as: ROCALTROL  Take 2 capsules (1 mcg total) by mouth once daily.  Start taking on: September 3, 2020     docusate sodium 100 MG capsule  Commonly known as: COLACE  Take 1 capsule (100 mg total) by mouth 3 (three) times daily as needed for Constipation.     ergocalciferol 50,000 unit Cap  Commonly known as:  ERGOCALCIFEROL  Take 1 capsule (50,000 Units total) by mouth every 7 days.  Start taking on: September 4, 2020     famotidine 20 MG tablet  Commonly known as: PEPCID  Take 1 tablet (20 mg total) by mouth every evening.     mycophenolate 250 mg Cap  Commonly known as: CELLCEPT  Take 4 capsules (1,000 mg total) by mouth 2 (two) times daily.     nystatin 100,000 unit/mL suspension  Commonly known as: MYCOSTATIN  Take 5 mLs (500,000 Units total) by mouth 2 hours after meals and at bedtime. STOP 10/1/20     oxyCODONE 5 MG immediate release tablet  Commonly known as: ROXICODONE  Take 1 tablet (5 mg total) by mouth every 6 (six) hours as needed for Pain.     predniSONE 5 MG tablet  Commonly known as: DELTASONE  Take by mouth daily:  20mg 8/30-9/30, 15mg 10/1-10/31, 10mg 11/1-11/30, then 5mg daily beginning 12/1     sodium bicarbonate 650 MG tablet  Take 2 tablets (1,300 mg total) by mouth 3 (three) times daily.     sulfamethoxazole-trimethoprim 400-80mg 400-80 mg per tablet  Commonly known as: BACTRIM,SEPTRA  Take 1 tablet by mouth every Mon, Wed, Fri. STOP 8/21/21     tacrolimus 1 MG Cap  Commonly known as: PROGRAF  Take 6 capsules (6 mg total) by mouth every 12 (twelve) hours.     valGANciclovir 450 mg Tab  Commonly known as: VALCYTE  Take 1 tablet (450 mg total) by mouth every Mon, Wed, Fri. STOP 2/23/21        CONTINUE taking these medications    fluticasone-salmeterol 250-50 mcg/dose 250-50 mcg/dose diskus inhaler  Commonly known as: ADVAIR  Inhale 1 puff into the lungs 2 (two) times daily.     montelukast 10 mg tablet  Commonly known as: SINGULAIR  Take 10 mg by mouth once daily.     paroxetine 20 MG tablet  Commonly known as: PAXIL  Take 20 mg by mouth every evening.     sertraline 50 MG tablet  Commonly known as: ZOLOFT  Take 50 mg by mouth once daily.        STOP taking these medications    allopurinoL 100 MG tablet  Commonly known as: ZYLOPRIM     amLODIPine 5 MG tablet  Commonly known as: NORVASC     calcium  acetate(phosphat bind) 667 mg capsule  Commonly known as: PHOSLO     ferrous sulfate 325 (65 FE) MG EC tablet     furosemide 80 MG tablet  Commonly known as: LASIX     labetaloL 100 MG tablet  Commonly known as: NORMODYNE          Time spent caring for patient (Greater than 1/2 spent in direct face-to-face contact): > 30 minutes    JOAQUÍN CamposC  Kidney Transplant  [facility]

## 2020-09-02 NOTE — PROGRESS NOTES
Discharge Note:    Pt was alert and oriented x4, sitting in chair at bedside and communicative. Pt presented with his caregiver, Mehreen Rich, phone number [phone] . SW met with pt and caregiver to assess discharge plan and any concerns or needs.    Pt reports agreeing with the discharge plan and has no psychosocial concerns. Pt will discharge today to [facility] with no needs. Pt's friend will transport patient. Pt reports being able to afford medications at this time. Patients caretakers verbalize understanding and are involved in treatment planning and discharge process. Pt nor caregiver had concerns or questions.    SUNITHA remains available at [phone].

## 2020-09-03 ENCOUNTER — CLINICAL SUPPORT (OUTPATIENT)
Dept: TRANSPLANT | Facility: CLINIC | Age: 50
End: 2020-09-03
Payer: MEDICARE

## 2020-09-03 ENCOUNTER — LAB VISIT (OUTPATIENT)
Dept: LAB | Facility: HOSPITAL | Age: 50
End: 2020-09-03
Attending: INTERNAL MEDICINE
Payer: MEDICARE

## 2020-09-03 ENCOUNTER — TELEPHONE (OUTPATIENT)
Dept: TRANSPLANT | Facility: CLINIC | Age: 50
End: 2020-09-03

## 2020-09-03 VITALS
OXYGEN SATURATION: 98 % | TEMPERATURE: 98 F | DIASTOLIC BLOOD PRESSURE: 79 MMHG | HEIGHT: 64 IN | SYSTOLIC BLOOD PRESSURE: 138 MMHG | RESPIRATION RATE: 18 BRPM | OXYGEN SATURATION: 98 % | BODY MASS INDEX: 26.24 KG/M2 | HEART RATE: 70 BPM | HEIGHT: 64 IN | DIASTOLIC BLOOD PRESSURE: 79 MMHG | WEIGHT: 153.69 LBS | RESPIRATION RATE: 18 BRPM | BODY MASS INDEX: 26.24 KG/M2 | SYSTOLIC BLOOD PRESSURE: 138 MMHG | WEIGHT: 153.69 LBS | HEART RATE: 70 BPM | TEMPERATURE: 98 F

## 2020-09-03 DIAGNOSIS — Z94.0 KIDNEY REPLACED BY TRANSPLANT: ICD-10-CM

## 2020-09-03 DIAGNOSIS — Z94.0 KIDNEY REPLACED BY TRANSPLANT: Primary | ICD-10-CM

## 2020-09-03 LAB
ALBUMIN SERPL BCP-MCNC: 2.8 G/DL (ref 3.5–5.2)
ANION GAP SERPL CALC-SCNC: 9 MMOL/L (ref 8–16)
BACTERIA SPEC ANAEROBE CULT: NORMAL
BASOPHILS # BLD AUTO: 0.11 K/UL (ref 0–0.2)
BASOPHILS NFR BLD: 0.9 % (ref 0–1.9)
BUN SERPL-MCNC: 97 MG/DL (ref 6–20)
CALCIUM SERPL-MCNC: 9 MG/DL (ref 8.7–10.5)
CHLORIDE SERPL-SCNC: 111 MMOL/L (ref 95–110)
CO2 SERPL-SCNC: 21 MMOL/L (ref 23–29)
CREAT SERPL-MCNC: 5.5 MG/DL (ref 0.5–1.4)
DIFFERENTIAL METHOD: ABNORMAL
EOSINOPHIL # BLD AUTO: 0.7 K/UL (ref 0–0.5)
EOSINOPHIL NFR BLD: 5.3 % (ref 0–8)
ERYTHROCYTE [DISTWIDTH] IN BLOOD BY AUTOMATED COUNT: 13.1 % (ref 11.5–14.5)
EST. GFR  (AFRICAN AMERICAN): 12.8 ML/MIN/1.73 M^2
EST. GFR  (NON AFRICAN AMERICAN): 11 ML/MIN/1.73 M^2
GLUCOSE SERPL-MCNC: 88 MG/DL (ref 70–110)
HCT VFR BLD AUTO: 37.6 % (ref 40–54)
HGB BLD-MCNC: 12.4 G/DL (ref 14–18)
IMM GRANULOCYTES # BLD AUTO: 0.62 K/UL (ref 0–0.04)
IMM GRANULOCYTES NFR BLD AUTO: 4.8 % (ref 0–0.5)
LYMPHOCYTES # BLD AUTO: 1.4 K/UL (ref 1–4.8)
LYMPHOCYTES NFR BLD: 10.6 % (ref 18–48)
MAGNESIUM SERPL-MCNC: 2.3 MG/DL (ref 1.6–2.6)
MCH RBC QN AUTO: 31.4 PG (ref 27–31)
MCHC RBC AUTO-ENTMCNC: 33 G/DL (ref 32–36)
MCV RBC AUTO: 95 FL (ref 82–98)
MONOCYTES # BLD AUTO: 1 K/UL (ref 0.3–1)
MONOCYTES NFR BLD: 7.6 % (ref 4–15)
NEUTROPHILS # BLD AUTO: 9.1 K/UL (ref 1.8–7.7)
NEUTROPHILS NFR BLD: 70.8 % (ref 38–73)
NRBC BLD-RTO: 0 /100 WBC
PHOSPHATE SERPL-MCNC: 6.3 MG/DL (ref 2.7–4.5)
PLATELET # BLD AUTO: 188 K/UL (ref 150–350)
PMV BLD AUTO: 11.9 FL (ref 9.2–12.9)
POTASSIUM SERPL-SCNC: 4.8 MMOL/L (ref 3.5–5.1)
RBC # BLD AUTO: 3.95 M/UL (ref 4.6–6.2)
SODIUM SERPL-SCNC: 141 MMOL/L (ref 136–145)
TACROLIMUS BLD-MCNC: 6.3 NG/ML (ref 5–15)
WBC # BLD AUTO: 12.82 K/UL (ref 3.9–12.7)

## 2020-09-03 PROCEDURE — 83735 ASSAY OF MAGNESIUM: CPT

## 2020-09-03 PROCEDURE — 99999 PR PBB SHADOW E&M-EST. PATIENT-LVL IV: ICD-10-PCS | Mod: PBBFAC,,,

## 2020-09-03 PROCEDURE — 36415 COLL VENOUS BLD VENIPUNCTURE: CPT

## 2020-09-03 PROCEDURE — 80069 RENAL FUNCTION PANEL: CPT

## 2020-09-03 PROCEDURE — 99999 PR PBB SHADOW E&M-EST. PATIENT-LVL III: ICD-10-PCS | Mod: PBBFAC,,,

## 2020-09-03 PROCEDURE — 80197 ASSAY OF TACROLIMUS: CPT

## 2020-09-03 PROCEDURE — 99214 OFFICE O/P EST MOD 30 MIN: CPT | Mod: PBBFAC

## 2020-09-03 PROCEDURE — 99999 PR PBB SHADOW E&M-EST. PATIENT-LVL IV: CPT | Mod: PBBFAC,,,

## 2020-09-03 PROCEDURE — 85025 COMPLETE CBC W/AUTO DIFF WBC: CPT

## 2020-09-03 PROCEDURE — 99999 PR PBB SHADOW E&M-EST. PATIENT-LVL III: CPT | Mod: PBBFAC,,,

## 2020-09-03 PROCEDURE — 99213 OFFICE O/P EST LOW 20 MIN: CPT | Mod: PBBFAC,27

## 2020-09-03 NOTE — PROGRESS NOTES
Clinic Note: First Return to Clinic Post-  Kidney Transplant    Bryant Azevedo  is a 49 y.o. male  S/p LEFT KIDNEY   transplant on 8/27/2020 (Kidney) for Other, Specify - unknown.      Discharge Course (Issues/Concerns): no issues since discharge.  Was concerned meds were late this morning since lab was backed up, but discussed that on lab days that may happen and it is okay.      Objective:   Vitals:    09/03/20 0834   BP: 138/79   Pulse: 70   Resp: 18   Temp: 98.4 °F (36.9 °C)       Met with patient and his caregiver in the clinic to review current medication list.     Current Outpatient Medications   Medication Sig Dispense Refill    calcitRIOL (ROCALTROL) 0.5 MCG Cap Take 2 capsules (1 mcg total) by mouth once daily. 60 capsule 11    docusate sodium (COLACE) 100 MG capsule Take 1 capsule (100 mg total) by mouth 3 (three) times daily as needed for Constipation.  0    [START ON 9/4/2020] ergocalciferol (ERGOCALCIFEROL) 50,000 unit Cap Take 1 capsule (50,000 Units total) by mouth every 7 days. 4 capsule 5    famotidine (PEPCID) 20 MG tablet Take 1 tablet (20 mg total) by mouth every evening. 30 tablet 0    fluticasone-salmeterol 250-50 mcg/dose (ADVAIR) 250-50 mcg/dose diskus inhaler Inhale 1 puff into the lungs 2 (two) times daily.      montelukast (SINGULAIR) 10 mg tablet Take 10 mg by mouth once daily.      mycophenolate (CELLCEPT) 250 mg Cap Take 4 capsules (1,000 mg total) by mouth 2 (two) times daily. 240 capsule 11    nystatin (MYCOSTATIN) 100,000 unit/mL suspension Take 5 mLs (500,000 Units total) by mouth 2 hours after meals and at bedtime. STOP 10/1/20 480 mL 0    oxyCODONE (ROXICODONE) 5 MG immediate release tablet Take 1 tablet (5 mg total) by mouth every 6 (six) hours as needed for Pain. 10 tablet 0    paroxetine (PAXIL) 20 MG tablet Take 20 mg by mouth every evening.      predniSONE (DELTASONE) 5 MG tablet Take by mouth daily:  20mg 8/30-9/30, 15mg 10/1-10/31, 10mg 11/1-11/30, then 5mg  daily beginning 12/1 120 tablet 11    sertraline (ZOLOFT) 50 MG tablet Take 50 mg by mouth once daily.      sodium bicarbonate 650 MG tablet Take 2 tablets (1,300 mg total) by mouth 3 (three) times daily. 180 tablet 11    sulfamethoxazole-trimethoprim 400-80mg (BACTRIM,SEPTRA) 400-80 mg per tablet Take 1 tablet by mouth every Mon, Wed, Fri. STOP 8/21/21 12 tablet 11    tacrolimus (PROGRAF) 1 MG Cap Take 6 capsules (6 mg total) by mouth every 12 (twelve) hours. 360 capsule 11    valGANciclovir (VALCYTE) 450 mg Tab Take 1 tablet (450 mg total) by mouth every Mon, Wed, Fri. STOP 2/23/21 12 tablet 5     No current facility-administered medications for this visit.        Pharmacy Interventions/Recommendations:     1) Graft Function & Immunosuppression Issues: SCr 5.2, continues to trend down.  On tac/mmf/pred taper    2) Opportunistic Infection prophylaxis:   PCP ppx: Bactrim until 8/21/21  CMV ppx: Valcyte until 2/23/21  Fungal ppx: Nystatin until 10/1/20    3) Donor Serologies & Monitoring:     Donor CMV Status: Positive  Donor HCV Status:   Donor HBcAb:   Donor HBV GLENYS: Negative  Donor HCV GLENYS: Negative    4) Pain Management & Bowel Regimen: oxy 5 but has not been requiring much.    5) Blood Pressure Management: 116/80 sitting, 117/92 standing; 138/79 in clinic    6) Blood Sugar Management & Follow-up: n/a    7) Electrolyte Management: CO2=19 on sodium bicarb 1300 mg tid, continue to monitor    8) OTHER medication follow-up (patient assistance, held medications, etc):   -gets insurance 10/1/20    9) Reinforced medication education conducted in the hospital, including medication indications, dosing, administration, side effects, monitoring-- including timing of immunosuppressant levels.     Patient received their FIRST fill of medications from [facility].  Discussed the process for obtaining refills of medications, including verifying the dose and mailing address to have medications delivered.     Bryant and his  caregivers verbalized understanding and had the opportunity to ask questions.

## 2020-09-03 NOTE — TELEPHONE ENCOUNTER
Results reviewed with patient and Renal diet discussed with patient.  Repeat labs schedule for tomorrow 9/4/2020.    ----- Message from Angelia Ritter MD sent at 9/3/2020  2:26 PM CDT -----  The following message sent to patient via [facility]:  Keep drinking plenty of fluids.  Your creatinine is still in the 5s, and if it remains elevated next week, we will plan a repeat renal ultrasound and kidney biopsy.  Let us know if you are putting out more urine than you are able to drink.  Your phosphorus is above normal and potassium is high normal.  I recommend to follow a LOW phosphorus , sodium, potassium [renal] diet until the creatinine falls even further and the kidney is working better. Also, take a phos binder with each meal-let us know which type you used prior to transplant so we can add to your medicine list.  Your tacrolimus level remains low, but it is steadily improving.  No change for now.  Repeat labs in the morning.

## 2020-09-03 NOTE — PROGRESS NOTES
"1ST NURSING VISIT POST DISCHARGE NOTE    1st RN appointment with Bryant Azevedo post discharge 9/2/2020 s/p kidney transplant 8/27/20.  Patient's caregiver Mehreen accompanied him.  Patient reports none.  Patient says that he that he is sleeping well.  Incision intact with staples.  Patient has N/A.  Patient that he is able to explain daily incision care and showering instructions.  Reviewed I&O monitoring, measuring, and recording, and the need for hydration (i.e., at least 2 liters of water daily with minimal caffeine and no grapefruit products).  Medication list and rationale were reviewed.  Patient did bring blue medication card and medication bottles for review.  Patient reports that he has stopped Dulcolax and has continued Colace.  Patient has had a bowel movement.  Patient expressed understanding of daily care including BID VS, medications, and I&O documentation.  Patient made aware of today's creatinine level: 5.5.  Patient aware that coordinator will review today's labs with a transplant physician and call the patient with any dose changes indicated.  Next lab appointment scheduled for 9/4/2020.  First post-operative transplant team appointment with labs scheduled for 9/16/2020.    Using the Kidney Transplant Patient Reference Manual, the patient submitted his open book "Self-assessment of Kidney Transplant Patient Knowledge" test, which was completed in the transplant clinic this morning before 1st nursing visit.  This test includes questions regarding critical dose medications commonly used after kidney transplant, medication dosing and side effects, importance of timed lab draws, important signs and symptoms to report 24/7 immediately post-transplant as well as how to contact the transplant team 24/7.    Test Score: 23/25    After completing the test, the patient was given a copy of the Self-assessment Answer Key to reinforce accurate learning of test content.  Patient expressed his understanding of the " value of the information included in the self-assessment test.

## 2020-09-04 ENCOUNTER — TELEPHONE (OUTPATIENT)
Dept: TRANSPLANT | Facility: CLINIC | Age: 50
End: 2020-09-04

## 2020-09-04 ENCOUNTER — LAB VISIT (OUTPATIENT)
Dept: LAB | Facility: HOSPITAL | Age: 50
End: 2020-09-04
Attending: INTERNAL MEDICINE
Payer: MEDICARE

## 2020-09-04 DIAGNOSIS — Z94.0 KIDNEY REPLACED BY TRANSPLANT: ICD-10-CM

## 2020-09-04 DIAGNOSIS — N17.9 AKI (ACUTE KIDNEY INJURY): Primary | ICD-10-CM

## 2020-09-04 LAB
ALBUMIN SERPL BCP-MCNC: 3 G/DL (ref 3.5–5.2)
ANION GAP SERPL CALC-SCNC: 9 MMOL/L (ref 8–16)
BASOPHILS # BLD AUTO: 0.09 K/UL (ref 0–0.2)
BASOPHILS NFR BLD: 0.7 % (ref 0–1.9)
BUN SERPL-MCNC: 109 MG/DL (ref 6–20)
CALCIUM SERPL-MCNC: 9.1 MG/DL (ref 8.7–10.5)
CHLORIDE SERPL-SCNC: 107 MMOL/L (ref 95–110)
CO2 SERPL-SCNC: 25 MMOL/L (ref 23–29)
CREAT SERPL-MCNC: 5.4 MG/DL (ref 0.5–1.4)
DIFFERENTIAL METHOD: ABNORMAL
EOSINOPHIL # BLD AUTO: 0.5 K/UL (ref 0–0.5)
EOSINOPHIL NFR BLD: 3.7 % (ref 0–8)
ERYTHROCYTE [DISTWIDTH] IN BLOOD BY AUTOMATED COUNT: 13 % (ref 11.5–14.5)
EST. GFR  (AFRICAN AMERICAN): 13.1 ML/MIN/1.73 M^2
EST. GFR  (NON AFRICAN AMERICAN): 11.3 ML/MIN/1.73 M^2
GLUCOSE SERPL-MCNC: 91 MG/DL (ref 70–110)
HCT VFR BLD AUTO: 38.8 % (ref 40–54)
HGB BLD-MCNC: 12.7 G/DL (ref 14–18)
IMM GRANULOCYTES # BLD AUTO: 0.51 K/UL (ref 0–0.04)
IMM GRANULOCYTES NFR BLD AUTO: 3.8 % (ref 0–0.5)
LYMPHOCYTES # BLD AUTO: 1.5 K/UL (ref 1–4.8)
LYMPHOCYTES NFR BLD: 11.3 % (ref 18–48)
MAGNESIUM SERPL-MCNC: 2.4 MG/DL (ref 1.6–2.6)
MCH RBC QN AUTO: 31.2 PG (ref 27–31)
MCHC RBC AUTO-ENTMCNC: 32.7 G/DL (ref 32–36)
MCV RBC AUTO: 95 FL (ref 82–98)
MONOCYTES # BLD AUTO: 0.9 K/UL (ref 0.3–1)
MONOCYTES NFR BLD: 6.8 % (ref 4–15)
NEUTROPHILS # BLD AUTO: 9.8 K/UL (ref 1.8–7.7)
NEUTROPHILS NFR BLD: 73.7 % (ref 38–73)
NRBC BLD-RTO: 0 /100 WBC
PHOSPHATE SERPL-MCNC: 6 MG/DL (ref 2.7–4.5)
PLATELET # BLD AUTO: 213 K/UL (ref 150–350)
PMV BLD AUTO: 12 FL (ref 9.2–12.9)
POTASSIUM SERPL-SCNC: 4.7 MMOL/L (ref 3.5–5.1)
RBC # BLD AUTO: 4.07 M/UL (ref 4.6–6.2)
SODIUM SERPL-SCNC: 141 MMOL/L (ref 136–145)
TACROLIMUS BLD-MCNC: 7.3 NG/ML (ref 5–15)
WBC # BLD AUTO: 13.34 K/UL (ref 3.9–12.7)

## 2020-09-04 PROCEDURE — 80197 ASSAY OF TACROLIMUS: CPT

## 2020-09-04 PROCEDURE — 80069 RENAL FUNCTION PANEL: CPT

## 2020-09-04 PROCEDURE — 85025 COMPLETE CBC W/AUTO DIFF WBC: CPT

## 2020-09-04 PROCEDURE — 83735 ASSAY OF MAGNESIUM: CPT

## 2020-09-04 PROCEDURE — 36415 COLL VENOUS BLD VENIPUNCTURE: CPT

## 2020-09-04 NOTE — TELEPHONE ENCOUNTER
Spoke with Pt his output from 9/3- 3680/in take 2955, 9/4/2020 so far out  900 and in 2200. He denies any swelling, SOB and , B/P running 110/70's. Asked when next labs.

## 2020-09-05 NOTE — TELEPHONE ENCOUNTER
Confirmed Message, Pt did not start Phos Binders as he did not have any with him. Will have labs Monday.    ----- Message from Angelia Ritter MD sent at 9/4/2020  3:40 PM CDT -----  The following message sent to patient via [facility]:  Your labs look ok today.  Phosphorus is better.  Continue with we discussed yesterday.  I recommend repeat labs Tuesday with an ultrasound.  If her creatinine is not dropping by than, I will recommend a kidney biopsy later next week.    NOTE: We need to add the name of his phosphorus binder to his med list.

## 2020-09-07 ENCOUNTER — LAB VISIT (OUTPATIENT)
Dept: LAB | Facility: HOSPITAL | Age: 50
End: 2020-09-07
Attending: INTERNAL MEDICINE
Payer: MEDICARE

## 2020-09-07 DIAGNOSIS — Z94.0 KIDNEY REPLACED BY TRANSPLANT: ICD-10-CM

## 2020-09-07 LAB
ALBUMIN SERPL BCP-MCNC: 3.2 G/DL (ref 3.5–5.2)
ANION GAP SERPL CALC-SCNC: 9 MMOL/L (ref 8–16)
BASOPHILS # BLD AUTO: 0.09 K/UL (ref 0–0.2)
BASOPHILS NFR BLD: 0.7 % (ref 0–1.9)
BUN SERPL-MCNC: 83 MG/DL (ref 6–20)
CALCIUM SERPL-MCNC: 8.9 MG/DL (ref 8.7–10.5)
CHLORIDE SERPL-SCNC: 105 MMOL/L (ref 95–110)
CO2 SERPL-SCNC: 26 MMOL/L (ref 23–29)
CREAT SERPL-MCNC: 3.9 MG/DL (ref 0.5–1.4)
DIFFERENTIAL METHOD: ABNORMAL
EOSINOPHIL # BLD AUTO: 0.4 K/UL (ref 0–0.5)
EOSINOPHIL NFR BLD: 3 % (ref 0–8)
ERYTHROCYTE [DISTWIDTH] IN BLOOD BY AUTOMATED COUNT: 13.1 % (ref 11.5–14.5)
EST. GFR  (AFRICAN AMERICAN): 19.4 ML/MIN/1.73 M^2
EST. GFR  (NON AFRICAN AMERICAN): 16.7 ML/MIN/1.73 M^2
GLUCOSE SERPL-MCNC: 114 MG/DL (ref 70–110)
HCT VFR BLD AUTO: 40.2 % (ref 40–54)
HGB BLD-MCNC: 13 G/DL (ref 14–18)
IMM GRANULOCYTES # BLD AUTO: 0.4 K/UL (ref 0–0.04)
IMM GRANULOCYTES NFR BLD AUTO: 3 % (ref 0–0.5)
LYMPHOCYTES # BLD AUTO: 2 K/UL (ref 1–4.8)
LYMPHOCYTES NFR BLD: 14.8 % (ref 18–48)
MAGNESIUM SERPL-MCNC: 1.9 MG/DL (ref 1.6–2.6)
MCH RBC QN AUTO: 31.3 PG (ref 27–31)
MCHC RBC AUTO-ENTMCNC: 32.3 G/DL (ref 32–36)
MCV RBC AUTO: 97 FL (ref 82–98)
MONOCYTES # BLD AUTO: 0.9 K/UL (ref 0.3–1)
MONOCYTES NFR BLD: 6.6 % (ref 4–15)
NEUTROPHILS # BLD AUTO: 9.6 K/UL (ref 1.8–7.7)
NEUTROPHILS NFR BLD: 71.9 % (ref 38–73)
NRBC BLD-RTO: 0 /100 WBC
PHOSPHATE SERPL-MCNC: 5.4 MG/DL (ref 2.7–4.5)
PLATELET # BLD AUTO: 223 K/UL (ref 150–350)
PMV BLD AUTO: 12 FL (ref 9.2–12.9)
POTASSIUM SERPL-SCNC: 5 MMOL/L (ref 3.5–5.1)
RBC # BLD AUTO: 4.16 M/UL (ref 4.6–6.2)
SODIUM SERPL-SCNC: 140 MMOL/L (ref 136–145)
TACROLIMUS BLD-MCNC: 7.7 NG/ML (ref 5–15)
WBC # BLD AUTO: 13.27 K/UL (ref 3.9–12.7)

## 2020-09-07 PROCEDURE — 80197 ASSAY OF TACROLIMUS: CPT

## 2020-09-07 PROCEDURE — 80069 RENAL FUNCTION PANEL: CPT

## 2020-09-07 PROCEDURE — 85025 COMPLETE CBC W/AUTO DIFF WBC: CPT

## 2020-09-07 PROCEDURE — 83735 ASSAY OF MAGNESIUM: CPT

## 2020-09-08 ENCOUNTER — NURSE TRIAGE (OUTPATIENT)
Dept: ADMINISTRATIVE | Facility: CLINIC | Age: 50
End: 2020-09-08

## 2020-09-08 NOTE — TELEPHONE ENCOUNTER
Spoke with pt:     kidney transplant#1:  8/27/20    No BPA used    States does not need on call nurse services has spoken to another nurse via [email or web address].       Reason for Disposition   Caller has already spoken with another triager or PCP AND has further questions AND triager able to answer questions.    Protocols used: NO CONTACT OR DUPLICATE CONTACT CALL-A-AH

## 2020-09-09 ENCOUNTER — OFFICE VISIT (OUTPATIENT)
Dept: TRANSPLANT | Facility: CLINIC | Age: 50
End: 2020-09-09
Payer: MEDICARE

## 2020-09-09 VITALS
OXYGEN SATURATION: 99 % | SYSTOLIC BLOOD PRESSURE: 156 MMHG | DIASTOLIC BLOOD PRESSURE: 93 MMHG | TEMPERATURE: 99 F | RESPIRATION RATE: 18 BRPM | BODY MASS INDEX: 25.06 KG/M2 | HEIGHT: 64 IN | HEART RATE: 73 BPM | WEIGHT: 146.81 LBS

## 2020-09-09 VITALS
SYSTOLIC BLOOD PRESSURE: 156 MMHG | DIASTOLIC BLOOD PRESSURE: 93 MMHG | HEIGHT: 64 IN | BODY MASS INDEX: 25.06 KG/M2 | OXYGEN SATURATION: 99 % | RESPIRATION RATE: 18 BRPM | HEART RATE: 73 BPM | WEIGHT: 146.81 LBS | TEMPERATURE: 99 F

## 2020-09-09 DIAGNOSIS — N25.81 SECONDARY HYPERPARATHYROIDISM OF RENAL ORIGIN: ICD-10-CM

## 2020-09-09 DIAGNOSIS — Z29.89 PROPHYLACTIC IMMUNOTHERAPY: ICD-10-CM

## 2020-09-09 DIAGNOSIS — I15.1 HYPERTENSION SECONDARY TO OTHER RENAL DISORDERS: ICD-10-CM

## 2020-09-09 DIAGNOSIS — Z91.89 AT RISK FOR OPPORTUNISTIC INFECTIONS: Primary | ICD-10-CM

## 2020-09-09 DIAGNOSIS — Z94.0 KIDNEY REPLACED BY TRANSPLANT: ICD-10-CM

## 2020-09-09 DIAGNOSIS — Z94.0 DECEASED-DONOR KIDNEY TRANSPLANT: Primary | ICD-10-CM

## 2020-09-09 DIAGNOSIS — Z94.0 IMMUNOSUPPRESSIVE MANAGEMENT ENCOUNTER FOLLOWING KIDNEY TRANSPLANT: ICD-10-CM

## 2020-09-09 DIAGNOSIS — Z94.0 S/P KIDNEY TRANSPLANT: ICD-10-CM

## 2020-09-09 DIAGNOSIS — Z91.89 AT RISK FOR OPPORTUNISTIC INFECTIONS: ICD-10-CM

## 2020-09-09 DIAGNOSIS — Z79.899 IMMUNOSUPPRESSIVE MANAGEMENT ENCOUNTER FOLLOWING KIDNEY TRANSPLANT: ICD-10-CM

## 2020-09-09 PROCEDURE — 99215 OFFICE O/P EST HI 40 MIN: CPT | Mod: S$PBB,,, | Performed by: INTERNAL MEDICINE

## 2020-09-09 PROCEDURE — 99214 OFFICE O/P EST MOD 30 MIN: CPT | Mod: PBBFAC | Performed by: TRANSPLANT SURGERY

## 2020-09-09 PROCEDURE — 99999 PR PBB SHADOW E&M-EST. PATIENT-LVL IV: ICD-10-PCS | Mod: PBBFAC,,, | Performed by: TRANSPLANT SURGERY

## 2020-09-09 PROCEDURE — 99215 PR OFFICE/OUTPT VISIT, EST, LEVL V, 40-54 MIN: ICD-10-PCS | Mod: S$PBB,,, | Performed by: INTERNAL MEDICINE

## 2020-09-09 PROCEDURE — 99214 OFFICE O/P EST MOD 30 MIN: CPT | Mod: 24,S$PBB,GC, | Performed by: TRANSPLANT SURGERY

## 2020-09-09 PROCEDURE — 99215 OFFICE O/P EST HI 40 MIN: CPT | Mod: PBBFAC,27 | Performed by: INTERNAL MEDICINE

## 2020-09-09 PROCEDURE — 99999 PR PBB SHADOW E&M-EST. PATIENT-LVL V: ICD-10-PCS | Mod: PBBFAC,,, | Performed by: INTERNAL MEDICINE

## 2020-09-09 PROCEDURE — 99999 PR PBB SHADOW E&M-EST. PATIENT-LVL V: CPT | Mod: PBBFAC,,, | Performed by: INTERNAL MEDICINE

## 2020-09-09 PROCEDURE — 99999 PR PBB SHADOW E&M-EST. PATIENT-LVL IV: CPT | Mod: PBBFAC,,, | Performed by: TRANSPLANT SURGERY

## 2020-09-09 PROCEDURE — 99214 PR OFFICE/OUTPT VISIT, EST, LEVL IV, 30-39 MIN: ICD-10-PCS | Mod: 24,S$PBB,GC, | Performed by: TRANSPLANT SURGERY

## 2020-09-09 NOTE — LETTER
September 9, 2020        Leslie Roche Jr.  [address]; [address]  [place] [address]  Phone: [phone]  Fax: [phone]             [facility]- [facility]  [address]  [facility] [address]  Phone: [phone]   Patient: Bryant Azevedo   MR Number: [id number]   YOB: 1970   Date of Visit: 9/9/2020       Dear Dr. Leslie Roche Jr.    Thank you for referring Bryant Azevedo to me for evaluation. Attached you will find relevant portions of my assessment and plan of care.    If you have questions, please do not hesitate to call me. I look forward to following Bryant Azevedo along with you.    Sincerely,    Angelia Ritter MD    Enclosure    If you would like to receive this communication electronically, please contact [email or web address] or [phone] to request [employer] access.    [employer] is a tool which provides read-only access to select patient information with whom you have a relationship. Its easy to use and provides real time access to review your patients record including encounter summaries, notes, results, and demographic information.    If you feel you have received this communication in error or would no longer like to receive these types of communications, please e-mail [email or web address]

## 2020-09-09 NOTE — PROGRESS NOTES
Bryant Azevedo was discussed  with Dr. Hughes as outlined.  I have personally seen, interviewed and evaluated him, reviewed the information in this note, and agree with the findings listed in the attached encounter.    Issues addressed this encounter:    1. -donor kidney transplant     2. Immunosuppressive management encounter following kidney transplant     3. At risk for opportunistic infections     4. Hypertension secondary to other renal disorders     5. Secondary hyperparathyroidism of renal origin        Plan as outlined.  Improving post renal transplant, but still with suboptimal creat. HTN noted, BPs at home better. Monitor. Continue tacrolimus-based immunosuppression.  Will continue to watch for signs, symptoms and levels from side effects or drug toxicity.   Target level 8-10.

## 2020-09-09 NOTE — PROGRESS NOTES
Kidney Post-Transplant Assessment    Referring Physician: Leslie Roche Jr.  Current Nephrologist: Leslie Roche Jr.    ORGAN: LEFT KIDNEY  Donor Type: donation after circulatory death   PHS Increased Risk: no  Cold Ischemia: 1,261 mins  Induction Medications: simulect - basiliximab    Subjective:     CC:  Reassessment of renal allograft function and management of chronic immunosuppression.    Kidney History:  Mr. Azevedo is a 49 y.o. year old White male who received a donation after circulatory death  kidney transplant on 8/27/20. His most recent creatinine is 3.9 mg/dl. He takes tacrolimus for maintenance immunosuppression. His post transplant course has been uncomplicated to date. Noticed intermittent post op hypotension and resolved after stopping antihypertensives.     Since discharge from hospital (9/2/20) he denies any hospitalizations or ER visits. Doing great. Not complaining any headache, no vision problems, no cardiac, no dysuria, no hematuria. No tremors    Review of Systems  Constitutional: Negative for fever, appetite change and fatigue.   HENT: Negative for hearing loss, sore throat and mouth sores.   Eyes: Negative for photophobia, pain and visual disturbance.   Respiratory: Negative for cough, chest tightness, shortness of breath and wheezing.   Cardiovascular: Negative for chest pain, palpitations and leg swelling.   Gastrointestinal: Negative for nausea, vomiting, abdominal pain, diarrhea, constipation, blood in stool and abdominal distention.   Genitourinary: Negative for dysuria, urgency, frequency, hematuria, decreased urine volume, difficulty urinating  Musculoskeletal: Negative for back pain, joint swelling, arthralgias and gait problem.   Skin: Negative for pallor, rash and wound.   Neurological: Negative for dizziness, tremors, syncope, weakness, light-headedness and headaches.   Hematological: Negative for adenopathy. Does not bruise/bleed easily.   Psychiatric/Behavioral:  Negative for confusion, sleep disturbance and dysphoric mood. The patient is not nervous/anxious.     Medications:   Current Outpatient Medications   Medication Sig Dispense Refill    calcitRIOL (ROCALTROL) 0.5 MCG Cap Take 2 capsules (1 mcg total) by mouth once daily. 60 capsule 11    docusate sodium (COLACE) 100 MG capsule Take 1 capsule (100 mg total) by mouth 3 (three) times daily as needed for Constipation.  0    ergocalciferol (ERGOCALCIFEROL) 50,000 unit Cap Take 1 capsule (50,000 Units total) by mouth every 7 days. 4 capsule 5    famotidine (PEPCID) 20 MG tablet Take 1 tablet (20 mg total) by mouth every evening. 30 tablet 0    fluticasone-salmeterol 250-50 mcg/dose (ADVAIR) 250-50 mcg/dose diskus inhaler Inhale 1 puff into the lungs 2 (two) times daily.      montelukast (SINGULAIR) 10 mg tablet Take 10 mg by mouth once daily.      mycophenolate (CELLCEPT) 250 mg Cap Take 4 capsules (1,000 mg total) by mouth 2 (two) times daily. 240 capsule 11    nystatin (MYCOSTATIN) 100,000 unit/mL suspension Take 5 mLs (500,000 Units total) by mouth 2 hours after meals and at bedtime. STOP 10/1/20 480 mL 0    oxyCODONE (ROXICODONE) 5 MG immediate release tablet Take 1 tablet (5 mg total) by mouth every 6 (six) hours as needed for Pain. 10 tablet 0    paroxetine (PAXIL) 20 MG tablet Take 20 mg by mouth every evening.      predniSONE (DELTASONE) 5 MG tablet Take by mouth daily:  20mg 8/30-9/30, 15mg 10/1-10/31, 10mg 11/1-11/30, then 5mg daily beginning 12/1 120 tablet 11    sertraline (ZOLOFT) 50 MG tablet Take 50 mg by mouth once daily.      sodium bicarbonate 650 MG tablet Take 2 tablets (1,300 mg total) by mouth 3 (three) times daily. 180 tablet 11    sulfamethoxazole-trimethoprim 400-80mg (BACTRIM,SEPTRA) 400-80 mg per tablet Take 1 tablet by mouth every Mon, Wed, Fri. STOP 8/21/21 12 tablet 11    tacrolimus (PROGRAF) 1 MG Cap Take 6 capsules (6 mg total) by mouth every 12 (twelve) hours. 360 capsule 11  "   valGANciclovir (VALCYTE) 450 mg Tab Take 1 tablet (450 mg total) by mouth every Mon, Wed, Fri. STOP 2/23/21 12 tablet 5     No current facility-administered medications for this visit.        Objective:   Blood pressure (!) 156/93, pulse 73, temperature 98.5 °F (36.9 °C), temperature source Oral, resp. rate 18, height 5' 4" (1.626 m), weight 66.6 kg (146 lb 13.2 oz), SpO2 99 %.body mass index is 25.2 kg/m².    Physical Exam  General: No acute distress, well groomed, alert and oriented x 3  HEENT: Normocephalic, atraumatic, EOM's intact bilaterally, external inspection of ears and nose normal, moist mucous membranes, no oral ulcerations/lesions  Neck: Supple, symmetrical, trachea midline, no thyromegaly, no JVD  Respiratory: Clear to auscultation bilaterally, respirations unlabored, no rales/rhonchi/wheezing  Cardiovacular: Regular rate and rhythm, S1, S2 normal, no murmurs, rubs or gallops  Gastrointestinal: Soft, non-tender, bowel sounds normal, no hepatosplenomegaly  Renal allograft exam: No tenderness, no bruits, normal exam  Musculoskeletal: No knee or ankle joint tenderness or swelling.   Extremities: No clubbing or cyanosis of bilateral upper extremities; no lower extremity edema bilaterally, radial pulses 2+ bilaterally, symmetric  Skin: warm and dry; no rash on exposed skin  Neurologic: CN grossly intact    Labs:  Lab Results   Component Value Date    WBC 13.27 (H) 09/07/2020    HGB 13.0 (L) 09/07/2020    HCT 40.2 09/07/2020     09/07/2020    K 5.0 09/07/2020     09/07/2020    CO2 26 09/07/2020    BUN 83 (H) 09/07/2020    CREATININE 3.9 (H) 09/07/2020    EGFRNONAA 16.7 (A) 09/07/2020    CALCIUM 8.9 09/07/2020    PHOS 5.4 (H) 09/07/2020    MG 1.9 09/07/2020    ALBUMIN 3.2 (L) 09/07/2020    AST 34 08/27/2020    ALT 27 08/27/2020    UTPCR 0.55 (H) 09/07/2020    .0 (H) 08/27/2020    TACROLIMUS 7.7 09/07/2020       No results found for: EXTANC, EXTWBC, EXTSEGS, EXTPLATELETS, EXTHEMOGLOBI, " EXTHEMATOCRI, EXTCREATININ, EXTSODIUM, EXTPOTASSIUM, EXTBUN, EXTCO2, EXTCALCIUM, EXTPHOSPHORU, EXTGLUCOSE, EXTALBUMIN, EXTAST, EXTALT, EXTBILITOTAL, EXTLIPASE, EXTAMYLASE    No results found for: EXTCYCLOSLVL, EXTSIROLIMUS, EXTTACROLVL, EXTPROTCRE, EXTPTHINTACT, EXTPROTEINUA, EXTWBCUA, EXTRBCUA    Labs were reviewed with the patient    Assessment/Plan:     No diagnosis found.    Mr. Azevedo is a 49 y.o. 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 s/p DCD Kidney 8/27/20: baseline Cr unknown, improving  - last Cr 3.9 - 9/7/20    # Immunosuppression:   - continue Prograf 6/6, last FK-506 level 7.7 from 9/7/20  - continue  mg- 4 pills BID   - continue prednisone taper daily   - will monitor closely for toxicities    # Antimicrobial Prophylaxis:   - continue bactrim for PJP prophylaxis until 8/21/21  - continue Valcyte for CMV prophylaxis until 2/23/21  - continue nystatin for thrush prophylaxis for 1 month, 10/1/20    # Infectious Surveillance:   - last CMV IgG negative 8/28/20  - last BK urine PCR pending this visit, not done earlier    # HTN: BPs well controlled   - continue with home blood pressure monitoring  - low salt and healthy life discussed with the patient    # Metabolic Bone Disease/Secondary Hyperparathyroidism:  - will monitor PTH, calcium, and phosphorus as per our center protocol  C/w calcitriol  Will check PTH at the end of sept 2020    # Vitamin D Deficiency: vitamin D level 26  - c/w calcitriol for now. Repeat 3 months    # Fluids and electrolytes  Mag acceptable  K is 5  Mag acceptable    # Protienuria  Mild  Will check up/uc again at the end of month  #  Case was discussed with Dr. Awan.      Follow-up:   Clinic: return to transplant clinic weekly for the first month after transplant; every 2 weeks during months 2-3; then at 6-, 9-, 12-, 18-, 24-, and 36- months post-transplant to reassess for complications from immunosuppression  toxicity and monitor for rejection.  Annually thereafter.    Labs: since patient remains at high risk for rejection and drug-related complications that warrant close monitoring, labs will be ordered as follows: continue twice weekly CBC, renal panel, and drug level for first month; then same labs once weekly through 3rd month post-transplant.  Urine for UA and protein/creatinine ratio monthly.  Serum BK - PCR at 1-, 3-, 6-, 9-, 12-, 18-, 24-, 36-, 48-, and 60- months post-transplant.  Hepatic panel at 1-, 2-, 3-, 6-, 9-, 12-, 18-, 24-, and 36- months post-transplant.    Bijan Berumen MD       Education:   Material provided to the patient.  Patient reminded to call with any health changes since these can be early signs of significant complications.  Also, I advised the patient to be sure any new medications or changes of old medications are discussed with either a pharmacist or physician knowledgeable with transplant to avoid rejection/drug toxicity related to significant drug interactions.

## 2020-09-09 NOTE — PROGRESS NOTES
Transplant Surgery  Kidney Transplant Recipient Follow-up    Referring Physician: Leslie Roche Jr.  Current Nephrologist: Leslie Roche Jr.    Subjective:     Chief Complaint: Bryant Azevedo is a 49 y.o. year old White male who is status post Kidney transplant performed on 8/27/2020.    ORGAN: LEFT KIDNEY   Disease Etiology: Other, Specify - unknown  Donor Type: Donation after Circulatory Death    Donor CMV Status: Positive   Donor HBcAB: Negative   Donor HCV Status: Negative     History of Present Illness: He reports no concerns.  From a transplant perspective, he reports normal urination.  Bryant is here for management of his immunosuppression medication.  Bryant states that his immunosuppression is being well tolerated.  Hypertension is is reportedly well controlled.    Review of Systems   Constitutional: Negative for chills, diaphoresis and fever.   HENT: Negative for congestion, nosebleeds, sinus pressure, sneezing, sore throat and trouble swallowing.    Eyes: Negative for discharge and visual disturbance.   Respiratory: Negative for cough, chest tightness and shortness of breath.    Cardiovascular: Negative for chest pain.   Gastrointestinal: Negative for abdominal distention, abdominal pain, constipation, diarrhea and nausea.   Genitourinary: Negative for difficulty urinating, dysuria, frequency and hematuria.   Skin: Negative for color change and pallor.   Neurological: Negative for tremors, seizures and syncope.   Psychiatric/Behavioral: Negative for confusion, self-injury and suicidal ideas.          Objective:     Physical Exam  Constitutional:       Appearance: He is well-developed.   HENT:      Head: Normocephalic and atraumatic.   Eyes:      General: No scleral icterus.     Conjunctiva/sclera: Conjunctivae normal.      Pupils: Pupils are equal, round, and reactive to light.   Neck:      Musculoskeletal: Normal range of motion and neck supple.   Cardiovascular:      Rate and Rhythm:  Normal rate and regular rhythm.   Pulmonary:      Effort: Pulmonary effort is normal.      Breath sounds: Normal breath sounds.   Abdominal:      General: Bowel sounds are normal. There is no distension.      Palpations: Abdomen is soft.      Tenderness: There is no abdominal tenderness. There is no guarding or rebound.          Comments: Incision clean and dry with staples intact. No local erythema or drainage.    Musculoskeletal: Normal range of motion.   Skin:     General: Skin is warm and dry.   Neurological:      Mental Status: He is alert and oriented to person, place, and time.              Lab Results   Component Value Date    CREATININE 3.9 (H) 09/07/2020    BUN 83 (H) 09/07/2020     Lab Results   Component Value Date    WBC 13.27 (H) 09/07/2020    HGB 13.0 (L) 09/07/2020    HCT 40.2 09/07/2020    HCT 32 (L) 08/27/2020     09/07/2020     Lab Results   Component Value Date    TACROLIMUS 7.7 09/07/2020         Assessment and Plan:        · S/P Kidney transplant. Wound healing well with staples in place. No erythema or drainage. Follow up in 1 week for staple removal.   · Chronic immunosuppressive medications for rejection prophylaxis at target.  Plan: no adjustment needed.  · Continue monitoring symptoms, labs and drug levels for drug-related toxicity and side effects.  · Renal hypertension at target.    Follow-up: Patient reminded to call with any health changes, since these can be early signs of significant complications.  Also, I advised the patient to be sure any new medications or changes of old medications are discussed with either a pharmacist, or physician knowledgeable with transplant to avoid rejection/drug toxicity related to significant drug interactions.    Brittni Beltran MD       [facility] Patient Status  Functional Status: 100% - Normal, no complaints, no evidence of disease  Physical Capacity: No Limitations     Agree

## 2020-09-09 NOTE — PATIENT INSTRUCTIONS
From Dr. Merritt:  It is my privilege to participate in your post-transplant care!  Please be sure to let us know if you have any questions or concerns about your health care - we cannot help you if we do not know.  Don't forget we are on call 24/7 for any emergencies.   Best Wishes,  Dr. Merritt Staffeld Coit  Dr. Bijan Berumen

## 2020-09-09 NOTE — LETTER
September 10, 2020        Leslie Roche Jr.  [address]; [address]  [place] [facility] [address]  Phone: [phone]  Fax: [phone]             [facility]  [address]  [facility] [address]  Phone: [phone]   Patient: Bryant Azevedo   MR Number: [id number]   YOB: 1970   Date of Visit: 9/9/2020       Dear Dr. Leslie Roche Jr.    Thank you for referring Bryant Azevedo to me for evaluation. Attached you will find relevant portions of my assessment and plan of care.    If you have questions, please do not hesitate to call me. I look forward to following Bryant Azevedo along with you.    Sincerely,    Seferino Mendoza MD    Enclosure    If you would like to receive this communication electronically, please contact [email or web address] or [phone] to request [employer] access.    [employer] is a tool which provides read-only access to select patient information with whom you have a relationship. Its easy to use and provides real time access to review your patients record including encounter summaries, notes, results, and demographic information.    If you feel you have received this communication in error or would no longer like to receive these types of communications, please e-mail [email or web address]

## 2020-09-10 ENCOUNTER — LAB VISIT (OUTPATIENT)
Dept: LAB | Facility: HOSPITAL | Age: 50
End: 2020-09-10
Attending: INTERNAL MEDICINE
Payer: MEDICARE

## 2020-09-10 DIAGNOSIS — Z94.0 KIDNEY REPLACED BY TRANSPLANT: ICD-10-CM

## 2020-09-10 LAB
ALBUMIN SERPL BCP-MCNC: 3.3 G/DL (ref 3.5–5.2)
ALBUMIN SERPL BCP-MCNC: 3.3 G/DL (ref 3.5–5.2)
ALP SERPL-CCNC: 48 U/L (ref 55–135)
ALT SERPL W/O P-5'-P-CCNC: 11 U/L (ref 10–44)
ANION GAP SERPL CALC-SCNC: 8 MMOL/L (ref 8–16)
AST SERPL-CCNC: 14 U/L (ref 10–40)
BASOPHILS # BLD AUTO: 0.06 K/UL (ref 0–0.2)
BASOPHILS NFR BLD: 0.4 % (ref 0–1.9)
BILIRUB DIRECT SERPL-MCNC: 0.2 MG/DL (ref 0.1–0.3)
BILIRUB SERPL-MCNC: 0.5 MG/DL (ref 0.1–1)
BUN SERPL-MCNC: 55 MG/DL (ref 6–20)
CALCIUM SERPL-MCNC: 8.5 MG/DL (ref 8.7–10.5)
CHLORIDE SERPL-SCNC: 108 MMOL/L (ref 95–110)
CO2 SERPL-SCNC: 26 MMOL/L (ref 23–29)
CREAT SERPL-MCNC: 3 MG/DL (ref 0.5–1.4)
DIFFERENTIAL METHOD: ABNORMAL
EOSINOPHIL # BLD AUTO: 0.2 K/UL (ref 0–0.5)
EOSINOPHIL NFR BLD: 1.6 % (ref 0–8)
ERYTHROCYTE [DISTWIDTH] IN BLOOD BY AUTOMATED COUNT: 12.9 % (ref 11.5–14.5)
EST. GFR  (AFRICAN AMERICAN): 26.6 ML/MIN/1.73 M^2
EST. GFR  (NON AFRICAN AMERICAN): 23 ML/MIN/1.73 M^2
GLUCOSE SERPL-MCNC: 95 MG/DL (ref 70–110)
HCT VFR BLD AUTO: 37.9 % (ref 40–54)
HGB BLD-MCNC: 12.1 G/DL (ref 14–18)
IMM GRANULOCYTES # BLD AUTO: 0.19 K/UL (ref 0–0.04)
IMM GRANULOCYTES NFR BLD AUTO: 1.3 % (ref 0–0.5)
LYMPHOCYTES # BLD AUTO: 1.8 K/UL (ref 1–4.8)
LYMPHOCYTES NFR BLD: 12.2 % (ref 18–48)
MAGNESIUM SERPL-MCNC: 1.6 MG/DL (ref 1.6–2.6)
MCH RBC QN AUTO: 31.7 PG (ref 27–31)
MCHC RBC AUTO-ENTMCNC: 31.9 G/DL (ref 32–36)
MCV RBC AUTO: 99 FL (ref 82–98)
MONOCYTES # BLD AUTO: 0.7 K/UL (ref 0.3–1)
MONOCYTES NFR BLD: 4.9 % (ref 4–15)
NEUTROPHILS # BLD AUTO: 12 K/UL (ref 1.8–7.7)
NEUTROPHILS NFR BLD: 79.6 % (ref 38–73)
NRBC BLD-RTO: 0 /100 WBC
PHOSPHATE SERPL-MCNC: 4.3 MG/DL (ref 2.7–4.5)
PLATELET # BLD AUTO: 185 K/UL (ref 150–350)
PMV BLD AUTO: 11.7 FL (ref 9.2–12.9)
POTASSIUM SERPL-SCNC: 4.8 MMOL/L (ref 3.5–5.1)
PROT SERPL-MCNC: 5.9 G/DL (ref 6–8.4)
RBC # BLD AUTO: 3.82 M/UL (ref 4.6–6.2)
SODIUM SERPL-SCNC: 142 MMOL/L (ref 136–145)
TACROLIMUS BLD-MCNC: 9.1 NG/ML (ref 5–15)
WBC # BLD AUTO: 15 K/UL (ref 3.9–12.7)

## 2020-09-10 PROCEDURE — 36415 COLL VENOUS BLD VENIPUNCTURE: CPT

## 2020-09-10 PROCEDURE — 82247 BILIRUBIN TOTAL: CPT

## 2020-09-10 PROCEDURE — 80197 ASSAY OF TACROLIMUS: CPT

## 2020-09-10 PROCEDURE — 80069 RENAL FUNCTION PANEL: CPT

## 2020-09-10 PROCEDURE — 85025 COMPLETE CBC W/AUTO DIFF WBC: CPT

## 2020-09-10 PROCEDURE — 84075 ASSAY ALKALINE PHOSPHATASE: CPT

## 2020-09-10 PROCEDURE — 83735 ASSAY OF MAGNESIUM: CPT

## 2020-09-10 RX ORDER — TACROLIMUS 1 MG/1
CAPSULE ORAL
Qty: 330 CAPSULE | Refills: 11 | Status: SHIPPED | OUTPATIENT
Start: 2020-09-10 | End: 2020-09-14 | Stop reason: SDUPTHER

## 2020-09-10 NOTE — PROGRESS NOTES
Transplant Coordinator  8/26-9/2/2020     Pt admitted for a cadaveric kidney transplant from a DCD donor. ESRD secondary to HTN and congenital abnormality (unknown details).CMV D+/R-, CIT ~ 22 hours and Simulect induction.Pt was previously on PD.     Cr slowly trending down, 5.2 at time of d/c. Good UOP     5 day farah and ELEUTERIO both removed prior to d/c    RLQ incision XUAN with staples     Pt noted to have intermittent hypotension post op, but asymptomatic.  Pt will d/c to [facility], originally from [place] and plans to return home once cleared.     Labs 9/3 and RTC to meet with coordinator/pharmD

## 2020-09-10 NOTE — TELEPHONE ENCOUNTER
Patient repeated back and voice a understanding of orders.  Next labs 9/14/2020.    ----- Message from Angelia Ritter MD sent at 9/10/2020 12:56 PM CDT -----  The following message sent to patient via [facility]: Your tacrolimus level has continued to climb.  I am not clear if it will stabilize at 9 or get higher.  I recommend decreasing to 6 mg every morning and 5 mg nightly, and repeating the lab next Monday.  The kidney function is continuing to improve!  Keep up the great working have a wonderful weekend.

## 2020-09-11 LAB — CMV DNA SERPL NAA+PROBE-ACNC: NORMAL IU/ML

## 2020-09-14 ENCOUNTER — LAB VISIT (OUTPATIENT)
Dept: LAB | Facility: HOSPITAL | Age: 50
End: 2020-09-14
Attending: INTERNAL MEDICINE
Payer: MEDICARE

## 2020-09-14 ENCOUNTER — PROCEDURE VISIT (OUTPATIENT)
Dept: UROLOGY | Facility: CLINIC | Age: 50
End: 2020-09-14
Payer: MEDICARE

## 2020-09-14 VITALS
DIASTOLIC BLOOD PRESSURE: 94 MMHG | BODY MASS INDEX: 24.74 KG/M2 | HEART RATE: 72 BPM | HEIGHT: 64 IN | SYSTOLIC BLOOD PRESSURE: 139 MMHG | WEIGHT: 144.88 LBS | RESPIRATION RATE: 16 BRPM | TEMPERATURE: 98 F

## 2020-09-14 DIAGNOSIS — Z94.0 KIDNEY REPLACED BY TRANSPLANT: ICD-10-CM

## 2020-09-14 DIAGNOSIS — Z94.0 KIDNEY REPLACED BY TRANSPLANT: Primary | ICD-10-CM

## 2020-09-14 DIAGNOSIS — E83.42 HYPOMAGNESEMIA: ICD-10-CM

## 2020-09-14 LAB
CREAT UR-MCNC: 61 MG/DL (ref 23–375)
PROT UR-MCNC: 13 MG/DL (ref 0–15)
PROT/CREAT UR: 0.21 MG/G{CREAT} (ref 0–0.2)

## 2020-09-14 PROCEDURE — 52310 CYSTOSCOPY AND TREATMENT: CPT | Mod: PBBFAC | Performed by: UROLOGY

## 2020-09-14 PROCEDURE — 82570 ASSAY OF URINE CREATININE: CPT

## 2020-09-14 PROCEDURE — 52310 PR CYSTOSCOPY,REMV CALCULUS,SIMPLE: ICD-10-PCS | Mod: S$PBB,,, | Performed by: UROLOGY

## 2020-09-14 PROCEDURE — 52310 CYSTOSCOPY AND TREATMENT: CPT | Mod: S$PBB,,, | Performed by: UROLOGY

## 2020-09-14 RX ORDER — LIDOCAINE HYDROCHLORIDE 20 MG/ML
JELLY TOPICAL
Status: COMPLETED | OUTPATIENT
Start: 2020-09-14 | End: 2020-09-14

## 2020-09-14 RX ORDER — LANOLIN ALCOHOL/MO/W.PET/CERES
400 CREAM (GRAM) TOPICAL 2 TIMES DAILY
Qty: 60 TABLET | Refills: 11 | Status: SHIPPED | OUTPATIENT
Start: 2020-09-14 | End: 2020-10-15 | Stop reason: SDUPTHER

## 2020-09-14 RX ORDER — TACROLIMUS 1 MG/1
CAPSULE ORAL
Qty: 270 CAPSULE | Refills: 11 | Status: SHIPPED | OUTPATIENT
Start: 2020-09-14 | End: 2020-09-21 | Stop reason: SDUPTHER

## 2020-09-14 RX ADMIN — LIDOCAINE HYDROCHLORIDE: 20 JELLY TOPICAL at 10:09

## 2020-09-14 NOTE — TELEPHONE ENCOUNTER
VORB Decrease Tacrolimus 5mg in AM and 4mg in PM.  Start Mag Ox 400mg PO BID.  9/14/2020 labs reviewed.  _________________  Patient repeated back and voice a understanding of orders.  Next labs 9/17/2020.

## 2020-09-14 NOTE — PATIENT INSTRUCTIONS

## 2020-09-14 NOTE — PROCEDURES
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    14 FR Flexible Olympus cystoscope used.    FINDINGS:  Stent was removed in its entirety.    The patient was taken to the cystoscopy suite and placed in supine position.  The genitalia was prepped and draped  in the usual sterile fashion.  Two percent lidocaine jelly was inserted in the urethra and held in place with a penile clamp.  After sufficent time had passed to allow good local anesthesia, the cystoscope was inserted in the urethra and passed into the bladder visualizing the urethra along its entire course.  The cystoscope was then advanced into the bladder where the stent was visualized, grasped and removed along with the cystoscope.  The patient was instructed to urinate prior to leaving the office.     Post procedure medication:  Patient is on Bactrim DS      ASSESSMENT/PLAN:  49 year old man status post flexible cystoscopy, transplant stent removal.  1. Push fluids for 24 hours.  2. May see blood in the urine, this should gradually improve over the next 2-3 days.  3. The patient was instructed to return to the office or go to the emergency should fever, chills, cloudy urine, or inability to urinate develop.  4. Follow up with the transplant service as previously scheduled.

## 2020-09-15 NOTE — PROGRESS NOTES
Post-Transplant Assessment    Referring Physician: Leslie Roche Jr.  Current Nephrologist: Leslie Roche Jr.    ORGAN: LEFT KIDNEY  Donor Type: donation after circulatory death   PHS Increased Risk: no  Cold Ischemia: 1,261 mins  Induction Medications: simulect - basiliximab    Subjective:     CC:  Reassessment of renal allograft function and management of chronic immunosuppression.    HPI:  Mr. Azevedo is a 49 y.o. year old White male who received a @TXPORGITM His post transplant course has been uncomplicated to date.      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 Surgery notable for 3 arteries.     Antimicrobial Prophylaxis:   - PJP prophylaxis until 8/21/21- Bactrim  - CMV prophylaxis until 2/23/21 - Valcyte [ Mismatch]    POST TRANSPLANT UPDATE 09/16/2020   Bryant's stent was removed Monday. Seeing surgery today for removal of staples.  His only concern is that he has numbness in inner thight, an area measuring about 5 cm. No weakness, gait or balance problems reported.  Cardiopulmonary symptoms: denies CP, SOB  Pain over allograft? None. Hoping to get staples out  Urinary complaints? No LUTS after stent removal  Edema: none noted    Current Outpatient Medications   Medication Sig    calcitRIOL (ROCALTROL) 0.5 MCG Cap Take 2 capsules (1 mcg total) by mouth once daily.    docusate sodium (COLACE) 100 MG capsule Take 1 capsule (100 mg total) by mouth 3 (three) times daily as needed for Constipation.    ergocalciferol (ERGOCALCIFEROL) 50,000 unit Cap Take 1 capsule (50,000 Units total) by mouth every 7 days.    famotidine (PEPCID) 20 MG tablet Take 1 tablet (20 mg total) by mouth every evening.    fluticasone-salmeterol 250-50 mcg/dose (ADVAIR) 250-50 mcg/dose diskus inhaler Inhale 1 puff into the lungs 2 (two) times daily.    magnesium oxide (MAG-OX) 400 mg (241.3 mg magnesium) tablet Take 1 tablet (400 mg total) by  "mouth 2 (two) times daily.    montelukast (SINGULAIR) 10 mg tablet Take 10 mg by mouth once daily.    mycophenolate (CELLCEPT) 250 mg Cap Take 4 capsules (1,000 mg total) by mouth 2 (two) times daily.    nystatin (MYCOSTATIN) 100,000 unit/mL suspension Take 5 mLs (500,000 Units total) by mouth 2 hours after meals and at bedtime. STOP 10/1/20    oxyCODONE (ROXICODONE) 5 MG immediate release tablet Take 1 tablet (5 mg total) by mouth every 6 (six) hours as needed for Pain.    paroxetine (PAXIL) 20 MG tablet Take 20 mg by mouth every evening.    predniSONE (DELTASONE) 5 MG tablet Take by mouth daily:  20mg 8/30-9/30, 15mg 10/1-10/31, 10mg 11/1-11/30, then 5mg daily beginning 12/1    sertraline (ZOLOFT) 50 MG tablet Take 50 mg by mouth once daily.    sodium bicarbonate 650 MG tablet Take 2 tablets (1,300 mg total) by mouth 3 (three) times daily.    sulfamethoxazole-trimethoprim 400-80mg (BACTRIM,SEPTRA) 400-80 mg per tablet Take 1 tablet by mouth every Mon, Wed, Fri. STOP 8/21/21    tacrolimus (PROGRAF) 1 MG Cap Take 5 capsules (5 mg total) by mouth every morning AND 4 capsules (4 mg total) every evening.    valGANciclovir (VALCYTE) 450 mg Tab Take 1 tablet (450 mg total) by mouth every Mon, Wed, Fri. STOP 2/23/21     No current facility-administered medications for this visit.          Review of Systems   Respiratory: Negative for shortness of breath.    Cardiovascular: Negative for chest pain and leg swelling.   Gastrointestinal: Negative for abdominal pain, nausea and vomiting.   Genitourinary: Negative for difficulty urinating.   Skin: Negative for rash.   Allergic/Immunologic: Positive for immunocompromised state.       Objective:   Blood pressure 123/88, pulse 84, temperature 97.9 °F (36.6 °C), temperature source Oral, resp. rate 18, height 5' 4" (1.626 m), weight 66.6 kg (146 lb 13.2 oz), SpO2 98 %.body mass index is 25.2 kg/m².    Physical Exam  Constitutional:       Appearance: He is well-developed. "   HENT:      Head: Normocephalic.   Eyes:      Conjunctiva/sclera: Conjunctivae normal.   Cardiovascular:      Rate and Rhythm: Normal rate and regular rhythm.   Pulmonary:      Effort: Pulmonary effort is normal.      Breath sounds: Normal breath sounds.   Abdominal:      Palpations: Abdomen is soft. There is no mass.      Tenderness: There is no abdominal tenderness.   Skin:     General: Skin is dry.      Findings: No rash.      Comments: RLQ incision healing well; staples intact. nontender   Neurological:      Mental Status: He is alert and oriented to person, place, and time.   Psychiatric:         Judgment: Judgment normal.     Labs:  Recent Labs   Lab 10/25/18  0834  12/03/19  1018  05/23/20  2231  08/27/20  0050 08/27/20  0058  09/07/20  0814 09/07/20  0825 09/10/20  0912 09/14/20  0745 09/14/20  0753   WBC  --   --   --   --  9.90   < >  --   --    < >  --  13.27 H 15.00 H  --  12.68   Hemoglobin  --   --   --   --  13.7 L   < >  --   --    < >  --  13.0 L 12.1 L  --  12.7 L   POC Hematocrit  --   --   --   --   --   --   --   --    < >  --   --   --   --   --    Hematocrit  --   --   --   --  40.2   < >  --   --    < >  --  40.2 37.9 L  --  40.5   Sodium  --   --   --    < > 139  --  140  --    < >  --  140 142  --  142   Potassium  --    < >  --   --  3.5  --  3.3 L  --    < >  --  5.0 4.8  --  3.9   Chloride  --   --   --    < > 102  --  105  --    < >  --  105 108  --  107   CO2  --   --   --    < > 26  --  23  --    < >  --  26 26  --  25   BUN, Bld  --   --   --    < > 57 H  --  70 H  --    < >  --  83 H 55 H  --  42 H   Creatinine  --   --   --    < > 7.0 H  --  7.2 H  --    < >  --  3.9 H 3.0 H  --  2.4 H   eGFR if non   --   --   --    < > 8.3 A  --  8.0 A  --    < >  --  16.7 A 23.0 A  --  30.1 A   Calcium  --   --   --    < > 8.9  --  8.8  --    < >  --  8.9 8.5 L  --  8.7   Phosphorus  --   --   --   --   --   --  7.1 H  --    < >  --  5.4 H 4.3  --  3.8   Magnesium  --   --   --    --   --   --   --   --    < >  --  1.9 1.6  --  1.3 L   Albumin  --   --   --    < > 3.9  --  3.4 L  --    < >  --  3.2 L 3.3 L  3.3 L  --  3.4 L   AST  --   --   --   --  24  --  34  --   --   --   --  14  --   --    ALT  --   --   --   --  18  --  27  --   --   --   --  11  --   --    Prot/Creat Ratio, Ur  --   --   --   --   --   --   --  0.23 H  --  0.55 H  --   --  0.21 H  --    PTH, Intact 418.0 H  --  435.0 H  --   --   --  325.0 H  --   --   --   --   --   --   --    Tacrolimus Lvl  --   --   --   --   --   --   --   --    < >  --  7.7 9.1  --  11.1    < > = values in this interval not displayed.     Labs were reviewed with the patient    Assessment:     1. -donor kidney transplant    2. Renal hypertension    3. Metabolic acidosis    4. At risk for opportunistic infections    5. Hypomagnesemia    6. Immunosuppressive management encounter following kidney transplant        Plan:     Allograft function assessment - Creat continues to improve. Repeat lab in AM.  Recent Labs   Lab 09/07/20  0825 09/10/20  0912 09/14/20  0753   Creatinine 3.9 H 3.0 H 2.4 H   eGFR if non  16.7 A 23.0 A 30.1 A   eGFR if African American 19.4 A 26.6 A 34.8 A      Screening for proteinuria & urinary abnormalities  Recent Labs   Lab 20  0745   Protein, UA 1+ A  --    Glucose, UA Negative  --    Occult Blood UA 2+ A  --    Leukocytes, UA 1+ A  --    Prot/Creat Ratio, Ur 0.55 H 0.21 H   p/c ratio excellent.  Monitor as per guidelines    Encounter for Monitoring Immunosuppression post Transplant  Recent Labs   Lab 09/07/20  0825 09/10/20  0912 09/14/20  0753   Tacrolimus Lvl 7.7 9.1 11.1   -Target level for Bryant is 8-10. Last dose adjustment made alter 20 lab, f/u pending  -continue prednisone taper and Cellcept  -Recheck as per guidelines.  -Monitor for side effects and toxicities, given narrow therapeutic window and significant risk of AE     Evaluation for bone marrow suppression  (r/t immunosuppression toxicity or infection)  -Counts acceptable; cont to follow  Lab Results   Component Value Date    WBC 12.68 09/14/2020    HGB 12.7 (L) 09/14/2020    HCT 40.5 09/14/2020     09/14/2020     At Risk for Opportunistic Infections  PJP prophylaxis : until 8/21/21 - Bactrim   CMV [mismatch]prophylaxis:  End 2/23/21 - Valcyte  -BK Virus Monitoring  No results found for: [id number]  Continue monitoring BK PCRs per program guidelines to 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 Continue to watch    Metabolic bone disease [Secondary hyperparathyroidism/SPTH, Phosphorus metabolism disorders]  Recent Labs   Lab 10/25/18  0834 12/03/19  1018  08/27/20  0050  09/07/20  0825 09/10/20  0912 09/14/20  0753   Albumin  --   --    < > 3.4 L   < > 3.2 L 3.3 L  3.3 L 3.4 L   Calcium  --   --    < > 8.8   < > 8.9 8.5 L 8.7   Phosphorus  --   --   --  7.1 H   < > 5.4 H 4.3 3.8   Magnesium  --   --   --   --    < > 1.9 1.6 1.3 L   Vit D, 25-Hydroxy  --   --   --  16 L  --   --   --   --    PTH, Intact 418.0 H 435.0 H  --  325.0 H  --   --   --   --     < > = values in this interval not displayed.   Hypomagnesemia - on mag ox, recheck in AM    Assessment of electrolytes  Lab Results   Component Value Date     09/14/2020    K 3.9 09/14/2020     09/14/2020    CO2 25 09/14/2020    MG 1.3 (L) 09/14/2020   Acidosis- corrected on NaHCO3; continue    Femoral neuropathy - explained this is a nerve injury from time of txp and usually improves over months. I verified he has no weakness or motor involvement.    Follow-up:   Clinic: return to transplant clinic weekly for the first month after transplant; every 2 weeks during months 2-3; then at 6-, 9-, 12-, 18-, 24-, and 36- months post-transplant to reassess for complications from immunosuppression toxicity and monitor for rejection.  Annually thereafter.    Labs: since patient remains at high risk for  rejection and drug-related complications that warrant close monitoring, labs will be ordered as follows: continue twice weekly CBC, renal panel, and drug level for first month; then same labs once weekly through 3rd month post-transplant.  Urine for UA and protein/creatinine ratio monthly.  Serum BK - PCR at 1-, 3-, 6-, 9-, 12-, 18-, 24-, 36-, 48-, and 60 months post-transplant.  Hepatic panel at 1-, 2-, 3-, 6-, 9-, 12-, 18-, 24-, and 36- months post-transplant.    Angelia Ritter MD

## 2020-09-16 ENCOUNTER — OFFICE VISIT (OUTPATIENT)
Dept: TRANSPLANT | Facility: CLINIC | Age: 50
End: 2020-09-16
Payer: MEDICARE

## 2020-09-16 VITALS
RESPIRATION RATE: 18 BRPM | HEIGHT: 64 IN | DIASTOLIC BLOOD PRESSURE: 88 MMHG | TEMPERATURE: 98 F | WEIGHT: 146.81 LBS | OXYGEN SATURATION: 98 % | BODY MASS INDEX: 25.06 KG/M2 | HEART RATE: 84 BPM | SYSTOLIC BLOOD PRESSURE: 123 MMHG

## 2020-09-16 DIAGNOSIS — Z94.0 IMMUNOSUPPRESSIVE MANAGEMENT ENCOUNTER FOLLOWING KIDNEY TRANSPLANT: ICD-10-CM

## 2020-09-16 DIAGNOSIS — Z79.899 IMMUNOSUPPRESSIVE MANAGEMENT ENCOUNTER FOLLOWING KIDNEY TRANSPLANT: ICD-10-CM

## 2020-09-16 DIAGNOSIS — Z94.0 KIDNEY REPLACED BY TRANSPLANT: ICD-10-CM

## 2020-09-16 DIAGNOSIS — E87.20 METABOLIC ACIDOSIS: ICD-10-CM

## 2020-09-16 DIAGNOSIS — I12.9 RENAL HYPERTENSION: ICD-10-CM

## 2020-09-16 DIAGNOSIS — E83.42 HYPOMAGNESEMIA: ICD-10-CM

## 2020-09-16 DIAGNOSIS — Z91.89 AT RISK FOR OPPORTUNISTIC INFECTIONS: ICD-10-CM

## 2020-09-16 DIAGNOSIS — Z51.81 ENCOUNTER FOR THERAPEUTIC DRUG MONITORING: Primary | ICD-10-CM

## 2020-09-16 DIAGNOSIS — Z79.899 ENCOUNTER FOR LONG-TERM (CURRENT) USE OF OTHER MEDICATIONS: ICD-10-CM

## 2020-09-16 DIAGNOSIS — Z94.0 DECEASED-DONOR KIDNEY TRANSPLANT: Primary | ICD-10-CM

## 2020-09-16 PROCEDURE — 99215 OFFICE O/P EST HI 40 MIN: CPT | Mod: S$PBB,,, | Performed by: TRANSPLANT SURGERY

## 2020-09-16 PROCEDURE — 99999 PR PBB SHADOW E&M-EST. PATIENT-LVL I: CPT | Mod: PBBFAC,,,

## 2020-09-16 PROCEDURE — 99215 OFFICE O/P EST HI 40 MIN: CPT | Mod: S$PBB,,, | Performed by: INTERNAL MEDICINE

## 2020-09-16 PROCEDURE — 99999 PR PBB SHADOW E&M-EST. PATIENT-LVL I: ICD-10-PCS | Mod: PBBFAC,,,

## 2020-09-16 PROCEDURE — 99215 PR OFFICE/OUTPT VISIT, EST, LEVL V, 40-54 MIN: ICD-10-PCS | Mod: S$PBB,,, | Performed by: INTERNAL MEDICINE

## 2020-09-16 PROCEDURE — 99999 PR PBB SHADOW E&M-EST. PATIENT-LVL V: ICD-10-PCS | Mod: PBBFAC,,, | Performed by: INTERNAL MEDICINE

## 2020-09-16 PROCEDURE — 99215 PR OFFICE/OUTPT VISIT, EST, LEVL V, 40-54 MIN: ICD-10-PCS | Mod: S$PBB,,, | Performed by: TRANSPLANT SURGERY

## 2020-09-16 PROCEDURE — 99999 PR PBB SHADOW E&M-EST. PATIENT-LVL V: CPT | Mod: PBBFAC,,, | Performed by: INTERNAL MEDICINE

## 2020-09-16 PROCEDURE — 99215 OFFICE O/P EST HI 40 MIN: CPT | Mod: PBBFAC | Performed by: INTERNAL MEDICINE

## 2020-09-16 PROCEDURE — 99211 OFF/OP EST MAY X REQ PHY/QHP: CPT | Mod: PBBFAC,27

## 2020-09-16 NOTE — PROGRESS NOTES
STAPLE REMOVAL NOTE    Staples removed from kidney transplant incision, steri-strips applied with Benzoin per Dr. Mendoza's order.  Patient tolerated procedure well.  Skin dry and intact.  Patient instructed to shower with back to water spray and to pat dry incision and to let the steri-strips wear off on their own.  Patient instructed to report any redness, warmth, or drainage from incision to transplant coordinators.  All questions answered.

## 2020-09-16 NOTE — LETTER
September 16, 2020        Leslie Roche Jr.  [address]; [address]  [facility] [address]  Phone: [phone]  Fax: [phone]             [facility]  [address]  [facility] [address]  Phone: [phone]   Patient: Bryant Azevedo   MR Number: [id number]   YOB: 1970   Date of Visit: 9/16/2020       Dear Dr. Leslie Roche Jr.    Thank you for referring Bryant Azevedo to me for evaluation. Attached you will find relevant portions of my assessment and plan of care.    If you have questions, please do not hesitate to call me. I look forward to following Bryant Azevedo along with you.    Sincerely,    Angelia Ritter MD    Enclosure    If you would like to receive this communication electronically, please contact [email or web address] or [phone] to request [employer] access.    [employer] is a tool which provides read-only access to select patient information with whom you have a relationship. Its easy to use and provides real time access to review your patients record including encounter summaries, notes, results, and demographic information.    If you feel you have received this communication in error or would no longer like to receive these types of communications, please e-mail [email or web address]

## 2020-09-16 NOTE — PROGRESS NOTES
Transplant Surgery  Kidney Transplant Recipient Follow-up    Referring Physician: Leslie Roche Jr.  Current Nephrologist: Leslie Roche Jr.    Subjective:     Chief Complaint: Bryant Azevedo is a 49 y.o. year old White male who is status post Kidney transplant performed on 8/27/2020.    ORGAN: LEFT KIDNEY   Disease Etiology: Other, Specify - unknown  Donor Type: Donation after Circulatory Death    Donor CMV Status: Positive   Donor HBcAB: Negative   Donor HCV Status: Negative     History of Present Illness: He reports no concerns.  From a transplant perspective, he reports normal urination.  Bryant is here for management of his immunosuppression medication.  Bryant states that his immunosuppression is being well tolerated.  Hypertension is not present.    Review of Systems   Constitutional: Negative for activity change and appetite change.   HENT: Negative for congestion and tinnitus.    Eyes: Negative for redness and visual disturbance.   Respiratory: Negative for cough and shortness of breath.    Cardiovascular: Negative for chest pain and palpitations.   Gastrointestinal: Negative for abdominal distention and abdominal pain.   Endocrine: Negative for polydipsia and polyuria.   Genitourinary: Negative for decreased urine volume and dysuria.   Musculoskeletal: Negative for arthralgias and back pain.   Skin: Negative for pallor and rash.   Allergic/Immunologic: Positive for immunocompromised state. Negative for environmental allergies.   Neurological: Negative for tremors and headaches.   Hematological: Negative for adenopathy. Does not bruise/bleed easily.   Psychiatric/Behavioral: Negative for behavioral problems and confusion.       Objective:     Physical Exam  Constitutional:       General: He is not in acute distress.     Appearance: He is not diaphoretic.   HENT:      Head: Normocephalic and atraumatic.   Eyes:      Conjunctiva/sclera: Conjunctivae normal.      Pupils: Pupils are equal,  round, and reactive to light.   Neck:      Musculoskeletal: Normal range of motion and neck supple.   Cardiovascular:      Rate and Rhythm: Normal rate and regular rhythm.   Pulmonary:      Effort: Pulmonary effort is normal.      Breath sounds: Normal breath sounds.   Abdominal:      General: Bowel sounds are normal. There is no distension.      Palpations: Abdomen is soft.      Tenderness: There is no abdominal tenderness.       Musculoskeletal: Normal range of motion.   Skin:     General: Skin is warm and dry.   Neurological:      Mental Status: He is alert and oriented to person, place, and time.   Psychiatric:         Behavior: Behavior normal.       Lab Results   Component Value Date    CREATININE 2.4 (H) 09/14/2020    BUN 42 (H) 09/14/2020     Lab Results   Component Value Date    WBC 12.68 09/14/2020    HGB 12.7 (L) 09/14/2020    HCT 40.5 09/14/2020    HCT 32 (L) 08/27/2020     09/14/2020     Lab Results   Component Value Date    TACROLIMUS 11.1 09/14/2020         Assessment and Plan:        · S/P Kidney transplant.  · Chronic immunosuppressive medications for rejection prophylaxis at target.  Plan: no adjustment needed.  · Continue monitoring symptoms, labs and drug levels for drug-related toxicity and side effects.  · Renal hypertension at target.  · Remove staples    Follow-up: Patient reminded to call with any health changes, since these can be early signs of significant complications.  Also, I advised the patient to be sure any new medications or changes of old medications are discussed with either a pharmacist, or physician knowledgeable with transplant to avoid rejection/drug toxicity related to significant drug interactions.    Seferino Mendoza MD       [facility] Patient Status  Functional Status: 70% - Cares for self: unable to carry on normal activity or active work  Physical Capacity: No Limitations

## 2020-09-16 NOTE — PROGRESS NOTES
Post Clinic Note    SUNITHA met with pt and pt's caregiver in post clinic. Pt presents alert and oriented x4, communicative and in high spirits. Pt presents with primary caregiver, Mehreen Rich, [phone]. Pt's caregiver reports pt's mother returned home last Wednesday. Pt and caregiver are still staying in [address] [address] apartments. Pt and caregiver are coping well at this time. Pt reports Part D kicked in 9/1/2020 and this was confirmed by financial coordinators yesterday. SW encouraged pt to look into Part D covering Valcyte that pt paid $400+ out of pocket for upon discharge from hospital. Pt reports being able to afford medications at this time. Pt and caregiver denied any concerns or needs at this time.     SUNITHA remains available at [phone].

## 2020-09-16 NOTE — PATIENT INSTRUCTIONS
From Dr. Merritt:  It is my privilege to participate in your post-transplant care!      -You have a femoral neuropathy causing the numbness in your thigh. Sensation usually returns within 6-12 months.    Please be sure to let us know if you have any questions or concerns about your health care - we cannot help you if we do not know.  Don't forget we are on call 24/7 for any emergencies.   Best Wishes,  Dr. Shaina Ritter

## 2020-09-17 ENCOUNTER — TELEPHONE (OUTPATIENT)
Dept: TRANSPLANT | Facility: CLINIC | Age: 50
End: 2020-09-17

## 2020-09-17 ENCOUNTER — LAB VISIT (OUTPATIENT)
Dept: LAB | Facility: HOSPITAL | Age: 50
End: 2020-09-17
Attending: INTERNAL MEDICINE
Payer: MEDICARE

## 2020-09-17 DIAGNOSIS — Z94.0 KIDNEY REPLACED BY TRANSPLANT: ICD-10-CM

## 2020-09-17 DIAGNOSIS — Z94.0 KIDNEY REPLACED BY TRANSPLANT: Primary | ICD-10-CM

## 2020-09-17 LAB
ALBUMIN SERPL BCP-MCNC: 3.4 G/DL (ref 3.5–5.2)
ANION GAP SERPL CALC-SCNC: 8 MMOL/L (ref 8–16)
BASOPHILS # BLD AUTO: 0.06 K/UL (ref 0–0.2)
BASOPHILS NFR BLD: 0.6 % (ref 0–1.9)
BUN SERPL-MCNC: 32 MG/DL (ref 6–20)
CALCIUM SERPL-MCNC: 8.5 MG/DL (ref 8.7–10.5)
CHLORIDE SERPL-SCNC: 110 MMOL/L (ref 95–110)
CO2 SERPL-SCNC: 26 MMOL/L (ref 23–29)
CREAT SERPL-MCNC: 2.5 MG/DL (ref 0.5–1.4)
DIFFERENTIAL METHOD: ABNORMAL
EOSINOPHIL # BLD AUTO: 0.2 K/UL (ref 0–0.5)
EOSINOPHIL NFR BLD: 1.9 % (ref 0–8)
ERYTHROCYTE [DISTWIDTH] IN BLOOD BY AUTOMATED COUNT: 12.8 % (ref 11.5–14.5)
EST. GFR  (AFRICAN AMERICAN): 33.1 ML/MIN/1.73 M^2
EST. GFR  (NON AFRICAN AMERICAN): 28.7 ML/MIN/1.73 M^2
GLUCOSE SERPL-MCNC: 87 MG/DL (ref 70–110)
HCT VFR BLD AUTO: 38.3 % (ref 40–54)
HGB BLD-MCNC: 12.2 G/DL (ref 14–18)
IMM GRANULOCYTES # BLD AUTO: 0.13 K/UL (ref 0–0.04)
IMM GRANULOCYTES NFR BLD AUTO: 1.2 % (ref 0–0.5)
LYMPHOCYTES # BLD AUTO: 2.4 K/UL (ref 1–4.8)
LYMPHOCYTES NFR BLD: 22.1 % (ref 18–48)
MAGNESIUM SERPL-MCNC: 1.3 MG/DL (ref 1.6–2.6)
MCH RBC QN AUTO: 31.6 PG (ref 27–31)
MCHC RBC AUTO-ENTMCNC: 31.9 G/DL (ref 32–36)
MCV RBC AUTO: 99 FL (ref 82–98)
MONOCYTES # BLD AUTO: 0.6 K/UL (ref 0.3–1)
MONOCYTES NFR BLD: 5.3 % (ref 4–15)
NEUTROPHILS # BLD AUTO: 7.4 K/UL (ref 1.8–7.7)
NEUTROPHILS NFR BLD: 68.9 % (ref 38–73)
NRBC BLD-RTO: 0 /100 WBC
PHOSPHATE SERPL-MCNC: 3 MG/DL (ref 2.7–4.5)
PLATELET # BLD AUTO: 175 K/UL (ref 150–350)
PMV BLD AUTO: 11.9 FL (ref 9.2–12.9)
POTASSIUM SERPL-SCNC: 4.5 MMOL/L (ref 3.5–5.1)
RBC # BLD AUTO: 3.86 M/UL (ref 4.6–6.2)
SODIUM SERPL-SCNC: 144 MMOL/L (ref 136–145)
TACROLIMUS BLD-MCNC: 9 NG/ML (ref 5–15)
WBC # BLD AUTO: 10.73 K/UL (ref 3.9–12.7)

## 2020-09-17 PROCEDURE — 80197 ASSAY OF TACROLIMUS: CPT

## 2020-09-17 PROCEDURE — 85025 COMPLETE CBC W/AUTO DIFF WBC: CPT

## 2020-09-17 PROCEDURE — 80069 RENAL FUNCTION PANEL: CPT

## 2020-09-17 PROCEDURE — 36415 COLL VENOUS BLD VENIPUNCTURE: CPT

## 2020-09-17 PROCEDURE — 83735 ASSAY OF MAGNESIUM: CPT

## 2020-09-17 NOTE — TELEPHONE ENCOUNTER
Patient repeated back and voice a understanding of orders.  Repeat labs schedule for 9/21/2020.    ----- Message from Angelia Ritter MD sent at 9/17/2020  3:20 PM CDT -----  The following message sent to patient via [facility]: Let's repeat lab next week and see if creatinine comes down further. If it does not get below 2.0, we will plan a biopsy.

## 2020-09-18 DIAGNOSIS — Z94.0 KIDNEY REPLACED BY TRANSPLANT: Primary | ICD-10-CM

## 2020-09-19 LAB — CMV DNA SERPL NAA+PROBE-ACNC: NORMAL IU/ML

## 2020-09-21 ENCOUNTER — LAB VISIT (OUTPATIENT)
Dept: LAB | Facility: HOSPITAL | Age: 50
End: 2020-09-21
Attending: INTERNAL MEDICINE
Payer: MEDICARE

## 2020-09-21 DIAGNOSIS — Z94.0 KIDNEY REPLACED BY TRANSPLANT: ICD-10-CM

## 2020-09-21 DIAGNOSIS — Z94.0 KIDNEY REPLACED BY TRANSPLANT: Primary | ICD-10-CM

## 2020-09-21 LAB
ALBUMIN SERPL BCP-MCNC: 3.5 G/DL (ref 3.5–5.2)
ANION GAP SERPL CALC-SCNC: 9 MMOL/L (ref 8–16)
BASOPHILS # BLD AUTO: 0.03 K/UL (ref 0–0.2)
BASOPHILS NFR BLD: 0.3 % (ref 0–1.9)
BUN SERPL-MCNC: 33 MG/DL (ref 6–20)
CALCIUM SERPL-MCNC: 9.1 MG/DL (ref 8.7–10.5)
CHLORIDE SERPL-SCNC: 106 MMOL/L (ref 95–110)
CO2 SERPL-SCNC: 27 MMOL/L (ref 23–29)
CREAT SERPL-MCNC: 2.1 MG/DL (ref 0.5–1.4)
DIFFERENTIAL METHOD: ABNORMAL
EOSINOPHIL # BLD AUTO: 0.1 K/UL (ref 0–0.5)
EOSINOPHIL NFR BLD: 1 % (ref 0–8)
ERYTHROCYTE [DISTWIDTH] IN BLOOD BY AUTOMATED COUNT: 12.4 % (ref 11.5–14.5)
EST. GFR  (AFRICAN AMERICAN): 40.9 ML/MIN/1.73 M^2
EST. GFR  (NON AFRICAN AMERICAN): 35.4 ML/MIN/1.73 M^2
GLUCOSE SERPL-MCNC: 90 MG/DL (ref 70–110)
HCT VFR BLD AUTO: 40.3 % (ref 40–54)
HGB BLD-MCNC: 12.5 G/DL (ref 14–18)
IMM GRANULOCYTES # BLD AUTO: 0.11 K/UL (ref 0–0.04)
IMM GRANULOCYTES NFR BLD AUTO: 1.1 % (ref 0–0.5)
LYMPHOCYTES # BLD AUTO: 2.4 K/UL (ref 1–4.8)
LYMPHOCYTES NFR BLD: 23.3 % (ref 18–48)
MAGNESIUM SERPL-MCNC: 1.4 MG/DL (ref 1.6–2.6)
MCH RBC QN AUTO: 30.8 PG (ref 27–31)
MCHC RBC AUTO-ENTMCNC: 31 G/DL (ref 32–36)
MCV RBC AUTO: 99 FL (ref 82–98)
MONOCYTES # BLD AUTO: 0.5 K/UL (ref 0.3–1)
MONOCYTES NFR BLD: 5.2 % (ref 4–15)
NEUTROPHILS # BLD AUTO: 7.2 K/UL (ref 1.8–7.7)
NEUTROPHILS NFR BLD: 69.1 % (ref 38–73)
NRBC BLD-RTO: 0 /100 WBC
PHOSPHATE SERPL-MCNC: 3.2 MG/DL (ref 2.7–4.5)
PLATELET # BLD AUTO: 166 K/UL (ref 150–350)
PMV BLD AUTO: 11.9 FL (ref 9.2–12.9)
POTASSIUM SERPL-SCNC: 4 MMOL/L (ref 3.5–5.1)
RBC # BLD AUTO: 4.06 M/UL (ref 4.6–6.2)
SODIUM SERPL-SCNC: 142 MMOL/L (ref 136–145)
TACROLIMUS BLD-MCNC: 11.4 NG/ML (ref 5–15)
WBC # BLD AUTO: 10.37 K/UL (ref 3.9–12.7)

## 2020-09-21 PROCEDURE — 83735 ASSAY OF MAGNESIUM: CPT

## 2020-09-21 PROCEDURE — 80197 ASSAY OF TACROLIMUS: CPT

## 2020-09-21 PROCEDURE — 36415 COLL VENOUS BLD VENIPUNCTURE: CPT

## 2020-09-21 PROCEDURE — 80069 RENAL FUNCTION PANEL: CPT

## 2020-09-21 PROCEDURE — 85025 COMPLETE CBC W/AUTO DIFF WBC: CPT

## 2020-09-21 RX ORDER — TACROLIMUS 1 MG/1
4 CAPSULE ORAL EVERY 12 HOURS
Qty: 240 CAPSULE | Refills: 11 | Status: SHIPPED | OUTPATIENT
Start: 2020-09-21 | End: 2020-10-05 | Stop reason: SDUPTHER

## 2020-09-21 NOTE — TELEPHONE ENCOUNTER
Patient repeated back and voice a understanding of orders .  Sisi HOLDER in IR notified to Cancell plan biopsy for tomorrow 9/22.  Next labs on 9/24/2020.     ----- Message from Angelia Ritter MD sent at 9/21/2020  1:13 PM CDT -----  The following message sent to patient via [facility]:  Please decrease tacrolimus from 05/04 to 4 mg twice daily.  Your creatinine has improved, although it is still higher than target of <2.  We can cancel biopsy for now and watch to see as we get tacrolimus level situated, since I expect her creatinine may improve further.  Note, if the creatinine remains above 1.8-2, a biopsy will be indicated.

## 2020-09-23 ENCOUNTER — PATIENT MESSAGE (OUTPATIENT)
Dept: TRANSPLANT | Facility: CLINIC | Age: 50
End: 2020-09-23

## 2020-09-24 ENCOUNTER — TELEPHONE (OUTPATIENT)
Dept: TRANSPLANT | Facility: CLINIC | Age: 50
End: 2020-09-24

## 2020-09-24 ENCOUNTER — LAB VISIT (OUTPATIENT)
Dept: LAB | Facility: HOSPITAL | Age: 50
End: 2020-09-24
Attending: INTERNAL MEDICINE
Payer: MEDICARE

## 2020-09-24 DIAGNOSIS — Z94.0 KIDNEY REPLACED BY TRANSPLANT: ICD-10-CM

## 2020-09-24 LAB
ALBUMIN SERPL BCP-MCNC: 3.7 G/DL (ref 3.5–5.2)
ANION GAP SERPL CALC-SCNC: 9 MMOL/L (ref 8–16)
BASOPHILS # BLD AUTO: 0.05 K/UL (ref 0–0.2)
BASOPHILS NFR BLD: 0.5 % (ref 0–1.9)
BUN SERPL-MCNC: 25 MG/DL (ref 6–20)
CALCIUM SERPL-MCNC: 9.3 MG/DL (ref 8.7–10.5)
CHLORIDE SERPL-SCNC: 103 MMOL/L (ref 95–110)
CMV DNA SERPL NAA+PROBE-ACNC: <35 IU/ML
CO2 SERPL-SCNC: 29 MMOL/L (ref 23–29)
CREAT SERPL-MCNC: 2.3 MG/DL (ref 0.5–1.4)
DIFFERENTIAL METHOD: ABNORMAL
EOSINOPHIL # BLD AUTO: 0.1 K/UL (ref 0–0.5)
EOSINOPHIL NFR BLD: 1 % (ref 0–8)
ERYTHROCYTE [DISTWIDTH] IN BLOOD BY AUTOMATED COUNT: 12.5 % (ref 11.5–14.5)
EST. GFR  (AFRICAN AMERICAN): 36.6 ML/MIN/1.73 M^2
EST. GFR  (NON AFRICAN AMERICAN): 31.7 ML/MIN/1.73 M^2
GLUCOSE SERPL-MCNC: 90 MG/DL (ref 70–110)
HCT VFR BLD AUTO: 39.3 % (ref 40–54)
HGB BLD-MCNC: 12.7 G/DL (ref 14–18)
IMM GRANULOCYTES # BLD AUTO: 0.17 K/UL (ref 0–0.04)
IMM GRANULOCYTES NFR BLD AUTO: 1.6 % (ref 0–0.5)
LYMPHOCYTES # BLD AUTO: 2.8 K/UL (ref 1–4.8)
LYMPHOCYTES NFR BLD: 27 % (ref 18–48)
MAGNESIUM SERPL-MCNC: 1.5 MG/DL (ref 1.6–2.6)
MCH RBC QN AUTO: 31.1 PG (ref 27–31)
MCHC RBC AUTO-ENTMCNC: 32.3 G/DL (ref 32–36)
MCV RBC AUTO: 96 FL (ref 82–98)
MONOCYTES # BLD AUTO: 0.6 K/UL (ref 0.3–1)
MONOCYTES NFR BLD: 5.8 % (ref 4–15)
NEUTROPHILS # BLD AUTO: 6.7 K/UL (ref 1.8–7.7)
NEUTROPHILS NFR BLD: 64.1 % (ref 38–73)
NRBC BLD-RTO: 0 /100 WBC
PHOSPHATE SERPL-MCNC: 3.8 MG/DL (ref 2.7–4.5)
PLATELET # BLD AUTO: 184 K/UL (ref 150–350)
PMV BLD AUTO: 12 FL (ref 9.2–12.9)
POTASSIUM SERPL-SCNC: 4.2 MMOL/L (ref 3.5–5.1)
RBC # BLD AUTO: 4.08 M/UL (ref 4.6–6.2)
SODIUM SERPL-SCNC: 141 MMOL/L (ref 136–145)
TACROLIMUS BLD-MCNC: 8.7 NG/ML (ref 5–15)
WBC # BLD AUTO: 10.48 K/UL (ref 3.9–12.7)

## 2020-09-24 PROCEDURE — 80069 RENAL FUNCTION PANEL: CPT

## 2020-09-24 PROCEDURE — 80197 ASSAY OF TACROLIMUS: CPT

## 2020-09-24 PROCEDURE — 85025 COMPLETE CBC W/AUTO DIFF WBC: CPT

## 2020-09-24 PROCEDURE — 83735 ASSAY OF MAGNESIUM: CPT

## 2020-09-24 NOTE — TELEPHONE ENCOUNTER
Plan reviewed with patient . Biopsy has been reschedule for Thursday 10/1/2020.  DOSC at 10:00am and Biopsy at 11:30am.  Next labs 9/28/2020.    ----- Message from Angelia Ritter MD sent at 9/24/2020  3:11 PM CDT -----  The following message sent to patient via [facility]: Creatinine remains >1.5 and biopsy should be re-scheduled.

## 2020-09-25 ENCOUNTER — PATIENT MESSAGE (OUTPATIENT)
Dept: TRANSPLANT | Facility: CLINIC | Age: 50
End: 2020-09-25

## 2020-09-25 DIAGNOSIS — Z94.0 KIDNEY REPLACED BY TRANSPLANT: Primary | ICD-10-CM

## 2020-09-26 LAB — CMV DNA SERPL NAA+PROBE-ACNC: <35 IU/ML

## 2020-09-28 ENCOUNTER — LAB VISIT (OUTPATIENT)
Dept: LAB | Facility: HOSPITAL | Age: 50
End: 2020-09-28
Attending: INTERNAL MEDICINE
Payer: MEDICARE

## 2020-09-28 ENCOUNTER — TELEPHONE (OUTPATIENT)
Dept: TRANSPLANT | Facility: CLINIC | Age: 50
End: 2020-09-28

## 2020-09-28 DIAGNOSIS — Z94.0 KIDNEY REPLACED BY TRANSPLANT: ICD-10-CM

## 2020-09-28 LAB
ALBUMIN SERPL BCP-MCNC: 3.7 G/DL (ref 3.5–5.2)
ALBUMIN SERPL BCP-MCNC: 3.7 G/DL (ref 3.5–5.2)
ALP SERPL-CCNC: 42 U/L (ref 55–135)
ALT SERPL W/O P-5'-P-CCNC: 10 U/L (ref 10–44)
ANION GAP SERPL CALC-SCNC: 9 MMOL/L (ref 8–16)
AST SERPL-CCNC: 13 U/L (ref 10–40)
BASOPHILS # BLD AUTO: 0.04 K/UL (ref 0–0.2)
BASOPHILS NFR BLD: 0.3 % (ref 0–1.9)
BILIRUB DIRECT SERPL-MCNC: 0.1 MG/DL (ref 0.1–0.3)
BILIRUB SERPL-MCNC: 0.4 MG/DL (ref 0.1–1)
BUN SERPL-MCNC: 39 MG/DL (ref 6–20)
CALCIUM SERPL-MCNC: 9.3 MG/DL (ref 8.7–10.5)
CHLORIDE SERPL-SCNC: 105 MMOL/L (ref 95–110)
CO2 SERPL-SCNC: 28 MMOL/L (ref 23–29)
CREAT SERPL-MCNC: 2 MG/DL (ref 0.5–1.4)
DIFFERENTIAL METHOD: ABNORMAL
EOSINOPHIL # BLD AUTO: 0.1 K/UL (ref 0–0.5)
EOSINOPHIL NFR BLD: 0.7 % (ref 0–8)
ERYTHROCYTE [DISTWIDTH] IN BLOOD BY AUTOMATED COUNT: 12.5 % (ref 11.5–14.5)
EST. GFR  (AFRICAN AMERICAN): 43.4 ML/MIN/1.73 M^2
EST. GFR  (NON AFRICAN AMERICAN): 37.5 ML/MIN/1.73 M^2
GLUCOSE SERPL-MCNC: 89 MG/DL (ref 70–110)
HCT VFR BLD AUTO: 39.5 % (ref 40–54)
HGB BLD-MCNC: 12.6 G/DL (ref 14–18)
IMM GRANULOCYTES # BLD AUTO: 0.29 K/UL (ref 0–0.04)
IMM GRANULOCYTES NFR BLD AUTO: 2.5 % (ref 0–0.5)
LYMPHOCYTES # BLD AUTO: 2.9 K/UL (ref 1–4.8)
LYMPHOCYTES NFR BLD: 24.4 % (ref 18–48)
MAGNESIUM SERPL-MCNC: 1.5 MG/DL (ref 1.6–2.6)
MCH RBC QN AUTO: 31 PG (ref 27–31)
MCHC RBC AUTO-ENTMCNC: 31.9 G/DL (ref 32–36)
MCV RBC AUTO: 97 FL (ref 82–98)
MONOCYTES # BLD AUTO: 0.7 K/UL (ref 0.3–1)
MONOCYTES NFR BLD: 6.1 % (ref 4–15)
NEUTROPHILS # BLD AUTO: 7.8 K/UL (ref 1.8–7.7)
NEUTROPHILS NFR BLD: 66 % (ref 38–73)
NRBC BLD-RTO: 0 /100 WBC
PHOSPHATE SERPL-MCNC: 3.5 MG/DL (ref 2.7–4.5)
PLATELET # BLD AUTO: 177 K/UL (ref 150–350)
PMV BLD AUTO: 11.9 FL (ref 9.2–12.9)
POTASSIUM SERPL-SCNC: 4.1 MMOL/L (ref 3.5–5.1)
PROT SERPL-MCNC: 6.1 G/DL (ref 6–8.4)
PTH-INTACT SERPL-MCNC: 56 PG/ML (ref 9–77)
RBC # BLD AUTO: 4.06 M/UL (ref 4.6–6.2)
SODIUM SERPL-SCNC: 142 MMOL/L (ref 136–145)
TACROLIMUS BLD-MCNC: 7 NG/ML (ref 5–15)
URATE SERPL-MCNC: 6.3 MG/DL (ref 3.4–7)
WBC # BLD AUTO: 11.74 K/UL (ref 3.9–12.7)

## 2020-09-28 PROCEDURE — 36415 COLL VENOUS BLD VENIPUNCTURE: CPT

## 2020-09-28 PROCEDURE — 80069 RENAL FUNCTION PANEL: CPT

## 2020-09-28 PROCEDURE — 84550 ASSAY OF BLOOD/URIC ACID: CPT

## 2020-09-28 PROCEDURE — 80197 ASSAY OF TACROLIMUS: CPT

## 2020-09-28 PROCEDURE — 85025 COMPLETE CBC W/AUTO DIFF WBC: CPT

## 2020-09-28 PROCEDURE — 87799 DETECT AGENT NOS DNA QUANT: CPT

## 2020-09-28 PROCEDURE — 83970 ASSAY OF PARATHORMONE: CPT

## 2020-09-28 PROCEDURE — 84075 ASSAY ALKALINE PHOSPHATASE: CPT

## 2020-09-28 PROCEDURE — 83735 ASSAY OF MAGNESIUM: CPT

## 2020-09-28 NOTE — TELEPHONE ENCOUNTER
Results reviewed with patient .  Plan repeat labs schedule for 10/1/2020.    ----- Message from Angelia Ritter MD sent at 9/28/2020  4:18 PM CDT -----  Result reviewed and message sent to patient via [facility]:  Your results are acceptable and require no action.

## 2020-09-29 ENCOUNTER — PATIENT MESSAGE (OUTPATIENT)
Dept: TRANSPLANT | Facility: CLINIC | Age: 50
End: 2020-09-29

## 2020-09-29 DIAGNOSIS — Z94.0 KIDNEY REPLACED BY TRANSPLANT: Primary | ICD-10-CM

## 2020-09-30 ENCOUNTER — OFFICE VISIT (OUTPATIENT)
Dept: TRANSPLANT | Facility: CLINIC | Age: 50
End: 2020-09-30
Payer: MEDICARE

## 2020-09-30 VITALS
BODY MASS INDEX: 25.48 KG/M2 | WEIGHT: 149.25 LBS | DIASTOLIC BLOOD PRESSURE: 103 MMHG | HEIGHT: 64 IN | SYSTOLIC BLOOD PRESSURE: 153 MMHG | RESPIRATION RATE: 16 BRPM | HEART RATE: 87 BPM | OXYGEN SATURATION: 100 %

## 2020-09-30 DIAGNOSIS — Z94.0 S/P KIDNEY TRANSPLANT: Primary | ICD-10-CM

## 2020-09-30 DIAGNOSIS — N25.81 SECONDARY HYPERPARATHYROIDISM OF RENAL ORIGIN: Chronic | ICD-10-CM

## 2020-09-30 DIAGNOSIS — Z91.89 AT RISK FOR OPPORTUNISTIC INFECTIONS: ICD-10-CM

## 2020-09-30 DIAGNOSIS — Z79.60 LONG-TERM USE OF IMMUNOSUPPRESSANT MEDICATION: ICD-10-CM

## 2020-09-30 DIAGNOSIS — E87.20 ACIDOSIS: ICD-10-CM

## 2020-09-30 DIAGNOSIS — Z29.89 PROPHYLACTIC IMMUNOTHERAPY: ICD-10-CM

## 2020-09-30 DIAGNOSIS — I12.9 RENAL HYPERTENSION: Chronic | ICD-10-CM

## 2020-09-30 DIAGNOSIS — Z94.0 KIDNEY REPLACED BY TRANSPLANT: Primary | ICD-10-CM

## 2020-09-30 LAB
BKV DNA SERPL NAA+PROBE-ACNC: <125 COPIES/ML
BKV DNA SERPL NAA+PROBE-LOG#: <2.1 LOG (10) COPIES/ML
BKV DNA SERPL QL NAA+PROBE: NOT DETECTED
CMV DNA SERPL NAA+PROBE-ACNC: <35 IU/ML

## 2020-09-30 PROCEDURE — 99215 PR OFFICE/OUTPT VISIT, EST, LEVL V, 40-54 MIN: ICD-10-PCS | Mod: S$PBB,,, | Performed by: NURSE PRACTITIONER

## 2020-09-30 PROCEDURE — 99215 OFFICE O/P EST HI 40 MIN: CPT | Mod: S$PBB,,, | Performed by: NURSE PRACTITIONER

## 2020-09-30 PROCEDURE — 99215 OFFICE O/P EST HI 40 MIN: CPT | Mod: PBBFAC | Performed by: NURSE PRACTITIONER

## 2020-09-30 PROCEDURE — 99999 PR PBB SHADOW E&M-EST. PATIENT-LVL V: ICD-10-PCS | Mod: PBBFAC,,, | Performed by: NURSE PRACTITIONER

## 2020-09-30 PROCEDURE — 99999 PR PBB SHADOW E&M-EST. PATIENT-LVL V: CPT | Mod: PBBFAC,,, | Performed by: NURSE PRACTITIONER

## 2020-09-30 RX ORDER — SULFAMETHOXAZOLE AND TRIMETHOPRIM 400; 80 MG/1; MG/1
1 TABLET ORAL DAILY
Qty: 30 TABLET | Refills: 11 | Status: SHIPPED | OUTPATIENT
Start: 2020-09-30 | End: 2020-10-15 | Stop reason: SDUPTHER

## 2020-09-30 RX ORDER — SODIUM BICARBONATE 650 MG/1
650 TABLET ORAL 3 TIMES DAILY
Qty: 180 TABLET | Refills: 11 | Status: SHIPPED | OUTPATIENT
Start: 2020-09-30 | End: 2020-10-15 | Stop reason: SDUPTHER

## 2020-09-30 RX ORDER — VALGANCICLOVIR 450 MG/1
450 TABLET, FILM COATED ORAL DAILY
Qty: 30 TABLET | Refills: 5 | Status: SHIPPED | OUTPATIENT
Start: 2020-09-30 | End: 2020-10-15 | Stop reason: SDUPTHER

## 2020-09-30 RX ORDER — FENTANYL CITRATE 50 UG/ML
50 INJECTION, SOLUTION INTRAMUSCULAR; INTRAVENOUS
Status: CANCELLED | OUTPATIENT
Start: 2020-09-30

## 2020-09-30 RX ORDER — MIDAZOLAM HYDROCHLORIDE 1 MG/ML
1 INJECTION INTRAMUSCULAR; INTRAVENOUS
Status: CANCELLED | OUTPATIENT
Start: 2020-09-30

## 2020-09-30 NOTE — NURSING
Pre-op call complete.     Pre procedure instructions given for Kidney bx. Pt instructed not to eat or drink after midnight. Allergies reviewed. Mehreen to provide transport and monitor pt 8 hrs. Pt denied anticoagulation medications. Home medications reviewed with patient. Pt instructed to check in to second floor radiology/lab desk to have blood work drawn at 8:30 am then to proceed to [facility] waiting area. Expected length of stay reviewed.  Covid screening complete.  Pt verbalizes understanding. Questions answered.

## 2020-09-30 NOTE — PATIENT INSTRUCTIONS
Stop calcitriol   Lower sodium bicarb 650 mg ( 1 pill) 3x/day   Increase bactrim and valcyte daily dosing

## 2020-09-30 NOTE — PROGRESS NOTES
Kidney Post-Transplant Assessment    Referring Physician: Leslie Roche Jr.  Current Nephrologist: Angel Portillo    ORGAN: LEFT KIDNEY  Donor Type: donation after circulatory death   PHS Increased Risk: no  Cold Ischemia: 1,261 mins  Induction Medications: simulect - basiliximab    Subjective:     CC:  Reassessment of renal allograft function and management of chronic immunosuppression.    HPI:  Mr. Azevedo is a 49 y.o. 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  His most recent creatinine is 2.0. He takes mycophenolate mofetil, prednisone and tacrolimus for maintenance immunosuppression. His post transplant course has been uncomplicated to date.    Antimicrobial Prophylaxis:   - PJP prophylaxis until 21- Bactrim  - CMV prophylaxis until 21 - Valcyte [ Mismatch]  -FUNGAL PROPHYLAXIS: Nystatin 10/1/20    Interval HX:  Pt is scheduled for kidney bx 10/1  He is drinking alot of water today and is hoping scr level will drop and bx will be cancelled   Intake 2.5-2.7  UOP 2.3-2.6 --> will run slightly lower than intake     Log book reviewed. Pt is drinking a mixture: some water, with the bulk of Intake being  tea, milk and Gatorade  sodium 142 -->Discussed the importance in drinking more water ,and  limiting amounts of tea and milk , and discouraged drinking Gatorade d/t the potential of electrolyte imbalances .     BP Readings from Last 3 Encounters:   20 (!) 153/103   20 123/88   20 (!) 139/94     Peripheral edema-none  Weight ~ 143-144 lbs--stable  Appetite--improving     fx assessment  Overall feels good  Lab /diagnostic results reviewed with patient today.   All questions answered   numbness in inner thight, an area       Past Medical History:   Diagnosis Date    Chronic asthma      Donor Kidney Transplant 20    ESRD  "since 11/2014 from HTN     Hyperlipidemia     Hyperuricemia     Renal hypertension     Secondary hyperparathyroidism of renal origin        Review of Systems   Constitutional: Negative for activity change, appetite change, chills, fatigue, fever and unexpected weight change.   HENT: Negative for congestion, facial swelling, postnasal drip, rhinorrhea, sinus pressure, sore throat and trouble swallowing.    Eyes: Negative for pain, redness and visual disturbance.   Respiratory: Negative for cough, chest tightness, shortness of breath and wheezing.    Cardiovascular: Negative.  Negative for chest pain, palpitations and leg swelling.   Gastrointestinal: Negative for abdominal pain, diarrhea, nausea and vomiting.   Genitourinary: Negative for dysuria, flank pain and urgency.   Musculoskeletal: Negative for gait problem, neck pain and neck stiffness.   Skin: Negative for rash.   Allergic/Immunologic: Positive for immunocompromised state. Negative for environmental allergies and food allergies.   Neurological: Negative for dizziness, weakness, light-headedness and headaches.   Psychiatric/Behavioral: Negative for agitation and confusion. The patient is not nervous/anxious.        Objective:   Blood pressure (!) 153/103, pulse 87, resp. rate 16, height 5' 4" (1.626 m), weight 67.7 kg (149 lb 4 oz), SpO2 100 %.body mass index is 25.62 kg/m².    Physical Exam  Vitals signs reviewed.   Constitutional:       Appearance: Normal appearance. He is well-developed.   HENT:      Head: Normocephalic.   Eyes:      Pupils: Pupils are equal, round, and reactive to light.   Neck:      Musculoskeletal: Normal range of motion and neck supple.   Cardiovascular:      Rate and Rhythm: Normal rate and regular rhythm.      Heart sounds: Normal heart sounds.   Pulmonary:      Effort: Pulmonary effort is normal.      Breath sounds: Normal breath sounds.   Abdominal:      General: Bowel sounds are normal. There is no distension.      " Palpations: Abdomen is soft.      Tenderness: There is no abdominal tenderness.       Musculoskeletal: Normal range of motion.   Skin:     General: Skin is warm and dry.   Neurological:      Mental Status: He is alert and oriented to person, place, and time.      Motor: No abnormal muscle tone.      Coordination: Coordination normal.   Psychiatric:         Behavior: Behavior normal.         Labs:  Lab Results   Component Value Date    WBC 11.74 2020    HGB 12.6 (L) 2020    HCT 39.5 (L) 2020     2020    K 4.1 2020     2020    CO2 28 2020    BUN 39 (H) 2020    CREATININE 2.0 (H) 2020    EGFRNONAA 37.5 (A) 2020    CALCIUM 9.3 2020    PHOS 3.5 2020    MG 1.5 (L) 2020    ALBUMIN 3.7 2020    ALBUMIN 3.7 2020    AST 13 2020    ALT 10 2020    UTPCR 0.12 2020    PTH 56.0 2020    TACROLIMUS 7.0 2020       No results found for: EXTANC, EXTWBC, EXTSEGS, EXTPLATELETS, EXTHEMOGLOBI, EXTHEMATOCRI, EXTCREATININ, EXTSODIUM, EXTPOTASSIUM, EXTBUN, EXTCO2, EXTCALCIUM, EXTPHOSPHORU, EXTGLUCOSE, EXTALBUMIN, EXTAST, EXTALT, EXTBILITOTAL, EXTLIPASE, EXTAMYLASE    No results found for: EXTCYCLOSLVL, EXTSIROLIMUS, EXTTACROLVL, EXTPROTCRE, EXTPTHINTACT, EXTPROTEINUA, EXTWBCUA, EXTRBCUA    Labs were reviewed with the patient    Assessment:     1.  Donor Kidney Transplant 20    2. Prophylactic immunotherapy    3. Long-term use of immunosuppressant medication    4. At risk for opportunistic infections    5. Renal hypertension    6. Secondary hyperparathyroidism of renal origin    7. Acidosis        Plan:    -Detected CMV PCR-->CMV PCR weekly/guidelines   -Verify accuracy-->Repeat prograf trough      -Pt is schedule for a kidney transplant biopsy for Thursday 10/1/2020. He is drinking alot of water today and is hoping scr level will drop and bx will be cancelled      -bactrim and valcyte increased to  daily dosing   -Stop calcitriol     -Log book reviewed. Pt is drinking a mixture: some water, with the bulk of Intake being  tea, milk and Gatorade   sodium 142 -->Discussed the importance in drinking more water ,and  limiting amounts of tea and milk , and discouraged drinking Gatorade d/t the potential of electrolyte imbalances .        Follow-up:   1. CKD stage:  3  2. Immunosuppression:   Prograf trough7.0, which is  Sub therapeutic target 8-10 . Dose lowered on 9/21 . Continue Prograf 4/4, [id number] Mg BID, and Prednisone  Taper as prescribed, Bactrim 400/80 mg QD for PCP prophylaxis, Valcyte 450 mg QDfor CMV prophylaxis. Will continue to monitor for drug toxicities    3. Allograft Function: Stable. Continue good po hydration.       9/28/2020  POD 32   eGFR if non African American >60 mL/min/1.73 m^2 37.5 (A)  Calculation used to obtain the estimated glomerular filtration  rate (eGFR) is the CKD-EPI equation.         Lab Results   Component Value Date    CREATININE 2.0 (H) 09/28/2020        4. Hypertension management: advise low salt diet and home BP monitoring    No BP meds    5. Metabolic Bone Disease/Secondary Hyperparathyroidism:stable  Will monitor PTH, CA and Vit D/guidelines,    Ergocalciferol 50,000 Units / week--> as prescribed   Mg ox 400 mg BID, stop Calcitriol        8/27/2020  POD 0   Vit D, 25-Hydroxy 30 - 96 ng/mL 16 (A)  Vitamin D deficiency.........<10 ng/mL  Vitamin D insufficiency......10-29 ng/mL  Vitamin D sufficiency........> or equal to 30 ng/mL  Vitamin D toxicity............>100 ng/mL        9/28/2020  POD 32   Magnesium 1.6 - 2.6 mg/dL 1.5 (A)     Lab Results   Component Value Date    PTH 56.0 09/28/2020    CALCIUM 9.3 09/28/2020    PHOS 3.5 09/28/2020       6. Electrolytes:  Will monitor /guidelines  Lab Results   Component Value Date     09/28/2020    K 4.1 09/28/2020     09/28/2020    CO2 28 09/28/2020   Sodium bicarb 650 mg TID      7. Anemia: stable. No need for  intervention    Will monitor /guidelines  Lab Results   Component Value Date    WBC 11.74 09/28/2020    HGB 12.6 (L) 09/28/2020    HCT 39.5 (L) 09/28/2020    MCV 97 09/28/2020     09/28/2020         8.  Cytopenias: no significant cytopenias will monitor as per our guidelines. Medicine list reviewed including potential causes of drug-induced cytopenias    9.Proteinuria: continue p/c ratio as per guidelines        9/28/2020  POD 32   Prot/Creat Ratio, Ur 0.00 - 0.20 0.12     10. CMV and BK virus infection screening:  will continue to monitor/ guidelines   CMV PCR weekly/guidelines    9/28/2020  POD 32   BK Virus DNA, Blood Not detected Not detected       9/28/2020  POD 32   BK Virus DNA PCR, Quant, Blood <125 Copies/mL <125       9/24/2020  POD 28   Cytomegalovirus PCR, Quant Undetected IU/mL <35 (A)          11. Weight education: provided during the clinic visit   Body mass index is 25.62 kg/m².          12.Patient safety education regarding immunosuppression including prophylaxis posttransplant for CMV, PCP : Education provided about vaccination and prevention of infections            Follow-up:   Clinic: return to transplant clinic weekly for the first month after transplant; every 2 weeks during months 2-3; then at 6-, 9-, 12-, 18-, 24-, and 36- months post-transplant to reassess for complications from immunosuppression toxicity and monitor for rejection.  Annually thereafter.    Labs: since patient remains at high risk for rejection and drug-related complications that warrant close monitoring, labs will be ordered as follows: continue twice weekly CBC, renal panel, and drug level for first month; then same labs once weekly through 3rd month post-transplant.  Urine for UA and protein/creatinine ratio monthly.  Serum BK - PCR at 1-, 3-, 6-, 9-, 12-, 18-, 24-, 36-, 48-, and 60 months post-transplant.  Hepatic panel at 1-, 2-, 3-, 6-, 9-, 12-, 18-, 24-, and 36- months post-transplant.    Carline Obrien NP        Education:   Material provided to the patient.  Patient reminded to call with any health changes since these can be early signs of significant complications.  Also, I advised the patient to be sure any new medications or changes of old medications are discussed with either a pharmacist or physician knowledgeable with transplant to avoid rejection/drug toxicity related to significant drug interactions.

## 2020-09-30 NOTE — LETTER
September 30, 2020        Angel Portillo  [address]  [address]  [facility] [address]  Phone: [phone]  Fax: [phone]             [facility] 1st Fl  [address]  [facility] [address]  Phone: [phone]   Patient: Bryant Azevedo   MR Number: [id number]   YOB: 1970   Date of Visit: 9/30/2020       Dear Dr. Angel Portillo    Thank you for referring Bryant Azevedo to me for evaluation. Attached you will find relevant portions of my assessment and plan of care.    If you have questions, please do not hesitate to call me. I look forward to following Bryant Azevedo along with you.    Sincerely,    Carline Obrien, NP    Enclosure    If you would like to receive this communication electronically, please contact [email or web address] or [phone] to request [employer] access.    [employer] is a tool which provides read-only access to select patient information with whom you have a relationship. Its easy to use and provides real time access to review your patients record including encounter summaries, notes, results, and demographic information.    If you feel you have received this communication in error or would no longer like to receive these types of communications, please e-mail [email or web address]

## 2020-10-01 ENCOUNTER — PATIENT MESSAGE (OUTPATIENT)
Dept: TRANSPLANT | Facility: CLINIC | Age: 50
End: 2020-10-01

## 2020-10-01 ENCOUNTER — HOSPITAL ENCOUNTER (OUTPATIENT)
Facility: HOSPITAL | Age: 50
Discharge: HOME | End: 2020-10-01
Attending: INTERNAL MEDICINE | Admitting: INTERNAL MEDICINE
Payer: MEDICARE

## 2020-10-01 VITALS
HEIGHT: 64 IN | WEIGHT: 144 LBS | SYSTOLIC BLOOD PRESSURE: 166 MMHG | HEART RATE: 65 BPM | TEMPERATURE: 98 F | RESPIRATION RATE: 16 BRPM | DIASTOLIC BLOOD PRESSURE: 90 MMHG | BODY MASS INDEX: 24.59 KG/M2

## 2020-10-01 DIAGNOSIS — Z94.0 KIDNEY REPLACED BY TRANSPLANT: ICD-10-CM

## 2020-10-01 RX ORDER — SODIUM CHLORIDE 9 MG/ML
500 INJECTION, SOLUTION INTRAVENOUS ONCE
Status: DISCONTINUED | OUTPATIENT
Start: 2020-10-01 | End: 2020-10-01 | Stop reason: HOSPADM

## 2020-10-01 NOTE — H&P
Radiology History & Physical      SUBJECTIVE:     Chief Complaint: elevated Cr    History of Present Illness:  Bryant Azevedo is a 49 y.o. male s/p RLQ kidney transplant on 20 with persistently elevated Cr. He presents to IR for a kidney transplant biopsy.    Past Medical History:   Diagnosis Date    Chronic asthma      Donor Kidney Transplant 20    ESRD since 2014 from HTN     Hyperlipidemia     Hyperuricemia     Renal hypertension     Secondary hyperparathyroidism of renal origin      Past Surgical History:   Procedure Laterality Date    CHOLECYSTECTOMY      COLONOSCOPY N/A 2018    Procedure: COLONOSCOPY;  Surgeon: Pawel Gusman MD;  Location: [facility] (4TH FLR);  Service: Endoscopy;  Laterality: N/A;  peritoneal dialysis daily-labs prior-MS    KIDNEY TRANSPLANT N/A 2020    Procedure: TRANSPLANT, KIDNEY;  Surgeon: Seferino Mendoza MD;  [facility];  Service: Transplant;  Laterality: N/A;  Out of Ice time: 711  Kidney Reperfusion Time: 749    PERITONEAL CATHETER INSERTION      TONSILLECTOMY         Home Meds:   Prior to Admission medications    Medication Sig Start Date End Date Taking? Authorizing Provider   docusate sodium (COLACE) 100 MG capsule Take 1 capsule (100 mg total) by mouth 3 (three) times daily as needed for Constipation. 20  Yes Angelia Ritter MD   famotidine (PEPCID) 20 MG tablet Take 1 tablet (20 mg total) by mouth every evening. 20  Yes Angelia Ritter MD   magnesium oxide (MAG-OX) 400 mg (241.3 mg magnesium) tablet Take 1 tablet (400 mg total) by mouth 2 (two) times daily. 20 Yes Angelia Ritter MD   montelukast (SINGULAIR) 10 mg tablet Take 10 mg by mouth once daily.   Yes Historical Provider   mycophenolate (CELLCEPT) 250 mg Cap Take 4 capsules (1,000 mg total) by mouth 2 (two) times daily. 20  Yes Faina Esposito MD   nystatin (MYCOSTATIN) 100,000 unit/mL suspension  Take 5 mLs (500,000 Units total) by mouth 2 hours after meals and at bedtime. STOP 10/1/20 8/27/20  Yes Faina Esposito MD   paroxetine (PAXIL) 20 MG tablet Take 20 mg by mouth every evening.   Yes Historical Provider   predniSONE (DELTASONE) 5 MG tablet Take by mouth daily:  20mg 8/30-9/30, 15mg 10/1-10/31, 10mg 11/1-11/30, then 5mg daily beginning 12/1 8/27/20  Yes Faina Esposito MD   sodium bicarbonate 650 MG tablet Take 1 tablet (650 mg total) by mouth 3 (three) times daily. 9/30/20  Yes Carline Obrien NP   sulfamethoxazole-trimethoprim 400-80mg (BACTRIM,SEPTRA) 400-80 mg per tablet Take 1 tablet by mouth once daily. STOP 8/21/21 9/30/20  Yes Carline Obrien NP   tacrolimus (PROGRAF) 1 MG Cap Take 4 capsules (4 mg total) by mouth every 12 (twelve) hours. 9/21/20 9/21/21 Yes Shivani Van NP   valGANciclovir (VALCYTE) 450 mg Tab Take 1 tablet (450 mg total) by mouth once daily. STOP 2/23/21 9/30/20 3/29/21 Yes Carline Obrien NP   ergocalciferol (ERGOCALCIFEROL) 50,000 unit Cap Take 1 capsule (50,000 Units total) by mouth every 7 days. 9/4/20   Angelia Ritter MD   fluticasone-salmeterol 250-50 mcg/dose (ADVAIR) 250-50 mcg/dose diskus inhaler Inhale 1 puff into the lungs 2 (two) times daily.    Historical Provider   oxyCODONE (ROXICODONE) 5 MG immediate release tablet Take 1 tablet (5 mg total) by mouth every 6 (six) hours as needed for Pain. 9/2/20   Renita Mena PA-C   sertraline (ZOLOFT) 50 MG tablet Take 50 mg by mouth once daily.    Historical Provider     Anticoagulants/Antiplatelets: no anticoagulation    Allergies:   Review of patient's allergies indicates:   Allergen Reactions    Antihistamines - alkylamine Other (See Comments)     End stage renal     Codeine Other (See Comments)     Severe abdominal cramping    Nsaids (non-steroidal anti-inflammatory drug) Other (See Comments)     End stage renal      Sedation History:  no adverse reactions    Review of Systems:    Hematological: no known coagulopathies  Respiratory: no shortness of breath  Cardiovascular: no chest pain  Gastrointestinal: no abdominal pain  Genito-Urinary: no dysuria  Musculoskeletal: negative  Neurological: no TIA or stroke symptoms         OBJECTIVE:     Vital Signs (Most Recent)       Physical Exam:      General: no acute distress  Mental Status: alert and oriented to person, place and time  HEENT: normocephalic, atraumatic  Chest: unlabored breathing  Heart: regular heart rate  Abdomen: RLQ healed incision; nondistended  Extremity: moves all extremities    Laboratory  Lab Results   Component Value Date    INR 1.0 10/01/2020       Lab Results   Component Value Date    WBC 10.50 10/01/2020    HGB 12.1 (L) 10/01/2020    HCT 37.4 (L) 10/01/2020    MCV 96 10/01/2020     10/01/2020      Lab Results   Component Value Date    GLU 88 10/01/2020    GLU 88 10/01/2020     10/01/2020     10/01/2020    K 4.3 10/01/2020    K 4.3 10/01/2020     10/01/2020     10/01/2020    CO2 28 10/01/2020    CO2 28 10/01/2020    BUN 37 (H) 10/01/2020    BUN 37 (H) 10/01/2020    CREATININE 2.1 (H) 10/01/2020    CREATININE 2.1 (H) 10/01/2020    CALCIUM 8.9 10/01/2020    CALCIUM 8.9 10/01/2020    MG 1.6 10/01/2020    ALT 10 10/01/2020    AST 13 10/01/2020    ALBUMIN 3.7 10/01/2020    ALBUMIN 3.7 10/01/2020    BILITOT 0.5 10/01/2020    BILIDIR 0.1 09/28/2020       ASSESSMENT/PLAN:     Sedation Plan: local only (patient ate)    Patient will undergo a RLQ kidney transplant biopsy.      Marilee Montana MD   Department of Radiology  PGY IV Resident  Pager: [phone]

## 2020-10-01 NOTE — PROGRESS NOTES
Patient is dressed and ready for discharge, he stated that he already spoke to someone in scheduling and has rescheduled his procedure, pt in nad, caretaker at bedside, pt discharged.

## 2020-10-01 NOTE — PROGRESS NOTES
Patient is upset about the delay in his procedure time and wishes to reschedule his procedure, pt was encouraged to stay for procedure and advised that the delay in his procedure was a result of an emergency, pt was advised that it was a potential for him to be delayed on any day due to the potential for emergency patients requiring immediate treatment on any given day, pt verbalized understanding but wishes to reschedule, notified Iris in IR, she stated the MD will put in discharge orders and have the  call patient to reschedule.

## 2020-10-01 NOTE — PROGRESS NOTES
Patient had breakfast at 0830 this morning, notified Iris in IR and she asked the MD if patient could still have procedure, Dr. Montana stated patient could have procedure but it would have to be with local anesthesia only, no iv sedation, if patient chooses to have iv sedation we could reschedule procedure, pt opted to proceed using local anesthesia only after speaking to Dr. Montana, Dr. Montana stated patient did not require and iv start.  Preop process complete, Iris in IR stated they would be a delay due to an emergency on their unit, pt informed with verbal understanding received, also an IR representative would come to update patients about the time delay.

## 2020-10-02 ENCOUNTER — TELEPHONE (OUTPATIENT)
Dept: TRANSPLANT | Facility: HOSPITAL | Age: 50
End: 2020-10-02

## 2020-10-02 NOTE — DISCHARGE SUMMARY
Radiology Discharge Summary      Hospital Course: No complications    Admit Date: 10/1/2020  Discharge Date: 10/02/2020     Instructions Given to Patient: Yes  Diet: Resume prior diet  Activity: activity as tolerated and no driving for today    Description of Condition on Discharge: Stable  Vital Signs (Most Recent): Temp: 98.1 °F (36.7 °C) (10/01/20 1040)  Pulse: 65 (10/01/20 1040)  Resp: 16 (10/01/20 1040)  BP: (!) 166/90 (10/01/20 1044)    Discharge Disposition: Home    Discharge Diagnosis: Renal failure. Follow up as scheduled.    Gato Ng M.D.  Diagnostic and Interventional Radiologist  Department of Radiology  Pager: [phone]

## 2020-10-02 NOTE — PROCEDURES
Patient decided to reschedule due to delays.    Gato Ng M.D.  Diagnostic and Interventional Radiologist  Department of Radiology  Pager: [phone]

## 2020-10-02 NOTE — TELEPHONE ENCOUNTER
----- Message from Antonio Wu RN sent at 9/28/2020  4:56 PM CDT -----  He wants to know if you to continue to take the calcitriol since his PTH is back in normal range .  ----- Message -----  From: Angelia Ritter MD  Sent: 9/28/2020   4:18 PM CDT  To: [facility] Post-Kidney Transplant Clinical    Result reviewed and message sent to patient via [facility]:  Your results are acceptable and require no action.

## 2020-10-02 NOTE — TELEPHONE ENCOUNTER
It is fine to stop calcitriol.  We will monitor not just PTH, but also calcium and phosphorus since let Paige FX all of those results.

## 2020-10-05 ENCOUNTER — LAB VISIT (OUTPATIENT)
Dept: LAB | Facility: HOSPITAL | Age: 50
End: 2020-10-05
Attending: INTERNAL MEDICINE
Payer: MEDICARE

## 2020-10-05 DIAGNOSIS — Z94.0 KIDNEY REPLACED BY TRANSPLANT: ICD-10-CM

## 2020-10-05 LAB
ALBUMIN SERPL BCP-MCNC: 3.6 G/DL (ref 3.5–5.2)
ANION GAP SERPL CALC-SCNC: 7 MMOL/L (ref 8–16)
BASOPHILS # BLD AUTO: 0.09 K/UL (ref 0–0.2)
BASOPHILS NFR BLD: 0.8 % (ref 0–1.9)
BUN SERPL-MCNC: 32 MG/DL (ref 6–20)
CALCIUM SERPL-MCNC: 8.5 MG/DL (ref 8.7–10.5)
CHLORIDE SERPL-SCNC: 110 MMOL/L (ref 95–110)
CO2 SERPL-SCNC: 25 MMOL/L (ref 23–29)
CREAT SERPL-MCNC: 1.9 MG/DL (ref 0.5–1.4)
DIFFERENTIAL METHOD: ABNORMAL
EOSINOPHIL # BLD AUTO: 0.1 K/UL (ref 0–0.5)
EOSINOPHIL NFR BLD: 0.9 % (ref 0–8)
ERYTHROCYTE [DISTWIDTH] IN BLOOD BY AUTOMATED COUNT: 12.6 % (ref 11.5–14.5)
EST. GFR  (AFRICAN AMERICAN): 46.2 ML/MIN/1.73 M^2
EST. GFR  (NON AFRICAN AMERICAN): 39.9 ML/MIN/1.73 M^2
GLUCOSE SERPL-MCNC: 90 MG/DL (ref 70–110)
HCT VFR BLD AUTO: 39.1 % (ref 40–54)
HGB BLD-MCNC: 12.5 G/DL (ref 14–18)
IMM GRANULOCYTES # BLD AUTO: 0.54 K/UL (ref 0–0.04)
IMM GRANULOCYTES NFR BLD AUTO: 4.6 % (ref 0–0.5)
LYMPHOCYTES # BLD AUTO: 3 K/UL (ref 1–4.8)
LYMPHOCYTES NFR BLD: 25.7 % (ref 18–48)
MAGNESIUM SERPL-MCNC: 1.6 MG/DL (ref 1.6–2.6)
MCH RBC QN AUTO: 31.3 PG (ref 27–31)
MCHC RBC AUTO-ENTMCNC: 32 G/DL (ref 32–36)
MCV RBC AUTO: 98 FL (ref 82–98)
MONOCYTES # BLD AUTO: 0.7 K/UL (ref 0.3–1)
MONOCYTES NFR BLD: 5.9 % (ref 4–15)
NEUTROPHILS # BLD AUTO: 7.3 K/UL (ref 1.8–7.7)
NEUTROPHILS NFR BLD: 62.1 % (ref 38–73)
NRBC BLD-RTO: 0 /100 WBC
PHOSPHATE SERPL-MCNC: 2.9 MG/DL (ref 2.7–4.5)
PLATELET # BLD AUTO: 183 K/UL (ref 150–350)
PMV BLD AUTO: 12.1 FL (ref 9.2–12.9)
POTASSIUM SERPL-SCNC: 4.7 MMOL/L (ref 3.5–5.1)
RBC # BLD AUTO: 3.99 M/UL (ref 4.6–6.2)
SODIUM SERPL-SCNC: 142 MMOL/L (ref 136–145)
TACROLIMUS BLD-MCNC: 12.4 NG/ML (ref 5–15)
WBC # BLD AUTO: 11.73 K/UL (ref 3.9–12.7)

## 2020-10-05 PROCEDURE — 83735 ASSAY OF MAGNESIUM: CPT

## 2020-10-05 PROCEDURE — 80197 ASSAY OF TACROLIMUS: CPT

## 2020-10-05 PROCEDURE — 85025 COMPLETE CBC W/AUTO DIFF WBC: CPT

## 2020-10-05 PROCEDURE — 80069 RENAL FUNCTION PANEL: CPT

## 2020-10-05 PROCEDURE — 36415 COLL VENOUS BLD VENIPUNCTURE: CPT

## 2020-10-05 RX ORDER — TACROLIMUS 1 MG/1
CAPSULE ORAL
Qty: 210 CAPSULE | Refills: 11 | Status: SHIPPED | OUTPATIENT
Start: 2020-10-05 | End: 2020-10-08 | Stop reason: SDUPTHER

## 2020-10-05 NOTE — TELEPHONE ENCOUNTER
Patient repeated back and voice a understanding of orders.  Patient sates level is a 12 hour through.  Next labs 10/8/2020 with a kidney transplant biopsy schedule for Friday 10/9/2020.    ----- Message from Angelia Ritter MD sent at 10/5/2020 11:32 AM CDT -----  The following message sent to patient via [facility]:  Please lower tacrolimus from 04/04 to 4 mg every morning and 3 mg nightly, assuming this is an accurate 12 hr level.  If there is any question about the timing, please repeat tacro level 1st.

## 2020-10-06 ENCOUNTER — TELEPHONE (OUTPATIENT)
Dept: TRANSPLANT | Facility: CLINIC | Age: 50
End: 2020-10-06

## 2020-10-07 LAB — CMV DNA SERPL NAA+PROBE-ACNC: <35 IU/ML
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
## 2020-10-08 ENCOUNTER — TELEPHONE (OUTPATIENT)
Dept: TRANSPLANT | Facility: CLINIC | Age: 50
End: 2020-10-08

## 2020-10-08 ENCOUNTER — LAB VISIT (OUTPATIENT)
Dept: LAB | Facility: HOSPITAL | Age: 50
End: 2020-10-08
Attending: INTERNAL MEDICINE
Payer: MEDICARE

## 2020-10-08 ENCOUNTER — PATIENT MESSAGE (OUTPATIENT)
Dept: TRANSPLANT | Facility: CLINIC | Age: 50
End: 2020-10-08

## 2020-10-08 DIAGNOSIS — Z94.0 KIDNEY REPLACED BY TRANSPLANT: ICD-10-CM

## 2020-10-08 LAB
ALBUMIN SERPL BCP-MCNC: 3.6 G/DL (ref 3.5–5.2)
ANION GAP SERPL CALC-SCNC: 8 MMOL/L (ref 8–16)
BASOPHILS # BLD AUTO: 0.07 K/UL (ref 0–0.2)
BASOPHILS NFR BLD: 0.6 % (ref 0–1.9)
BUN SERPL-MCNC: 31 MG/DL (ref 6–20)
CALCIUM SERPL-MCNC: 8.4 MG/DL (ref 8.7–10.5)
CHLORIDE SERPL-SCNC: 111 MMOL/L (ref 95–110)
CO2 SERPL-SCNC: 23 MMOL/L (ref 23–29)
CREAT SERPL-MCNC: 1.7 MG/DL (ref 0.5–1.4)
DIFFERENTIAL METHOD: ABNORMAL
EOSINOPHIL # BLD AUTO: 0.1 K/UL (ref 0–0.5)
EOSINOPHIL NFR BLD: 0.8 % (ref 0–8)
ERYTHROCYTE [DISTWIDTH] IN BLOOD BY AUTOMATED COUNT: 13 % (ref 11.5–14.5)
EST. GFR  (AFRICAN AMERICAN): 52.8 ML/MIN/1.73 M^2
EST. GFR  (NON AFRICAN AMERICAN): 45.7 ML/MIN/1.73 M^2
GLUCOSE SERPL-MCNC: 95 MG/DL (ref 70–110)
HCT VFR BLD AUTO: 38.2 % (ref 40–54)
HGB BLD-MCNC: 12.6 G/DL (ref 14–18)
IMM GRANULOCYTES # BLD AUTO: 0.51 K/UL (ref 0–0.04)
IMM GRANULOCYTES NFR BLD AUTO: 4.5 % (ref 0–0.5)
LYMPHOCYTES # BLD AUTO: 3 K/UL (ref 1–4.8)
LYMPHOCYTES NFR BLD: 26.6 % (ref 18–48)
MAGNESIUM SERPL-MCNC: 1.7 MG/DL (ref 1.6–2.6)
MCH RBC QN AUTO: 31.5 PG (ref 27–31)
MCHC RBC AUTO-ENTMCNC: 33 G/DL (ref 32–36)
MCV RBC AUTO: 96 FL (ref 82–98)
MONOCYTES # BLD AUTO: 0.5 K/UL (ref 0.3–1)
MONOCYTES NFR BLD: 4.4 % (ref 4–15)
NEUTROPHILS # BLD AUTO: 7.1 K/UL (ref 1.8–7.7)
NEUTROPHILS NFR BLD: 63.1 % (ref 38–73)
NRBC BLD-RTO: 0 /100 WBC
PHOSPHATE SERPL-MCNC: 2.7 MG/DL (ref 2.7–4.5)
PLATELET # BLD AUTO: 180 K/UL (ref 150–350)
PMV BLD AUTO: 11.9 FL (ref 9.2–12.9)
POTASSIUM SERPL-SCNC: 4.1 MMOL/L (ref 3.5–5.1)
RBC # BLD AUTO: 4 M/UL (ref 4.6–6.2)
SODIUM SERPL-SCNC: 142 MMOL/L (ref 136–145)
TACROLIMUS BLD-MCNC: 13.1 NG/ML (ref 5–15)
WBC # BLD AUTO: 11.28 K/UL (ref 3.9–12.7)

## 2020-10-08 PROCEDURE — 85025 COMPLETE CBC W/AUTO DIFF WBC: CPT

## 2020-10-08 PROCEDURE — 80197 ASSAY OF TACROLIMUS: CPT

## 2020-10-08 PROCEDURE — 83735 ASSAY OF MAGNESIUM: CPT

## 2020-10-08 PROCEDURE — 36415 COLL VENOUS BLD VENIPUNCTURE: CPT

## 2020-10-08 PROCEDURE — 80069 RENAL FUNCTION PANEL: CPT

## 2020-10-08 RX ORDER — TACROLIMUS 1 MG/1
3 CAPSULE ORAL EVERY 12 HOURS
Qty: 180 CAPSULE | Refills: 11 | Status: SHIPPED | OUTPATIENT
Start: 2020-10-08 | End: 2020-10-15 | Stop reason: SDUPTHER

## 2020-10-08 NOTE — TELEPHONE ENCOUNTER
Patient repeated back and voice a understanding of orders.  Patient refusing biopsy tomorrow since his creatinine is down to 1.7.  Patient states Dr Awan informed him that he would need the biopsy if his creatinine remained 1.8-2.0. Patient asking to speak with Dr Awan regarding Biopsy.  Dr Awan notified of patients request and states she will try to call him and some point due to busy work load.Per Dr Awan she is requesting to proceed with biopsy due to recent erratic creatinine but we can Cancell biopsy if patients wants to.  ________________  Discussed with patient Carson Lv concerns and he just wants to Cancell biopsy. Katie in IR notified of patients request to cancel plan biopsy for tomorrow 10/9/2020.      ----- Message from Angelia Ritter MD sent at 10/8/2020 10:34 AM CDT -----  The following message sent to patient via [facility]:  Plan to proceed with biopsy as planned.

## 2020-10-08 NOTE — TELEPHONE ENCOUNTER
As previously agreed I left voice message with orders and to call coordinator with any questions.    ----- Message from Angelia Ritter MD sent at 10/8/2020  1:13 PM CDT -----  The following message sent to patient via [facility]:  Please decrease tacrolimus to 3 mg twice daily, from 4/3.     INTERVAL HPI/OVERNIGHT EVENTS: Pt seen and examined. States he had low appetite the past 2 days but better today. Denies abd pain. Denies N/V    MEDICATIONS  (STANDING):  albuterol/ipratropium for Nebulization. 3 milliLiter(s) Nebulizer every 6 hours  buDESOnide    Inhalation Suspension 0.5 milliGRAM(s) Inhalation every 12 hours  chlorhexidine 2% Cloths 1 Application(s) Topical <User Schedule>  diphenoxylate/atropine 2 Tablet(s) Oral <User Schedule>  enoxaparin Injectable 40 milliGRAM(s) SubCutaneous daily  ergocalciferol 93247 Unit(s) Oral every week  insulin lispro (HumaLOG) corrective regimen sliding scale   SubCutaneous every 6 hours  loperamide 16 milliGRAM(s) Oral <User Schedule>  octreotide  Injectable 100 MICROGram(s) SubCutaneous three times a day  opium Tincture 6 milliGRAM(s) Oral <User Schedule>  pantoprazole    Tablet 40 milliGRAM(s) Oral before breakfast  Parenteral Nutrition - Adult 1 Each (50 mL/Hr) TPN Continuous <Continuous>  psyllium Powder 1 Packet(s) Oral three times a day  tamsulosin 0.4 milliGRAM(s) Oral daily    MEDICATIONS  (PRN):  acetaminophen   Tablet .. 975 milliGRAM(s) Oral every 6 hours PRN Mild Pain (1 - 3)  ondansetron Injectable 4 milliGRAM(s) IV Push every 6 hours PRN Nausea and/or Vomiting      Vital Signs Last 24 Hrs  T(C): 37 (2020 07:00), Max: 37 (2020 07:00)  T(F): 98.6 (2020 07:00), Max: 98.6 (2020 07:00)  HR: 82 (2020 08:59) (70 - 103)  BP: 106/58 (2020 08:00) (72/44 - 112/59)  BP(mean): 76 (2020 08:00) (53 - 82)  RR: 36 (2020 08:00) (13 - 39)  SpO2: 100% (2020 08:59) (76% - 100%)    PHYSICAL EXAM:      Constitutional: NAD, resting comfortably in hospital bed eating breakfast    Respiratory: breathing comfortably with supplemental O2 via NC    Gastrointestinal: abd soft, NT, ND. Stoma pink and viable with stool and large amt of teressa in bag            I&O's Detail    2020 07:01  -  2020 07:00  --------------------------------------------------------  IN:    fat emulsion (Fish Oil and Plant Based) 20% Infusion: 175 mL    Lactated Ringers IV Bolus: 1000 mL    Oral Fluid: 810 mL    Packed Red Blood Cells: 350 mL    Solution: 100 mL    TPN (Total Parenteral Nutrition): 1200 mL  Total IN: 3635 mL    OUT:    Ileostomy: 1470 mL    Indwelling Catheter - Urethral: 1745 mL  Total OUT: 3215 mL    Total NET: 420 mL          LABS:                        7.4    7.42  )-----------( 258      ( 2020 05:32 )             23.4         138  |  98  |  23  ----------------------------<  148<H>  4.9   |  31  |  0.44<L>    Ca    7.8<L>      2020 01:35  Phos  3.4       Mg     2.2         TPro  5.3<L>  /  Alb  2.1<L>  /  TBili  0.2  /  DBili  <0.1  /  AST  31  /  ALT  40  /  AlkPhos  72        Urinalysis Basic - ( 2020 00:05 )    Color: Light Orange / Appearance: Slightly Turbid / S.030 / pH: x  Gluc: x / Ketone: Trace  / Bili: Negative / Urobili: Negative   Blood: x / Protein: 30 mg/dL / Nitrite: Negative   Leuk Esterase: Negative / RBC: >50 /hpf / WBC 2 /HPF   Sq Epi: x / Non Sq Epi: 0 /hpf / Bacteria: Negative        RADIOLOGY & ADDITIONAL STUDIES:

## 2020-10-12 ENCOUNTER — PATIENT MESSAGE (OUTPATIENT)
Dept: TRANSPLANT | Facility: CLINIC | Age: 50
End: 2020-10-12

## 2020-10-12 ENCOUNTER — LAB VISIT (OUTPATIENT)
Dept: LAB | Facility: HOSPITAL | Age: 50
End: 2020-10-12
Attending: INTERNAL MEDICINE
Payer: MEDICARE

## 2020-10-12 ENCOUNTER — TELEPHONE (OUTPATIENT)
Dept: TRANSPLANT | Facility: CLINIC | Age: 50
End: 2020-10-12

## 2020-10-12 DIAGNOSIS — Z94.0 KIDNEY REPLACED BY TRANSPLANT: ICD-10-CM

## 2020-10-12 LAB
ALBUMIN SERPL BCP-MCNC: 3.8 G/DL (ref 3.5–5.2)
ANION GAP SERPL CALC-SCNC: 10 MMOL/L (ref 8–16)
BASOPHILS # BLD AUTO: 0.06 K/UL (ref 0–0.2)
BASOPHILS NFR BLD: 0.5 % (ref 0–1.9)
BUN SERPL-MCNC: 31 MG/DL (ref 6–20)
CALCIUM SERPL-MCNC: 9 MG/DL (ref 8.7–10.5)
CHLORIDE SERPL-SCNC: 107 MMOL/L (ref 95–110)
CO2 SERPL-SCNC: 24 MMOL/L (ref 23–29)
CREAT SERPL-MCNC: 1.6 MG/DL (ref 0.5–1.4)
DIFFERENTIAL METHOD: ABNORMAL
EOSINOPHIL # BLD AUTO: 0.1 K/UL (ref 0–0.5)
EOSINOPHIL NFR BLD: 1.1 % (ref 0–8)
ERYTHROCYTE [DISTWIDTH] IN BLOOD BY AUTOMATED COUNT: 12.9 % (ref 11.5–14.5)
EST. GFR  (AFRICAN AMERICAN): 56.8 ML/MIN/1.73 M^2
EST. GFR  (NON AFRICAN AMERICAN): 49.2 ML/MIN/1.73 M^2
GLUCOSE SERPL-MCNC: 89 MG/DL (ref 70–110)
HCT VFR BLD AUTO: 39.4 % (ref 40–54)
HGB BLD-MCNC: 12.7 G/DL (ref 14–18)
IMM GRANULOCYTES # BLD AUTO: 0.34 K/UL (ref 0–0.04)
IMM GRANULOCYTES NFR BLD AUTO: 2.8 % (ref 0–0.5)
LYMPHOCYTES # BLD AUTO: 2.8 K/UL (ref 1–4.8)
LYMPHOCYTES NFR BLD: 22.4 % (ref 18–48)
MAGNESIUM SERPL-MCNC: 1.6 MG/DL (ref 1.6–2.6)
MCH RBC QN AUTO: 31.6 PG (ref 27–31)
MCHC RBC AUTO-ENTMCNC: 32.2 G/DL (ref 32–36)
MCV RBC AUTO: 98 FL (ref 82–98)
MONOCYTES # BLD AUTO: 0.5 K/UL (ref 0.3–1)
MONOCYTES NFR BLD: 4 % (ref 4–15)
NEUTROPHILS # BLD AUTO: 8.5 K/UL (ref 1.8–7.7)
NEUTROPHILS NFR BLD: 69.2 % (ref 38–73)
NRBC BLD-RTO: 0 /100 WBC
PHOSPHATE SERPL-MCNC: 2.6 MG/DL (ref 2.7–4.5)
PLATELET # BLD AUTO: 171 K/UL (ref 150–350)
PMV BLD AUTO: 12 FL (ref 9.2–12.9)
POTASSIUM SERPL-SCNC: 4.3 MMOL/L (ref 3.5–5.1)
RBC # BLD AUTO: 4.02 M/UL (ref 4.6–6.2)
SODIUM SERPL-SCNC: 141 MMOL/L (ref 136–145)
TACROLIMUS BLD-MCNC: 8.9 NG/ML (ref 5–15)
WBC # BLD AUTO: 12.27 K/UL (ref 3.9–12.7)

## 2020-10-12 PROCEDURE — 83735 ASSAY OF MAGNESIUM: CPT

## 2020-10-12 PROCEDURE — 80197 ASSAY OF TACROLIMUS: CPT

## 2020-10-12 PROCEDURE — 85025 COMPLETE CBC W/AUTO DIFF WBC: CPT

## 2020-10-12 PROCEDURE — 80069 RENAL FUNCTION PANEL: CPT

## 2020-10-12 PROCEDURE — 36415 COLL VENOUS BLD VENIPUNCTURE: CPT

## 2020-10-12 NOTE — TELEPHONE ENCOUNTER
Results reviewed with patient and he is requesting to repeat labs on Thursday 10/15 to see if [place] is holding . Patient is schedule to be seen in clinic on Wednesday 10/14 and decision to be made that patient can return home to [place] with future labs on [facility].    ----- Message from Rahul Watkins RN sent at 10/12/2020  1:23 PM CDT -----      ----- Message -----  From: Clarence Pedersen MD  Sent: 10/12/2020   1:19 PM CDT  To: [facility] Post-Liver Transplant Clinical    Results ok. No action needed

## 2020-10-14 ENCOUNTER — TELEPHONE (OUTPATIENT)
Dept: TRANSPLANT | Facility: CLINIC | Age: 50
End: 2020-10-14

## 2020-10-14 ENCOUNTER — OFFICE VISIT (OUTPATIENT)
Dept: TRANSPLANT | Facility: CLINIC | Age: 50
End: 2020-10-14
Payer: MEDICARE

## 2020-10-14 VITALS
OXYGEN SATURATION: 96 % | TEMPERATURE: 99 F | RESPIRATION RATE: 18 BRPM | SYSTOLIC BLOOD PRESSURE: 135 MMHG | HEIGHT: 64 IN | HEART RATE: 88 BPM | DIASTOLIC BLOOD PRESSURE: 87 MMHG | BODY MASS INDEX: 25.48 KG/M2 | WEIGHT: 149.25 LBS

## 2020-10-14 DIAGNOSIS — Z91.89 AT RISK FOR OPPORTUNISTIC INFECTIONS: ICD-10-CM

## 2020-10-14 DIAGNOSIS — Z94.0 S/P KIDNEY TRANSPLANT: Primary | ICD-10-CM

## 2020-10-14 DIAGNOSIS — N25.81 SECONDARY HYPERPARATHYROIDISM OF RENAL ORIGIN: Chronic | ICD-10-CM

## 2020-10-14 DIAGNOSIS — I12.9 RENAL HYPERTENSION: Chronic | ICD-10-CM

## 2020-10-14 DIAGNOSIS — Z79.60 LONG-TERM USE OF IMMUNOSUPPRESSANT MEDICATION: ICD-10-CM

## 2020-10-14 LAB — CMV DNA SERPL NAA+PROBE-ACNC: 74 IU/ML

## 2020-10-14 PROCEDURE — 99215 PR OFFICE/OUTPT VISIT, EST, LEVL V, 40-54 MIN: ICD-10-PCS | Mod: S$PBB,,, | Performed by: NURSE PRACTITIONER

## 2020-10-14 PROCEDURE — 99215 OFFICE O/P EST HI 40 MIN: CPT | Mod: PBBFAC | Performed by: NURSE PRACTITIONER

## 2020-10-14 PROCEDURE — 99999 PR PBB SHADOW E&M-EST. PATIENT-LVL V: CPT | Mod: PBBFAC,,, | Performed by: NURSE PRACTITIONER

## 2020-10-14 PROCEDURE — 99215 OFFICE O/P EST HI 40 MIN: CPT | Mod: S$PBB,,, | Performed by: NURSE PRACTITIONER

## 2020-10-14 PROCEDURE — 99999 PR PBB SHADOW E&M-EST. PATIENT-LVL V: ICD-10-PCS | Mod: PBBFAC,,, | Performed by: NURSE PRACTITIONER

## 2020-10-14 RX ORDER — AMLODIPINE BESYLATE 5 MG/1
5 TABLET ORAL DAILY
Qty: 90 TABLET | Refills: 3 | Status: SHIPPED | OUTPATIENT
Start: 2020-10-14 | End: 2020-10-15 | Stop reason: SDUPTHER

## 2020-10-14 NOTE — PROGRESS NOTES
Kidney Post-Transplant Assessment    Referring Physician: Leslie Roche Jr.  Current Nephrologist: Angel Portillo    ORGAN: LEFT KIDNEY  Donor Type: donation after circulatory death   PHS Increased Risk: no  Cold Ischemia: 1,261 mins  Induction Medications: simulect - basiliximab    Subjective:     CC:  Reassessment of renal allograft function and management of chronic immunosuppression.    HPI:  Mr. Azevedo is a 49 y.o. 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  His most recent creatinine is 1.6. He takes mycophenolate mofetil, prednisone and tacrolimus for maintenance immunosuppression. His post transplant course has been uncomplicated to date.    Antimicrobial Prophylaxis:   - PJP prophylaxis until 21- Bactrim  - CMV prophylaxis until 21 - Valcyte [ Mismatch]  -FUNGAL PROPHYLAXIS: Nystatin 10/1/20    Interval HX:   Pt is schedule for a kidney transplant biopsy for Thursday 10/1/2020 which was  cancelled      Scr has trended down , scr 1.6  Intake 2.5- 3 L  UOP 2.3-3 L      Pt reports BP usually lowest in  The AM, will increase 150/100s in the PM   Previously on Norvasc p/t txp    BP Readings from Last 3 Encounters:   10/14/20 135/87   10/01/20 (!) 166/90   20 (!) 153/103     Peripheral edema-none  Weight ~  144-145 lbs--stable  Appetite- stable      fx assessment  Overall feels good  Lab /diagnostic results reviewed with patient today.   All questions answered   numbness in inner thight, an area       Past Medical History:   Diagnosis Date    Chronic asthma      Donor Kidney Transplant 20    ESRD since 2014 from HTN     Hyperlipidemia     Hyperuricemia     Renal hypertension     Secondary hyperparathyroidism of renal origin        Review of Systems   Constitutional: Negative for activity change, appetite change, chills,  "fatigue, fever and unexpected weight change.   HENT: Negative for congestion, facial swelling, postnasal drip, rhinorrhea, sinus pressure, sore throat and trouble swallowing.    Eyes: Negative for pain, redness and visual disturbance.   Respiratory: Negative for cough, chest tightness, shortness of breath and wheezing.    Cardiovascular: Negative.  Negative for chest pain, palpitations and leg swelling.   Gastrointestinal: Negative for abdominal pain, diarrhea, nausea and vomiting.   Genitourinary: Negative for dysuria, flank pain and urgency.   Musculoskeletal: Negative for gait problem, neck pain and neck stiffness.   Skin: Negative for rash.   Allergic/Immunologic: Positive for immunocompromised state. Negative for environmental allergies and food allergies.   Neurological: Negative for dizziness, weakness, light-headedness and headaches.   Psychiatric/Behavioral: Negative for agitation and confusion. The patient is not nervous/anxious.        Objective:   Blood pressure 135/87, pulse 88, temperature 98.5 °F (36.9 °C), temperature source Oral, resp. rate 18, height 5' 4" (1.626 m), weight 67.7 kg (149 lb 4 oz), SpO2 96 %.body mass index is 25.62 kg/m².    Physical Exam  Vitals signs reviewed.   Constitutional:       Appearance: Normal appearance. He is well-developed.   HENT:      Head: Normocephalic.   Eyes:      Pupils: Pupils are equal, round, and reactive to light.   Neck:      Musculoskeletal: Normal range of motion and neck supple.   Cardiovascular:      Rate and Rhythm: Normal rate and regular rhythm.      Heart sounds: Normal heart sounds.   Pulmonary:      Effort: Pulmonary effort is normal.      Breath sounds: Normal breath sounds.   Abdominal:      General: Bowel sounds are normal. There is no distension.      Palpations: Abdomen is soft.      Tenderness: There is no abdominal tenderness.       Musculoskeletal: Normal range of motion.   Skin:     General: Skin is warm and dry.   Neurological:      " Mental Status: He is alert and oriented to person, place, and time.      Motor: No abnormal muscle tone.      Coordination: Coordination normal.   Psychiatric:         Behavior: Behavior normal.         Labs:  Lab Results   Component Value Date    WBC 12.27 10/12/2020    HGB 12.7 (L) 10/12/2020    HCT 39.4 (L) 10/12/2020     10/12/2020    K 4.3 10/12/2020     10/12/2020    CO2 24 10/12/2020    BUN 31 (H) 10/12/2020    CREATININE 1.6 (H) 10/12/2020    EGFRNONAA 49.2 (A) 10/12/2020    CALCIUM 9.0 10/12/2020    PHOS 2.6 (L) 10/12/2020    MG 1.6 10/12/2020    ALBUMIN 3.8 10/12/2020    AST 13 10/01/2020    ALT 10 10/01/2020    UTPCR 0.10 10/05/2020    PTH 56.0 2020    TACROLIMUS 8.9 10/12/2020       No results found for: EXTANC, EXTWBC, EXTSEGS, EXTPLATELETS, EXTHEMOGLOBI, EXTHEMATOCRI, EXTCREATININ, EXTSODIUM, EXTPOTASSIUM, EXTBUN, EXTCO2, EXTCALCIUM, EXTPHOSPHORU, EXTGLUCOSE, EXTALBUMIN, EXTAST, EXTALT, EXTBILITOTAL, EXTLIPASE, EXTAMYLASE    No results found for: EXTCYCLOSLVL, EXTSIROLIMUS, EXTTACROLVL, EXTPROTCRE, EXTPTHINTACT, EXTPROTEINUA, EXTWBCUA, EXTRBCUA    Labs were reviewed with the patient    Assessment:     1.  Donor Kidney Transplant 20    2. Long-term use of immunosuppressant medication    3. At risk for opportunistic infections    4. Renal hypertension    5. Secondary hyperparathyroidism of renal origin        Plan:    -Detected CMV PCR < 35 -->CMV PCR weekly/guidelines     Add Norvasc 5 mg daily --hold if BP in the 120/80s   labs scheduled for 10/15--if acceptable Pt is OK to check out of the apartments to go home.   Discusses with the patient and he verbalized understanding     Follow-up:   1. CKD stage:  3  2. Immunosuppression:   Prograf trough 8.9, which is   therapeutic target 8-10 .  Continue Prograf 3/3, [id number] Mg BID, and Prednisone  Taper as prescribed, Bactrim 400/80 mg QD for PCP prophylaxis, Valcyte 450 mg QDfor CMV prophylaxis. Will continue to monitor for  drug toxicities    3. Allograft Function:   Improved,  Continue good po hydration.       Lab Results   Component Value Date    CREATININE 1.6 (H) 10/12/2020       10/12/2020  POD 46   eGFR if non African American >60 mL/min/1.73 m^2 49.2 (A)  Calculation used to obtain the estimated glomerular filtration  rate (eGFR) is the CKD-EPI equation.          4. Hypertension management: advise low salt diet and home BP monitoring     start norvasc 5 mg QD , hold BP in 120/80s range     5. Metabolic Bone Disease/Secondary Hyperparathyroidism:stable  Will monitor PTH, CA and Vit D/guidelines,    Ergocalciferol 50,000 Units / week--> as prescribed   Mg ox 400 mg BID, stop Calcitriol        8/27/2020  POD 0   Vit D, 25-Hydroxy 30 - 96 ng/mL 16 (A)  Vitamin D deficiency.........<10 ng/mL  Vitamin D insufficiency......10-29 ng/mL  Vitamin D sufficiency........> or equal to 30 ng/mL  Vitamin D toxicity............>100 ng/mL            10/12/2020  POD 46   Magnesium 1.6 - 2.6 mg/dL 1.6     Lab Results   Component Value Date    PTH 56.0 09/28/2020    CALCIUM 9.0 10/12/2020    PHOS 2.6 (L) 10/12/2020       6. Electrolytes:  Will monitor /guidelines  Lab Results   Component Value Date     10/12/2020    K 4.3 10/12/2020     10/12/2020    CO2 24 10/12/2020   Sodium bicarb 650 mg TID      7. Anemia: stable. No need for intervention    Will monitor /guidelines  Lab Results   Component Value Date    WBC 12.27 10/12/2020    HGB 12.7 (L) 10/12/2020    HCT 39.4 (L) 10/12/2020    MCV 98 10/12/2020     10/12/2020         8.  Cytopenias: no significant cytopenias will monitor as per our guidelines. Medicine list reviewed including potential causes of drug-induced cytopenias    9.Proteinuria: continue p/c ratio as per guidelines         10/5/2020  POD 39   Prot/Creat Ratio, Ur 0.00 - 0.20 0.10       10. CMV and BK virus infection screening:  will continue to monitor/ guidelines   CMV PCR weekly/guidelines      10/5/2020  POD 39    Cytomegalovirus PCR, Quant Undetected IU/mL <35 (A)         9/28/2020  POD 32   BK Virus DNA, Blood Not detected Not detected       9/28/2020  POD 32   BK Virus DNA PCR, Quant, Blood <125 Copies/mL <125       9/24/2020  POD 28   Cytomegalovirus PCR, Quant Undetected IU/mL <35 (A)          11. Weight education: provided during the clinic visit   Body mass index is 25.62 kg/m².          12.Patient safety education regarding immunosuppression including prophylaxis posttransplant for CMV, PCP : Education provided about vaccination and prevention of infections            Follow-up:   Clinic: return to transplant clinic weekly for the first month after transplant; every 2 weeks during months 2-3; then at 6-, 9-, 12-, 18-, 24-, and 36- months post-transplant to reassess for complications from immunosuppression toxicity and monitor for rejection.  Annually thereafter.    Labs: since patient remains at high risk for rejection and drug-related complications that warrant close monitoring, labs will be ordered as follows: continue twice weekly CBC, renal panel, and drug level for first month; then same labs once weekly through 3rd month post-transplant.  Urine for UA and protein/creatinine ratio monthly.  Serum BK - PCR at 1-, 3-, 6-, 9-, 12-, 18-, 24-, 36-, 48-, and 60 months post-transplant.  Hepatic panel at 1-, 2-, 3-, 6-, 9-, 12-, 18-, 24-, and 36- months post-transplant.    Carline Obrien NP       Education:   Material provided to the patient.  Patient reminded to call with any health changes since these can be early signs of significant complications.  Also, I advised the patient to be sure any new medications or changes of old medications are discussed with either a pharmacist or physician knowledgeable with transplant to avoid rejection/drug toxicity related to significant drug interactions.

## 2020-10-14 NOTE — LETTER
October 14, 2020        Angel Portillo  [address]  [address]  [facility] [address]  Phone: [phone]  Fax: [phone]             [facility]  [address]  [facility] [address]  Phone: [phone]   Patient: Bryant Azevedo   MR Number: [id number]   YOB: 1970   Date of Visit: 10/14/2020       Dear Dr. Angel Portillo    Thank you for referring Bryant Azevedo to me for evaluation. Attached you will find relevant portions of my assessment and plan of care.    If you have questions, please do not hesitate to call me. I look forward to following Bryant Azevedo along with you.    Sincerely,    Carline Obrien, NP    Enclosure    If you would like to receive this communication electronically, please contact [email or web address] or [phone] to request [employer] access.    [employer] is a tool which provides read-only access to select patient information with whom you have a relationship. Its easy to use and provides real time access to review your patients record including encounter summaries, notes, results, and demographic information.    If you feel you have received this communication in error or would no longer like to receive these types of communications, please e-mail [email or web address]

## 2020-10-14 NOTE — TELEPHONE ENCOUNTER
Results reviewed with patient and will repeat CMV on 10/19/2020 as previously scheduled..  ----- Message from Angelia Ritter MD sent at 10/14/2020  3:56 PM CDT -----  Yes for now. Let's repeat CMV next week. If rises, will need rx dose of 450 BID  Is obviously, if creatinine significantly improves, we will adjust dose than as well.  Thanks to all for checking!  ----- Message -----  From: Antonio Wu RN  Sent: 10/14/2020   3:52 PM CDT  To: Angelia Ritter MD, #    So we stay at 450mg QD ?  ----- Message -----  From: Reba France PharmD  Sent: 10/14/2020   3:51 PM CDT  To: Angelia Ritter MD, Antonio Wu RN    Prophylaxis dose should be 450 mg PO daily (CrCl ~52).  Treatment would be 450 mg PO BID.    Reab  ----- Message -----  From: Antonio Wu RN  Sent: 10/14/2020   3:38 PM CDT  To: Reba France, PharmD, #    Please advise  ----- Message -----  From: Angelia Ritter MD  Sent: 10/14/2020   3:07 PM CDT  To: [facility] Post-Kidney Transplant Clinical    The following message sent to patient via [facility]:  Continue Valcyte and repeat CMV PCR in a week.  Will ask pharmacist to confirm prophylaxis dose given current GFR.

## 2020-10-15 ENCOUNTER — DOCUMENTATION ONLY (OUTPATIENT)
Dept: TRANSPLANT | Facility: CLINIC | Age: 50
End: 2020-10-15

## 2020-10-15 ENCOUNTER — LAB VISIT (OUTPATIENT)
Dept: LAB | Facility: HOSPITAL | Age: 50
End: 2020-10-15
Attending: INTERNAL MEDICINE
Payer: MEDICARE

## 2020-10-15 DIAGNOSIS — E83.42 HYPOMAGNESEMIA: ICD-10-CM

## 2020-10-15 DIAGNOSIS — Z29.89 PROPHYLACTIC IMMUNOTHERAPY: ICD-10-CM

## 2020-10-15 DIAGNOSIS — I12.9 RENAL HYPERTENSION: Chronic | ICD-10-CM

## 2020-10-15 DIAGNOSIS — Z94.0 KIDNEY REPLACED BY TRANSPLANT: ICD-10-CM

## 2020-10-15 DIAGNOSIS — E87.20 ACIDOSIS: ICD-10-CM

## 2020-10-15 DIAGNOSIS — Z94.0 S/P KIDNEY TRANSPLANT: ICD-10-CM

## 2020-10-15 LAB
ALBUMIN SERPL BCP-MCNC: 3.8 G/DL (ref 3.5–5.2)
ANION GAP SERPL CALC-SCNC: 7 MMOL/L (ref 8–16)
BASOPHILS # BLD AUTO: 0.06 K/UL (ref 0–0.2)
BASOPHILS NFR BLD: 0.6 % (ref 0–1.9)
BUN SERPL-MCNC: 26 MG/DL (ref 6–20)
CALCIUM SERPL-MCNC: 8.9 MG/DL (ref 8.7–10.5)
CHLORIDE SERPL-SCNC: 108 MMOL/L (ref 95–110)
CO2 SERPL-SCNC: 26 MMOL/L (ref 23–29)
CREAT SERPL-MCNC: 1.6 MG/DL (ref 0.5–1.4)
DIFFERENTIAL METHOD: ABNORMAL
EOSINOPHIL # BLD AUTO: 0.1 K/UL (ref 0–0.5)
EOSINOPHIL NFR BLD: 1.1 % (ref 0–8)
ERYTHROCYTE [DISTWIDTH] IN BLOOD BY AUTOMATED COUNT: 13.1 % (ref 11.5–14.5)
EST. GFR  (AFRICAN AMERICAN): 56.8 ML/MIN/1.73 M^2
EST. GFR  (NON AFRICAN AMERICAN): 49.2 ML/MIN/1.73 M^2
GLUCOSE SERPL-MCNC: 92 MG/DL (ref 70–110)
HCT VFR BLD AUTO: 39.5 % (ref 40–54)
HGB BLD-MCNC: 12.5 G/DL (ref 14–18)
IMM GRANULOCYTES # BLD AUTO: 0.23 K/UL (ref 0–0.04)
IMM GRANULOCYTES NFR BLD AUTO: 2.1 % (ref 0–0.5)
LYMPHOCYTES # BLD AUTO: 2.6 K/UL (ref 1–4.8)
LYMPHOCYTES NFR BLD: 24.6 % (ref 18–48)
MAGNESIUM SERPL-MCNC: 1.6 MG/DL (ref 1.6–2.6)
MCH RBC QN AUTO: 31 PG (ref 27–31)
MCHC RBC AUTO-ENTMCNC: 31.6 G/DL (ref 32–36)
MCV RBC AUTO: 98 FL (ref 82–98)
MONOCYTES # BLD AUTO: 0.5 K/UL (ref 0.3–1)
MONOCYTES NFR BLD: 4.5 % (ref 4–15)
NEUTROPHILS # BLD AUTO: 7.2 K/UL (ref 1.8–7.7)
NEUTROPHILS NFR BLD: 67.1 % (ref 38–73)
NRBC BLD-RTO: 0 /100 WBC
PHOSPHATE SERPL-MCNC: 2.6 MG/DL (ref 2.7–4.5)
PLATELET # BLD AUTO: 182 K/UL (ref 150–350)
PMV BLD AUTO: 12 FL (ref 9.2–12.9)
POTASSIUM SERPL-SCNC: 4.2 MMOL/L (ref 3.5–5.1)
RBC # BLD AUTO: 4.03 M/UL (ref 4.6–6.2)
SODIUM SERPL-SCNC: 141 MMOL/L (ref 136–145)
TACROLIMUS BLD-MCNC: 10.2 NG/ML (ref 5–15)
WBC # BLD AUTO: 10.73 K/UL (ref 3.9–12.7)

## 2020-10-15 PROCEDURE — 85025 COMPLETE CBC W/AUTO DIFF WBC: CPT

## 2020-10-15 PROCEDURE — 80197 ASSAY OF TACROLIMUS: CPT

## 2020-10-15 PROCEDURE — 36415 COLL VENOUS BLD VENIPUNCTURE: CPT

## 2020-10-15 PROCEDURE — 86977 RBC SERUM PRETX INCUBJ/INHIB: CPT | Mod: PO

## 2020-10-15 PROCEDURE — 86833 HLA CLASS II HIGH DEFIN QUAL: CPT | Mod: PO

## 2020-10-15 PROCEDURE — 86832 HLA CLASS I HIGH DEFIN QUAL: CPT | Mod: PO

## 2020-10-15 PROCEDURE — 83735 ASSAY OF MAGNESIUM: CPT

## 2020-10-15 PROCEDURE — 80069 RENAL FUNCTION PANEL: CPT

## 2020-10-15 NOTE — PROGRESS NOTES
Spoke with [facility]  at [facility] in [facility] and she states per Dr Hilliard they don't do labs on non [facility] [place] patients.  Spoke with Ange FARMER at Dr MICHAEL Zambrano office and she states she will arrange to have labs schedule for patient.  Lab orders fax to Dr Zambrano office.  Patient has been made aware of plan.

## 2020-10-15 NOTE — TELEPHONE ENCOUNTER
Patient repeated back and voice a understanding of orders.  Next labs 10/19/2020.    ----- Message from Clarence Pedersen MD sent at 10/15/2020  1:00 PM CDT -----  Results ok. fk 2 bid  Can go home

## 2020-10-16 ENCOUNTER — PATIENT MESSAGE (OUTPATIENT)
Dept: TRANSPLANT | Facility: CLINIC | Age: 50
End: 2020-10-16

## 2020-10-16 ENCOUNTER — TELEPHONE (OUTPATIENT)
Dept: TRANSPLANT | Facility: HOSPITAL | Age: 50
End: 2020-10-16

## 2020-10-16 LAB
CLASS I ANTIBODY COMMENTS - LUMINEX: NORMAL
CLASS II ANTIBODY COMMENTS - LUMINEX: NORMAL
DSA1 TESTING DATE: NORMAL
DSA12 TESTING DATE: NORMAL
DSA2 TESTING DATE: NORMAL
SERUM COLLECTION DT - LUMINEX CLASS I: NORMAL
SERUM COLLECTION DT - LUMINEX CLASS II: NORMAL

## 2020-10-16 RX ORDER — VALGANCICLOVIR 450 MG/1
450 TABLET, FILM COATED ORAL DAILY
Qty: 30 TABLET | Refills: 5 | Status: SHIPPED | OUTPATIENT
Start: 2020-10-16 | End: 2020-10-23 | Stop reason: SDUPTHER

## 2020-10-16 RX ORDER — AMLODIPINE BESYLATE 5 MG/1
5 TABLET ORAL DAILY
Qty: 90 TABLET | Refills: 3 | Status: SHIPPED | OUTPATIENT
Start: 2020-10-16 | End: 2021-02-09 | Stop reason: SDUPTHER

## 2020-10-16 RX ORDER — SULFAMETHOXAZOLE AND TRIMETHOPRIM 400; 80 MG/1; MG/1
1 TABLET ORAL DAILY
Qty: 30 TABLET | Refills: 11 | Status: SHIPPED | OUTPATIENT
Start: 2020-10-16 | End: 2021-02-09 | Stop reason: SDUPTHER

## 2020-10-16 RX ORDER — SODIUM BICARBONATE 650 MG/1
650 TABLET ORAL 3 TIMES DAILY
Qty: 180 TABLET | Refills: 11 | Status: SHIPPED | OUTPATIENT
Start: 2020-10-16 | End: 2021-02-09 | Stop reason: SDUPTHER

## 2020-10-16 RX ORDER — MYCOPHENOLATE MOFETIL 250 MG/1
1000 CAPSULE ORAL 2 TIMES DAILY
Qty: 240 CAPSULE | Refills: 11 | Status: SHIPPED | OUTPATIENT
Start: 2020-10-16 | End: 2021-02-09 | Stop reason: SDUPTHER

## 2020-10-16 RX ORDER — TACROLIMUS 1 MG/1
2 CAPSULE ORAL EVERY 12 HOURS
Qty: 120 CAPSULE | Refills: 11 | Status: SHIPPED | OUTPATIENT
Start: 2020-10-16 | End: 2020-10-27 | Stop reason: SDUPTHER

## 2020-10-16 RX ORDER — LANOLIN ALCOHOL/MO/W.PET/CERES
400 CREAM (GRAM) TOPICAL 2 TIMES DAILY
Qty: 60 TABLET | Refills: 11 | Status: SHIPPED | OUTPATIENT
Start: 2020-10-16 | End: 2020-11-04

## 2020-10-16 RX ORDER — PREDNISONE 5 MG/1
TABLET ORAL
Qty: 120 TABLET | Refills: 11 | Status: SHIPPED | OUTPATIENT
Start: 2020-10-16 | End: 2021-02-09 | Stop reason: SDUPTHER

## 2020-10-16 RX ORDER — FAMOTIDINE 20 MG/1
20 TABLET, FILM COATED ORAL NIGHTLY
Qty: 30 TABLET | Refills: 11 | Status: SHIPPED | OUTPATIENT
Start: 2020-10-16 | End: 2021-02-09 | Stop reason: SDUPTHER

## 2020-10-16 NOTE — TELEPHONE ENCOUNTER
"SW received message from patient's post-kidney transplant coordinator informing of pt's request to speak to a kidney transplant SW but would not elaborate on reason.  SW contacted pt ([phone]) for follow up.  Pt reports inquiring about timeline and process of checking out of [place] apartment when cleared for return home.  SW informed pt that apartment key will need to be returned to [place] leasing office prior to departure and pt would be expected to vacate apartment once pt is cleared by kidney transplant coordinator or provider.  Pt reports being informed of plan to remain in [place] to complete labs locally on Monday 10/19/2020 while coordinator confirms scheduling of future labs back home in [place].  Fellow kidney transplant SW Georgette Norwood LCSW contacted pt's post-kidney transplant coordinator Antonio Wu RN to confirm pt's reports.  Georgette informed by Antonio that pt is not correct in plan and reports discussing plan of pt returning home and having labs scheduled back home in [place] on Monday 10/19.  This SW contacted Dr. Zambrano's office in [place], [place] ([phone]) and confirmed with MA that all necessary documentation was received from Antonio in order to schedule labs for Monday.  MA stated plans of contacting pt today to discuss convenient lab location near pt's home in [place].  SW called pt back to provide update from MA.  Pt expressed frustration stating that no one has spoken to him about this.  SW reiterated to pt that MA would contact him today to schedule.  SW also reiterated that transplant team would expect pt to vacate apartment as pt no longer requires local lodging for transplant follow up.  Pt verbalized understanding regarding same.  Pt reports plans of vacating apartment "no later than Sunday [10/18/2020]" and will have friend Mehreen accompany and transport him home.  Pt denies having any additional questions or concerns at this time.  SW remains available for further support " as needed.

## 2020-10-19 ENCOUNTER — PATIENT MESSAGE (OUTPATIENT)
Dept: TRANSPLANT | Facility: CLINIC | Age: 50
End: 2020-10-19

## 2020-10-19 ENCOUNTER — TELEPHONE (OUTPATIENT)
Dept: TRANSPLANT | Facility: CLINIC | Age: 50
End: 2020-10-19

## 2020-10-19 ENCOUNTER — LAB VISIT (OUTPATIENT)
Dept: LAB | Facility: HOSPITAL | Age: 50
End: 2020-10-19
Attending: INTERNAL MEDICINE
Payer: MEDICARE

## 2020-10-19 DIAGNOSIS — Z94.0 KIDNEY REPLACED BY TRANSPLANT: ICD-10-CM

## 2020-10-19 LAB
ALBUMIN SERPL BCP-MCNC: 3.8 G/DL (ref 3.5–5.2)
ANION GAP SERPL CALC-SCNC: 7 MMOL/L (ref 8–16)
BASOPHILS # BLD AUTO: 0.06 K/UL (ref 0–0.2)
BASOPHILS NFR BLD: 0.6 % (ref 0–1.9)
BUN SERPL-MCNC: 25 MG/DL (ref 6–20)
CALCIUM SERPL-MCNC: 8.8 MG/DL (ref 8.7–10.5)
CHLORIDE SERPL-SCNC: 108 MMOL/L (ref 95–110)
CO2 SERPL-SCNC: 25 MMOL/L (ref 23–29)
CREAT SERPL-MCNC: 1.7 MG/DL (ref 0.5–1.4)
DIFFERENTIAL METHOD: ABNORMAL
EOSINOPHIL # BLD AUTO: 0.3 K/UL (ref 0–0.5)
EOSINOPHIL NFR BLD: 2.5 % (ref 0–8)
ERYTHROCYTE [DISTWIDTH] IN BLOOD BY AUTOMATED COUNT: 13.3 % (ref 11.5–14.5)
EST. GFR  (AFRICAN AMERICAN): 52.8 ML/MIN/1.73 M^2
EST. GFR  (NON AFRICAN AMERICAN): 45.7 ML/MIN/1.73 M^2
GLUCOSE SERPL-MCNC: 96 MG/DL (ref 70–110)
HCT VFR BLD AUTO: 41.6 % (ref 40–54)
HGB BLD-MCNC: 13.3 G/DL (ref 14–18)
IMM GRANULOCYTES # BLD AUTO: 0.23 K/UL (ref 0–0.04)
IMM GRANULOCYTES NFR BLD AUTO: 2.2 % (ref 0–0.5)
LYMPHOCYTES # BLD AUTO: 2.8 K/UL (ref 1–4.8)
LYMPHOCYTES NFR BLD: 26.4 % (ref 18–48)
MAGNESIUM SERPL-MCNC: 1.8 MG/DL (ref 1.6–2.6)
MCH RBC QN AUTO: 31.2 PG (ref 27–31)
MCHC RBC AUTO-ENTMCNC: 32 G/DL (ref 32–36)
MCV RBC AUTO: 98 FL (ref 82–98)
MONOCYTES # BLD AUTO: 0.5 K/UL (ref 0.3–1)
MONOCYTES NFR BLD: 4.9 % (ref 4–15)
NEUTROPHILS # BLD AUTO: 6.6 K/UL (ref 1.8–7.7)
NEUTROPHILS NFR BLD: 63.4 % (ref 38–73)
NRBC BLD-RTO: 0 /100 WBC
PHOSPHATE SERPL-MCNC: 2.5 MG/DL (ref 2.7–4.5)
PLATELET # BLD AUTO: 184 K/UL (ref 150–350)
PMV BLD AUTO: 12.2 FL (ref 9.2–12.9)
POTASSIUM SERPL-SCNC: 3.8 MMOL/L (ref 3.5–5.1)
RBC # BLD AUTO: 4.26 M/UL (ref 4.6–6.2)
SODIUM SERPL-SCNC: 140 MMOL/L (ref 136–145)
TACROLIMUS BLD-MCNC: 8.2 NG/ML (ref 5–15)
WBC # BLD AUTO: 10.44 K/UL (ref 3.9–12.7)

## 2020-10-19 PROCEDURE — 85025 COMPLETE CBC W/AUTO DIFF WBC: CPT

## 2020-10-19 PROCEDURE — 83735 ASSAY OF MAGNESIUM: CPT

## 2020-10-19 PROCEDURE — 36415 COLL VENOUS BLD VENIPUNCTURE: CPT

## 2020-10-19 PROCEDURE — 80069 RENAL FUNCTION PANEL: CPT

## 2020-10-19 PROCEDURE — 80197 ASSAY OF TACROLIMUS: CPT

## 2020-10-19 NOTE — TELEPHONE ENCOUNTER
SW received the [facility] apartment list and noted that pt was still listed as being in the apartments. SUNITHA followed up with pt's coordinator: Antonio RN who reports that he was out on Friday, but was under the impression that pt had checked left out. SW reviewed pt's chart and saw that pt did attend his lab appointment this morning. Antonio reports that he left that appointment in because he was out on Friday. Antonio does report and did send a message to pt that Dr. Zambrano's office was trying to get a hold of patient to schedule his labs and they confirmed that they spoke with pt.    SW then spoke with pt himself and pt reports that he received a notification that he was scheduled to do labs here at [facility] this morning, so he stayed in the apartments over the weekend. Pt reports that he is in the process of cleaning the apartments and will be checking out tomorrow.    SUNITHA remains available to pt, pt's family, and transplant team at [phone].

## 2020-10-21 LAB — CMV DNA SERPL NAA+PROBE-ACNC: 53 IU/ML

## 2020-10-23 ENCOUNTER — PATIENT MESSAGE (OUTPATIENT)
Dept: TRANSPLANT | Facility: CLINIC | Age: 50
End: 2020-10-23

## 2020-10-23 DIAGNOSIS — Z29.89 PROPHYLACTIC IMMUNOTHERAPY: ICD-10-CM

## 2020-10-23 DIAGNOSIS — B25.9 CYTOMEGALOVIRUS (CMV) VIREMIA: Primary | ICD-10-CM

## 2020-10-23 DIAGNOSIS — Z94.0 S/P KIDNEY TRANSPLANT: ICD-10-CM

## 2020-10-23 RX ORDER — VALGANCICLOVIR 450 MG/1
450 TABLET, FILM COATED ORAL 2 TIMES DAILY
Qty: 60 TABLET | Refills: 11 | Status: SHIPPED | OUTPATIENT
Start: 2020-10-23 | End: 2020-12-03 | Stop reason: SDUPTHER

## 2020-10-23 RX ORDER — VALGANCICLOVIR 450 MG/1
450 TABLET, FILM COATED ORAL 2 TIMES DAILY
Qty: 60 TABLET | Refills: 11 | Status: SHIPPED | OUTPATIENT
Start: 2020-10-23 | End: 2020-10-23 | Stop reason: SDUPTHER

## 2020-10-23 NOTE — TELEPHONE ENCOUNTER
As previously agreed I left voice message and My Chart message with orders, and to call coordinator with any questions.    ----- Message from Alicia Barnes PharmD sent at 10/23/2020  9:51 AM CDT -----  Ok, 450 mg bid and continuing checking weekly CMV PCRs to monitor viral load.  ----- Message -----  From: Angelia Ritter MD  Sent: 10/22/2020   5:41 PM CDT  To: Antonio Wu, RN, Alicia Barnes, PharmAlicia Perez. Thanks fo minesh correa. I support that plan. Either way, ticket is continued monitoring qweek PCR!  ----- Message -----  From: Alicia Barnes PharmD  Sent: 10/22/2020   9:24 AM CDT  To: Angelia Ritter MD, Antonio Wu, RN    He is CMV high risk D+/R- and has been on full-dose immunosuppression, so I would be more inclined to treat this despite it decreasing.    ----- Message -----  From: Angelia Ritter MD  Sent: 10/21/2020   8:26 PM CDT  To: Alicia Barnes PharmD, #    Since count dropping, would use prophylactic dose and only increase to txp dose if level rises.  Is that reasonable from pharmD perspective?  ----- Message -----  From: Alicia Barnes PharmD  Sent: 10/21/2020   3:45 PM CDT  To: Angelia Ritter MD, #    If we are continuing to treat him for CMV, his dose should be valcyte 450 mg q12h.  If dropping to prophylaxis his dose would be 450 mg daily.    ----- Message -----  From: Angelia Ritter MD  Sent: 10/21/2020   3:42 PM CDT  To: Ab[facility] Transplant Pharmacists, #    The following message sent to patient via [facility]:  Your kidney function has improved.  This CMV level has also dropped.    [via this message] Will ask pharmacist to verify that Valcyte dose is appropriate given improved creat.

## 2020-10-26 ENCOUNTER — PATIENT MESSAGE (OUTPATIENT)
Dept: ADMINISTRATIVE | Facility: OTHER | Age: 50
End: 2020-10-26

## 2020-10-26 LAB
EXT ALBUMIN: 4.5
EXT ALKALINE PHOSPHATASE: 41
EXT ALT: 13
EXT AST: 18
EXT BACTERIA UA: ABNORMAL
EXT BILIRUBIN DIRECT: 0.1 MG/DL
EXT BILIRUBIN TOTAL: 0.3
EXT BK VIRUS DNA QN PCR: ABNORMAL
EXT BUN: 21
EXT CALCIUM: 9.7
EXT CHLORIDE: 104
EXT CMV DNA QUANT. BY PCR: 54.2
EXT CO2: 24
EXT CREATININE: 1.64 MG/DL
EXT EOSINOPHIL%: 3.5
EXT GFR MDRD NON AF AMER: 48
EXT GLUCOSE UA: ABNORMAL
EXT GLUCOSE: 83
EXT HEMATOCRIT: 40.6
EXT HEMOGLOBIN: 13.4
EXT LYMPH%: 25.3
EXT MAGNESIUM: 1.8
EXT MONOCYTES%: 4.9
EXT NITRITES UA: ABNORMAL
EXT PHOSPHORUS: 3.6
EXT PLATELETS: 205
EXT POTASSIUM: 4.7
EXT PROT/CREAT RATIO UR: 0.07
EXT PROTEIN TOTAL: 6
EXT PROTEIN UA: ABNORMAL
EXT RBC UA: <1
EXT SEGS%: 64.3
EXT SODIUM: 142 MMOL/L
EXT TACROLIMUS LVL: 5.9
EXT WBC UA: <1
EXT WBC: 11.6

## 2020-10-27 ENCOUNTER — DOCUMENTATION ONLY (OUTPATIENT)
Dept: TRANSPLANT | Facility: CLINIC | Age: 50
End: 2020-10-27

## 2020-10-27 DIAGNOSIS — Z94.0 KIDNEY REPLACED BY TRANSPLANT: ICD-10-CM

## 2020-10-27 RX ORDER — TACROLIMUS 1 MG/1
CAPSULE ORAL
Qty: 150 CAPSULE | Refills: 11 | Status: SHIPPED | OUTPATIENT
Start: 2020-10-27 | End: 2020-12-18 | Stop reason: SDUPTHER

## 2020-10-27 NOTE — TELEPHONE ENCOUNTER
VORB Increase your Tacrolimus 3mg in AM and 2mg in PM.  10/26/2020 Tacrolimus level reviewed.  ______________  Patient repeated back and voice a understanding of orders.  Next labs are schedule for 11/2/2020

## 2020-11-02 ENCOUNTER — PATIENT MESSAGE (OUTPATIENT)
Dept: TRANSPLANT | Facility: CLINIC | Age: 50
End: 2020-11-02

## 2020-11-02 LAB
EXT ALBUMIN: 4.6
EXT BUN: 24
EXT CALCIUM: 9.8
EXT CHLORIDE: 106
EXT CMV DNA QUANT. BY PCR: 160
EXT CO2: 26
EXT CREATININE: 1.74 MG/DL
EXT GFR MDRD NON AF AMER: 44
EXT GLUCOSE: 103
EXT MAGNESIUM: 1.93
EXT PHOSPHORUS: 3.4
EXT POTASSIUM: 4.4
EXT PROT/CREAT RATIO UR: 0.3
EXT SODIUM: 142 MMOL/L
EXT TACROLIMUS LVL: 7.5

## 2020-11-03 ENCOUNTER — DOCUMENTATION ONLY (OUTPATIENT)
Dept: TRANSPLANT | Facility: CLINIC | Age: 50
End: 2020-11-03

## 2020-11-04 ENCOUNTER — OFFICE VISIT (OUTPATIENT)
Dept: TRANSPLANT | Facility: CLINIC | Age: 50
End: 2020-11-04
Payer: MEDICARE

## 2020-11-04 VITALS
SYSTOLIC BLOOD PRESSURE: 138 MMHG | RESPIRATION RATE: 16 BRPM | HEIGHT: 64 IN | BODY MASS INDEX: 25.36 KG/M2 | OXYGEN SATURATION: 98 % | TEMPERATURE: 99 F | WEIGHT: 148.56 LBS | DIASTOLIC BLOOD PRESSURE: 90 MMHG

## 2020-11-04 DIAGNOSIS — Z94.0 KIDNEY REPLACED BY TRANSPLANT: ICD-10-CM

## 2020-11-04 DIAGNOSIS — Z94.0 IMMUNOSUPPRESSIVE MANAGEMENT ENCOUNTER FOLLOWING KIDNEY TRANSPLANT: ICD-10-CM

## 2020-11-04 DIAGNOSIS — Z79.899 IMMUNOSUPPRESSIVE MANAGEMENT ENCOUNTER FOLLOWING KIDNEY TRANSPLANT: ICD-10-CM

## 2020-11-04 DIAGNOSIS — E83.42 HYPOMAGNESEMIA: ICD-10-CM

## 2020-11-04 DIAGNOSIS — Z91.89 AT RISK FOR OPPORTUNISTIC INFECTIONS: ICD-10-CM

## 2020-11-04 DIAGNOSIS — I12.9 RENAL HYPERTENSION: ICD-10-CM

## 2020-11-04 DIAGNOSIS — N18.31 STAGE 3A CHRONIC KIDNEY DISEASE: Primary | ICD-10-CM

## 2020-11-04 DIAGNOSIS — Z94.0 DECEASED-DONOR KIDNEY TRANSPLANT: ICD-10-CM

## 2020-11-04 DIAGNOSIS — B25.9 CYTOMEGALOVIRUS (CMV) VIREMIA: ICD-10-CM

## 2020-11-04 PROCEDURE — 99999 PR PBB SHADOW E&M-EST. PATIENT-LVL III: ICD-10-PCS | Mod: PBBFAC,,, | Performed by: INTERNAL MEDICINE

## 2020-11-04 PROCEDURE — 99999 PR PBB SHADOW E&M-EST. PATIENT-LVL III: CPT | Mod: PBBFAC,,, | Performed by: INTERNAL MEDICINE

## 2020-11-04 PROCEDURE — 99215 OFFICE O/P EST HI 40 MIN: CPT | Mod: S$PBB,,, | Performed by: INTERNAL MEDICINE

## 2020-11-04 PROCEDURE — 99215 PR OFFICE/OUTPT VISIT, EST, LEVL V, 40-54 MIN: ICD-10-PCS | Mod: S$PBB,,, | Performed by: INTERNAL MEDICINE

## 2020-11-04 PROCEDURE — 99213 OFFICE O/P EST LOW 20 MIN: CPT | Mod: PBBFAC | Performed by: INTERNAL MEDICINE

## 2020-11-04 RX ORDER — LANOLIN ALCOHOL/MO/W.PET/CERES
400 CREAM (GRAM) TOPICAL DAILY
Qty: 30 TABLET | Refills: 11 | Status: SHIPPED | OUTPATIENT
Start: 2020-11-04 | End: 2021-02-09 | Stop reason: SDUPTHER

## 2020-11-04 NOTE — PROGRESS NOTES
Post-Transplant Assessment    Referring Physician: Leslie Roche Jr.  Current Nephrologist: Leslie Roche Jr.    ORGAN: LEFT KIDNEY  Donor Type: donation after circulatory death   PHS Increased Risk: no  Cold Ischemia: 1,261 mins  Induction Medications: simulect - basiliximab    Subjective:     CC:  Reassessment of renal allograft function and management of chronic immunosuppression.    HPI:  Mr. Azevedo is a 49 y.o. year old White male who received a DCD kidney transplant  8/27/2020. His post transplant course has been uncomplicated to date.      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 Surgery notable for 3 arteries.     Antimicrobial Prophylaxis:   - PJP prophylaxis until 8/21/21- Bactrim  - CMV prophylaxis until 2/23/21 - Valcyte [ Mismatch]    UPDATE 11/04/2020   Maryjane is here 69 days post transplant. He is feeling well, and his only concern is his lab has not been able to get CMV results in a timely fashion. He denies CP, SOB, N/V, diarrhea. He has no pain over allograft or LUTS.  His BPs at home have been 130s/80s-90s. He was instructed to take amlodipine of BP >150, and has taken it twice.    CMV PCR weakly + in the recent past; he is on Valcyte treatment dose  Results for MARYJANE AZEVEDO (MRN [id number]) as of 11/4/2020 08:56   Ref. Range 10/5/2020 08:22 10/12/2020 08:14 10/19/2020 08:43   Cytomegalovirus PCR, Quant Latest Ref Range: Undetected IU/mL <35 (A) 74 (A) 53 (A)     Current Outpatient Medications   Medication Sig    amLODIPine (NORVASC) 5 MG tablet Take 1 tablet (5 mg total) by mouth once daily.    docusate sodium (COLACE) 100 MG capsule Take 1 capsule (100 mg total) by mouth 3 (three) times daily as needed for Constipation.    ergocalciferol (ERGOCALCIFEROL) 50,000 unit Cap Take 1 capsule (50,000 Units total) by mouth every 7 days.    famotidine (PEPCID) 20 MG tablet Take 1 tablet (20 mg total) by mouth  every evening.    fluticasone-salmeterol 250-50 mcg/dose (ADVAIR) 250-50 mcg/dose diskus inhaler Inhale 1 puff into the lungs 2 (two) times daily.    magnesium oxide (MAG-OX) 400 mg (241.3 mg magnesium) tablet Take 1 tablet (400 mg total) by mouth 2 (two) times daily.    montelukast (SINGULAIR) 10 mg tablet Take 10 mg by mouth once daily.    mycophenolate (CELLCEPT) 250 mg Cap Take 4 capsules (1,000 mg total) by mouth 2 (two) times daily.    nystatin (MYCOSTATIN) 100,000 unit/mL suspension Take 5 mLs (500,000 Units total) by mouth 2 hours after meals and at bedtime. STOP 10/1/20    oxyCODONE (ROXICODONE) 5 MG immediate release tablet Take 1 tablet (5 mg total) by mouth every 6 (six) hours as needed for Pain.    paroxetine (PAXIL) 20 MG tablet Take 20 mg by mouth every evening.    predniSONE (DELTASONE) 5 MG tablet Take by mouth daily:  20mg 8/30-9/30, 15mg 10/1-10/31, 10mg 11/1-11/30, then 5mg daily beginning 12/1    sertraline (ZOLOFT) 50 MG tablet Take 50 mg by mouth once daily.    sodium bicarbonate 650 MG tablet Take 1 tablet (650 mg total) by mouth 3 (three) times daily.    sulfamethoxazole-trimethoprim 400-80mg (BACTRIM,SEPTRA) 400-80 mg per tablet Take 1 tablet by mouth once daily. STOP 8/21/21    tacrolimus (PROGRAF) 1 MG Cap Take 3 capsules (3 mg total) by mouth every morning AND 2 capsules (2 mg total) every evening.    valGANciclovir (VALCYTE) 450 mg Tab Take 1 tablet (450 mg total) by mouth 2 (two) times daily. STOP 2/23/21     No current facility-administered medications for this visit.          Review of Systems   Respiratory: Negative for shortness of breath.    Cardiovascular: Negative for chest pain and leg swelling.   Gastrointestinal: Negative for abdominal pain, nausea and vomiting.   Genitourinary: Negative for difficulty urinating.   Skin: Negative for rash.   Allergic/Immunologic: Positive for immunocompromised state.   Also see HPI    Objective:   Blood pressure (!) 138/90,  "temperature 98.5 °F (36.9 °C), temperature source Oral, resp. rate 16, height 5' 4" (1.626 m), weight 67.4 kg (148 lb 9.4 oz), SpO2 98 %.body mass index is 25.51 kg/m².    Physical Exam  Constitutional:       Appearance: He is well-developed.   HENT:      Head: Normocephalic.   Eyes:      Conjunctiva/sclera: Conjunctivae normal.   Cardiovascular:      Rate and Rhythm: Normal rate and regular rhythm.   Pulmonary:      Effort: Pulmonary effort is normal.      Breath sounds: Normal breath sounds.   Abdominal:      Palpations: Abdomen is soft. There is no mass.      Tenderness: There is no abdominal tenderness.   Skin:     General: Skin is dry.      Findings: No rash.   Neurological:      Mental Status: He is alert and oriented to person, place, and time.   Psychiatric:         Judgment: Judgment normal.     Labs:  Recent Labs   Lab 12/03/19  1018  08/27/20  0050  09/10/20  0912  09/28/20  0804 09/28/20  0815 10/01/20  0829 10/05/20  0802  10/12/20  0814 10/15/20  0808 10/19/20  0806 10/19/20  0843 11/04/20  0909   WBC  --    < >  --    < > 15.00 H   < >  --  11.74 10.50  --    < > 12.27 10.73  --  10.44 10.62   Hemoglobin  --    < >  --    < > 12.1 L   < >  --  12.6 L 12.1 L  --    < > 12.7 L 12.5 L  --  13.3 L 13.2 L   POC Hematocrit  --   --   --    < >  --   --   --   --   --   --   --   --   --   --   --   --    Hematocrit  --    < >  --    < > 37.9 L   < >  --  39.5 L 37.4 L  --    < > 39.4 L 39.5 L  --  41.6 41.6   Sodium  --    < > 140   < > 142   < >  --  142 143  143  --    < > 141 141  --  140 140   Potassium  --    < > 3.3 L   < > 4.8   < >  --  4.1 4.3  4.3  --    < > 4.3 4.2  --  3.8 3.9   Chloride  --    < > 105   < > 108   < >  --  105 106  106  --    < > 107 108  --  108 107   CO2  --    < > 23   < > 26   < >  --  28 28  28  --    < > 24 26  --  25 24   BUN  --    < > 70 H   < > 55 H   < >  --  39 H 37 H  37 H  --    < > 31 H 26 H  --  25 H 24 H   Creatinine  --    < > 7.2 H   < > 3.0 H   < >  --  " 2.0 H 2.1 H  2.1 H  --    < > 1.6 H 1.6 H  --  1.7 H 1.7 H   eGFR if non   --    < > 8.0 A   < > 23.0 A   < >  --  37.5 A 35.4 A  35.4 A  --    < > 49.2 A 49.2 A  --  45.7 A 45.7 A   Calcium  --    < > 8.8   < > 8.5 L   < >  --  9.3 8.9  8.9  --    < > 9.0 8.9  --  8.8 9.1   Phosphorus  --   --  7.1 H   < > 4.3   < >  --  3.5 2.9  --    < > 2.6 L 2.6 L  --  2.5 L 3.9   Magnesium  --   --   --    < > 1.6   < >  --  1.5 L 1.6  --    < > 1.6 1.6  --  1.8 1.6   Albumin  --    < > 3.4 L   < > 3.3 L  3.3 L   < >  --  3.7  3.7 3.7  3.7  --    < > 3.8 3.8  --  3.8 4.0   AST  --    < > 34  --  14  --   --  13 13  --   --   --   --   --   --   --    ALT  --    < > 27  --  11  --   --  10 10  --   --   --   --   --   --   --    Prot/Creat Ratio, Urine  --   --   --    < >  --    < > 0.12  --   --  0.10  --   --   --  0.08  --   --    PTH, Intact 435.0 H  --  325.0 H  --   --   --   --  56.0  --   --   --   --   --   --   --   --    Tacrolimus Lvl  --   --   --    < > 9.1   < >  --  7.0 8.2  --    < > 8.9 10.2  --  8.2  --     < > = values in this interval not displayed.     Labs were reviewed with the patient    Assessment:     1. Stage 3a chronic kidney disease    2. Kidney replaced by transplant    3. Hypomagnesemia    4. -donor kidney transplant    5. Immunosuppressive management encounter following kidney transplant    6. At risk for opportunistic infections    7. Renal hypertension    8. Cytomegalovirus (CMV) viremia      Plan:     Allograft function assessment - Appears to have reached plateau at 1.6-1.6, CKD IIIa  -Doing well, recovered smoothly from surgery  Recent Labs   Lab 10/15/20  0808 10/19/20  0843 20  0909   Creatinine 1.6 H 1.7 H 1.7 H   eGFR if non  49.2 A 45.7 A 45.7 A   eGFR if  56.8 A 52.8 A 52.8 A     Screening for proteinuria & urinary abnormalities  EXT p/c ratio 2020 negligible.  Monitor as per guidelines    Encounter for  Monitoring Immunosuppression post Transplant  Recent Labs   Lab 10/12/20  0814 10/15/20  0808 10/19/20  0843   Tacrolimus Lvl 8.9 10.2 8.2   -Target level for Bryant is 8-10.  -EXT tacro level 11/2/20 was 7.5; pending from today  -continue prednisone taper and Cellcept. If CMV viremia persists or worsens, will lower MMF  -Recheck as per guidelines.  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 Importance of tight BP control emphasized. Asked to take amlodipine 5 mg daily instead of only when SPB >150    Metabolic bone disease [Secondary hyperparathyroidism/SPTH, Phosphorus metabolism disorders]  Hypomagnesemia - reduce mag ox to 1 tab daily    Assessment of electrolytes  Lab Results   Component Value Date     11/04/2020    K 3.9 11/04/2020     11/04/2020    CO2 24 11/04/2020    MG 1.6 11/04/2020   Acidosis- corrected on NaHCO3; continue current dose    Follow-up:   Clinic: return to transplant clinic weekly for the first month after transplant; every 2 weeks during months 2-3; then at 6-, 9-, 12-, 18-, 24-, and 36- months post-transplant to reassess for complications from immunosuppression toxicity and monitor for rejection.  Annually thereafter.    Labs: since patient remains at high risk for rejection and drug-related complications that warrant close monitoring, labs will be ordered as follows: continue twice weekly CBC, renal panel, and drug level for first month; then same labs once weekly through 3rd month  post-transplant.  Urine for UA and protein/creatinine ratio monthly.  Serum BK - PCR at 1-, 3-, 6-, 9-, 12-, 18-, 24-, 36-, 48-, and 60 months post-transplant.  Hepatic panel at 1-, 2-, 3-, 6-, 9-, 12-, 18-, 24-, and 36- months post-transplant.    Angelia Ritter MD

## 2020-11-04 NOTE — LETTER
November 4, 2020        Angel Portillo  [address]  [address]  [place] [facility] [address]  Phone: [phone]  Fax: [phone]             [facility] 1st Fl  [address]  [facility] [address]  Phone: [phone]   Patient: Bryant Azevedo   MR Number: [id number]   YOB: 1970   Date of Visit: 11/4/2020       Dear Dr. Angel Portillo    Thank you for referring Bryant Azevedo to me for evaluation. Attached you will find relevant portions of my assessment and plan of care.    If you have questions, please do not hesitate to call me. I look forward to following Bryant Azevedo along with you.    Sincerely,    Angelia Ritter MD    Enclosure    If you would like to receive this communication electronically, please contact [email or web address] or [phone] to request [employer] [facility] access.    [employer] [facility] is a tool which provides read-only access to select patient information with whom you have a relationship. Its easy to use and provides real time access to review your patients record including encounter summaries, notes, results, and demographic information.    If you feel you have received this communication in error or would no longer like to receive these types of communications, please e-mail [email or web address]

## 2020-11-06 ENCOUNTER — PATIENT MESSAGE (OUTPATIENT)
Dept: TRANSPLANT | Facility: CLINIC | Age: 50
End: 2020-11-06

## 2020-11-12 ENCOUNTER — DOCUMENTATION ONLY (OUTPATIENT)
Dept: TRANSPLANT | Facility: CLINIC | Age: 50
End: 2020-11-12

## 2020-11-12 ENCOUNTER — PATIENT MESSAGE (OUTPATIENT)
Dept: TRANSPLANT | Facility: CLINIC | Age: 50
End: 2020-11-12

## 2020-11-12 LAB
EXT ALBUMIN: 4.6
EXT BUN: 23
EXT CALCIUM: 10.1
EXT CHLORIDE: 106
EXT CMV DNA QUANT. BY PCR: 107
EXT CO2: 21
EXT CREATININE: 1.48 MG/DL
EXT EOSINOPHIL%: 0.2
EXT GFR MDRD NON AF AMER: 54
EXT GLUCOSE: 90
EXT HEMATOCRIT: 39
EXT HEMOGLOBIN: 13.1
EXT LYMPH%: 24.8
EXT MAGNESIUM: 1.75
EXT MONOCYTES%: 3.2
EXT PHOSPHORUS: 3.4
EXT PLATELETS: 198
EXT POTASSIUM: 5
EXT PROT/CREAT RATIO UR: 0.24
EXT SEGS%: 70
EXT SODIUM: 142 MMOL/L
EXT TACROLIMUS LVL: 7.7
EXT WBC: 9.5

## 2020-11-17 LAB
EXT ALBUMIN: 4.7
EXT BUN: 18
EXT CALCIUM: 9.9
EXT CHLORIDE: 104
EXT CMV DNA QUANT. BY PCR: <200
EXT CO2: 25
EXT CREATININE: 1.48 MG/DL
EXT EOSINOPHIL%: 0
EXT GFR MDRD NON AF AMER: 54
EXT GLUCOSE: 87
EXT HEMATOCRIT: 45.4
EXT HEMOGLOBIN: 14.3
EXT LYMPH%: 31
EXT MAGNESIUM: 1.8
EXT MONOCYTES%: 4
EXT PHOSPHORUS: 3.6
EXT PLATELETS: 236
EXT POTASSIUM: 4.2
EXT PROT/CREAT RATIO UR: 0.08
EXT SEGS%: 64
EXT SODIUM: 143 MMOL/L
EXT TACROLIMUS LVL: 9.5
EXT WBC: 7.1

## 2020-11-18 ENCOUNTER — DOCUMENTATION ONLY (OUTPATIENT)
Dept: TRANSPLANT | Facility: CLINIC | Age: 50
End: 2020-11-18

## 2020-11-20 LAB
ALBUMIN SERPL-MCNC: 4.7 G/DL (ref 3.8–4.9)
BASOPHILS # BLD AUTO: 0 X10E3/UL (ref 0–0.2)
BASOPHILS NFR BLD AUTO: 0 %
BUN SERPL-MCNC: 18 MG/DL (ref 6–24)
BUN/CREAT SERPL: 12 (ref 9–20)
CALCIUM SERPL-MCNC: 9.9 MG/DL (ref 8.7–10.2)
CHLORIDE SERPL-SCNC: 104 MMOL/L (ref 96–106)
CMV DNA SERPL NAA+PROBE-ACNC: NORMAL IU/ML
CMV DNA SERPL NAA+PROBE-LOG IU: NORMAL LOG10 IU/ML
CO2 SERPL-SCNC: 25 MMOL/L (ref 20–29)
CREAT SERPL-MCNC: 1.48 MG/DL (ref 0.76–1.27)
CREAT UR-MCNC: 45.6 MG/DL
EOSINOPHIL # BLD AUTO: 0 X10E3/UL (ref 0–0.4)
EOSINOPHIL NFR BLD AUTO: 0 %
ERYTHROCYTE [DISTWIDTH] IN BLOOD BY AUTOMATED COUNT: 14.3 % (ref 11.6–15.4)
GLUCOSE SERPL-MCNC: 87 MG/DL (ref 65–99)
HCT VFR BLD AUTO: 45.4 % (ref 37.5–51)
HGB BLD-MCNC: 14.3 G/DL (ref 13–17.7)
IMM GRANULOCYTES # BLD AUTO: 0 X10E3/UL (ref 0–0.1)
IMM GRANULOCYTES NFR BLD AUTO: 1 %
LYMPHOCYTES # BLD AUTO: 2.2 X10E3/UL (ref 0.7–3.1)
LYMPHOCYTES NFR BLD AUTO: 31 %
MAGNESIUM SERPL-MCNC: 1.8 MG/DL (ref 1.6–2.3)
MCH RBC QN AUTO: 31.1 PG (ref 26.6–33)
MCHC RBC AUTO-ENTMCNC: 31.5 G/DL (ref 31.5–35.7)
MCV RBC AUTO: 99 FL (ref 79–97)
MONOCYTES # BLD AUTO: 0.3 X10E3/UL (ref 0.1–0.9)
MONOCYTES NFR BLD AUTO: 4 %
NEUTROPHILS # BLD AUTO: 4.6 X10E3/UL (ref 1.4–7)
NEUTROPHILS NFR BLD AUTO: 64 %
PHOSPHATE SERPL-MCNC: 3.6 MG/DL (ref 2.8–4.1)
PLATELET # BLD AUTO: 236 X10E3/UL (ref 150–450)
POTASSIUM SERPL-SCNC: 4.2 MMOL/L (ref 3.5–5.2)
PROT UR-MCNC: <4 MG/DL
PROT/CREAT UR: ABNORMAL MG/G CREAT (ref 0–200)
RBC # BLD AUTO: 4.6 X10E6/UL (ref 4.14–5.8)
SODIUM SERPL-SCNC: 143 MMOL/L (ref 134–144)
SPECIMEN STATUS REPORT: NORMAL
TACROLIMUS BLD LC/MS/MS-MCNC: 9.5 NG/ML (ref 2–20)
WBC # BLD AUTO: 7.1 X10E3/UL (ref 3.4–10.8)

## 2020-11-24 ENCOUNTER — PATIENT MESSAGE (OUTPATIENT)
Dept: ADMINISTRATIVE | Facility: OTHER | Age: 50
End: 2020-11-24

## 2020-11-24 LAB
EXT ALBUMIN: 4.4
EXT ALKALINE PHOSPHATASE: 50
EXT ALT: 10
EXT AST: 21
EXT BACTERIA UA: 0
EXT BILIRUBIN DIRECT: 0.14 MG/DL
EXT BILIRUBIN TOTAL: 0.8
EXT BK VIRUS DNA QN PCR: ABNORMAL
EXT BUN: 22
EXT CALCIUM: 9.8
EXT CHLORIDE: 104
EXT CHOLESTEROL (LIPID PANEL): 194
EXT CMV DNA QUANT. BY PCR: ABNORMAL
EXT CO2: 23
EXT CREATININE: 1.41 MG/DL
EXT EOSINOPHIL%: 1
EXT GFR MDRD NON AF AMER: 57
EXT GLUCOSE UA: ABNORMAL
EXT GLUCOSE: 88
EXT HDL: 54
EXT HEMATOCRIT: 42
EXT HEMOGLOBIN: 13.8
EXT LDL CHOLESTEROL: 24
EXT LYMPH%: 37
EXT MONOCYTES%: 5
EXT NITRITES UA: ABNORMAL
EXT PHOSPHORUS: 2.9
EXT PLATELETS: 237
EXT POTASSIUM: 4
EXT PROT/CREAT RATIO UR: 0.08
EXT PROTEIN TOTAL: 6.5
EXT PROTEIN UA: ABNORMAL
EXT RBC UA: 0
EXT SEGS%: 57
EXT SODIUM: 139 MMOL/L
EXT TACROLIMUS LVL: 5.9
EXT TRIGLYCERIDES: 135
EXT VIT D 25 HYDROXY: 46.4
EXT WBC UA: ABNORMAL
EXT WBC: 5

## 2020-11-27 ENCOUNTER — DOCUMENTATION ONLY (OUTPATIENT)
Dept: TRANSPLANT | Facility: CLINIC | Age: 50
End: 2020-11-27

## 2020-11-27 DIAGNOSIS — Z94.0 KIDNEY REPLACED BY TRANSPLANT: Primary | ICD-10-CM

## 2020-11-28 LAB
25(OH)D3+25(OH)D2 SERPL-MCNC: 43.1 NG/ML (ref 30–100)
MAGNESIUM SERPL-MCNC: <0.2 MG/DL (ref 1.6–2.3)
SPECIMEN STATUS REPORT: NORMAL

## 2020-11-30 ENCOUNTER — OFFICE VISIT (OUTPATIENT)
Dept: TRANSPLANT | Facility: CLINIC | Age: 50
End: 2020-11-30
Payer: MEDICARE

## 2020-11-30 ENCOUNTER — LAB VISIT (OUTPATIENT)
Dept: LAB | Facility: HOSPITAL | Age: 50
End: 2020-11-30
Attending: INTERNAL MEDICINE
Payer: MEDICARE

## 2020-11-30 VITALS
OXYGEN SATURATION: 97 % | HEART RATE: 76 BPM | DIASTOLIC BLOOD PRESSURE: 93 MMHG | RESPIRATION RATE: 18 BRPM | WEIGHT: 148.56 LBS | SYSTOLIC BLOOD PRESSURE: 144 MMHG | HEIGHT: 65 IN | BODY MASS INDEX: 24.75 KG/M2 | TEMPERATURE: 98 F

## 2020-11-30 DIAGNOSIS — Z91.89 AT RISK FOR OPPORTUNISTIC INFECTIONS: ICD-10-CM

## 2020-11-30 DIAGNOSIS — N25.81 SECONDARY HYPERPARATHYROIDISM OF RENAL ORIGIN: Chronic | ICD-10-CM

## 2020-11-30 DIAGNOSIS — I12.9 RENAL HYPERTENSION: Chronic | ICD-10-CM

## 2020-11-30 DIAGNOSIS — Z79.60 LONG-TERM USE OF IMMUNOSUPPRESSANT MEDICATION: ICD-10-CM

## 2020-11-30 DIAGNOSIS — Z94.0 S/P KIDNEY TRANSPLANT: Primary | ICD-10-CM

## 2020-11-30 DIAGNOSIS — Z29.89 PROPHYLACTIC IMMUNOTHERAPY: ICD-10-CM

## 2020-11-30 DIAGNOSIS — Z94.0 KIDNEY REPLACED BY TRANSPLANT: ICD-10-CM

## 2020-11-30 LAB
ALBUMIN SERPL BCP-MCNC: 3.8 G/DL (ref 3.5–5.2)
ANION GAP SERPL CALC-SCNC: 7 MMOL/L (ref 8–16)
BASOPHILS # BLD AUTO: 0.02 K/UL (ref 0–0.2)
BASOPHILS NFR BLD: 0.3 % (ref 0–1.9)
BUN SERPL-MCNC: 18 MG/DL (ref 6–20)
CALCIUM SERPL-MCNC: 9.2 MG/DL (ref 8.7–10.5)
CHLORIDE SERPL-SCNC: 105 MMOL/L (ref 95–110)
CO2 SERPL-SCNC: 28 MMOL/L (ref 23–29)
CREAT SERPL-MCNC: 1.4 MG/DL (ref 0.5–1.4)
DIFFERENTIAL METHOD: ABNORMAL
EOSINOPHIL # BLD AUTO: 0 K/UL (ref 0–0.5)
EOSINOPHIL NFR BLD: 0.5 % (ref 0–8)
ERYTHROCYTE [DISTWIDTH] IN BLOOD BY AUTOMATED COUNT: 13.6 % (ref 11.5–14.5)
EST. GFR  (AFRICAN AMERICAN): >60 ML/MIN/1.73 M^2
EST. GFR  (NON AFRICAN AMERICAN): 57.4 ML/MIN/1.73 M^2
GLUCOSE SERPL-MCNC: 84 MG/DL (ref 70–110)
HCT VFR BLD AUTO: 43.7 % (ref 40–54)
HGB BLD-MCNC: 14 G/DL (ref 14–18)
IMM GRANULOCYTES # BLD AUTO: 0.05 K/UL (ref 0–0.04)
IMM GRANULOCYTES NFR BLD AUTO: 0.8 % (ref 0–0.5)
LYMPHOCYTES # BLD AUTO: 2.4 K/UL (ref 1–4.8)
LYMPHOCYTES NFR BLD: 37.7 % (ref 18–48)
MAGNESIUM SERPL-MCNC: 1.7 MG/DL (ref 1.6–2.6)
MCH RBC QN AUTO: 31.7 PG (ref 27–31)
MCHC RBC AUTO-ENTMCNC: 32 G/DL (ref 32–36)
MCV RBC AUTO: 99 FL (ref 82–98)
MONOCYTES # BLD AUTO: 0.3 K/UL (ref 0.3–1)
MONOCYTES NFR BLD: 5.1 % (ref 4–15)
NEUTROPHILS # BLD AUTO: 3.5 K/UL (ref 1.8–7.7)
NEUTROPHILS NFR BLD: 55.6 % (ref 38–73)
NRBC BLD-RTO: 0 /100 WBC
PHOSPHATE SERPL-MCNC: 2.4 MG/DL (ref 2.7–4.5)
PLATELET # BLD AUTO: 263 K/UL (ref 150–350)
PMV BLD AUTO: 12.1 FL (ref 9.2–12.9)
POTASSIUM SERPL-SCNC: 3.6 MMOL/L (ref 3.5–5.1)
RBC # BLD AUTO: 4.41 M/UL (ref 4.6–6.2)
SODIUM SERPL-SCNC: 140 MMOL/L (ref 136–145)
TACROLIMUS BLD-MCNC: 7.5 NG/ML (ref 5–15)
WBC # BLD AUTO: 6.29 K/UL (ref 3.9–12.7)

## 2020-11-30 PROCEDURE — 80197 ASSAY OF TACROLIMUS: CPT

## 2020-11-30 PROCEDURE — 99215 OFFICE O/P EST HI 40 MIN: CPT | Mod: PBBFAC | Performed by: NURSE PRACTITIONER

## 2020-11-30 PROCEDURE — 83735 ASSAY OF MAGNESIUM: CPT

## 2020-11-30 PROCEDURE — 99999 PR PBB SHADOW E&M-EST. PATIENT-LVL V: ICD-10-PCS | Mod: PBBFAC,,, | Performed by: NURSE PRACTITIONER

## 2020-11-30 PROCEDURE — 80069 RENAL FUNCTION PANEL: CPT

## 2020-11-30 PROCEDURE — 99999 PR PBB SHADOW E&M-EST. PATIENT-LVL V: CPT | Mod: PBBFAC,,, | Performed by: NURSE PRACTITIONER

## 2020-11-30 PROCEDURE — 99215 PR OFFICE/OUTPT VISIT, EST, LEVL V, 40-54 MIN: ICD-10-PCS | Mod: S$PBB,,, | Performed by: NURSE PRACTITIONER

## 2020-11-30 PROCEDURE — 99215 OFFICE O/P EST HI 40 MIN: CPT | Mod: S$PBB,,, | Performed by: NURSE PRACTITIONER

## 2020-11-30 PROCEDURE — 85025 COMPLETE CBC W/AUTO DIFF WBC: CPT

## 2020-11-30 NOTE — LETTER
November 30, 2020        Angel Portillo  [address]  [address]  [place] [address]  Phone: [phone]  Fax: [phone]             [facility] 1st Fl  [address]  [facility] [address]  Phone: [phone]   Patient: Bryant Azevedo   MR Number: [id number]   YOB: 1970   Date of Visit: 11/30/2020       Dear Dr. Angel Portillo    Thank you for referring Bryant Azevedo to me for evaluation. Attached you will find relevant portions of my assessment and plan of care.    If you have questions, please do not hesitate to call me. I look forward to following Bryant Azevedo along with you.    Sincerely,    Carline Obrien, NP    Enclosure    If you would like to receive this communication electronically, please contact [email or web address] or [phone] to request [employer] [facility] access.    [employer] [facility] is a tool which provides read-only access to select patient information with whom you have a relationship. Its easy to use and provides real time access to review your patients record including encounter summaries, notes, results, and demographic information.    If you feel you have received this communication in error or would no longer like to receive these types of communications, please e-mail [email or web address]

## 2020-11-30 NOTE — PROGRESS NOTES
Kidney Post-Transplant Assessment    Referring Physician: Leslie Roche Jr.  Current Nephrologist: Angel Portillo    ORGAN: LEFT KIDNEY  Donor Type: donation after circulatory death   PHS Increased Risk: no  Cold Ischemia: 1,261 mins  Induction Medications: simulect - basiliximab    Subjective:     CC:  Reassessment of renal allograft function and management of chronic immunosuppression.    HPI:  Mr. Azevedo is a 50 y.o. 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  His most recent creatinine is 1.6. He takes mycophenolate mofetil, prednisone and tacrolimus for maintenance immunosuppression. His post transplant course has been uncomplicated to date.    Antimicrobial Prophylaxis:   - PJP prophylaxis until 21- Bactrim  - CMV prophylaxis until 21 - Valcyte [ Mismatch]  -FUNGAL PROPHYLAXIS: Nystatin 10/1/20    Interval HX:    Intake 2.5- 3 L  UOP 2.3-3 L         BP Readings from Last 3 Encounters:   20 (!) 144/93   20 (!) 138/90   10/14/20 135/87     Peripheral edema-none  Weight ~  stable  Appetite- stable      fx assessment  Overall feels good  Lab /diagnostic results reviewed with patient today.   All questions answered          Past Medical History:   Diagnosis Date    Chronic asthma      Donor Kidney Transplant 20    ESRD since 2014 from HTN     Hyperlipidemia     Hyperuricemia     Renal hypertension     Secondary hyperparathyroidism of renal origin        Review of Systems   Constitutional: Negative for activity change, appetite change, chills, fatigue, fever and unexpected weight change.   HENT: Negative for congestion, facial swelling, postnasal drip, rhinorrhea, sinus pressure, sore throat and trouble swallowing.    Eyes: Negative for pain, redness and visual disturbance.   Respiratory: Negative for cough, chest  "tightness, shortness of breath and wheezing.    Cardiovascular: Negative.  Negative for chest pain, palpitations and leg swelling.   Gastrointestinal: Negative for abdominal pain, diarrhea, nausea and vomiting.   Genitourinary: Negative for dysuria, flank pain and urgency.   Musculoskeletal: Negative for gait problem, neck pain and neck stiffness.   Skin: Negative for rash.   Allergic/Immunologic: Positive for immunocompromised state. Negative for environmental allergies and food allergies.   Neurological: Negative for dizziness, weakness, light-headedness and headaches.   Psychiatric/Behavioral: Negative for agitation and confusion. The patient is not nervous/anxious.        Objective:   Blood pressure (!) 144/93, pulse 76, temperature 97.7 °F (36.5 °C), temperature source Oral, resp. rate 18, height 5' 5" (1.651 m), weight 67.4 kg (148 lb 9.4 oz), SpO2 97 %.body mass index is 24.73 kg/m².    Physical Exam  Vitals signs reviewed.   Constitutional:       Appearance: Normal appearance. He is well-developed.   HENT:      Head: Normocephalic.   Eyes:      Pupils: Pupils are equal, round, and reactive to light.   Neck:      Musculoskeletal: Normal range of motion and neck supple.   Cardiovascular:      Rate and Rhythm: Normal rate and regular rhythm.      Heart sounds: Normal heart sounds.   Pulmonary:      Effort: Pulmonary effort is normal.      Breath sounds: Normal breath sounds.   Abdominal:      General: Bowel sounds are normal. There is no distension.      Palpations: Abdomen is soft.      Tenderness: There is no abdominal tenderness.       Musculoskeletal: Normal range of motion.   Skin:     General: Skin is warm and dry.   Neurological:      Mental Status: He is alert and oriented to person, place, and time.      Motor: No abnormal muscle tone.      Coordination: Coordination normal.   Psychiatric:         Behavior: Behavior normal.         Labs:  Lab Results   Component Value Date    WBC 6.29 11/30/2020    HGB " 14.0 2020    HCT 43.7 2020     2020    K 3.6 2020     2020    CO2 28 2020    BUN 18 2020    CREATININE 1.4 2020    EGFRNONAA 57.4 (A) 2020    EGFRIFAFRICA 62 2020    CALCIUM 9.2 2020    PHOS 2.4 (L) 2020    MG 1.7 2020    ALBUMIN 3.8 2020    AST 13 10/01/2020    ALT 10 10/01/2020    UTPCR 0.08 2020    PTH 56.0 2020    TACROLIMUS 9.5 2020       Lab Results   Component Value Date    EXTWBC 5.0 2020    EXTSEGS 57 2020    EXTPLATELETS 237 2020    EXTHEMOGLOBI 13.8 2020    EXTHEMATOCRI 42.0 2020    EXTCREATININ 1.41 (H) 2020    EXTSODIUM 139 2020    EXTPOTASSIUM 4.0 2020    EXTBUN 22 2020    EXTCO2 23 2020    EXTCALCIUM 9.8 2020    EXTPHOSPHORU 2.9 2020    EXTGLUCOSE 88 2020    EXTALBUMIN 4.4 2020    EXTAST 21 2020    EXTALT 10 2020    EXTBILITOTAL 0.8 2020       Lab Results   Component Value Date    EXTTACROLVL 5.9 2020    EXTPROTCRE 0.08 2020    EXTPROTEINUA Neg 2020    EXTWBCUA 0-5 2020    EXTRBCUA 0 2020       Labs were reviewed with the patient    Assessment:     1.  Donor Kidney Transplant 20    2. Prophylactic immunotherapy    3. Long-term use of immunosuppressant medication    4. At risk for opportunistic infections    5. Renal hypertension    6. Secondary hyperparathyroidism of renal origin        Plan:    CMV PCR weekly/guidelines    Today, Repeat trough pending     As of 2021--pt will be changing insurance to a [employer] choice PPO plan   By [facility]  --please have  call Pt and check if [facility] is on the plan       Follow-up:   1. CKD stage:  3  2. Immunosuppression:   Prograf trough 5.9, which is  Sub  therapeutic target 8-10 .  Continue Prograf 3/2, [id number] Mg BID, and Prednisone  Taper as prescribed, Bactrim 400/80 mg  QD for PCP prophylaxis, Valcyte 450 mg QDfor CMV prophylaxis. Will continue to monitor for drug toxicities    3. Allograft Function:   Improved,  Continue good po hydration.       Lab Results   Component Value Date    CREATININE 1.4 11/30/2020 11/30/2020  3mo 0wk   eGFR if non African American >60 mL/min/1.73 m^2 57.4 (A)  Calculation used to obtain the estimated glomerular filtration  rate (eGFR) is the CKD-EPI equation.            4. Hypertension management: advise low salt diet and home BP monitoring     start norvasc 5 mg QD , hold BP in 120/80s range     5. Metabolic Bone Disease/Secondary Hyperparathyroidism:stable  Will monitor PTH, CA and Vit D/guidelines,    Ergocalciferol 50,000 Units / week--> as prescribed   Mg ox 400 mg BID,       8/27/2020  POD 0   Vit D, 25-Hydroxy 30 - 96 ng/mL 16 (A)                 11/30/2020  3mo 0wk   Magnesium 1.6 - 2.6 mg/dL 1.7       Lab Results   Component Value Date    PTH 56.0 09/28/2020    CALCIUM 9.2 11/30/2020    PHOS 2.4 (L) 11/30/2020       6. Electrolytes:  Will monitor /guidelines  Lab Results   Component Value Date     11/30/2020    K 3.6 11/30/2020     11/30/2020    CO2 28 11/30/2020   Sodium bicarb 650 mg TID      7. Anemia: stable. No need for intervention    Will monitor /guidelines  Lab Results   Component Value Date    WBC 6.29 11/30/2020    HGB 14.0 11/30/2020    HCT 43.7 11/30/2020    MCV 99 (H) 11/30/2020     11/30/2020         8.  Cytopenias: no significant cytopenias will monitor as per our guidelines. Medicine list reviewed including potential causes of drug-induced cytopenias    9.Proteinuria: continue p/c ratio as per guidelines           11/30/2020  3mo 0wk   Prot/Creat Ratio, Urine 0.00 - 0.20 0.08         10. CMV and BK virus infection screening:  will continue to monitor/ guidelines   CMV PCR weekly/guidelines        11/24/2020  POD 89   EXT CMV DNA QUANT. BY PCR Neg       11/17/2020  POD 82   EXT CMV DNA QUANT. BY PCR  <200 (A)      9/28/2020  POD 32   BK Virus DNA, Blood Not detected Not detected       9/28/2020  POD 32   BK Virus DNA PCR, Quant, Blood <125 Copies/mL <125       9/24/2020  POD 28   Cytomegalovirus PCR, Quant Undetected IU/mL <35 (A)          11. Weight education: provided during the clinic visit   Body mass index is 24.73 kg/m².          12.Patient safety education regarding immunosuppression including prophylaxis posttransplant for CMV, PCP : Education provided about vaccination and prevention of infections            Follow-up:   Clinic: return to transplant clinic weekly for the first month after transplant; every 2 weeks during months 2-3; then at 6-, 9-, 12-, 18-, 24-, and 36- months post-transplant to reassess for complications from immunosuppression toxicity and monitor for rejection.  Annually thereafter.    Labs: since patient remains at high risk for rejection and drug-related complications that warrant close monitoring, labs will be ordered as follows: continue twice weekly CBC, renal panel, and drug level for first month; then same labs once weekly through 3rd month post-transplant.  Urine for UA and protein/creatinine ratio monthly.  Serum BK - PCR at 1-, 3-, 6-, 9-, 12-, 18-, 24-, 36-, 48-, and 60 months post-transplant.  Hepatic panel at 1-, 2-, 3-, 6-, 9-, 12-, 18-, 24-, and 36- months post-transplant.    Carline Obrien NP       Education:   Material provided to the patient.  Patient reminded to call with any health changes since these can be early signs of significant complications.  Also, I advised the patient to be sure any new medications or changes of old medications are discussed with either a pharmacist or physician knowledgeable with transplant to avoid rejection/drug toxicity related to significant drug interactions.

## 2020-11-30 NOTE — Clinical Note
As of 1/1/2021--pt will be changing insurance to a [employer] Ad[employer]age choice PPO plan   By [facility]  --please have  call Pt ASAP and check if [facility] is on the plan

## 2020-12-01 LAB
25(OH)D3+25(OH)D2 SERPL-MCNC: 46.4 NG/ML (ref 30–100)
ALBUMIN SERPL-MCNC: 4.4 G/DL (ref 3.8–4.9)
ALP SERPL-CCNC: 50 IU/L (ref 39–117)
ALT SERPL-CCNC: 10 IU/L (ref 0–44)
APPEARANCE UR: CLEAR
AST SERPL-CCNC: 21 IU/L (ref 0–40)
BACTERIA #/AREA URNS HPF: NORMAL /[HPF]
BASOPHILS # BLD AUTO: 0 X10E3/UL (ref 0–0.2)
BASOPHILS NFR BLD AUTO: 0 %
BILIRUB DIRECT SERPL-MCNC: 0.14 MG/DL (ref 0–0.4)
BILIRUB SERPL-MCNC: 0.8 MG/DL (ref 0–1.2)
BILIRUB UR QL STRIP: NEGATIVE
BKV DNA # SERPL NAA+PROBE: NEGATIVE COPIES/ML
BKV DNA SERPL NAA+PROBE-LOG#: NORMAL LOG10COPY/ML
BUN SERPL-MCNC: 22 MG/DL (ref 6–24)
BUN/CREAT SERPL: 16 (ref 9–20)
CALCIUM SERPL-MCNC: 9.8 MG/DL (ref 8.7–10.2)
CHLORIDE SERPL-SCNC: 104 MMOL/L (ref 96–106)
CHOLEST SERPL-MCNC: 194 MG/DL (ref 100–199)
CMV DNA SERPL NAA+PROBE-ACNC: <35 IU/ML
CMV DNA SERPL NAA+PROBE-ACNC: NEGATIVE IU/ML
CMV DNA SERPL NAA+PROBE-LOG IU: NORMAL LOG10 IU/ML
CO2 SERPL-SCNC: 23 MMOL/L (ref 20–29)
COLOR UR: YELLOW
CREAT SERPL-MCNC: 1.41 MG/DL (ref 0.76–1.27)
CREAT UR-MCNC: 134.3 MG/DL
EOSINOPHIL # BLD AUTO: 0 X10E3/UL (ref 0–0.4)
EOSINOPHIL NFR BLD AUTO: 1 %
EPI CELLS #/AREA URNS HPF: NORMAL /HPF (ref 0–10)
ERYTHROCYTE [DISTWIDTH] IN BLOOD BY AUTOMATED COUNT: 14.2 % (ref 11.6–15.4)
GLUCOSE SERPL-MCNC: 88 MG/DL (ref 65–99)
GLUCOSE UR QL: NEGATIVE
HCT VFR BLD AUTO: 42 % (ref 37.5–51)
HDLC SERPL-MCNC: 54 MG/DL
HGB BLD-MCNC: 13.8 G/DL (ref 13–17.7)
HGB UR QL STRIP: NEGATIVE
IMM GRANULOCYTES # BLD AUTO: 0 X10E3/UL (ref 0–0.1)
IMM GRANULOCYTES NFR BLD AUTO: 0 %
KETONES UR QL STRIP: NEGATIVE
LDLC SERPL CALC-MCNC: 116 MG/DL (ref 0–99)
LEUKOCYTE ESTERASE UR QL STRIP: NEGATIVE
LYMPHOCYTES # BLD AUTO: 1.8 X10E3/UL (ref 0.7–3.1)
LYMPHOCYTES NFR BLD AUTO: 37 %
MCH RBC QN AUTO: 32.7 PG (ref 26.6–33)
MCHC RBC AUTO-ENTMCNC: 32.9 G/DL (ref 31.5–35.7)
MCV RBC AUTO: 100 FL (ref 79–97)
MICRO URNS: NORMAL
MICRO URNS: NORMAL
MONOCYTES # BLD AUTO: 0.3 X10E3/UL (ref 0.1–0.9)
MONOCYTES NFR BLD AUTO: 5 %
NEUTROPHILS # BLD AUTO: 2.9 X10E3/UL (ref 1.4–7)
NEUTROPHILS NFR BLD AUTO: 57 %
NITRITE UR QL STRIP: NEGATIVE
PH UR STRIP: 6 [PH] (ref 5–7.5)
PHOSPHATE SERPL-MCNC: 2.9 MG/DL (ref 2.8–4.1)
PLATELET # BLD AUTO: 237 X10E3/UL (ref 150–450)
POTASSIUM SERPL-SCNC: 4 MMOL/L (ref 3.5–5.2)
PROT SERPL-MCNC: 6.5 G/DL (ref 6–8.5)
PROT UR QL STRIP: NEGATIVE
PROT UR-MCNC: 10.9 MG/DL
PROT/CREAT UR: 81 MG/G CREAT (ref 0–200)
PTH-INTACT SERPL-MCNC: 93 PG/ML (ref 15–65)
RBC # BLD AUTO: 4.22 X10E6/UL (ref 4.14–5.8)
RBC #/AREA URNS HPF: NORMAL /HPF (ref 0–2)
SODIUM SERPL-SCNC: 139 MMOL/L (ref 134–144)
SP GR UR: 1.02 (ref 1–1.03)
SPECIMEN STATUS REPORT: NORMAL
TACROLIMUS BLD LC/MS/MS-MCNC: 5.9 NG/ML (ref 2–20)
TRIGL SERPL-MCNC: 135 MG/DL (ref 0–149)
UROBILINOGEN UR STRIP-MCNC: 0.2 MG/DL (ref 0.2–1)
VLDLC SERPL CALC-MCNC: 24 MG/DL (ref 5–40)
WBC # BLD AUTO: 5 X10E3/UL (ref 3.4–10.8)
WBC #/AREA URNS HPF: NORMAL /HPF (ref 0–5)

## 2020-12-03 DIAGNOSIS — B25.9 CYTOMEGALOVIRUS (CMV) VIREMIA: ICD-10-CM

## 2020-12-03 DIAGNOSIS — Z94.0 S/P KIDNEY TRANSPLANT: ICD-10-CM

## 2020-12-03 RX ORDER — VALGANCICLOVIR 450 MG/1
450 TABLET, FILM COATED ORAL DAILY
Qty: 30 TABLET | Refills: 11 | Status: SHIPPED | OUTPATIENT
Start: 2020-12-03 | End: 2021-02-09 | Stop reason: SDUPTHER

## 2020-12-03 NOTE — TELEPHONE ENCOUNTER
Patient repeated back and voice a understanding of orders.  Next labs 12/15/2020.    ----- Message from Tory Kirk PharmD sent at 12/3/2020  9:51 AM CST -----  Valcyte 450 mg PO daily.     -tory   ----- Message -----  From: Antonio Wu RN  Sent: 12/3/2020   9:27 AM CST  To: Abdominal Transplant Pharmacists    Please advise on what dose of Valcyte he should be taking Now that he is CMV neg , He is a CMV mismatch that 3 months post transplant .  ----- Message -----  From: Angelia Ritter MD  Sent: 12/2/2020   5:52 PM CST  To: [facility] Post-Kidney Transplant Clinical    Results reviewed. He should continue CMV prophylaxis dose, based on current GFR. Will ask pharmD to check.  No need for routine CMV screens, only if develops symptoms

## 2020-12-15 LAB
EXT ALBUMIN: ABNORMAL
EXT ALKALINE PHOSPHATASE: ABNORMAL
EXT ALLOSURE: ABNORMAL
EXT ALT: ABNORMAL
EXT AMYLASE: ABNORMAL
EXT ANC: ABNORMAL
EXT AST: ABNORMAL
EXT BACTERIA UA: ABNORMAL
EXT BANDS%: ABNORMAL
EXT BILIRUBIN DIRECT: ABNORMAL
EXT BILIRUBIN TOTAL: ABNORMAL
EXT BK VIRUS DNA QN PCR: ABNORMAL
EXT BK VIRUS DNA QUANT, PCR, URINE: ABNORMAL
EXT BUN: 17
EXT C PEPTIDE: ABNORMAL
EXT CALCIUM: 9.5
EXT CHLORIDE: 105
EXT CHOLESTEROL (LIPID PANEL): ABNORMAL
EXT CHOLESTEROL: ABNORMAL
EXT CMV DNA QUANT. BY PCR: ABNORMAL
EXT CO2: 22
EXT CREATININE: 1.49 MG/DL
EXT CYCLOSPORINE LVL: ABNORMAL
EXT EBV DNA BY PCR: ABNORMAL
EXT EBV DNA-COPIES/ML: ABNORMAL
EXT EOSINOPHIL%: 1
EXT FERRITIN: ABNORMAL
EXT GFR MDRD AF AMER: ABNORMAL
EXT GFR MDRD NON AF AMER: 53
EXT GLUCOSE UA: ABNORMAL
EXT GLUCOSE: 90
EXT HBV DNA QUANT PCR: ABNORMAL
EXT HCV QUANT: ABNORMAL
EXT HDL: ABNORMAL
EXT HEMATOCRIT: 43.7
EXT HEMOGLOBIN A1C: ABNORMAL
EXT HEMOGLOBIN: 14.5
EXT HEP B S AG: ABNORMAL
EXT HIV: ABNORMAL
EXT IMMUNKNOW (STIMULATED): ABNORMAL
EXT INR: ABNORMAL
EXT IRON SATURATION: ABNORMAL
EXT LDH, TOTAL: ABNORMAL
EXT LDL CHOLESTEROL: ABNORMAL
EXT LIPASE: ABNORMAL
EXT LYMPH%: 37
EXT MAGNESIUM: ABNORMAL
EXT MONOCYTES%: 10
EXT NITRITES UA: ABNORMAL
EXT PHOSPHORUS: 3.2
EXT PLATELETS: 228
EXT POTASSIUM: 4.2
EXT PROT/CREAT RATIO UR: ABNORMAL
EXT PROTEIN TOTAL: ABNORMAL
EXT PROTEIN UA: ABNORMAL
EXT PT: ABNORMAL
EXT PTH, INTACT: ABNORMAL
EXT RBC UA: ABNORMAL
EXT SARS COV-2 (COVID-19): ABNORMAL
EXT SEGS%: 50
EXT SERUM IRON: ABNORMAL
EXT SIROLIMUS LVL: ABNORMAL
EXT SODIUM: 142 MMOL/L
EXT STOOL CDIFF: ABNORMAL
EXT STOOL CMV: ABNORMAL
EXT STOOL CULTURE: ABNORMAL
EXT STOOL OCP: ABNORMAL
EXT TACROLIMUS LVL: ABNORMAL
EXT TIBC: ABNORMAL
EXT TRIGLYCERIDES: ABNORMAL
EXT UNSATURATED IRON BINDING CAP.: ABNORMAL
EXT URIC ACID: ABNORMAL
EXT URINE CULTURE: ABNORMAL
EXT VIT D 25 HYDROXY: ABNORMAL
EXT WBC UA: ABNORMAL
EXT WBC: 4.3

## 2020-12-16 ENCOUNTER — DOCUMENTATION ONLY (OUTPATIENT)
Dept: TRANSPLANT | Facility: CLINIC | Age: 50
End: 2020-12-16

## 2020-12-18 DIAGNOSIS — Z94.0 KIDNEY REPLACED BY TRANSPLANT: ICD-10-CM

## 2020-12-18 LAB
BASOPHILS # BLD AUTO: 0 X10E3/UL (ref 0–0.2)
BASOPHILS NFR BLD AUTO: 1 %
BUN SERPL-MCNC: 17 MG/DL (ref 6–24)
BUN/CREAT SERPL: 11 (ref 9–20)
CALCIUM SERPL-MCNC: 9.5 MG/DL (ref 8.7–10.2)
CHLORIDE SERPL-SCNC: 105 MMOL/L (ref 96–106)
CMV DNA SERPL NAA+PROBE-ACNC: NORMAL IU/ML
CMV DNA SERPL NAA+PROBE-LOG IU: NORMAL LOG10 IU/ML
CO2 SERPL-SCNC: 22 MMOL/L (ref 20–29)
CREAT SERPL-MCNC: 1.49 MG/DL (ref 0.76–1.27)
EOSINOPHIL # BLD AUTO: 0.1 X10E3/UL (ref 0–0.4)
EOSINOPHIL NFR BLD AUTO: 1 %
ERYTHROCYTE [DISTWIDTH] IN BLOOD BY AUTOMATED COUNT: 13.1 % (ref 11.6–15.4)
GLUCOSE SERPL-MCNC: 90 MG/DL (ref 65–99)
HCT VFR BLD AUTO: 43.7 % (ref 37.5–51)
HGB BLD-MCNC: 14.5 G/DL (ref 13–17.7)
IMM GRANULOCYTES # BLD AUTO: 0 X10E3/UL (ref 0–0.1)
IMM GRANULOCYTES NFR BLD AUTO: 1 %
LYMPHOCYTES # BLD AUTO: 1.6 X10E3/UL (ref 0.7–3.1)
LYMPHOCYTES NFR BLD AUTO: 37 %
MCH RBC QN AUTO: 32.7 PG (ref 26.6–33)
MCHC RBC AUTO-ENTMCNC: 33.2 G/DL (ref 31.5–35.7)
MCV RBC AUTO: 98 FL (ref 79–97)
MONOCYTES # BLD AUTO: 0.4 X10E3/UL (ref 0.1–0.9)
MONOCYTES NFR BLD AUTO: 10 %
NEUTROPHILS # BLD AUTO: 2.2 X10E3/UL (ref 1.4–7)
NEUTROPHILS NFR BLD AUTO: 50 %
PHOSPHATE SERPL-MCNC: 3.2 MG/DL (ref 2.8–4.1)
PLATELET # BLD AUTO: 228 X10E3/UL (ref 150–450)
POTASSIUM SERPL-SCNC: 4.2 MMOL/L (ref 3.5–5.2)
RBC # BLD AUTO: 4.44 X10E6/UL (ref 4.14–5.8)
SODIUM SERPL-SCNC: 142 MMOL/L (ref 134–144)
SPECIMEN STATUS REPORT: NORMAL
TACROLIMUS BLD LC/MS/MS-MCNC: 6.2 NG/ML (ref 2–20)
WBC # BLD AUTO: 4.3 X10E3/UL (ref 3.4–10.8)

## 2020-12-18 RX ORDER — TACROLIMUS 1 MG/1
3 CAPSULE ORAL EVERY 12 HOURS
Qty: 180 CAPSULE | Refills: 11 | Status: SHIPPED | OUTPATIENT
Start: 2020-12-18 | End: 2021-01-11 | Stop reason: SDUPTHER

## 2020-12-18 NOTE — TELEPHONE ENCOUNTER
Patient repeated back and voice a understanding of orders.  States level is a 12 hour trough.  Next labs 12/29/2020.    ----- Message from Angelia Ritter MD sent at 12/18/2020  9:12 AM CST -----  The following message sent to patient via [facility]:  I recommend increasing tacrolimus back to 3 mg twice daily, assuming this lab is an accurate 12 hr level.

## 2020-12-29 ENCOUNTER — PATIENT MESSAGE (OUTPATIENT)
Dept: TRANSPLANT | Facility: CLINIC | Age: 50
End: 2020-12-29

## 2021-01-01 LAB
APPEARANCE UR: CLEAR
BACTERIA #/AREA URNS HPF: NORMAL /[HPF]
BASOPHILS # BLD AUTO: 0 X10E3/UL (ref 0–0.2)
BASOPHILS NFR BLD AUTO: 1 %
BILIRUB UR QL STRIP: NEGATIVE
BKV DNA # SERPL NAA+PROBE: NEGATIVE COPIES/ML
BKV DNA SERPL NAA+PROBE-LOG#: NORMAL LOG10COPY/ML
BUN SERPL-MCNC: 17 MG/DL (ref 6–24)
BUN/CREAT SERPL: 10 (ref 9–20)
CALCIUM SERPL-MCNC: 9.6 MG/DL (ref 8.7–10.2)
CHLORIDE SERPL-SCNC: 104 MMOL/L (ref 96–106)
CMV DNA SERPL NAA+PROBE-ACNC: NORMAL IU/ML
CMV DNA SERPL NAA+PROBE-LOG IU: NORMAL LOG10 IU/ML
CO2 SERPL-SCNC: 23 MMOL/L (ref 20–29)
COLOR UR: YELLOW
CREAT SERPL-MCNC: 1.66 MG/DL (ref 0.76–1.27)
CREAT UR-MCNC: 128.1 MG/DL
EOSINOPHIL # BLD AUTO: 0.2 X10E3/UL (ref 0–0.4)
EOSINOPHIL NFR BLD AUTO: 4 %
EPI CELLS #/AREA URNS HPF: NORMAL /HPF (ref 0–10)
ERYTHROCYTE [DISTWIDTH] IN BLOOD BY AUTOMATED COUNT: 12.7 % (ref 11.6–15.4)
GLUCOSE SERPL-MCNC: 93 MG/DL (ref 65–99)
GLUCOSE UR QL: NEGATIVE
HCT VFR BLD AUTO: 44 % (ref 37.5–51)
HGB BLD-MCNC: 14 G/DL (ref 13–17.7)
HGB UR QL STRIP: NEGATIVE
IMM GRANULOCYTES # BLD AUTO: 0 X10E3/UL (ref 0–0.1)
IMM GRANULOCYTES NFR BLD AUTO: 0 %
KETONES UR QL STRIP: NEGATIVE
LEUKOCYTE ESTERASE UR QL STRIP: NEGATIVE
LYMPHOCYTES # BLD AUTO: 1.7 X10E3/UL (ref 0.7–3.1)
LYMPHOCYTES NFR BLD AUTO: 34 %
MCH RBC QN AUTO: 31.6 PG (ref 26.6–33)
MCHC RBC AUTO-ENTMCNC: 31.8 G/DL (ref 31.5–35.7)
MCV RBC AUTO: 99 FL (ref 79–97)
MICRO URNS: NORMAL
MICRO URNS: NORMAL
MONOCYTES # BLD AUTO: 0.4 X10E3/UL (ref 0.1–0.9)
MONOCYTES NFR BLD AUTO: 8 %
MUCOUS THREADS URNS QL MICRO: PRESENT
NEUTROPHILS # BLD AUTO: 2.7 X10E3/UL (ref 1.4–7)
NEUTROPHILS NFR BLD AUTO: 53 %
NITRITE UR QL STRIP: NEGATIVE
PH UR STRIP: 7 [PH] (ref 5–7.5)
PHOSPHATE SERPL-MCNC: 3.1 MG/DL (ref 2.8–4.1)
PLATELET # BLD AUTO: 206 X10E3/UL (ref 150–450)
POTASSIUM SERPL-SCNC: 4.7 MMOL/L (ref 3.5–5.2)
PROT UR QL STRIP: NEGATIVE
PROT UR-MCNC: 7.6 MG/DL
PROT/CREAT UR: 59 MG/G CREAT (ref 0–200)
RBC # BLD AUTO: 4.43 X10E6/UL (ref 4.14–5.8)
RBC #/AREA URNS HPF: NORMAL /HPF (ref 0–2)
SODIUM SERPL-SCNC: 140 MMOL/L (ref 134–144)
SP GR UR: 1.02 (ref 1–1.03)
SPECIMEN STATUS REPORT: NORMAL
TACROLIMUS BLD LC/MS/MS-MCNC: 9.9 NG/ML (ref 2–20)
UROBILINOGEN UR STRIP-MCNC: 0.2 MG/DL (ref 0.2–1)
WBC # BLD AUTO: 5.1 X10E3/UL (ref 3.4–10.8)
WBC #/AREA URNS HPF: NORMAL /HPF (ref 0–5)

## 2021-01-04 ENCOUNTER — PATIENT MESSAGE (OUTPATIENT)
Dept: TRANSPLANT | Facility: CLINIC | Age: 51
End: 2021-01-04

## 2021-01-06 LAB
EXT ALBUMIN: ABNORMAL
EXT ALKALINE PHOSPHATASE: ABNORMAL
EXT ALLOSURE: ABNORMAL
EXT ALT: ABNORMAL
EXT AMYLASE: ABNORMAL
EXT ANC: ABNORMAL
EXT AST: ABNORMAL
EXT BACTERIA UA: ABNORMAL
EXT BANDS%: ABNORMAL
EXT BILIRUBIN DIRECT: ABNORMAL
EXT BILIRUBIN TOTAL: ABNORMAL
EXT BK VIRUS DNA QN PCR: ABNORMAL
EXT BK VIRUS DNA QUANT, PCR, URINE: ABNORMAL
EXT BUN: 21
EXT C PEPTIDE: ABNORMAL
EXT CALCIUM: 9.6
EXT CHLORIDE: 106
EXT CHOLESTEROL (LIPID PANEL): ABNORMAL
EXT CHOLESTEROL: ABNORMAL
EXT CMV DNA QUANT. BY PCR: ABNORMAL
EXT CO2: 24
EXT CREATININE: 1.53 MG/DL
EXT CYCLOSPORINE LVL: ABNORMAL
EXT EBV DNA BY PCR: ABNORMAL
EXT EBV DNA-COPIES/ML: ABNORMAL
EXT EOSINOPHIL%: 2
EXT FERRITIN: ABNORMAL
EXT GFR MDRD AF AMER: ABNORMAL
EXT GFR MDRD NON AF AMER: 52
EXT GLUCOSE UA: ABNORMAL
EXT GLUCOSE: 94
EXT HBV DNA QUANT PCR: ABNORMAL
EXT HCV QUANT: ABNORMAL
EXT HDL: ABNORMAL
EXT HEMATOCRIT: 45.4
EXT HEMOGLOBIN A1C: ABNORMAL
EXT HEMOGLOBIN: 14.8
EXT HEP B S AG: ABNORMAL
EXT HIV: ABNORMAL
EXT IMMUNKNOW (STIMULATED): ABNORMAL
EXT INR: ABNORMAL
EXT IRON SATURATION: ABNORMAL
EXT LDH, TOTAL: ABNORMAL
EXT LDL CHOLESTEROL: ABNORMAL
EXT LIPASE: ABNORMAL
EXT LYMPH%: 26
EXT MAGNESIUM: ABNORMAL
EXT MONOCYTES%: 8
EXT NITRITES UA: ABNORMAL
EXT PHOSPHORUS: 3.4
EXT PLATELETS: 214
EXT POTASSIUM: 4.7
EXT PROT/CREAT RATIO UR: ABNORMAL
EXT PROTEIN TOTAL: ABNORMAL
EXT PROTEIN UA: ABNORMAL
EXT PT: ABNORMAL
EXT PTH, INTACT: ABNORMAL
EXT RBC UA: ABNORMAL
EXT SARS COV-2 (COVID-19): ABNORMAL
EXT SEGS%: 63
EXT SERUM IRON: ABNORMAL
EXT SIROLIMUS LVL: ABNORMAL
EXT SODIUM: 141 MMOL/L
EXT STOOL CDIFF: ABNORMAL
EXT STOOL CMV: ABNORMAL
EXT STOOL CULTURE: ABNORMAL
EXT STOOL OCP: ABNORMAL
EXT TACROLIMUS LVL: ABNORMAL
EXT TIBC: ABNORMAL
EXT TRIGLYCERIDES: ABNORMAL
EXT UNSATURATED IRON BINDING CAP.: ABNORMAL
EXT URIC ACID: ABNORMAL
EXT URINE CULTURE: ABNORMAL
EXT VIT D 25 HYDROXY: ABNORMAL
EXT WBC UA: ABNORMAL
EXT WBC: 5.6

## 2021-01-07 ENCOUNTER — DOCUMENTATION ONLY (OUTPATIENT)
Dept: TRANSPLANT | Facility: CLINIC | Age: 51
End: 2021-01-07

## 2021-01-08 ENCOUNTER — PATIENT MESSAGE (OUTPATIENT)
Dept: TRANSPLANT | Facility: CLINIC | Age: 51
End: 2021-01-08

## 2021-01-09 LAB
BASOPHILS # BLD AUTO: 0 X10E3/UL (ref 0–0.2)
BASOPHILS NFR BLD AUTO: 1 %
BUN SERPL-MCNC: 21 MG/DL (ref 6–24)
BUN/CREAT SERPL: 14 (ref 9–20)
CALCIUM SERPL-MCNC: 9.6 MG/DL (ref 8.7–10.2)
CHLORIDE SERPL-SCNC: 106 MMOL/L (ref 96–106)
CO2 SERPL-SCNC: 24 MMOL/L (ref 20–29)
CREAT SERPL-MCNC: 1.53 MG/DL (ref 0.76–1.27)
EOSINOPHIL # BLD AUTO: 0.1 X10E3/UL (ref 0–0.4)
EOSINOPHIL NFR BLD AUTO: 2 %
ERYTHROCYTE [DISTWIDTH] IN BLOOD BY AUTOMATED COUNT: 12.5 % (ref 11.6–15.4)
GLUCOSE SERPL-MCNC: 94 MG/DL (ref 65–99)
HCT VFR BLD AUTO: 45.4 % (ref 37.5–51)
HGB BLD-MCNC: 14.8 G/DL (ref 13–17.7)
IMM GRANULOCYTES # BLD AUTO: 0 X10E3/UL (ref 0–0.1)
IMM GRANULOCYTES NFR BLD AUTO: 0 %
LYMPHOCYTES # BLD AUTO: 1.5 X10E3/UL (ref 0.7–3.1)
LYMPHOCYTES NFR BLD AUTO: 26 %
MCH RBC QN AUTO: 31.7 PG (ref 26.6–33)
MCHC RBC AUTO-ENTMCNC: 32.6 G/DL (ref 31.5–35.7)
MCV RBC AUTO: 97 FL (ref 79–97)
MONOCYTES # BLD AUTO: 0.5 X10E3/UL (ref 0.1–0.9)
MONOCYTES NFR BLD AUTO: 8 %
NEUTROPHILS # BLD AUTO: 3.5 X10E3/UL (ref 1.4–7)
NEUTROPHILS NFR BLD AUTO: 63 %
PHOSPHATE SERPL-MCNC: 3.4 MG/DL (ref 2.8–4.1)
PLATELET # BLD AUTO: 214 X10E3/UL (ref 150–450)
POTASSIUM SERPL-SCNC: 4.7 MMOL/L (ref 3.5–5.2)
RBC # BLD AUTO: 4.67 X10E6/UL (ref 4.14–5.8)
SODIUM SERPL-SCNC: 141 MMOL/L (ref 134–144)
SPECIMEN STATUS REPORT: NORMAL
TACROLIMUS BLD LC/MS/MS-MCNC: 11.7 NG/ML (ref 2–20)
WBC # BLD AUTO: 5.6 X10E3/UL (ref 3.4–10.8)

## 2021-01-11 DIAGNOSIS — Z94.0 KIDNEY REPLACED BY TRANSPLANT: ICD-10-CM

## 2021-01-11 RX ORDER — TACROLIMUS 1 MG/1
2 CAPSULE ORAL EVERY 12 HOURS
Qty: 120 CAPSULE | Refills: 11 | Status: SHIPPED | OUTPATIENT
Start: 2021-01-11 | End: 2021-02-09 | Stop reason: SDUPTHER

## 2021-01-11 RX ORDER — TACROLIMUS 0.5 MG/1
0.5 CAPSULE ORAL EVERY 12 HOURS
Qty: 60 CAPSULE | Refills: 11 | Status: SHIPPED | OUTPATIENT
Start: 2021-01-11 | End: 2021-02-09 | Stop reason: SDUPTHER

## 2021-01-19 ENCOUNTER — LAB VISIT (OUTPATIENT)
Dept: LAB | Facility: HOSPITAL | Age: 51
End: 2021-01-19
Payer: MEDICARE

## 2021-01-19 DIAGNOSIS — Z79.899 ENCOUNTER FOR LONG-TERM (CURRENT) USE OF OTHER MEDICATIONS: Primary | ICD-10-CM

## 2021-01-19 DIAGNOSIS — B25.9 CYTOMEGALOVIRAL DISEASE: ICD-10-CM

## 2021-01-19 DIAGNOSIS — Z94.0 KIDNEY REPLACED BY TRANSPLANT: ICD-10-CM

## 2021-01-19 LAB
ALBUMIN SERPL BCP-MCNC: 4.2 G/DL (ref 3.5–5.2)
ANION GAP SERPL CALC-SCNC: 6 MMOL/L (ref 8–16)
BASOPHILS # BLD AUTO: 0.02 K/UL (ref 0–0.2)
BASOPHILS NFR BLD: 0.5 % (ref 0–1.9)
BUN SERPL-MCNC: 21 MG/DL (ref 6–20)
CALCIUM SERPL-MCNC: 9.2 MG/DL (ref 8.7–10.5)
CHLORIDE SERPL-SCNC: 107 MMOL/L (ref 95–110)
CO2 SERPL-SCNC: 26 MMOL/L (ref 23–29)
CREAT SERPL-MCNC: 1.7 MG/DL (ref 0.5–1.4)
DIFFERENTIAL METHOD: ABNORMAL
EOSINOPHIL # BLD AUTO: 0.2 K/UL (ref 0–0.5)
EOSINOPHIL NFR BLD: 3.9 % (ref 0–8)
ERYTHROCYTE [DISTWIDTH] IN BLOOD BY AUTOMATED COUNT: 11.9 % (ref 11.5–14.5)
EST. GFR  (AFRICAN AMERICAN): 52.4 ML/MIN/1.73 M^2
EST. GFR  (NON AFRICAN AMERICAN): 45.4 ML/MIN/1.73 M^2
GLUCOSE SERPL-MCNC: 88 MG/DL (ref 70–110)
HCT VFR BLD AUTO: 46.3 % (ref 40–54)
HGB BLD-MCNC: 14.8 G/DL (ref 14–18)
IMM GRANULOCYTES # BLD AUTO: 0.01 K/UL (ref 0–0.04)
IMM GRANULOCYTES NFR BLD AUTO: 0.2 % (ref 0–0.5)
LYMPHOCYTES # BLD AUTO: 1.9 K/UL (ref 1–4.8)
LYMPHOCYTES NFR BLD: 43.5 % (ref 18–48)
MAGNESIUM SERPL-MCNC: 1.7 MG/DL (ref 1.6–2.6)
MCH RBC QN AUTO: 31.4 PG (ref 27–31)
MCHC RBC AUTO-ENTMCNC: 32 G/DL (ref 32–36)
MCV RBC AUTO: 98 FL (ref 82–98)
MONOCYTES # BLD AUTO: 0.3 K/UL (ref 0.3–1)
MONOCYTES NFR BLD: 7.7 % (ref 4–15)
NEUTROPHILS # BLD AUTO: 1.9 K/UL (ref 1.8–7.7)
NEUTROPHILS NFR BLD: 44.2 % (ref 38–73)
NRBC BLD-RTO: 0 /100 WBC
PHOSPHATE SERPL-MCNC: 3.1 MG/DL (ref 2.7–4.5)
PLATELET # BLD AUTO: 190 K/UL (ref 150–350)
PMV BLD AUTO: 11.1 FL (ref 9.2–12.9)
POTASSIUM SERPL-SCNC: 4.7 MMOL/L (ref 3.5–5.1)
RBC # BLD AUTO: 4.72 M/UL (ref 4.6–6.2)
SODIUM SERPL-SCNC: 139 MMOL/L (ref 136–145)
TACROLIMUS BLD-MCNC: 9.9 NG/ML (ref 5–15)
WBC # BLD AUTO: 4.39 K/UL (ref 3.9–12.7)

## 2021-01-19 PROCEDURE — 36415 COLL VENOUS BLD VENIPUNCTURE: CPT

## 2021-01-19 PROCEDURE — 80197 ASSAY OF TACROLIMUS: CPT

## 2021-01-19 PROCEDURE — 83735 ASSAY OF MAGNESIUM: CPT

## 2021-01-19 PROCEDURE — 82040 ASSAY OF SERUM ALBUMIN: CPT

## 2021-01-19 PROCEDURE — 84100 ASSAY OF PHOSPHORUS: CPT

## 2021-01-19 PROCEDURE — 85025 COMPLETE CBC W/AUTO DIFF WBC: CPT

## 2021-01-19 PROCEDURE — 80048 BASIC METABOLIC PNL TOTAL CA: CPT

## 2021-01-20 LAB — CMV DNA SERPL NAA+PROBE-ACNC: 381 IU/ML

## 2021-01-27 LAB
EXT ALBUMIN: 4.4
EXT BK VIRUS DNA QN PCR: ABNORMAL
EXT BUN: 20
EXT CALCIUM: 9.7
EXT CHLORIDE: 106
EXT CMV DNA QUANT. BY PCR: 841
EXT CO2: 23
EXT CREATININE: 1.56 MG/DL
EXT EOSINOPHIL%: 4
EXT GFR MDRD NON AF AMER: 50
EXT GLUCOSE UA: ABNORMAL
EXT GLUCOSE: 89
EXT HEMATOCRIT: 44.6
EXT HEMOGLOBIN: 14.7
EXT LYMPH%: 42
EXT MAGNESIUM: 1.8
EXT MONOCYTES%: 8
EXT NITRITES UA: ABNORMAL
EXT PHOSPHORUS: 3.2
EXT PLATELETS: 208
EXT POTASSIUM: 4.4
EXT PROT/CREAT RATIO UR: 0.07
EXT PROTEIN UA: ABNORMAL
EXT RBC UA: ABNORMAL
EXT SEGS%: 46
EXT SODIUM: 142 MMOL/L
EXT TACROLIMUS LVL: 7.8
EXT WBC UA: 0
EXT WBC: 3.3

## 2021-01-28 ENCOUNTER — DOCUMENTATION ONLY (OUTPATIENT)
Dept: TRANSPLANT | Facility: CLINIC | Age: 51
End: 2021-01-28

## 2021-01-30 LAB
ALBUMIN SERPL-MCNC: 4.6 G/DL (ref 3.8–4.9)
MAGNESIUM SERPL-MCNC: 1.8 MG/DL (ref 1.6–2.3)
SPECIMEN STATUS REPORT: NORMAL

## 2021-02-03 ENCOUNTER — TELEPHONE (OUTPATIENT)
Dept: TRANSPLANT | Facility: CLINIC | Age: 51
End: 2021-02-03

## 2021-02-03 ENCOUNTER — PATIENT MESSAGE (OUTPATIENT)
Dept: TRANSPLANT | Facility: CLINIC | Age: 51
End: 2021-02-03

## 2021-02-03 LAB
ALBUMIN SERPL-MCNC: 4.4 G/DL (ref 3.8–4.9)
APPEARANCE UR: CLEAR
BACTERIA #/AREA URNS HPF: NORMAL /[HPF]
BASOPHILS # BLD AUTO: 0 X10E3/UL (ref 0–0.2)
BASOPHILS NFR BLD AUTO: 0 %
BILIRUB UR QL STRIP: NEGATIVE
BKV DNA # SERPL NAA+PROBE: NEGATIVE COPIES/ML
BKV DNA SERPL NAA+PROBE-LOG#: NORMAL LOG10COPY/ML
BUN SERPL-MCNC: 20 MG/DL (ref 6–24)
BUN/CREAT SERPL: 13 (ref 9–20)
CALCIUM SERPL-MCNC: 9.7 MG/DL (ref 8.7–10.2)
CHLORIDE SERPL-SCNC: 106 MMOL/L (ref 96–106)
CMV DNA SERPL NAA+PROBE-ACNC: 841 IU/ML
CMV DNA SERPL NAA+PROBE-LOG IU: 2.92 LOG10 IU/ML
CO2 SERPL-SCNC: 23 MMOL/L (ref 20–29)
COLOR UR: YELLOW
CREAT SERPL-MCNC: 1.56 MG/DL (ref 0.76–1.27)
CREAT UR-MCNC: 52.9 MG/DL
EOSINOPHIL # BLD AUTO: 0.1 X10E3/UL (ref 0–0.4)
EOSINOPHIL NFR BLD AUTO: 4 %
EPI CELLS #/AREA URNS HPF: NORMAL /HPF (ref 0–10)
ERYTHROCYTE [DISTWIDTH] IN BLOOD BY AUTOMATED COUNT: 12.2 % (ref 11.6–15.4)
GLUCOSE SERPL-MCNC: 89 MG/DL (ref 65–99)
GLUCOSE UR QL: NEGATIVE
HCT VFR BLD AUTO: 44.6 % (ref 37.5–51)
HGB BLD-MCNC: 14.7 G/DL (ref 13–17.7)
HGB UR QL STRIP: NEGATIVE
IMM GRANULOCYTES # BLD AUTO: 0 X10E3/UL (ref 0–0.1)
IMM GRANULOCYTES NFR BLD AUTO: 0 %
KETONES UR QL STRIP: NEGATIVE
LEUKOCYTE ESTERASE UR QL STRIP: NEGATIVE
LYMPHOCYTES # BLD AUTO: 1.4 X10E3/UL (ref 0.7–3.1)
LYMPHOCYTES NFR BLD AUTO: 42 %
MAGNESIUM SERPL-MCNC: 1.8 MG/DL (ref 1.6–2.3)
MCH RBC QN AUTO: 31.6 PG (ref 26.6–33)
MCHC RBC AUTO-ENTMCNC: 33 G/DL (ref 31.5–35.7)
MCV RBC AUTO: 96 FL (ref 79–97)
MICRO URNS: NORMAL
MICRO URNS: NORMAL
MONOCYTES # BLD AUTO: 0.3 X10E3/UL (ref 0.1–0.9)
MONOCYTES NFR BLD AUTO: 8 %
NEUTROPHILS # BLD AUTO: 1.5 X10E3/UL (ref 1.4–7)
NEUTROPHILS NFR BLD AUTO: 46 %
NITRITE UR QL STRIP: NEGATIVE
PH UR STRIP: 6.5 [PH] (ref 5–7.5)
PHOSPHATE SERPL-MCNC: 3.2 MG/DL (ref 2.8–4.1)
PLATELET # BLD AUTO: 208 X10E3/UL (ref 150–450)
POTASSIUM SERPL-SCNC: 4.4 MMOL/L (ref 3.5–5.2)
PROT UR QL STRIP: NEGATIVE
PROT UR-MCNC: <4 MG/DL
PROT/CREAT UR: ABNORMAL MG/G CREAT (ref 0–200)
RBC # BLD AUTO: 4.65 X10E6/UL (ref 4.14–5.8)
RBC #/AREA URNS HPF: NORMAL /HPF (ref 0–2)
SODIUM SERPL-SCNC: 142 MMOL/L (ref 134–144)
SP GR UR: 1.01 (ref 1–1.03)
TACROLIMUS BLD LC/MS/MS-MCNC: 7.8 NG/ML (ref 2–20)
UROBILINOGEN UR STRIP-MCNC: 0.2 MG/DL (ref 0.2–1)
WBC # BLD AUTO: 3.3 X10E3/UL (ref 3.4–10.8)
WBC #/AREA URNS HPF: NORMAL /HPF (ref 0–5)

## 2021-02-08 ENCOUNTER — TELEPHONE (OUTPATIENT)
Dept: TRANSPLANT | Facility: CLINIC | Age: 51
End: 2021-02-08

## 2021-02-08 DIAGNOSIS — B25.9 CYTOMEGALOVIRUS (CMV) VIREMIA: ICD-10-CM

## 2021-02-08 DIAGNOSIS — Z94.0 S/P KIDNEY TRANSPLANT: ICD-10-CM

## 2021-02-08 DIAGNOSIS — Z94.0 KIDNEY REPLACED BY TRANSPLANT: ICD-10-CM

## 2021-02-08 DIAGNOSIS — E55.9 VITAMIN D DEFICIENCY: Primary | ICD-10-CM

## 2021-02-08 DIAGNOSIS — Z29.89 PROPHYLACTIC IMMUNOTHERAPY: ICD-10-CM

## 2021-02-08 DIAGNOSIS — I12.9 RENAL HYPERTENSION: Chronic | ICD-10-CM

## 2021-02-08 DIAGNOSIS — E87.20 ACIDOSIS: ICD-10-CM

## 2021-02-08 DIAGNOSIS — E83.42 HYPOMAGNESEMIA: ICD-10-CM

## 2021-02-08 LAB
EXT ALBUMIN: 4.5
EXT ALKALINE PHOSPHATASE: 79
EXT ALT: 12
EXT AST: 20
EXT BILIRUBIN DIRECT: 0.13 MG/DL
EXT BILIRUBIN TOTAL: 0.6
EXT BUN: 20
EXT CALCIUM: 9.4
EXT CHLORIDE: 105
EXT CMV DNA QUANT. BY PCR: 3700
EXT CO2: 24
EXT CREATININE: 1.46 MG/DL
EXT EOSINOPHIL%: 3
EXT GFR MDRD NON AF AMER: 55
EXT GLUCOSE: 87
EXT HEMATOCRIT: 45.7
EXT HEMOGLOBIN: 15
EXT LYMPH%: 44
EXT MONOCYTES%: 8
EXT PHOSPHORUS: 2.9
EXT PLATELETS: 227
EXT POTASSIUM: 4.3
EXT PROTEIN TOTAL: 6.5
EXT SEGS%: 44
EXT SODIUM: 143 MMOL/L
EXT TACROLIMUS LVL: 10.7
EXT WBC: 3

## 2021-02-08 RX ORDER — ERGOCALCIFEROL 1.25 MG/1
50000 CAPSULE ORAL
Qty: 4 CAPSULE | Refills: 2 | Status: SHIPPED | OUTPATIENT
Start: 2021-02-08 | End: 2021-02-09 | Stop reason: SDUPTHER

## 2021-02-09 ENCOUNTER — DOCUMENTATION ONLY (OUTPATIENT)
Dept: TRANSPLANT | Facility: CLINIC | Age: 51
End: 2021-02-09

## 2021-02-10 RX ORDER — ERGOCALCIFEROL 1.25 MG/1
50000 CAPSULE ORAL
Qty: 12 CAPSULE | Refills: 3 | Status: SHIPPED | OUTPATIENT
Start: 2021-02-10 | End: 2021-05-11

## 2021-02-10 RX ORDER — MYCOPHENOLATE MOFETIL 250 MG/1
500 CAPSULE ORAL 2 TIMES DAILY
Qty: 120 CAPSULE | Refills: 11 | Status: SHIPPED | OUTPATIENT
Start: 2021-02-10 | End: 2021-02-11 | Stop reason: SINTOL

## 2021-02-10 RX ORDER — SODIUM BICARBONATE 650 MG/1
650 TABLET ORAL 3 TIMES DAILY
Qty: 270 TABLET | Refills: 3 | Status: SHIPPED | OUTPATIENT
Start: 2021-02-10 | End: 2021-10-25

## 2021-02-10 RX ORDER — TACROLIMUS 0.5 MG/1
0.5 CAPSULE ORAL EVERY 12 HOURS
Qty: 60 CAPSULE | Refills: 11 | Status: SHIPPED | OUTPATIENT
Start: 2021-02-10 | End: 2021-02-11 | Stop reason: SDUPTHER

## 2021-02-10 RX ORDER — FAMOTIDINE 20 MG/1
20 TABLET, FILM COATED ORAL NIGHTLY
Qty: 90 TABLET | Refills: 3 | Status: SHIPPED | OUTPATIENT
Start: 2021-02-10 | End: 2021-05-11

## 2021-02-10 RX ORDER — LANOLIN ALCOHOL/MO/W.PET/CERES
400 CREAM (GRAM) TOPICAL DAILY
Qty: 90 TABLET | Refills: 3 | Status: SHIPPED | OUTPATIENT
Start: 2021-02-10 | End: 2021-08-03

## 2021-02-10 RX ORDER — SULFAMETHOXAZOLE AND TRIMETHOPRIM 400; 80 MG/1; MG/1
1 TABLET ORAL DAILY
Qty: 30 TABLET | Refills: 11 | Status: SHIPPED | OUTPATIENT
Start: 2021-02-10 | End: 2021-03-03 | Stop reason: ALTCHOICE

## 2021-02-10 RX ORDER — AMLODIPINE BESYLATE 5 MG/1
5 TABLET ORAL DAILY
Qty: 90 TABLET | Refills: 3 | Status: SHIPPED | OUTPATIENT
Start: 2021-02-10 | End: 2021-05-11

## 2021-02-10 RX ORDER — PREDNISONE 5 MG/1
5 TABLET ORAL DAILY
Qty: 90 TABLET | Refills: 3 | Status: SHIPPED | OUTPATIENT
Start: 2021-02-10 | End: 2021-10-25

## 2021-02-10 RX ORDER — TACROLIMUS 1 MG/1
2 CAPSULE ORAL EVERY 12 HOURS
Qty: 120 CAPSULE | Refills: 11 | Status: SHIPPED | OUTPATIENT
Start: 2021-02-10 | End: 2021-02-11 | Stop reason: SDUPTHER

## 2021-02-10 RX ORDER — VALGANCICLOVIR 450 MG/1
450 TABLET, FILM COATED ORAL 2 TIMES DAILY
Qty: 180 TABLET | Refills: 3 | Status: SHIPPED | OUTPATIENT
Start: 2021-02-10 | End: 2021-02-18 | Stop reason: SDUPTHER

## 2021-02-11 DIAGNOSIS — Z94.0 KIDNEY REPLACED BY TRANSPLANT: ICD-10-CM

## 2021-02-11 LAB
ALBUMIN SERPL-MCNC: 4.5 G/DL (ref 3.8–4.9)
ALP SERPL-CCNC: 79 IU/L (ref 39–117)
ALT SERPL-CCNC: 12 IU/L (ref 0–44)
AST SERPL-CCNC: 20 IU/L (ref 0–40)
BASOPHILS # BLD AUTO: 0 X10E3/UL (ref 0–0.2)
BASOPHILS NFR BLD AUTO: 0 %
BILIRUB DIRECT SERPL-MCNC: 0.13 MG/DL (ref 0–0.4)
BILIRUB SERPL-MCNC: 0.6 MG/DL (ref 0–1.2)
BUN SERPL-MCNC: 20 MG/DL (ref 6–24)
BUN/CREAT SERPL: 14 (ref 9–20)
CALCIUM SERPL-MCNC: 9.4 MG/DL (ref 8.7–10.2)
CHLORIDE SERPL-SCNC: 105 MMOL/L (ref 96–106)
CMV DNA SERPL NAA+PROBE-ACNC: 3700 IU/ML
CMV DNA SERPL NAA+PROBE-LOG IU: 3.57 LOG10 IU/ML
CO2 SERPL-SCNC: 24 MMOL/L (ref 20–29)
CREAT SERPL-MCNC: 1.46 MG/DL (ref 0.76–1.27)
EOSINOPHIL # BLD AUTO: 0.1 X10E3/UL (ref 0–0.4)
EOSINOPHIL NFR BLD AUTO: 3 %
ERYTHROCYTE [DISTWIDTH] IN BLOOD BY AUTOMATED COUNT: 11.9 % (ref 11.6–15.4)
GLUCOSE SERPL-MCNC: 87 MG/DL (ref 65–99)
HCT VFR BLD AUTO: 45.7 % (ref 37.5–51)
HGB BLD-MCNC: 15 G/DL (ref 13–17.7)
IMM GRANULOCYTES # BLD AUTO: 0 X10E3/UL (ref 0–0.1)
IMM GRANULOCYTES NFR BLD AUTO: 1 %
LYMPHOCYTES # BLD AUTO: 1.3 X10E3/UL (ref 0.7–3.1)
LYMPHOCYTES NFR BLD AUTO: 44 %
MCH RBC QN AUTO: 31.9 PG (ref 26.6–33)
MCHC RBC AUTO-ENTMCNC: 32.8 G/DL (ref 31.5–35.7)
MCV RBC AUTO: 97 FL (ref 79–97)
MONOCYTES # BLD AUTO: 0.2 X10E3/UL (ref 0.1–0.9)
MONOCYTES NFR BLD AUTO: 8 %
NEUTROPHILS # BLD AUTO: 1.3 X10E3/UL (ref 1.4–7)
NEUTROPHILS NFR BLD AUTO: 44 %
PHOSPHATE SERPL-MCNC: 2.9 MG/DL (ref 2.8–4.1)
PLATELET # BLD AUTO: 227 X10E3/UL (ref 150–450)
POTASSIUM SERPL-SCNC: 4.3 MMOL/L (ref 3.5–5.2)
PROT SERPL-MCNC: 6.5 G/DL (ref 6–8.5)
RBC # BLD AUTO: 4.7 X10E6/UL (ref 4.14–5.8)
SODIUM SERPL-SCNC: 143 MMOL/L (ref 134–144)
SPECIMEN STATUS REPORT: NORMAL
TACROLIMUS BLD LC/MS/MS-MCNC: 10.7 NG/ML (ref 2–20)
WBC # BLD AUTO: 3 X10E3/UL (ref 3.4–10.8)

## 2021-02-12 RX ORDER — TACROLIMUS 1 MG/1
2 CAPSULE ORAL EVERY 12 HOURS
Qty: 120 CAPSULE | Refills: 11 | Status: SHIPPED | OUTPATIENT
Start: 2021-02-12 | End: 2021-04-23 | Stop reason: SDUPTHER

## 2021-02-18 DIAGNOSIS — B25.9 CYTOMEGALOVIRUS (CMV) VIREMIA: ICD-10-CM

## 2021-02-18 DIAGNOSIS — Z94.0 S/P KIDNEY TRANSPLANT: ICD-10-CM

## 2021-02-18 RX ORDER — VALGANCICLOVIR 450 MG/1
450 TABLET, FILM COATED ORAL 2 TIMES DAILY
Qty: 180 TABLET | Refills: 3 | Status: SHIPPED | OUTPATIENT
Start: 2021-02-18 | End: 2021-03-17 | Stop reason: ALTCHOICE

## 2021-02-19 ENCOUNTER — PATIENT MESSAGE (OUTPATIENT)
Dept: TRANSPLANT | Facility: CLINIC | Age: 51
End: 2021-02-19

## 2021-02-23 ENCOUNTER — TELEPHONE (OUTPATIENT)
Dept: TRANSPLANT | Facility: CLINIC | Age: 51
End: 2021-02-23

## 2021-02-23 LAB
EXT ALBUMIN: 4
EXT BUN: 16
EXT CALCIUM: 8.9
EXT CHLORIDE: 104
EXT CMV DNA QUANT. BY PCR: 1330
EXT CO2: 25
EXT CREATININE: 1.56 MG/DL
EXT EOSINOPHIL%: 4
EXT GFR MDRD NON AF AMER: 50
EXT GLUCOSE: 84
EXT HEMATOCRIT: 43.3
EXT HEMOGLOBIN: 14.2
EXT LYMPH%: 69
EXT MAGNESIUM: 1.8
EXT MONOCYTES%: 4
EXT PHOSPHORUS: 3.3
EXT PLATELETS: 218
EXT POTASSIUM: 4.2
EXT SEGS%: 22
EXT SODIUM: 140 MMOL/L
EXT TACROLIMUS LVL: 7.3
EXT WBC: 4.1

## 2021-02-25 ENCOUNTER — DOCUMENTATION ONLY (OUTPATIENT)
Dept: TRANSPLANT | Facility: CLINIC | Age: 51
End: 2021-02-25

## 2021-02-26 ENCOUNTER — PATIENT MESSAGE (OUTPATIENT)
Dept: TRANSPLANT | Facility: CLINIC | Age: 51
End: 2021-02-26

## 2021-02-26 LAB
ALBUMIN SERPL-MCNC: 4 G/DL (ref 3.8–4.9)
BASOPHILS # BLD AUTO: 0 X10E3/UL (ref 0–0.2)
BASOPHILS NFR BLD AUTO: 1 %
BUN SERPL-MCNC: 16 MG/DL (ref 6–24)
BUN/CREAT SERPL: 10 (ref 9–20)
CALCIUM SERPL-MCNC: 8.9 MG/DL (ref 8.7–10.2)
CHLORIDE SERPL-SCNC: 104 MMOL/L (ref 96–106)
CMV DNA SERPL NAA+PROBE-ACNC: 1330 IU/ML
CMV DNA SERPL NAA+PROBE-LOG IU: 3.12 LOG10 IU/ML
CO2 SERPL-SCNC: 25 MMOL/L (ref 20–29)
CREAT SERPL-MCNC: 1.56 MG/DL (ref 0.76–1.27)
EOSINOPHIL # BLD AUTO: 0.2 X10E3/UL (ref 0–0.4)
EOSINOPHIL NFR BLD AUTO: 4 %
ERYTHROCYTE [DISTWIDTH] IN BLOOD BY AUTOMATED COUNT: 12.7 % (ref 11.6–15.4)
GLUCOSE SERPL-MCNC: 84 MG/DL (ref 65–99)
HCT VFR BLD AUTO: 43.3 % (ref 37.5–51)
HGB BLD-MCNC: 14.2 G/DL (ref 13–17.7)
IMM GRANULOCYTES # BLD AUTO: 0 X10E3/UL (ref 0–0.1)
IMM GRANULOCYTES NFR BLD AUTO: 0 %
LYMPHOCYTES # BLD AUTO: 2.8 X10E3/UL (ref 0.7–3.1)
LYMPHOCYTES NFR BLD AUTO: 69 %
MAGNESIUM SERPL-MCNC: 1.8 MG/DL (ref 1.6–2.3)
MCH RBC QN AUTO: 31.3 PG (ref 26.6–33)
MCHC RBC AUTO-ENTMCNC: 32.8 G/DL (ref 31.5–35.7)
MCV RBC AUTO: 95 FL (ref 79–97)
MONOCYTES # BLD AUTO: 0.2 X10E3/UL (ref 0.1–0.9)
MONOCYTES NFR BLD AUTO: 4 %
MORPHOLOGY BLD-IMP: ABNORMAL
NEUTROPHILS # BLD AUTO: 0.9 X10E3/UL (ref 1.4–7)
NEUTROPHILS NFR BLD AUTO: 22 %
PHOSPHATE SERPL-MCNC: 3.3 MG/DL (ref 2.8–4.1)
PLATELET # BLD AUTO: 218 X10E3/UL (ref 150–450)
POTASSIUM SERPL-SCNC: 4.2 MMOL/L (ref 3.5–5.2)
RBC # BLD AUTO: 4.54 X10E6/UL (ref 4.14–5.8)
SODIUM SERPL-SCNC: 140 MMOL/L (ref 134–144)
TACROLIMUS BLD LC/MS/MS-MCNC: 7.3 NG/ML (ref 2–20)
WBC # BLD AUTO: 4.1 X10E3/UL (ref 3.4–10.8)

## 2021-03-01 ENCOUNTER — LAB VISIT (OUTPATIENT)
Dept: LAB | Facility: HOSPITAL | Age: 51
End: 2021-03-01
Attending: INTERNAL MEDICINE
Payer: MEDICARE

## 2021-03-01 ENCOUNTER — OFFICE VISIT (OUTPATIENT)
Dept: TRANSPLANT | Facility: CLINIC | Age: 51
End: 2021-03-01
Payer: MEDICARE

## 2021-03-01 ENCOUNTER — TELEPHONE (OUTPATIENT)
Dept: TRANSPLANT | Facility: CLINIC | Age: 51
End: 2021-03-01

## 2021-03-01 VITALS
DIASTOLIC BLOOD PRESSURE: 92 MMHG | SYSTOLIC BLOOD PRESSURE: 118 MMHG | HEIGHT: 65 IN | WEIGHT: 149.06 LBS | BODY MASS INDEX: 24.83 KG/M2 | RESPIRATION RATE: 16 BRPM | HEART RATE: 81 BPM | OXYGEN SATURATION: 98 % | TEMPERATURE: 98 F

## 2021-03-01 DIAGNOSIS — Z91.89 AT RISK FOR OPPORTUNISTIC INFECTIONS: ICD-10-CM

## 2021-03-01 DIAGNOSIS — Z94.0 KIDNEY REPLACED BY TRANSPLANT: ICD-10-CM

## 2021-03-01 DIAGNOSIS — Z79.60 LONG-TERM USE OF IMMUNOSUPPRESSANT MEDICATION: ICD-10-CM

## 2021-03-01 DIAGNOSIS — Z94.0 S/P KIDNEY TRANSPLANT: Primary | ICD-10-CM

## 2021-03-01 DIAGNOSIS — Z29.89 PROPHYLACTIC IMMUNOTHERAPY: ICD-10-CM

## 2021-03-01 DIAGNOSIS — E55.9 VITAMIN D DEFICIENCY: ICD-10-CM

## 2021-03-01 LAB
25(OH)D3+25(OH)D2 SERPL-MCNC: 19 NG/ML (ref 30–96)
ALBUMIN SERPL BCP-MCNC: 3.8 G/DL (ref 3.5–5.2)
ALBUMIN SERPL BCP-MCNC: 3.8 G/DL (ref 3.5–5.2)
ALP SERPL-CCNC: 81 U/L (ref 55–135)
ALT SERPL W/O P-5'-P-CCNC: 22 U/L (ref 10–44)
ANION GAP SERPL CALC-SCNC: 8 MMOL/L (ref 8–16)
AST SERPL-CCNC: 23 U/L (ref 10–40)
BASOPHILS # BLD AUTO: 0.04 K/UL (ref 0–0.2)
BASOPHILS NFR BLD: 0.9 % (ref 0–1.9)
BILIRUB DIRECT SERPL-MCNC: 0.2 MG/DL (ref 0.1–0.3)
BILIRUB SERPL-MCNC: 0.6 MG/DL (ref 0.1–1)
BUN SERPL-MCNC: 26 MG/DL (ref 6–20)
CALCIUM SERPL-MCNC: 8.6 MG/DL (ref 8.7–10.5)
CHLORIDE SERPL-SCNC: 108 MMOL/L (ref 95–110)
CO2 SERPL-SCNC: 24 MMOL/L (ref 23–29)
CREAT SERPL-MCNC: 1.7 MG/DL (ref 0.5–1.4)
DIFFERENTIAL METHOD: ABNORMAL
EOSINOPHIL # BLD AUTO: 0.2 K/UL (ref 0–0.5)
EOSINOPHIL NFR BLD: 3.6 % (ref 0–8)
ERYTHROCYTE [DISTWIDTH] IN BLOOD BY AUTOMATED COUNT: 12.6 % (ref 11.5–14.5)
EST. GFR  (AFRICAN AMERICAN): 52.4 ML/MIN/1.73 M^2
EST. GFR  (NON AFRICAN AMERICAN): 45.4 ML/MIN/1.73 M^2
GLUCOSE SERPL-MCNC: 90 MG/DL (ref 70–110)
HCT VFR BLD AUTO: 43.5 % (ref 40–54)
HGB BLD-MCNC: 14.2 G/DL (ref 14–18)
IMM GRANULOCYTES # BLD AUTO: 0.01 K/UL (ref 0–0.04)
IMM GRANULOCYTES NFR BLD AUTO: 0.2 % (ref 0–0.5)
LYMPHOCYTES # BLD AUTO: 3.3 K/UL (ref 1–4.8)
LYMPHOCYTES NFR BLD: 74.9 % (ref 18–48)
MAGNESIUM SERPL-MCNC: 1.8 MG/DL (ref 1.6–2.6)
MCH RBC QN AUTO: 30.8 PG (ref 27–31)
MCHC RBC AUTO-ENTMCNC: 32.6 G/DL (ref 32–36)
MCV RBC AUTO: 94 FL (ref 82–98)
MONOCYTES # BLD AUTO: 0.4 K/UL (ref 0.3–1)
MONOCYTES NFR BLD: 8.1 % (ref 4–15)
NEUTROPHILS # BLD AUTO: 0.5 K/UL (ref 1.8–7.7)
NEUTROPHILS NFR BLD: 12.3 % (ref 38–73)
NRBC BLD-RTO: 0 /100 WBC
PHOSPHATE SERPL-MCNC: 2.9 MG/DL (ref 2.7–4.5)
PLATELET # BLD AUTO: 232 K/UL (ref 150–350)
PMV BLD AUTO: 10.7 FL (ref 9.2–12.9)
POTASSIUM SERPL-SCNC: 4.5 MMOL/L (ref 3.5–5.1)
PROT SERPL-MCNC: 7.3 G/DL (ref 6–8.4)
RBC # BLD AUTO: 4.61 M/UL (ref 4.6–6.2)
SODIUM SERPL-SCNC: 140 MMOL/L (ref 136–145)
TACROLIMUS BLD-MCNC: 8.2 NG/ML (ref 5–15)
WBC # BLD AUTO: 4.43 K/UL (ref 3.9–12.7)

## 2021-03-01 PROCEDURE — 85025 COMPLETE CBC W/AUTO DIFF WBC: CPT

## 2021-03-01 PROCEDURE — 99999 PR PBB SHADOW E&M-EST. PATIENT-LVL IV: CPT | Mod: PBBFAC,,, | Performed by: TRANSPLANT SURGERY

## 2021-03-01 PROCEDURE — 99214 OFFICE O/P EST MOD 30 MIN: CPT | Mod: PBBFAC | Performed by: TRANSPLANT SURGERY

## 2021-03-01 PROCEDURE — 99215 OFFICE O/P EST HI 40 MIN: CPT | Mod: 24,S$GLB,, | Performed by: TRANSPLANT SURGERY

## 2021-03-01 PROCEDURE — 80069 RENAL FUNCTION PANEL: CPT

## 2021-03-01 PROCEDURE — 99999 PR PBB SHADOW E&M-EST. PATIENT-LVL IV: ICD-10-PCS | Mod: PBBFAC,,, | Performed by: TRANSPLANT SURGERY

## 2021-03-01 PROCEDURE — 83735 ASSAY OF MAGNESIUM: CPT

## 2021-03-01 PROCEDURE — 82306 VITAMIN D 25 HYDROXY: CPT

## 2021-03-01 PROCEDURE — 87799 DETECT AGENT NOS DNA QUANT: CPT

## 2021-03-01 PROCEDURE — 99215 PR OFFICE/OUTPT VISIT, EST, LEVL V, 40-54 MIN: ICD-10-PCS | Mod: 24,S$GLB,, | Performed by: TRANSPLANT SURGERY

## 2021-03-01 PROCEDURE — 80197 ASSAY OF TACROLIMUS: CPT

## 2021-03-01 PROCEDURE — 84075 ASSAY ALKALINE PHOSPHATASE: CPT

## 2021-03-01 PROCEDURE — 36415 COLL VENOUS BLD VENIPUNCTURE: CPT

## 2021-03-01 PROCEDURE — 84450 TRANSFERASE (AST) (SGOT): CPT

## 2021-03-03 ENCOUNTER — PATIENT MESSAGE (OUTPATIENT)
Dept: TRANSPLANT | Facility: CLINIC | Age: 51
End: 2021-03-03

## 2021-03-03 ENCOUNTER — TELEPHONE (OUTPATIENT)
Dept: TRANSPLANT | Facility: CLINIC | Age: 51
End: 2021-03-03

## 2021-03-03 LAB
BKV DNA SERPL NAA+PROBE-ACNC: <125 COPIES/ML
BKV DNA SERPL NAA+PROBE-LOG#: <2.1 LOG (10) COPIES/ML
BKV DNA SERPL QL NAA+PROBE: NOT DETECTED
CMV DNA SERPL NAA+PROBE-ACNC: 182 IU/ML

## 2021-03-09 LAB
EXT ALBUMIN: ABNORMAL
EXT ALKALINE PHOSPHATASE: ABNORMAL
EXT ALLOSURE: ABNORMAL
EXT ALT: ABNORMAL
EXT AMYLASE: ABNORMAL
EXT ANC: 259
EXT AST: ABNORMAL
EXT BACTERIA UA: ABNORMAL
EXT BANDS%: ABNORMAL
EXT BILIRUBIN DIRECT: ABNORMAL
EXT BILIRUBIN TOTAL: ABNORMAL
EXT BK VIRUS DNA QN PCR: ABNORMAL
EXT BK VIRUS DNA QUANT, PCR, URINE: ABNORMAL
EXT BUN: 21
EXT C PEPTIDE: ABNORMAL
EXT CALCIUM: 9.4
EXT CHLORIDE: 105
EXT CHOLESTEROL (LIPID PANEL): ABNORMAL
EXT CHOLESTEROL: ABNORMAL
EXT CMV DNA QUANT. BY PCR: ABNORMAL
EXT CO2: 24
EXT CREATININE: 1.45 MG/DL
EXT CYCLOSPORINE LVL: ABNORMAL
EXT EBV DNA BY PCR: ABNORMAL
EXT EBV DNA-COPIES/ML: ABNORMAL
EXT EOSINOPHIL%: 2
EXT FERRITIN: ABNORMAL
EXT GFR MDRD AF AMER: ABNORMAL
EXT GFR MDRD NON AF AMER: 55
EXT GLUCOSE UA: ABNORMAL
EXT GLUCOSE: 95
EXT HBV DNA QUANT PCR: ABNORMAL
EXT HCV QUANT: ABNORMAL
EXT HDL: ABNORMAL
EXT HEMATOCRIT: 45.3
EXT HEMOGLOBIN A1C: ABNORMAL
EXT HEMOGLOBIN: 15
EXT HEP B S AG: ABNORMAL
EXT HIV: ABNORMAL
EXT IMMUNKNOW (STIMULATED): ABNORMAL
EXT INR: ABNORMAL
EXT IRON SATURATION: ABNORMAL
EXT LDH, TOTAL: ABNORMAL
EXT LDL CHOLESTEROL: ABNORMAL
EXT LIPASE: ABNORMAL
EXT LYMPH%: 72
EXT MAGNESIUM: ABNORMAL
EXT MONOCYTES%: 15
EXT NITRITES UA: ABNORMAL
EXT PHOSPHORUS: 2.8
EXT PLATELETS: 274
EXT POTASSIUM: 5
EXT PROT/CREAT RATIO UR: ABNORMAL
EXT PROTEIN TOTAL: ABNORMAL
EXT PROTEIN UA: ABNORMAL
EXT PT: ABNORMAL
EXT PTH, INTACT: ABNORMAL
EXT RBC UA: ABNORMAL
EXT SARS COV-2 (COVID-19): ABNORMAL
EXT SEGS%: 7
EXT SERUM IRON: ABNORMAL
EXT SIROLIMUS LVL: ABNORMAL
EXT SODIUM: 140 MMOL/L
EXT STOOL CDIFF: ABNORMAL
EXT STOOL CMV: ABNORMAL
EXT STOOL CULTURE: ABNORMAL
EXT STOOL OCP: ABNORMAL
EXT TACROLIMUS LVL: ABNORMAL
EXT TIBC: ABNORMAL
EXT TRIGLYCERIDES: ABNORMAL
EXT UNSATURATED IRON BINDING CAP.: ABNORMAL
EXT URIC ACID: ABNORMAL
EXT URINE CULTURE: ABNORMAL
EXT VIT D 25 HYDROXY: ABNORMAL
EXT WBC UA: ABNORMAL
EXT WBC: 3.7

## 2021-03-11 ENCOUNTER — TELEPHONE (OUTPATIENT)
Dept: TRANSPLANT | Facility: CLINIC | Age: 51
End: 2021-03-11

## 2021-03-11 ENCOUNTER — DOCUMENTATION ONLY (OUTPATIENT)
Dept: TRANSPLANT | Facility: CLINIC | Age: 51
End: 2021-03-11

## 2021-03-11 DIAGNOSIS — D70.8 OTHER NEUTROPENIA: ICD-10-CM

## 2021-03-11 DIAGNOSIS — Z94.0 KIDNEY REPLACED BY TRANSPLANT: Primary | ICD-10-CM

## 2021-03-12 ENCOUNTER — HOSPITAL ENCOUNTER (OUTPATIENT)
Dept: RADIOLOGY | Facility: HOSPITAL | Age: 51
Discharge: HOME | End: 2021-03-12
Attending: INTERNAL MEDICINE
Payer: MEDICARE

## 2021-03-12 ENCOUNTER — TELEPHONE (OUTPATIENT)
Dept: TRANSPLANT | Facility: CLINIC | Age: 51
End: 2021-03-12

## 2021-03-12 ENCOUNTER — CLINICAL SUPPORT (OUTPATIENT)
Dept: TRANSPLANT | Facility: CLINIC | Age: 51
End: 2021-03-12
Payer: MEDICARE

## 2021-03-12 VITALS
WEIGHT: 150.38 LBS | BODY MASS INDEX: 25.05 KG/M2 | SYSTOLIC BLOOD PRESSURE: 134 MMHG | HEIGHT: 65 IN | DIASTOLIC BLOOD PRESSURE: 87 MMHG

## 2021-03-12 DIAGNOSIS — D70.8 OTHER NEUTROPENIA: ICD-10-CM

## 2021-03-12 DIAGNOSIS — D70.9 NEUTROPENIA, UNSPECIFIED TYPE: Primary | ICD-10-CM

## 2021-03-12 DIAGNOSIS — Z94.0 KIDNEY REPLACED BY TRANSPLANT: ICD-10-CM

## 2021-03-12 DIAGNOSIS — Z94.0 S/P KIDNEY TRANSPLANT: Primary | ICD-10-CM

## 2021-03-12 DIAGNOSIS — Z29.89 PROPHYLACTIC IMMUNOTHERAPY: ICD-10-CM

## 2021-03-12 DIAGNOSIS — Z94.0 KIDNEY REPLACED BY TRANSPLANT: Primary | ICD-10-CM

## 2021-03-12 DIAGNOSIS — Z79.60 LONG-TERM USE OF IMMUNOSUPPRESSANT MEDICATION: ICD-10-CM

## 2021-03-12 DIAGNOSIS — D72.820 ELEVATED LYMPHOCYTES: ICD-10-CM

## 2021-03-12 PROCEDURE — 74176 CT CHEST ABDOMEN PELVIS WITHOUT CONTRAST(XPD): ICD-10-PCS | Mod: 26,,, | Performed by: RADIOLOGY

## 2021-03-12 PROCEDURE — 71250 CT THORAX DX C-: CPT | Mod: 26,,, | Performed by: RADIOLOGY

## 2021-03-12 PROCEDURE — 71250 CT THORAX DX C-: CPT | Mod: TC

## 2021-03-12 PROCEDURE — 74176 CT ABD & PELVIS W/O CONTRAST: CPT | Mod: TC

## 2021-03-12 PROCEDURE — 96372 THER/PROPH/DIAG INJ SC/IM: CPT | Mod: PBBFAC

## 2021-03-12 PROCEDURE — 71250 CT CHEST ABDOMEN PELVIS WITHOUT CONTRAST(XPD): ICD-10-PCS | Mod: 26,,, | Performed by: RADIOLOGY

## 2021-03-12 PROCEDURE — 99999 PR PBB SHADOW E&M-EST. PATIENT-LVL I: CPT | Mod: PBBFAC,,,

## 2021-03-12 PROCEDURE — 99999 PR PBB SHADOW E&M-EST. PATIENT-LVL I: ICD-10-PCS | Mod: PBBFAC,,,

## 2021-03-12 PROCEDURE — 74176 CT ABD & PELVIS W/O CONTRAST: CPT | Mod: 26,,, | Performed by: RADIOLOGY

## 2021-03-12 RX ADMIN — FILGRASTIM 480 MCG: 480 INJECTION, SOLUTION INTRAVENOUS; SUBCUTANEOUS at 11:03

## 2021-03-13 ENCOUNTER — PATIENT MESSAGE (OUTPATIENT)
Dept: TRANSPLANT | Facility: CLINIC | Age: 51
End: 2021-03-13

## 2021-03-13 LAB
BASOPHILS # BLD AUTO: 0.1 X10E3/UL (ref 0–0.2)
BASOPHILS NFR BLD AUTO: 4 %
BUN SERPL-MCNC: 21 MG/DL (ref 6–24)
BUN/CREAT SERPL: 14 (ref 9–20)
CALCIUM SERPL-MCNC: 9.4 MG/DL (ref 8.7–10.2)
CHLORIDE SERPL-SCNC: 105 MMOL/L (ref 96–106)
CMV DNA SERPL NAA+PROBE-ACNC: NORMAL IU/ML
CMV DNA SERPL NAA+PROBE-LOG IU: NORMAL LOG10 IU/ML
CO2 SERPL-SCNC: 24 MMOL/L (ref 20–29)
CREAT SERPL-MCNC: 1.45 MG/DL (ref 0.76–1.27)
EOSINOPHIL # BLD AUTO: 0.1 X10E3/UL (ref 0–0.4)
EOSINOPHIL NFR BLD AUTO: 2 %
ERYTHROCYTE [DISTWIDTH] IN BLOOD BY AUTOMATED COUNT: 12.5 % (ref 11.6–15.4)
GLUCOSE SERPL-MCNC: 95 MG/DL (ref 65–99)
HCT VFR BLD AUTO: 45.3 % (ref 37.5–51)
HGB BLD-MCNC: 15 G/DL (ref 13–17.7)
LYMPHOCYTES # BLD AUTO: 2.7 X10E3/UL (ref 0.7–3.1)
LYMPHOCYTES NFR BLD AUTO: 72 %
MCH RBC QN AUTO: 31.3 PG (ref 26.6–33)
MCHC RBC AUTO-ENTMCNC: 33.1 G/DL (ref 31.5–35.7)
MCV RBC AUTO: 95 FL (ref 79–97)
MONOCYTES # BLD AUTO: 0.6 X10E3/UL (ref 0.1–0.9)
MONOCYTES NFR BLD AUTO: 15 %
MORPHOLOGY BLD-IMP: ABNORMAL
NEUTROPHILS # BLD AUTO: 0.3 X10E3/UL (ref 1.4–7)
NEUTROPHILS NFR BLD AUTO: 7 %
PHOSPHATE SERPL-MCNC: 2.8 MG/DL (ref 2.8–4.1)
PLATELET # BLD AUTO: 274 X10E3/UL (ref 150–450)
POTASSIUM SERPL-SCNC: 5 MMOL/L (ref 3.5–5.2)
RBC # BLD AUTO: 4.79 X10E6/UL (ref 4.14–5.8)
SODIUM SERPL-SCNC: 140 MMOL/L (ref 134–144)
SPECIMEN STATUS REPORT: NORMAL
TACROLIMUS BLD LC/MS/MS-MCNC: 7.9 NG/ML (ref 2–20)
WBC # BLD AUTO: 3.7 X10E3/UL (ref 3.4–10.8)

## 2021-03-15 ENCOUNTER — PATIENT MESSAGE (OUTPATIENT)
Dept: TRANSPLANT | Facility: CLINIC | Age: 51
End: 2021-03-15

## 2021-03-15 ENCOUNTER — TELEPHONE (OUTPATIENT)
Dept: TRANSPLANT | Facility: CLINIC | Age: 51
End: 2021-03-15

## 2021-03-15 ENCOUNTER — HOSPITAL ENCOUNTER (OUTPATIENT)
Dept: RADIOLOGY | Facility: HOSPITAL | Age: 51
Discharge: HOME | End: 2021-03-15
Attending: INTERNAL MEDICINE
Payer: MEDICARE

## 2021-03-15 DIAGNOSIS — Z94.0 KIDNEY REPLACED BY TRANSPLANT: ICD-10-CM

## 2021-03-15 PROCEDURE — 76776 US EXAM K TRANSPL W/DOPPLER: CPT | Mod: 26,,, | Performed by: RADIOLOGY

## 2021-03-15 PROCEDURE — 76776 US EXAM K TRANSPL W/DOPPLER: CPT | Mod: TC

## 2021-03-15 PROCEDURE — 76776 US TRANSPLANT KIDNEY WITH DOPPLER: ICD-10-PCS | Mod: 26,,, | Performed by: RADIOLOGY

## 2021-03-16 ENCOUNTER — TELEPHONE (OUTPATIENT)
Dept: TRANSPLANT | Facility: CLINIC | Age: 51
End: 2021-03-16

## 2021-03-17 ENCOUNTER — TELEPHONE (OUTPATIENT)
Dept: TRANSPLANT | Facility: CLINIC | Age: 51
End: 2021-03-17

## 2021-03-23 LAB
EXT BANDS%: 0
EXT BUN: 21
EXT CALCIUM: 9.3
EXT CHLORIDE: 102
EXT CMV DNA QUANT. BY PCR: ABNORMAL
EXT CO2: 22
EXT CREATININE: 1.37 MG/DL
EXT EOSINOPHIL%: 0
EXT GFR MDRD NON AF AMER: 59
EXT GLUCOSE: 76
EXT HEMATOCRIT: 45.4
EXT HEMOGLOBIN: 14.3
EXT LYMPH%: 37
EXT MONOCYTES%: 6
EXT PHOSPHORUS: 3.7
EXT PLATELETS: 158
EXT POTASSIUM: 4.3
EXT SEGS%: 53
EXT SODIUM: 139 MMOL/L
EXT TACROLIMUS LVL: 5.6
EXT WBC: 9.5

## 2021-03-24 ENCOUNTER — DOCUMENTATION ONLY (OUTPATIENT)
Dept: TRANSPLANT | Facility: CLINIC | Age: 51
End: 2021-03-24

## 2021-03-26 ENCOUNTER — TELEPHONE (OUTPATIENT)
Dept: TRANSPLANT | Facility: CLINIC | Age: 51
End: 2021-03-26

## 2021-03-26 LAB
BASOPHILS # BLD AUTO: 0.1 X10E3/UL (ref 0–0.2)
BASOPHILS NFR BLD AUTO: 1 %
BUN SERPL-MCNC: 21 MG/DL (ref 6–24)
BUN/CREAT SERPL: 15 (ref 9–20)
CALCIUM SERPL-MCNC: 9.3 MG/DL (ref 8.7–10.2)
CHLORIDE SERPL-SCNC: 102 MMOL/L (ref 96–106)
CMV DNA SERPL NAA+PROBE-ACNC: NEGATIVE IU/ML
CMV DNA SERPL NAA+PROBE-LOG IU: NORMAL LOG10 IU/ML
CO2 SERPL-SCNC: 22 MMOL/L (ref 20–29)
CREAT SERPL-MCNC: 1.37 MG/DL (ref 0.76–1.27)
EOSINOPHIL # BLD AUTO: 0 X10E3/UL (ref 0–0.4)
EOSINOPHIL NFR BLD AUTO: 0 %
ERYTHROCYTE [DISTWIDTH] IN BLOOD BY AUTOMATED COUNT: 13 % (ref 11.6–15.4)
GLUCOSE SERPL-MCNC: 76 MG/DL (ref 65–99)
HCT VFR BLD AUTO: 45.4 % (ref 37.5–51)
HGB BLD-MCNC: 14.3 G/DL (ref 13–17.7)
IMM GRANULOCYTES # BLD AUTO: 0.3 X10E3/UL (ref 0–0.1)
IMM GRANULOCYTES NFR BLD AUTO: 3 %
LYMPHOCYTES # BLD AUTO: 3.5 X10E3/UL (ref 0.7–3.1)
LYMPHOCYTES NFR BLD AUTO: 37 %
MCH RBC QN AUTO: 30.4 PG (ref 26.6–33)
MCHC RBC AUTO-ENTMCNC: 31.5 G/DL (ref 31.5–35.7)
MCV RBC AUTO: 96 FL (ref 79–97)
MONOCYTES # BLD AUTO: 0.6 X10E3/UL (ref 0.1–0.9)
MONOCYTES NFR BLD AUTO: 6 %
NEUTROPHILS # BLD AUTO: 5 X10E3/UL (ref 1.4–7)
NEUTROPHILS NFR BLD AUTO: 53 %
PHOSPHATE SERPL-MCNC: 3.7 MG/DL (ref 2.8–4.1)
PLATELET # BLD AUTO: 158 X10E3/UL (ref 150–450)
POTASSIUM SERPL-SCNC: 4.3 MMOL/L (ref 3.5–5.2)
RBC # BLD AUTO: 4.71 X10E6/UL (ref 4.14–5.8)
SODIUM SERPL-SCNC: 139 MMOL/L (ref 134–144)
SPECIMEN STATUS REPORT: NORMAL
TACROLIMUS BLD LC/MS/MS-MCNC: 5.6 NG/ML (ref 2–20)
WBC # BLD AUTO: 9.5 X10E3/UL (ref 3.4–10.8)

## 2021-04-01 ENCOUNTER — PATIENT MESSAGE (OUTPATIENT)
Dept: TRANSPLANT | Facility: CLINIC | Age: 51
End: 2021-04-01

## 2021-04-06 LAB
EXT ALBUMIN: 4.2
EXT BUN: 20
EXT CALCIUM: 9.5
EXT CHLORIDE: 104
EXT CMV DNA QUANT. BY PCR: <200 IU/ML
EXT CO2: 24
EXT CREATININE: 1.52 MG/DL
EXT EOSINOPHIL%: 2
EXT GFR MDRD NON AF AMER: 52
EXT GLUCOSE: 92
EXT HEMATOCRIT: 44.2
EXT HEMOGLOBIN: 14.8
EXT LYMPH%: 36
EXT MAGNESIUM: 1.7
EXT MONOCYTES%: 12
EXT PHOSPHORUS: 3.9
EXT PLATELETS: 222
EXT POTASSIUM: 4.3
EXT SEGS%: 48
EXT SODIUM: 139 MMOL/L
EXT TACROLIMUS LVL: 6.3
EXT WBC: 8.5

## 2021-04-09 ENCOUNTER — DOCUMENTATION ONLY (OUTPATIENT)
Dept: TRANSPLANT | Facility: CLINIC | Age: 51
End: 2021-04-09

## 2021-04-10 LAB
ALBUMIN SERPL-MCNC: 4.2 G/DL (ref 3.8–4.9)
BASOPHILS # BLD AUTO: 0.1 X10E3/UL (ref 0–0.2)
BASOPHILS NFR BLD AUTO: 1 %
BUN SERPL-MCNC: 20 MG/DL (ref 6–24)
BUN/CREAT SERPL: 13 (ref 9–20)
CALCIUM SERPL-MCNC: 9.5 MG/DL (ref 8.7–10.2)
CHLORIDE SERPL-SCNC: 104 MMOL/L (ref 96–106)
CMV DNA SERPL NAA+PROBE-ACNC: NORMAL IU/ML
CMV DNA SERPL NAA+PROBE-LOG IU: NORMAL LOG10 IU/ML
CO2 SERPL-SCNC: 24 MMOL/L (ref 20–29)
CREAT SERPL-MCNC: 1.52 MG/DL (ref 0.76–1.27)
EOSINOPHIL # BLD AUTO: 0.2 X10E3/UL (ref 0–0.4)
EOSINOPHIL NFR BLD AUTO: 2 %
ERYTHROCYTE [DISTWIDTH] IN BLOOD BY AUTOMATED COUNT: 13 % (ref 11.6–15.4)
GLUCOSE SERPL-MCNC: 92 MG/DL (ref 65–99)
HCT VFR BLD AUTO: 44.2 % (ref 37.5–51)
HGB BLD-MCNC: 14.8 G/DL (ref 13–17.7)
IMM GRANULOCYTES # BLD AUTO: 0 X10E3/UL (ref 0–0.1)
IMM GRANULOCYTES NFR BLD AUTO: 1 %
LYMPHOCYTES # BLD AUTO: 3.1 X10E3/UL (ref 0.7–3.1)
LYMPHOCYTES NFR BLD AUTO: 36 %
MAGNESIUM SERPL-MCNC: 1.7 MG/DL (ref 1.6–2.3)
MCH RBC QN AUTO: 31.6 PG (ref 26.6–33)
MCHC RBC AUTO-ENTMCNC: 33.5 G/DL (ref 31.5–35.7)
MCV RBC AUTO: 94 FL (ref 79–97)
MONOCYTES # BLD AUTO: 1 X10E3/UL (ref 0.1–0.9)
MONOCYTES NFR BLD AUTO: 12 %
NEUTROPHILS # BLD AUTO: 4.1 X10E3/UL (ref 1.4–7)
NEUTROPHILS NFR BLD AUTO: 48 %
PHOSPHATE SERPL-MCNC: 3.9 MG/DL (ref 2.8–4.1)
PLATELET # BLD AUTO: 222 X10E3/UL (ref 150–450)
POTASSIUM SERPL-SCNC: 4.3 MMOL/L (ref 3.5–5.2)
RBC # BLD AUTO: 4.69 X10E6/UL (ref 4.14–5.8)
SODIUM SERPL-SCNC: 139 MMOL/L (ref 134–144)
TACROLIMUS BLD LC/MS/MS-MCNC: 6.3 NG/ML (ref 2–20)
WBC # BLD AUTO: 8.5 X10E3/UL (ref 3.4–10.8)

## 2021-04-11 ENCOUNTER — PATIENT MESSAGE (OUTPATIENT)
Dept: TRANSPLANT | Facility: CLINIC | Age: 51
End: 2021-04-11

## 2021-04-12 ENCOUNTER — TELEPHONE (OUTPATIENT)
Dept: TRANSPLANT | Facility: CLINIC | Age: 51
End: 2021-04-12

## 2021-04-20 LAB
EXT BUN: 20
EXT CALCIUM: 9
EXT CHLORIDE: 103
EXT CMV DNA QUANT. BY PCR: ABNORMAL
EXT CO2: 24
EXT CREATININE: 1.43 MG/DL
EXT EOSINOPHIL%: 2
EXT GFR MDRD NON AF AMER: 56
EXT GLUCOSE: 83
EXT HEMATOCRIT: 46.2
EXT HEMOGLOBIN: 14.9
EXT LYMPH%: 45
EXT MONOCYTES%: 5
EXT PHOSPHORUS: 3.5
EXT PLATELETS: 159
EXT POTASSIUM: 4
EXT SEGS%: 47
EXT SODIUM: 140 MMOL/L
EXT TACROLIMUS LVL: 3.9
EXT WBC: 8.5

## 2021-04-23 ENCOUNTER — DOCUMENTATION ONLY (OUTPATIENT)
Dept: TRANSPLANT | Facility: CLINIC | Age: 51
End: 2021-04-23

## 2021-04-23 ENCOUNTER — PATIENT MESSAGE (OUTPATIENT)
Dept: TRANSPLANT | Facility: CLINIC | Age: 51
End: 2021-04-23

## 2021-04-23 DIAGNOSIS — Z94.0 KIDNEY REPLACED BY TRANSPLANT: ICD-10-CM

## 2021-04-23 LAB
BASOPHILS # BLD AUTO: 0.1 X10E3/UL (ref 0–0.2)
BASOPHILS NFR BLD AUTO: 1 %
BUN SERPL-MCNC: 20 MG/DL (ref 6–24)
BUN/CREAT SERPL: 14 (ref 9–20)
CALCIUM SERPL-MCNC: 9 MG/DL (ref 8.7–10.2)
CHLORIDE SERPL-SCNC: 103 MMOL/L (ref 96–106)
CMV DNA SERPL NAA+PROBE-ACNC: NEGATIVE IU/ML
CMV DNA SERPL NAA+PROBE-LOG IU: NORMAL LOG10 IU/ML
CO2 SERPL-SCNC: 24 MMOL/L (ref 20–29)
CREAT SERPL-MCNC: 1.43 MG/DL (ref 0.76–1.27)
EOSINOPHIL # BLD AUTO: 0.2 X10E3/UL (ref 0–0.4)
EOSINOPHIL NFR BLD AUTO: 2 %
ERYTHROCYTE [DISTWIDTH] IN BLOOD BY AUTOMATED COUNT: 12.9 % (ref 11.6–15.4)
GLUCOSE SERPL-MCNC: 83 MG/DL (ref 65–99)
HCT VFR BLD AUTO: 46.2 % (ref 37.5–51)
HGB BLD-MCNC: 14.9 G/DL (ref 13–17.7)
IMM GRANULOCYTES # BLD AUTO: 0 X10E3/UL (ref 0–0.1)
IMM GRANULOCYTES NFR BLD AUTO: 0 %
LYMPHOCYTES # BLD AUTO: 3.8 X10E3/UL (ref 0.7–3.1)
LYMPHOCYTES NFR BLD AUTO: 45 %
MCH RBC QN AUTO: 31.3 PG (ref 26.6–33)
MCHC RBC AUTO-ENTMCNC: 32.3 G/DL (ref 31.5–35.7)
MCV RBC AUTO: 97 FL (ref 79–97)
MONOCYTES # BLD AUTO: 0.5 X10E3/UL (ref 0.1–0.9)
MONOCYTES NFR BLD AUTO: 5 %
NEUTROPHILS # BLD AUTO: 4 X10E3/UL (ref 1.4–7)
NEUTROPHILS NFR BLD AUTO: 47 %
PHOSPHATE SERPL-MCNC: 3.5 MG/DL (ref 2.8–4.1)
PLATELET # BLD AUTO: 159 X10E3/UL (ref 150–450)
POTASSIUM SERPL-SCNC: 4 MMOL/L (ref 3.5–5.2)
RBC # BLD AUTO: 4.76 X10E6/UL (ref 4.14–5.8)
SODIUM SERPL-SCNC: 140 MMOL/L (ref 134–144)
SPECIMEN STATUS REPORT: NORMAL
TACROLIMUS BLD LC/MS/MS-MCNC: 3.9 NG/ML (ref 2–20)
WBC # BLD AUTO: 8.5 X10E3/UL (ref 3.4–10.8)

## 2021-04-23 RX ORDER — TACROLIMUS 1 MG/1
3 CAPSULE ORAL EVERY 12 HOURS
Qty: 180 CAPSULE | Refills: 11 | Status: SHIPPED | OUTPATIENT
Start: 2021-04-23 | End: 2021-06-02 | Stop reason: SDUPTHER

## 2021-05-03 LAB
EXT ALBUMIN: 4.3
EXT ALKALINE PHOSPHATASE: 76
EXT ALT: 14
EXT AST: 23
EXT BACTERIA UA: 0
EXT BILIRUBIN DIRECT: 0.15 MG/DL
EXT BILIRUBIN TOTAL: 0.7
EXT BK VIRUS DNA QN PCR: ABNORMAL
EXT BUN: 22
EXT CALCIUM: 9.5
EXT CHLORIDE: 101
EXT CMV DNA QUANT. BY PCR: ABNORMAL
EXT CO2: 24
EXT CREATININE: 1.44 MG/DL
EXT EOSINOPHIL%: 4
EXT GFR MDRD NON AF AMER: 55
EXT GLUCOSE UA: ABNORMAL
EXT GLUCOSE: 89
EXT HEMATOCRIT: 48.9
EXT HEMOGLOBIN: 16.2
EXT LYMPH%: 44
EXT MAGNESIUM: 1.6
EXT MONOCYTES%: 9
EXT NITRITES UA: ABNORMAL
EXT PHOSPHORUS: 3.2
EXT PLATELETS: 184
EXT POTASSIUM: 4.3
EXT PROT/CREAT RATIO UR: 0.07
EXT PROTEIN TOTAL: 6.6
EXT PROTEIN UA: ABNORMAL
EXT RBC UA: 0
EXT SEGS%: 42
EXT SODIUM: 139 MMOL/L
EXT TACROLIMUS LVL: 9.4
EXT WBC UA: 0
EXT WBC: 10.1

## 2021-05-04 ENCOUNTER — DOCUMENTATION ONLY (OUTPATIENT)
Dept: TRANSPLANT | Facility: CLINIC | Age: 51
End: 2021-05-04

## 2021-05-06 ENCOUNTER — TELEPHONE (OUTPATIENT)
Dept: TRANSPLANT | Facility: CLINIC | Age: 51
End: 2021-05-06

## 2021-05-06 LAB
ALBUMIN SERPL-MCNC: 4.3 G/DL (ref 3.8–4.9)
ALP SERPL-CCNC: 76 IU/L (ref 39–117)
ALT SERPL-CCNC: 14 IU/L (ref 0–44)
APPEARANCE UR: CLEAR
AST SERPL-CCNC: 23 IU/L (ref 0–40)
BACTERIA #/AREA URNS HPF: NORMAL /[HPF]
BASOPHILS # BLD AUTO: 0.1 X10E3/UL (ref 0–0.2)
BASOPHILS NFR BLD AUTO: 1 %
BILIRUB DIRECT SERPL-MCNC: 0.15 MG/DL (ref 0–0.4)
BILIRUB SERPL-MCNC: 0.7 MG/DL (ref 0–1.2)
BILIRUB UR QL STRIP: NEGATIVE
BKV DNA # SERPL NAA+PROBE: NEGATIVE COPIES/ML
BKV DNA SERPL NAA+PROBE-LOG#: NORMAL LOG10COPY/ML
BUN SERPL-MCNC: 22 MG/DL (ref 6–24)
BUN/CREAT SERPL: 15 (ref 9–20)
CALCIUM SERPL-MCNC: 9.5 MG/DL (ref 8.7–10.2)
CASTS URNS QL MICRO: NORMAL /LPF
CHLORIDE SERPL-SCNC: 101 MMOL/L (ref 96–106)
CMV DNA SERPL NAA+PROBE-ACNC: NEGATIVE IU/ML
CMV DNA SERPL NAA+PROBE-LOG IU: NORMAL LOG10 IU/ML
CO2 SERPL-SCNC: 24 MMOL/L (ref 20–29)
COLOR UR: YELLOW
CREAT SERPL-MCNC: 1.44 MG/DL (ref 0.76–1.27)
CREAT UR-MCNC: 64.8 MG/DL
EOSINOPHIL # BLD AUTO: 0.4 X10E3/UL (ref 0–0.4)
EOSINOPHIL NFR BLD AUTO: 4 %
EPI CELLS #/AREA URNS HPF: NORMAL /HPF (ref 0–10)
ERYTHROCYTE [DISTWIDTH] IN BLOOD BY AUTOMATED COUNT: 12.8 % (ref 11.6–15.4)
GLUCOSE SERPL-MCNC: 89 MG/DL (ref 65–99)
GLUCOSE UR QL: NEGATIVE
HCT VFR BLD AUTO: 48.9 % (ref 37.5–51)
HGB BLD-MCNC: 16.2 G/DL (ref 13–17.7)
HGB UR QL STRIP: NEGATIVE
IMM GRANULOCYTES # BLD AUTO: 0 X10E3/UL (ref 0–0.1)
IMM GRANULOCYTES NFR BLD AUTO: 0 %
KETONES UR QL STRIP: NEGATIVE
LEUKOCYTE ESTERASE UR QL STRIP: NEGATIVE
LYMPHOCYTES # BLD AUTO: 4.5 X10E3/UL (ref 0.7–3.1)
LYMPHOCYTES NFR BLD AUTO: 44 %
MAGNESIUM SERPL-MCNC: 1.6 MG/DL (ref 1.6–2.3)
MCH RBC QN AUTO: 31 PG (ref 26.6–33)
MCHC RBC AUTO-ENTMCNC: 33.1 G/DL (ref 31.5–35.7)
MCV RBC AUTO: 94 FL (ref 79–97)
MICRO URNS: NORMAL
MICRO URNS: NORMAL
MONOCYTES # BLD AUTO: 0.9 X10E3/UL (ref 0.1–0.9)
MONOCYTES NFR BLD AUTO: 9 %
NEUTROPHILS # BLD AUTO: 4.2 X10E3/UL (ref 1.4–7)
NEUTROPHILS NFR BLD AUTO: 42 %
NITRITE UR QL STRIP: NEGATIVE
PH UR STRIP: 6.5 [PH] (ref 5–7.5)
PHOSPHATE SERPL-MCNC: 3.2 MG/DL (ref 2.8–4.1)
PLATELET # BLD AUTO: 184 X10E3/UL (ref 150–450)
POTASSIUM SERPL-SCNC: 4.3 MMOL/L (ref 3.5–5.2)
PROT SERPL-MCNC: 6.6 G/DL (ref 6–8.5)
PROT UR QL STRIP: NEGATIVE
PROT UR-MCNC: 4.3 MG/DL
PROT/CREAT UR: 66 MG/G CREAT (ref 0–200)
RBC # BLD AUTO: 5.23 X10E6/UL (ref 4.14–5.8)
RBC #/AREA URNS HPF: NORMAL /HPF (ref 0–2)
SODIUM SERPL-SCNC: 139 MMOL/L (ref 134–144)
SP GR UR: 1.01 (ref 1–1.03)
TACROLIMUS BLD LC/MS/MS-MCNC: 9.4 NG/ML (ref 2–20)
UROBILINOGEN UR STRIP-MCNC: 0.2 MG/DL (ref 0.2–1)
WBC # BLD AUTO: 10.1 X10E3/UL (ref 3.4–10.8)
WBC #/AREA URNS HPF: NORMAL /HPF (ref 0–5)

## 2021-05-11 LAB — EXT TACROLIMUS LVL: 8.5

## 2021-05-13 LAB — TACROLIMUS BLD LC/MS/MS-MCNC: 8.5 NG/ML (ref 2–20)

## 2021-05-14 ENCOUNTER — DOCUMENTATION ONLY (OUTPATIENT)
Dept: TRANSPLANT | Facility: CLINIC | Age: 51
End: 2021-05-14

## 2021-05-14 ENCOUNTER — TELEPHONE (OUTPATIENT)
Dept: TRANSPLANT | Facility: CLINIC | Age: 51
End: 2021-05-14

## 2021-05-14 DIAGNOSIS — Z94.0 KIDNEY REPLACED BY TRANSPLANT: Primary | ICD-10-CM

## 2021-05-31 PROBLEM — N18.31 STAGE 3A CHRONIC KIDNEY DISEASE: Status: ACTIVE | Noted: 2021-05-31

## 2021-06-01 ENCOUNTER — TELEPHONE (OUTPATIENT)
Dept: TRANSPLANT | Facility: CLINIC | Age: 51
End: 2021-06-01

## 2021-06-01 ENCOUNTER — PATIENT MESSAGE (OUTPATIENT)
Dept: TRANSPLANT | Facility: CLINIC | Age: 51
End: 2021-06-01

## 2021-06-01 ENCOUNTER — LAB VISIT (OUTPATIENT)
Dept: LAB | Facility: HOSPITAL | Age: 51
End: 2021-06-01
Attending: INTERNAL MEDICINE
Payer: MEDICARE

## 2021-06-01 DIAGNOSIS — Z94.0 KIDNEY REPLACED BY TRANSPLANT: ICD-10-CM

## 2021-06-01 LAB
BILIRUB UR QL STRIP: NEGATIVE
CLARITY UR REFRACT.AUTO: CLEAR
COLOR UR AUTO: YELLOW
CREAT UR-MCNC: 170 MG/DL (ref 23–375)
GLUCOSE UR QL STRIP: NEGATIVE
HGB UR QL STRIP: NEGATIVE
KETONES UR QL STRIP: NEGATIVE
LEUKOCYTE ESTERASE UR QL STRIP: NEGATIVE
NITRITE UR QL STRIP: NEGATIVE
PH UR STRIP: 6 [PH] (ref 5–8)
PROT UR QL STRIP: NEGATIVE
PROT UR-MCNC: 16 MG/DL (ref 0–15)
PROT/CREAT UR: 0.09 MG/G{CREAT} (ref 0–0.2)
SP GR UR STRIP: 1.02 (ref 1–1.03)
URN SPEC COLLECT METH UR: NORMAL

## 2021-06-01 PROCEDURE — 81003 URINALYSIS AUTO W/O SCOPE: CPT | Performed by: INTERNAL MEDICINE

## 2021-06-01 PROCEDURE — 84156 ASSAY OF PROTEIN URINE: CPT | Performed by: INTERNAL MEDICINE

## 2021-06-02 ENCOUNTER — OFFICE VISIT (OUTPATIENT)
Dept: TRANSPLANT | Facility: CLINIC | Age: 51
End: 2021-06-02
Payer: MEDICARE

## 2021-06-02 VITALS
BODY MASS INDEX: 27.54 KG/M2 | HEIGHT: 63 IN | RESPIRATION RATE: 18 BRPM | TEMPERATURE: 98 F | DIASTOLIC BLOOD PRESSURE: 84 MMHG | HEART RATE: 76 BPM | OXYGEN SATURATION: 97 % | SYSTOLIC BLOOD PRESSURE: 122 MMHG | WEIGHT: 155.44 LBS

## 2021-06-02 DIAGNOSIS — N18.31 STAGE 3A CHRONIC KIDNEY DISEASE: ICD-10-CM

## 2021-06-02 DIAGNOSIS — Z91.89 AT RISK FOR OPPORTUNISTIC INFECTIONS: ICD-10-CM

## 2021-06-02 DIAGNOSIS — Z79.60 LONG-TERM USE OF IMMUNOSUPPRESSANT MEDICATION: ICD-10-CM

## 2021-06-02 DIAGNOSIS — Z94.0 S/P KIDNEY TRANSPLANT: Primary | ICD-10-CM

## 2021-06-02 DIAGNOSIS — I12.9 RENAL HYPERTENSION: Chronic | ICD-10-CM

## 2021-06-02 DIAGNOSIS — Z94.0 KIDNEY REPLACED BY TRANSPLANT: ICD-10-CM

## 2021-06-02 DIAGNOSIS — N25.81 SECONDARY HYPERPARATHYROIDISM OF RENAL ORIGIN: Chronic | ICD-10-CM

## 2021-06-02 PROCEDURE — 3079F DIAST BP 80-89 MM HG: CPT | Mod: CPTII,S$GLB,, | Performed by: INTERNAL MEDICINE

## 2021-06-02 PROCEDURE — 3074F PR MOST RECENT SYSTOLIC BLOOD PRESSURE < 130 MM HG: ICD-10-PCS | Mod: CPTII,S$GLB,, | Performed by: INTERNAL MEDICINE

## 2021-06-02 PROCEDURE — 3074F SYST BP LT 130 MM HG: CPT | Mod: CPTII,S$GLB,, | Performed by: INTERNAL MEDICINE

## 2021-06-02 PROCEDURE — 3008F BODY MASS INDEX DOCD: CPT | Mod: CPTII,S$GLB,, | Performed by: INTERNAL MEDICINE

## 2021-06-02 PROCEDURE — 3079F PR MOST RECENT DIASTOLIC BLOOD PRESSURE 80-89 MM HG: ICD-10-PCS | Mod: CPTII,S$GLB,, | Performed by: INTERNAL MEDICINE

## 2021-06-02 PROCEDURE — 99999 PR PBB SHADOW E&M-EST. PATIENT-LVL IV: CPT | Mod: PBBFAC,,, | Performed by: INTERNAL MEDICINE

## 2021-06-02 PROCEDURE — 3008F PR BODY MASS INDEX (BMI) DOCUMENTED: ICD-10-PCS | Mod: CPTII,S$GLB,, | Performed by: INTERNAL MEDICINE

## 2021-06-02 PROCEDURE — 99215 OFFICE O/P EST HI 40 MIN: CPT | Mod: S$GLB,,, | Performed by: INTERNAL MEDICINE

## 2021-06-02 PROCEDURE — 99215 PR OFFICE/OUTPT VISIT, EST, LEVL V, 40-54 MIN: ICD-10-PCS | Mod: S$GLB,,, | Performed by: INTERNAL MEDICINE

## 2021-06-02 PROCEDURE — 99999 PR PBB SHADOW E&M-EST. PATIENT-LVL IV: ICD-10-PCS | Mod: PBBFAC,,, | Performed by: INTERNAL MEDICINE

## 2021-06-02 PROCEDURE — 1126F AMNT PAIN NOTED NONE PRSNT: CPT | Mod: S$GLB,,, | Performed by: INTERNAL MEDICINE

## 2021-06-02 PROCEDURE — 1126F PR PAIN SEVERITY QUANTIFIED, NO PAIN PRESENT: ICD-10-PCS | Mod: S$GLB,,, | Performed by: INTERNAL MEDICINE

## 2021-06-02 RX ORDER — TACROLIMUS 1 MG/1
CAPSULE ORAL
Qty: 150 CAPSULE | Refills: 11 | Status: SHIPPED | OUTPATIENT
Start: 2021-06-02 | End: 2021-06-17 | Stop reason: SDUPTHER

## 2021-06-03 ENCOUNTER — PATIENT MESSAGE (OUTPATIENT)
Dept: TRANSPLANT | Facility: CLINIC | Age: 51
End: 2021-06-03

## 2021-06-07 ENCOUNTER — TELEPHONE (OUTPATIENT)
Dept: TRANSPLANT | Facility: CLINIC | Age: 51
End: 2021-06-07

## 2021-06-14 LAB
ALLOSURE: 0.2
EXT BUN: 19
EXT CALCIUM: 9.4
EXT CHLORIDE: 106
EXT CMV DNA QUANT. BY PCR: ABNORMAL
EXT CO2: 23
EXT CREATININE: 1.45 MG/DL
EXT EOSINOPHIL%: 2
EXT GFR MDRD NON AF AMER: 55
EXT GLUCOSE: 96
EXT HEMATOCRIT: 48.8
EXT HEMOGLOBIN: 16.3
EXT LYMPH%: 37
EXT MONOCYTES%: 8
EXT PHOSPHORUS: 3.8
EXT PLATELETS: 180
EXT POTASSIUM: 4.8
EXT SEGS%: 53
EXT SODIUM: 142 MMOL/L
EXT TACROLIMUS LVL: 9.7
EXT WBC: 10.7

## 2021-06-15 ENCOUNTER — DOCUMENTATION ONLY (OUTPATIENT)
Dept: TRANSPLANT | Facility: CLINIC | Age: 51
End: 2021-06-15

## 2021-06-16 LAB
BASOPHILS # BLD AUTO: 0 X10E3/UL (ref 0–0.2)
BASOPHILS NFR BLD AUTO: 0 %
BUN SERPL-MCNC: 19 MG/DL (ref 6–24)
BUN/CREAT SERPL: 13 (ref 9–20)
CALCIUM SERPL-MCNC: 9.4 MG/DL (ref 8.7–10.2)
CHLORIDE SERPL-SCNC: 106 MMOL/L (ref 96–106)
CMV DNA SERPL NAA+PROBE-ACNC: NEGATIVE IU/ML
CMV DNA SERPL NAA+PROBE-LOG IU: NORMAL LOG10 IU/ML
CO2 SERPL-SCNC: 23 MMOL/L (ref 20–29)
CREAT SERPL-MCNC: 1.45 MG/DL (ref 0.76–1.27)
EOSINOPHIL # BLD AUTO: 0.2 X10E3/UL (ref 0–0.4)
EOSINOPHIL NFR BLD AUTO: 2 %
ERYTHROCYTE [DISTWIDTH] IN BLOOD BY AUTOMATED COUNT: 13 % (ref 11.6–15.4)
GLUCOSE SERPL-MCNC: 96 MG/DL (ref 65–99)
HCT VFR BLD AUTO: 48.8 % (ref 37.5–51)
HGB BLD-MCNC: 16.3 G/DL (ref 13–17.7)
IMM GRANULOCYTES # BLD AUTO: 0 X10E3/UL (ref 0–0.1)
IMM GRANULOCYTES NFR BLD AUTO: 0 %
LYMPHOCYTES # BLD AUTO: 4 X10E3/UL (ref 0.7–3.1)
LYMPHOCYTES NFR BLD AUTO: 37 %
MCH RBC QN AUTO: 30.9 PG (ref 26.6–33)
MCHC RBC AUTO-ENTMCNC: 33.4 G/DL (ref 31.5–35.7)
MCV RBC AUTO: 93 FL (ref 79–97)
MONOCYTES # BLD AUTO: 0.9 X10E3/UL (ref 0.1–0.9)
MONOCYTES NFR BLD AUTO: 8 %
NEUTROPHILS # BLD AUTO: 5.6 X10E3/UL (ref 1.4–7)
NEUTROPHILS NFR BLD AUTO: 53 %
PHOSPHATE SERPL-MCNC: 3.8 MG/DL (ref 2.8–4.1)
PLATELET # BLD AUTO: 180 X10E3/UL (ref 150–450)
POTASSIUM SERPL-SCNC: 4.8 MMOL/L (ref 3.5–5.2)
RBC # BLD AUTO: 5.27 X10E6/UL (ref 4.14–5.8)
SODIUM SERPL-SCNC: 142 MMOL/L (ref 134–144)
SPECIMEN STATUS REPORT: NORMAL
TACROLIMUS BLD LC/MS/MS-MCNC: 9.7 NG/ML (ref 2–20)
WBC # BLD AUTO: 10.7 X10E3/UL (ref 3.4–10.8)

## 2021-06-17 DIAGNOSIS — Z94.0 KIDNEY TRANSPLANT STATUS: ICD-10-CM

## 2021-06-17 DIAGNOSIS — Z94.0 KIDNEY REPLACED BY TRANSPLANT: ICD-10-CM

## 2021-06-17 RX ORDER — TACROLIMUS 1 MG/1
2 CAPSULE ORAL EVERY 12 HOURS
Qty: 120 CAPSULE | Refills: 11 | Status: SHIPPED | OUTPATIENT
Start: 2021-06-17 | End: 2021-07-09 | Stop reason: SDUPTHER

## 2021-06-17 RX ORDER — MYCOPHENOLATE MOFETIL 250 MG/1
500 CAPSULE ORAL 2 TIMES DAILY
Qty: 120 CAPSULE | Refills: 11 | Status: SHIPPED | OUTPATIENT
Start: 2021-06-17 | End: 2021-07-09 | Stop reason: SDUPTHER

## 2021-07-07 ENCOUNTER — DOCUMENTATION ONLY (OUTPATIENT)
Dept: TRANSPLANT | Facility: CLINIC | Age: 51
End: 2021-07-07

## 2021-07-07 LAB
EXT BUN: 25
EXT CALCIUM: 9.6
EXT CHLORIDE: 102
EXT CMV DNA QUANT. BY PCR: ABNORMAL
EXT CO2: 24
EXT CREATININE: 1.46 MG/DL
EXT EOSINOPHIL%: 3
EXT GFR MDRD NON AF AMER: 55
EXT GLUCOSE: 83
EXT HEMATOCRIT: 50.3
EXT HEMOGLOBIN: 16.4
EXT LYMPH%: 4.5
EXT MONOCYTES%: 0.8
EXT PHOSPHORUS: 2.8
EXT PLATELETS: 193
EXT POTASSIUM: 4.3
EXT SEGS%: 4.4
EXT SODIUM: 138 MMOL/L
EXT TACROLIMUS LVL: 4.2
EXT WBC: 10.1

## 2021-07-08 ENCOUNTER — DOCUMENTATION ONLY (OUTPATIENT)
Dept: TRANSPLANT | Facility: CLINIC | Age: 51
End: 2021-07-08

## 2021-07-08 LAB
BASOPHILS # BLD AUTO: 0 X10E3/UL (ref 0–0.2)
BASOPHILS NFR BLD AUTO: 0 %
BUN SERPL-MCNC: 25 MG/DL (ref 6–24)
BUN/CREAT SERPL: 17 (ref 9–20)
CALCIUM SERPL-MCNC: 9.6 MG/DL (ref 8.7–10.2)
CHLORIDE SERPL-SCNC: 102 MMOL/L (ref 96–106)
CMV DNA SERPL NAA+PROBE-ACNC: NEGATIVE IU/ML
CMV DNA SERPL NAA+PROBE-LOG IU: NORMAL LOG10 IU/ML
CO2 SERPL-SCNC: 24 MMOL/L (ref 20–29)
CREAT SERPL-MCNC: 1.46 MG/DL (ref 0.76–1.27)
EOSINOPHIL # BLD AUTO: 0.3 X10E3/UL (ref 0–0.4)
EOSINOPHIL NFR BLD AUTO: 3 %
ERYTHROCYTE [DISTWIDTH] IN BLOOD BY AUTOMATED COUNT: 13 % (ref 11.6–15.4)
GLUCOSE SERPL-MCNC: 83 MG/DL (ref 65–99)
HCT VFR BLD AUTO: 50.3 % (ref 37.5–51)
HGB BLD-MCNC: 16.4 G/DL (ref 13–17.7)
IMM GRANULOCYTES # BLD AUTO: 0.1 X10E3/UL (ref 0–0.1)
IMM GRANULOCYTES NFR BLD AUTO: 1 %
LYMPHOCYTES # BLD AUTO: 4.5 X10E3/UL (ref 0.7–3.1)
LYMPHOCYTES NFR BLD AUTO: 44 %
MCH RBC QN AUTO: 30.8 PG (ref 26.6–33)
MCHC RBC AUTO-ENTMCNC: 32.6 G/DL (ref 31.5–35.7)
MCV RBC AUTO: 94 FL (ref 79–97)
MONOCYTES # BLD AUTO: 0.8 X10E3/UL (ref 0.1–0.9)
MONOCYTES NFR BLD AUTO: 8 %
NEUTROPHILS # BLD AUTO: 4.4 X10E3/UL (ref 1.4–7)
NEUTROPHILS NFR BLD AUTO: 44 %
PHOSPHATE SERPL-MCNC: 2.8 MG/DL (ref 2.8–4.1)
PLATELET # BLD AUTO: 193 X10E3/UL (ref 150–450)
POTASSIUM SERPL-SCNC: 4.3 MMOL/L (ref 3.5–5.2)
RBC # BLD AUTO: 5.33 X10E6/UL (ref 4.14–5.8)
SODIUM SERPL-SCNC: 138 MMOL/L (ref 134–144)
SPECIMEN STATUS REPORT: NORMAL
TACROLIMUS BLD LC/MS/MS-MCNC: 4.2 NG/ML (ref 2–20)
WBC # BLD AUTO: 10.1 X10E3/UL (ref 3.4–10.8)

## 2021-07-09 DIAGNOSIS — Z94.0 KIDNEY REPLACED BY TRANSPLANT: ICD-10-CM

## 2021-07-09 DIAGNOSIS — Z94.0 KIDNEY TRANSPLANT STATUS: ICD-10-CM

## 2021-07-09 RX ORDER — TACROLIMUS 1 MG/1
CAPSULE ORAL
Qty: 150 CAPSULE | Refills: 11 | Status: SHIPPED | OUTPATIENT
Start: 2021-07-09 | End: 2021-08-23 | Stop reason: DRUGHIGH

## 2021-07-09 RX ORDER — MYCOPHENOLATE MOFETIL 250 MG/1
750 CAPSULE ORAL 2 TIMES DAILY
Qty: 180 CAPSULE | Refills: 11 | Status: SHIPPED | OUTPATIENT
Start: 2021-07-09 | End: 2022-04-14

## 2021-07-20 LAB
EXT ALBUMIN: 4.4
EXT BUN: 26
EXT CALCIUM: 9.8
EXT CHLORIDE: 101
EXT CO2: 24
EXT CREATININE: 1.47 MG/DL
EXT EOSINOPHIL%: 2
EXT GFR MDRD NON AF AMER: 54
EXT GLUCOSE: 100
EXT HEMATOCRIT: 49.1
EXT HEMOGLOBIN: 16.3
EXT LYMPH%: 39
EXT MAGNESIUM: 1.8
EXT MONOCYTES%: 8
EXT PHOSPHORUS: 2.9
EXT PLATELETS: 211
EXT POTASSIUM: 4.7
EXT SEGS%: 50
EXT SODIUM: 138 MMOL/L
EXT TACROLIMUS LVL: 6.3
EXT WBC: 10.9

## 2021-07-21 ENCOUNTER — DOCUMENTATION ONLY (OUTPATIENT)
Dept: TRANSPLANT | Facility: CLINIC | Age: 51
End: 2021-07-21

## 2021-07-23 ENCOUNTER — TELEPHONE (OUTPATIENT)
Dept: TRANSPLANT | Facility: CLINIC | Age: 51
End: 2021-07-23

## 2021-07-23 LAB
BASOPHILS # BLD AUTO: 0 X10E3/UL (ref 0–0.2)
BASOPHILS NFR BLD AUTO: 0 %
BUN SERPL-MCNC: 26 MG/DL (ref 6–24)
BUN/CREAT SERPL: 18 (ref 9–20)
CALCIUM SERPL-MCNC: 9.8 MG/DL (ref 8.7–10.2)
CHLORIDE SERPL-SCNC: 101 MMOL/L (ref 96–106)
CO2 SERPL-SCNC: 24 MMOL/L (ref 20–29)
CREAT SERPL-MCNC: 1.47 MG/DL (ref 0.76–1.27)
EOSINOPHIL # BLD AUTO: 0.2 X10E3/UL (ref 0–0.4)
EOSINOPHIL NFR BLD AUTO: 2 %
ERYTHROCYTE [DISTWIDTH] IN BLOOD BY AUTOMATED COUNT: 12.7 % (ref 11.6–15.4)
GLUCOSE SERPL-MCNC: 100 MG/DL (ref 65–99)
HCT VFR BLD AUTO: 49.1 % (ref 37.5–51)
HGB BLD-MCNC: 16.3 G/DL (ref 13–17.7)
IMM GRANULOCYTES # BLD AUTO: 0.1 X10E3/UL (ref 0–0.1)
IMM GRANULOCYTES NFR BLD AUTO: 1 %
LYMPHOCYTES # BLD AUTO: 4.2 X10E3/UL (ref 0.7–3.1)
LYMPHOCYTES NFR BLD AUTO: 39 %
MAGNESIUM SERPL-MCNC: 1.8 MG/DL (ref 1.6–2.3)
MCH RBC QN AUTO: 30.9 PG (ref 26.6–33)
MCHC RBC AUTO-ENTMCNC: 33.2 G/DL (ref 31.5–35.7)
MCV RBC AUTO: 93 FL (ref 79–97)
MONOCYTES # BLD AUTO: 0.8 X10E3/UL (ref 0.1–0.9)
MONOCYTES NFR BLD AUTO: 8 %
NEUTROPHILS # BLD AUTO: 5.7 X10E3/UL (ref 1.4–7)
NEUTROPHILS NFR BLD AUTO: 50 %
PHOSPHATE SERPL-MCNC: 2.9 MG/DL (ref 2.8–4.1)
PLATELET # BLD AUTO: 211 X10E3/UL (ref 150–450)
POTASSIUM SERPL-SCNC: 4.7 MMOL/L (ref 3.5–5.2)
RBC # BLD AUTO: 5.27 X10E6/UL (ref 4.14–5.8)
SODIUM SERPL-SCNC: 138 MMOL/L (ref 134–144)
SPECIMEN STATUS REPORT: NORMAL
TACROLIMUS BLD LC/MS/MS-MCNC: 6.3 NG/ML (ref 2–20)
WBC # BLD AUTO: 10.9 X10E3/UL (ref 3.4–10.8)

## 2021-08-04 ENCOUNTER — TELEPHONE (OUTPATIENT)
Dept: TRANSPLANT | Facility: CLINIC | Age: 51
End: 2021-08-04

## 2021-08-05 ENCOUNTER — TELEPHONE (OUTPATIENT)
Dept: TRANSPLANT | Facility: CLINIC | Age: 51
End: 2021-08-05

## 2021-08-05 LAB
ALBUMIN SERPL-MCNC: 4.6 G/DL (ref 3.8–4.9)
BASOPHILS # BLD AUTO: 0 X10E3/UL (ref 0–0.2)
BASOPHILS NFR BLD AUTO: 0 %
BUN SERPL-MCNC: 22 MG/DL (ref 6–24)
BUN/CREAT SERPL: 14 (ref 9–20)
CALCIUM SERPL-MCNC: 9.4 MG/DL (ref 8.7–10.2)
CHLORIDE SERPL-SCNC: 106 MMOL/L (ref 96–106)
CO2 SERPL-SCNC: 21 MMOL/L (ref 20–29)
CREAT SERPL-MCNC: 1.56 MG/DL (ref 0.76–1.27)
EOSINOPHIL # BLD AUTO: 0.1 X10E3/UL (ref 0–0.4)
EOSINOPHIL NFR BLD AUTO: 1 %
ERYTHROCYTE [DISTWIDTH] IN BLOOD BY AUTOMATED COUNT: 12.9 % (ref 11.6–15.4)
GLUCOSE SERPL-MCNC: 90 MG/DL (ref 65–99)
HCT VFR BLD AUTO: 48.1 % (ref 37.5–51)
HGB BLD-MCNC: 16.2 G/DL (ref 13–17.7)
IMM GRANULOCYTES # BLD AUTO: 0.1 X10E3/UL (ref 0–0.1)
IMM GRANULOCYTES NFR BLD AUTO: 1 %
LYMPHOCYTES # BLD AUTO: 3.9 X10E3/UL (ref 0.7–3.1)
LYMPHOCYTES NFR BLD AUTO: 32 %
MAGNESIUM SERPL-MCNC: 1.8 MG/DL (ref 1.6–2.3)
MCH RBC QN AUTO: 31.3 PG (ref 26.6–33)
MCHC RBC AUTO-ENTMCNC: 33.7 G/DL (ref 31.5–35.7)
MCV RBC AUTO: 93 FL (ref 79–97)
MONOCYTES # BLD AUTO: 1 X10E3/UL (ref 0.1–0.9)
MONOCYTES NFR BLD AUTO: 8 %
NEUTROPHILS # BLD AUTO: 7.2 X10E3/UL (ref 1.4–7)
NEUTROPHILS NFR BLD AUTO: 58 %
PHOSPHATE SERPL-MCNC: 3.5 MG/DL (ref 2.8–4.1)
PLATELET # BLD AUTO: 195 X10E3/UL (ref 150–450)
POTASSIUM SERPL-SCNC: 4.9 MMOL/L (ref 3.5–5.2)
RBC # BLD AUTO: 5.18 X10E6/UL (ref 4.14–5.8)
SODIUM SERPL-SCNC: 140 MMOL/L (ref 134–144)
SPECIMEN STATUS REPORT: NORMAL
TACROLIMUS BLD LC/MS/MS-MCNC: 10 NG/ML (ref 2–20)
URATE SERPL-MCNC: 6.3 MG/DL (ref 3.8–8.4)
WBC # BLD AUTO: 12.3 X10E3/UL (ref 3.4–10.8)

## 2021-08-06 ENCOUNTER — DOCUMENTATION ONLY (OUTPATIENT)
Dept: TRANSPLANT | Facility: CLINIC | Age: 51
End: 2021-08-06

## 2021-08-06 LAB
EXT ALBUMIN: 4.6
EXT BANDS%: 0
EXT BUN: 22
EXT CALCIUM: 9.4
EXT CHLORIDE: 106
EXT CO2: 21
EXT CREATININE: 1.56 MG/DL (ref 0.76–1.27)
EXT EOSINOPHIL%: 1
EXT GFR MDRD NON AF AMER: 50
EXT GLUCOSE: 90
EXT HEMATOCRIT: 48.1
EXT HEMOGLOBIN: 16.2
EXT LYMPH%: 32
EXT MAGNESIUM: 1.8
EXT MONOCYTES%: 8
EXT PHOSPHORUS: 3.5
EXT PLATELETS: 195
EXT POTASSIUM: 4.9
EXT SEGS%: 58
EXT SODIUM: 140 MMOL/L
EXT TACROLIMUS LVL: ABNORMAL
EXT URIC ACID: 6.3
EXT WBC: 12.3

## 2021-08-07 ENCOUNTER — PATIENT MESSAGE (OUTPATIENT)
Dept: TRANSPLANT | Facility: CLINIC | Age: 51
End: 2021-08-07

## 2021-08-09 ENCOUNTER — PATIENT MESSAGE (OUTPATIENT)
Dept: TRANSPLANT | Facility: CLINIC | Age: 51
End: 2021-08-09

## 2021-08-09 ENCOUNTER — TELEPHONE (OUTPATIENT)
Dept: TRANSPLANT | Facility: CLINIC | Age: 51
End: 2021-08-09

## 2021-08-09 DIAGNOSIS — Z94.0 KIDNEY REPLACED BY TRANSPLANT: Primary | ICD-10-CM

## 2021-08-12 LAB
ALBUMIN SERPL-MCNC: 4.4 G/DL (ref 3.8–4.9)
SPECIMEN STATUS REPORT: NORMAL

## 2021-08-23 ENCOUNTER — LAB VISIT (OUTPATIENT)
Dept: LAB | Facility: HOSPITAL | Age: 51
End: 2021-08-23
Attending: INTERNAL MEDICINE
Payer: MEDICARE

## 2021-08-23 ENCOUNTER — PATIENT MESSAGE (OUTPATIENT)
Dept: TRANSPLANT | Facility: CLINIC | Age: 51
End: 2021-08-23

## 2021-08-23 ENCOUNTER — TELEPHONE (OUTPATIENT)
Dept: INFECTIOUS DISEASES | Facility: CLINIC | Age: 51
End: 2021-08-23

## 2021-08-23 ENCOUNTER — OFFICE VISIT (OUTPATIENT)
Dept: TRANSPLANT | Facility: CLINIC | Age: 51
End: 2021-08-23
Payer: MEDICARE

## 2021-08-23 VITALS
HEART RATE: 62 BPM | OXYGEN SATURATION: 97 % | RESPIRATION RATE: 18 BRPM | DIASTOLIC BLOOD PRESSURE: 86 MMHG | BODY MASS INDEX: 26.08 KG/M2 | SYSTOLIC BLOOD PRESSURE: 140 MMHG | TEMPERATURE: 98 F | HEIGHT: 65 IN | WEIGHT: 156.5 LBS

## 2021-08-23 DIAGNOSIS — Z94.0 KIDNEY REPLACED BY TRANSPLANT: Primary | ICD-10-CM

## 2021-08-23 DIAGNOSIS — I12.9 RENAL HYPERTENSION: Chronic | ICD-10-CM

## 2021-08-23 DIAGNOSIS — Z29.89 PROPHYLACTIC IMMUNOTHERAPY: ICD-10-CM

## 2021-08-23 DIAGNOSIS — Z94.0 KIDNEY REPLACED BY TRANSPLANT: ICD-10-CM

## 2021-08-23 DIAGNOSIS — N18.31 STAGE 3A CHRONIC KIDNEY DISEASE: Primary | ICD-10-CM

## 2021-08-23 DIAGNOSIS — N25.81 SECONDARY HYPERPARATHYROIDISM OF RENAL ORIGIN: Chronic | ICD-10-CM

## 2021-08-23 DIAGNOSIS — Z94.0 KIDNEY TRANSPLANT STATUS: ICD-10-CM

## 2021-08-23 DIAGNOSIS — Z91.89 AT RISK FOR OPPORTUNISTIC INFECTIONS: ICD-10-CM

## 2021-08-23 LAB
ALBUMIN SERPL BCP-MCNC: 4.1 G/DL (ref 3.5–5.2)
ALBUMIN SERPL BCP-MCNC: 4.1 G/DL (ref 3.5–5.2)
ALP SERPL-CCNC: 75 U/L (ref 55–135)
ALT SERPL W/O P-5'-P-CCNC: 16 U/L (ref 10–44)
ANION GAP SERPL CALC-SCNC: 11 MMOL/L (ref 8–16)
AST SERPL-CCNC: 18 U/L (ref 10–40)
BASOPHILS # BLD AUTO: 0.04 K/UL (ref 0–0.2)
BASOPHILS NFR BLD: 0.3 % (ref 0–1.9)
BILIRUB DIRECT SERPL-MCNC: 0.2 MG/DL (ref 0.1–0.3)
BILIRUB SERPL-MCNC: 0.6 MG/DL (ref 0.1–1)
BUN SERPL-MCNC: 30 MG/DL (ref 6–20)
CALCIUM SERPL-MCNC: 9.2 MG/DL (ref 8.7–10.5)
CHLORIDE SERPL-SCNC: 105 MMOL/L (ref 95–110)
CO2 SERPL-SCNC: 23 MMOL/L (ref 23–29)
CREAT SERPL-MCNC: 1.6 MG/DL (ref 0.5–1.4)
DIFFERENTIAL METHOD: ABNORMAL
EOSINOPHIL # BLD AUTO: 0.1 K/UL (ref 0–0.5)
EOSINOPHIL NFR BLD: 0.7 % (ref 0–8)
ERYTHROCYTE [DISTWIDTH] IN BLOOD BY AUTOMATED COUNT: 13 % (ref 11.5–14.5)
EST. GFR  (AFRICAN AMERICAN): 56.4 ML/MIN/1.73 M^2
EST. GFR  (NON AFRICAN AMERICAN): 48.8 ML/MIN/1.73 M^2
GLUCOSE SERPL-MCNC: 87 MG/DL (ref 70–110)
HCT VFR BLD AUTO: 47.6 % (ref 40–54)
HGB BLD-MCNC: 15.6 G/DL (ref 14–18)
IMM GRANULOCYTES # BLD AUTO: 0.1 K/UL (ref 0–0.04)
IMM GRANULOCYTES NFR BLD AUTO: 0.8 % (ref 0–0.5)
LYMPHOCYTES # BLD AUTO: 3.4 K/UL (ref 1–4.8)
LYMPHOCYTES NFR BLD: 25.5 % (ref 18–48)
MAGNESIUM SERPL-MCNC: 1.7 MG/DL (ref 1.6–2.6)
MCH RBC QN AUTO: 30.2 PG (ref 27–31)
MCHC RBC AUTO-ENTMCNC: 32.8 G/DL (ref 32–36)
MCV RBC AUTO: 92 FL (ref 82–98)
MONOCYTES # BLD AUTO: 0.8 K/UL (ref 0.3–1)
MONOCYTES NFR BLD: 6.2 % (ref 4–15)
NEUTROPHILS # BLD AUTO: 8.8 K/UL (ref 1.8–7.7)
NEUTROPHILS NFR BLD: 66.5 % (ref 38–73)
NRBC BLD-RTO: 0 /100 WBC
PHOSPHATE SERPL-MCNC: 3.2 MG/DL (ref 2.7–4.5)
PLATELET # BLD AUTO: 198 K/UL (ref 150–450)
PMV BLD AUTO: 11.5 FL (ref 9.2–12.9)
POTASSIUM SERPL-SCNC: 4.4 MMOL/L (ref 3.5–5.1)
PROT SERPL-MCNC: 7.2 G/DL (ref 6–8.4)
RBC # BLD AUTO: 5.16 M/UL (ref 4.6–6.2)
SODIUM SERPL-SCNC: 139 MMOL/L (ref 136–145)
TACROLIMUS BLD-MCNC: 11.6 NG/ML (ref 5–15)
TACROLIMUS, NORMALIZED: 10.9 NG/ML (ref 5–15)
WBC # BLD AUTO: 13.17 K/UL (ref 3.9–12.7)

## 2021-08-23 PROCEDURE — 3008F BODY MASS INDEX DOCD: CPT | Mod: CPTII,S$GLB,, | Performed by: INTERNAL MEDICINE

## 2021-08-23 PROCEDURE — 86833 HLA CLASS II HIGH DEFIN QUAL: CPT | Mod: PO | Performed by: INTERNAL MEDICINE

## 2021-08-23 PROCEDURE — 99999 PR PBB SHADOW E&M-EST. PATIENT-LVL IV: ICD-10-PCS | Mod: PBBFAC,,, | Performed by: INTERNAL MEDICINE

## 2021-08-23 PROCEDURE — 86832 HLA CLASS I HIGH DEFIN QUAL: CPT | Mod: PO | Performed by: INTERNAL MEDICINE

## 2021-08-23 PROCEDURE — 99215 PR OFFICE/OUTPT VISIT, EST, LEVL V, 40-54 MIN: ICD-10-PCS | Mod: S$GLB,,, | Performed by: INTERNAL MEDICINE

## 2021-08-23 PROCEDURE — 84075 ASSAY ALKALINE PHOSPHATASE: CPT | Performed by: INTERNAL MEDICINE

## 2021-08-23 PROCEDURE — 36415 COLL VENOUS BLD VENIPUNCTURE: CPT | Performed by: INTERNAL MEDICINE

## 2021-08-23 PROCEDURE — 83735 ASSAY OF MAGNESIUM: CPT | Performed by: INTERNAL MEDICINE

## 2021-08-23 PROCEDURE — 80069 RENAL FUNCTION PANEL: CPT | Performed by: INTERNAL MEDICINE

## 2021-08-23 PROCEDURE — 85025 COMPLETE CBC W/AUTO DIFF WBC: CPT | Performed by: INTERNAL MEDICINE

## 2021-08-23 PROCEDURE — 1126F AMNT PAIN NOTED NONE PRSNT: CPT | Mod: CPTII,S$GLB,, | Performed by: INTERNAL MEDICINE

## 2021-08-23 PROCEDURE — 87799 DETECT AGENT NOS DNA QUANT: CPT | Performed by: INTERNAL MEDICINE

## 2021-08-23 PROCEDURE — 1159F MED LIST DOCD IN RCRD: CPT | Mod: CPTII,S$GLB,, | Performed by: INTERNAL MEDICINE

## 2021-08-23 PROCEDURE — 3008F PR BODY MASS INDEX (BMI) DOCUMENTED: ICD-10-PCS | Mod: CPTII,S$GLB,, | Performed by: INTERNAL MEDICINE

## 2021-08-23 PROCEDURE — 3077F PR MOST RECENT SYSTOLIC BLOOD PRESSURE >= 140 MM HG: ICD-10-PCS | Mod: CPTII,S$GLB,, | Performed by: INTERNAL MEDICINE

## 2021-08-23 PROCEDURE — 3079F DIAST BP 80-89 MM HG: CPT | Mod: CPTII,S$GLB,, | Performed by: INTERNAL MEDICINE

## 2021-08-23 PROCEDURE — 99215 OFFICE O/P EST HI 40 MIN: CPT | Mod: S$GLB,,, | Performed by: INTERNAL MEDICINE

## 2021-08-23 PROCEDURE — 3077F SYST BP >= 140 MM HG: CPT | Mod: CPTII,S$GLB,, | Performed by: INTERNAL MEDICINE

## 2021-08-23 PROCEDURE — 80197 ASSAY OF TACROLIMUS: CPT | Performed by: INTERNAL MEDICINE

## 2021-08-23 PROCEDURE — 3079F PR MOST RECENT DIASTOLIC BLOOD PRESSURE 80-89 MM HG: ICD-10-PCS | Mod: CPTII,S$GLB,, | Performed by: INTERNAL MEDICINE

## 2021-08-23 PROCEDURE — 86977 RBC SERUM PRETX INCUBJ/INHIB: CPT | Mod: PO | Performed by: INTERNAL MEDICINE

## 2021-08-23 PROCEDURE — 1159F PR MEDICATION LIST DOCUMENTED IN MEDICAL RECORD: ICD-10-PCS | Mod: CPTII,S$GLB,, | Performed by: INTERNAL MEDICINE

## 2021-08-23 PROCEDURE — 82247 BILIRUBIN TOTAL: CPT | Performed by: INTERNAL MEDICINE

## 2021-08-23 PROCEDURE — 1126F PR PAIN SEVERITY QUANTIFIED, NO PAIN PRESENT: ICD-10-PCS | Mod: CPTII,S$GLB,, | Performed by: INTERNAL MEDICINE

## 2021-08-23 PROCEDURE — 99999 PR PBB SHADOW E&M-EST. PATIENT-LVL IV: CPT | Mod: PBBFAC,,, | Performed by: INTERNAL MEDICINE

## 2021-08-23 RX ORDER — TACROLIMUS 0.5 MG/1
CAPSULE ORAL
Qty: 30 CAPSULE | Refills: 11 | Status: SHIPPED | OUTPATIENT
Start: 2021-08-23 | End: 2021-10-15 | Stop reason: SDUPTHER

## 2021-08-23 RX ORDER — VALGANCICLOVIR 450 MG/1
900 TABLET, FILM COATED ORAL DAILY
COMMUNITY
End: 2023-03-24

## 2021-08-23 RX ORDER — TACROLIMUS 1 MG/1
CAPSULE ORAL
Qty: 120 CAPSULE | Refills: 11 | Status: SHIPPED | OUTPATIENT
Start: 2021-08-23 | End: 2021-10-15 | Stop reason: SDUPTHER

## 2021-08-24 ENCOUNTER — TELEPHONE (OUTPATIENT)
Dept: TRANSPLANT | Facility: CLINIC | Age: 51
End: 2021-08-24

## 2021-08-25 ENCOUNTER — PATIENT MESSAGE (OUTPATIENT)
Dept: TRANSPLANT | Facility: CLINIC | Age: 51
End: 2021-08-25

## 2021-08-25 LAB — CMV DNA SERPL NAA+PROBE-ACNC: NORMAL IU/ML

## 2021-08-26 ENCOUNTER — TELEPHONE (OUTPATIENT)
Dept: TRANSPLANT | Facility: CLINIC | Age: 51
End: 2021-08-26

## 2021-08-26 LAB
BKV DNA SERPL NAA+PROBE-ACNC: <125 COPIES/ML
BKV DNA SERPL NAA+PROBE-LOG#: <2.1 LOG (10) COPIES/ML
BKV DNA SERPL QL NAA+PROBE: NOT DETECTED
CLASS I ANTIBODY COMMENTS - LUMINEX: NORMAL
CLASS II ANTIBODY COMMENTS - LUMINEX: NORMAL
DSA1 TESTING DATE: NORMAL
DSA12 TESTING DATE: NORMAL
DSA2 TESTING DATE: NORMAL
SERUM COLLECTION DT - LUMINEX CLASS I: NORMAL
SERUM COLLECTION DT - LUMINEX CLASS II: NORMAL

## 2021-09-03 ENCOUNTER — PATIENT MESSAGE (OUTPATIENT)
Dept: TRANSPLANT | Facility: CLINIC | Age: 51
End: 2021-09-03

## 2021-09-13 ENCOUNTER — PATIENT MESSAGE (OUTPATIENT)
Dept: TRANSPLANT | Facility: CLINIC | Age: 51
End: 2021-09-13

## 2021-09-14 LAB
EXT ALBUMIN: 4.5
EXT BUN: 27
EXT CALCIUM: 9.3
EXT CHLORIDE: 105
EXT CO2: 23
EXT CREATININE: 1.46 MG/DL
EXT EOSINOPHIL%: 1
EXT GFR MDRD NON AF AMER: 55
EXT GLUCOSE: 95
EXT HEMATOCRIT: 45.7
EXT HEMOGLOBIN: 15
EXT LYMPH%: 26
EXT MAGNESIUM: 1.8
EXT MONOCYTES%: 6
EXT PHOSPHORUS: 3.7
EXT PLATELETS: 202
EXT POTASSIUM: 4.3
EXT SEGS%: 66
EXT SODIUM: 142 MMOL/L
EXT TACROLIMUS LVL: 8.1
EXT VIT D 25 HYDROXY: 64.7
EXT WBC: 13.4

## 2021-09-15 ENCOUNTER — DOCUMENTATION ONLY (OUTPATIENT)
Dept: TRANSPLANT | Facility: CLINIC | Age: 51
End: 2021-09-15

## 2021-09-17 LAB
ALBUMIN SERPL-MCNC: 4.5 G/DL (ref 3.8–4.9)
BASOPHILS # BLD AUTO: 0 X10E3/UL (ref 0–0.2)
BASOPHILS NFR BLD AUTO: 0 %
BUN SERPL-MCNC: 27 MG/DL (ref 6–24)
BUN/CREAT SERPL: 18 (ref 9–20)
CALCIUM SERPL-MCNC: 9.3 MG/DL (ref 8.7–10.2)
CHLORIDE SERPL-SCNC: 105 MMOL/L (ref 96–106)
CO2 SERPL-SCNC: 23 MMOL/L (ref 20–29)
CREAT SERPL-MCNC: 1.46 MG/DL (ref 0.76–1.27)
EOSINOPHIL # BLD AUTO: 0.1 X10E3/UL (ref 0–0.4)
EOSINOPHIL NFR BLD AUTO: 1 %
ERYTHROCYTE [DISTWIDTH] IN BLOOD BY AUTOMATED COUNT: 12.8 % (ref 11.6–15.4)
GLUCOSE SERPL-MCNC: 95 MG/DL (ref 65–99)
HCT VFR BLD AUTO: 45.7 % (ref 37.5–51)
HGB BLD-MCNC: 15 G/DL (ref 13–17.7)
IMM GRANULOCYTES # BLD AUTO: 0.1 X10E3/UL (ref 0–0.1)
IMM GRANULOCYTES NFR BLD AUTO: 1 %
LYMPHOCYTES # BLD AUTO: 3.5 X10E3/UL (ref 0.7–3.1)
LYMPHOCYTES NFR BLD AUTO: 26 %
MAGNESIUM SERPL-MCNC: 1.8 MG/DL (ref 1.6–2.3)
MCH RBC QN AUTO: 30.1 PG (ref 26.6–33)
MCHC RBC AUTO-ENTMCNC: 32.8 G/DL (ref 31.5–35.7)
MCV RBC AUTO: 92 FL (ref 79–97)
MONOCYTES # BLD AUTO: 0.8 X10E3/UL (ref 0.1–0.9)
MONOCYTES NFR BLD AUTO: 6 %
NEUTROPHILS # BLD AUTO: 8.8 X10E3/UL (ref 1.4–7)
NEUTROPHILS NFR BLD AUTO: 66 %
PHOSPHATE SERPL-MCNC: 3.7 MG/DL (ref 2.8–4.1)
PLATELET # BLD AUTO: 202 X10E3/UL (ref 150–450)
POTASSIUM SERPL-SCNC: 4.3 MMOL/L (ref 3.5–5.2)
RBC # BLD AUTO: 4.98 X10E6/UL (ref 4.14–5.8)
SODIUM SERPL-SCNC: 142 MMOL/L (ref 134–144)
SPECIMEN STATUS REPORT: NORMAL
TACROLIMUS BLD LC/MS/MS-MCNC: 8.1 NG/ML (ref 2–20)
WBC # BLD AUTO: 13.4 X10E3/UL (ref 3.4–10.8)

## 2021-09-21 ENCOUNTER — TELEPHONE (OUTPATIENT)
Dept: TRANSPLANT | Facility: CLINIC | Age: 51
End: 2021-09-21

## 2021-09-22 ENCOUNTER — TELEPHONE (OUTPATIENT)
Dept: TRANSPLANT | Facility: CLINIC | Age: 51
End: 2021-09-22

## 2021-10-01 ENCOUNTER — PATIENT MESSAGE (OUTPATIENT)
Dept: TRANSPLANT | Facility: CLINIC | Age: 51
End: 2021-10-01

## 2021-10-08 LAB
25(OH)D3+25(OH)D2 SERPL-MCNC: 64.7 NG/ML (ref 30–100)
SPECIMEN STATUS REPORT: NORMAL

## 2021-10-12 LAB
EXT ALBUMIN: 4.7
EXT BUN: 23
EXT CALCIUM: 10.1
EXT CHLORIDE: 106
EXT CO2: 27
EXT CREATININE: 1.54 MG/DL
EXT EOSINOPHIL%: 1
EXT GFR MDRD NON AF AMER: 51
EXT GLUCOSE: 93
EXT HEMATOCRIT: 49.8
EXT HEMOGLOBIN: 16.2
EXT LYMPH%: 37
EXT MAGNESIUM: 1.9
EXT MONOCYTES%: 8
EXT PHOSPHORUS: 3.6
EXT PLATELETS: 213
EXT POTASSIUM: 5
EXT SEGS%: 53
EXT SODIUM: 142 MMOL/L
EXT TACROLIMUS LVL: 8.6
EXT WBC: 11.8

## 2021-10-13 ENCOUNTER — PATIENT MESSAGE (OUTPATIENT)
Dept: TRANSPLANT | Facility: CLINIC | Age: 51
End: 2021-10-13
Payer: MEDICARE

## 2021-10-14 ENCOUNTER — DOCUMENTATION ONLY (OUTPATIENT)
Dept: TRANSPLANT | Facility: CLINIC | Age: 51
End: 2021-10-14

## 2021-10-15 DIAGNOSIS — Z94.0 KIDNEY REPLACED BY TRANSPLANT: ICD-10-CM

## 2021-10-15 DIAGNOSIS — Z94.0 KIDNEY TRANSPLANT STATUS: ICD-10-CM

## 2021-10-15 LAB
ALBUMIN SERPL-MCNC: 4.7 G/DL (ref 3.8–4.9)
BASOPHILS # BLD AUTO: 0 X10E3/UL (ref 0–0.2)
BASOPHILS NFR BLD AUTO: 0 %
BUN SERPL-MCNC: 23 MG/DL (ref 6–24)
BUN/CREAT SERPL: 15 (ref 9–20)
CALCIUM SERPL-MCNC: 10.1 MG/DL (ref 8.7–10.2)
CHLORIDE SERPL-SCNC: 106 MMOL/L (ref 96–106)
CO2 SERPL-SCNC: 27 MMOL/L (ref 20–29)
CREAT SERPL-MCNC: 1.54 MG/DL (ref 0.76–1.27)
EOSINOPHIL # BLD AUTO: 0.1 X10E3/UL (ref 0–0.4)
EOSINOPHIL NFR BLD AUTO: 1 %
ERYTHROCYTE [DISTWIDTH] IN BLOOD BY AUTOMATED COUNT: 12.8 % (ref 11.6–15.4)
GLUCOSE SERPL-MCNC: 93 MG/DL (ref 65–99)
HCT VFR BLD AUTO: 49.8 % (ref 37.5–51)
HGB BLD-MCNC: 16.2 G/DL (ref 13–17.7)
IMM GRANULOCYTES # BLD AUTO: 0.1 X10E3/UL (ref 0–0.1)
IMM GRANULOCYTES NFR BLD AUTO: 1 %
LYMPHOCYTES # BLD AUTO: 4.3 X10E3/UL (ref 0.7–3.1)
LYMPHOCYTES NFR BLD AUTO: 37 %
MAGNESIUM SERPL-MCNC: 1.9 MG/DL (ref 1.6–2.3)
MCH RBC QN AUTO: 30.5 PG (ref 26.6–33)
MCHC RBC AUTO-ENTMCNC: 32.5 G/DL (ref 31.5–35.7)
MCV RBC AUTO: 94 FL (ref 79–97)
MONOCYTES # BLD AUTO: 0.9 X10E3/UL (ref 0.1–0.9)
MONOCYTES NFR BLD AUTO: 8 %
NEUTROPHILS # BLD AUTO: 6.3 X10E3/UL (ref 1.4–7)
NEUTROPHILS NFR BLD AUTO: 53 %
PHOSPHATE SERPL-MCNC: 3.6 MG/DL (ref 2.8–4.1)
PLATELET # BLD AUTO: 213 X10E3/UL (ref 150–450)
POTASSIUM SERPL-SCNC: 5 MMOL/L (ref 3.5–5.2)
RBC # BLD AUTO: 5.31 X10E6/UL (ref 4.14–5.8)
SODIUM SERPL-SCNC: 142 MMOL/L (ref 134–144)
SPECIMEN STATUS REPORT: NORMAL
TACROLIMUS BLD LC/MS/MS-MCNC: 8.6 NG/ML (ref 2–20)
WBC # BLD AUTO: 11.8 X10E3/UL (ref 3.4–10.8)

## 2021-10-15 RX ORDER — TACROLIMUS 1 MG/1
2 CAPSULE ORAL EVERY 12 HOURS
Qty: 120 CAPSULE | Refills: 11 | Status: SHIPPED | OUTPATIENT
Start: 2021-10-15 | End: 2021-11-22 | Stop reason: SDUPTHER

## 2021-11-18 ENCOUNTER — DOCUMENTATION ONLY (OUTPATIENT)
Dept: TRANSPLANT | Facility: CLINIC | Age: 51
End: 2021-11-18
Payer: MEDICARE

## 2021-11-18 LAB
EXT ALBUMIN: 4.6
EXT BUN: 18
EXT CALCIUM: 9.6
EXT CHLORIDE: 103
EXT CO2: 21
EXT CREATININE: 1.47 MG/DL
EXT EOSINOPHIL%: 2
EXT GFR MDRD NON AF AMER: 54
EXT GLUCOSE: 87
EXT HEMATOCRIT: 50
EXT HEMOGLOBIN: 16.6
EXT LYMPH%: 36
EXT MAGNESIUM: 1.8
EXT MONOCYTES%: 8
EXT PHOSPHORUS: 3.2
EXT PLATELETS: 207
EXT POTASSIUM: 4.6
EXT SEGS%: 53
EXT SODIUM: 141 MMOL/L
EXT TACROLIMUS LVL: 8.2
EXT WBC: 11.5

## 2021-11-20 ENCOUNTER — PATIENT MESSAGE (OUTPATIENT)
Dept: TRANSPLANT | Facility: CLINIC | Age: 51
End: 2021-11-20
Payer: MEDICARE

## 2021-11-22 ENCOUNTER — DOCUMENTATION ONLY (OUTPATIENT)
Dept: TRANSPLANT | Facility: CLINIC | Age: 51
End: 2021-11-22
Payer: MEDICARE

## 2021-11-22 DIAGNOSIS — Z94.0 KIDNEY TRANSPLANT STATUS: ICD-10-CM

## 2021-11-22 DIAGNOSIS — Z94.0 KIDNEY REPLACED BY TRANSPLANT: ICD-10-CM

## 2021-11-23 RX ORDER — TACROLIMUS 1 MG/1
CAPSULE ORAL
Qty: 90 CAPSULE | Refills: 11 | Status: SHIPPED | OUTPATIENT
Start: 2021-11-23 | End: 2022-09-26

## 2021-11-29 ENCOUNTER — PATIENT MESSAGE (OUTPATIENT)
Dept: TRANSPLANT | Facility: CLINIC | Age: 51
End: 2021-11-29
Payer: MEDICARE

## 2021-12-01 DIAGNOSIS — Z94.0 KIDNEY TRANSPLANT STATUS: ICD-10-CM

## 2021-12-01 RX ORDER — PREDNISONE 5 MG/1
5 TABLET ORAL DAILY
Qty: 90 TABLET | Refills: 3 | Status: SHIPPED | OUTPATIENT
Start: 2021-12-01 | End: 2022-07-05

## 2021-12-04 ENCOUNTER — PATIENT MESSAGE (OUTPATIENT)
Dept: TRANSPLANT | Facility: CLINIC | Age: 51
End: 2021-12-04
Payer: MEDICARE

## 2021-12-14 LAB
EXT ALBUMIN: 4.4
EXT BUN: 20
EXT CALCIUM: 9.6
EXT CHLORIDE: 104
EXT CO2: 23
EXT CREATININE: 1.51 MG/DL
EXT EOSINOPHIL%: 3
EXT GFR MDRD NON AF AMER: 52
EXT GLUCOSE: 95
EXT HEMATOCRIT: 48
EXT HEMOGLOBIN: 16.1
EXT LYMPH%: 35
EXT MAGNESIUM: 1.9
EXT MONOCYTES%: 7
EXT PHOSPHORUS: 3.3
EXT PLATELETS: 204
EXT POTASSIUM: 4.4
EXT SEGS%: 54
EXT SODIUM: 140 MMOL/L
EXT TACROLIMUS LVL: 5.1
EXT WBC: 11.6

## 2021-12-15 ENCOUNTER — DOCUMENTATION ONLY (OUTPATIENT)
Dept: TRANSPLANT | Facility: CLINIC | Age: 51
End: 2021-12-15
Payer: MEDICARE

## 2021-12-17 LAB
ALBUMIN SERPL-MCNC: 4.4 G/DL (ref 3.8–4.9)
BASOPHILS # BLD AUTO: 0 X10E3/UL (ref 0–0.2)
BASOPHILS NFR BLD AUTO: 0 %
BUN SERPL-MCNC: 20 MG/DL (ref 6–24)
BUN/CREAT SERPL: 13 (ref 9–20)
CALCIUM SERPL-MCNC: 9.6 MG/DL (ref 8.7–10.2)
CHLORIDE SERPL-SCNC: 104 MMOL/L (ref 96–106)
CO2 SERPL-SCNC: 23 MMOL/L (ref 20–29)
CREAT SERPL-MCNC: 1.51 MG/DL (ref 0.76–1.27)
EOSINOPHIL # BLD AUTO: 0.3 X10E3/UL (ref 0–0.4)
EOSINOPHIL NFR BLD AUTO: 3 %
ERYTHROCYTE [DISTWIDTH] IN BLOOD BY AUTOMATED COUNT: 12.5 % (ref 11.6–15.4)
GLUCOSE SERPL-MCNC: 95 MG/DL (ref 65–99)
HCT VFR BLD AUTO: 48 % (ref 37.5–51)
HGB BLD-MCNC: 16.1 G/DL (ref 13–17.7)
IMM GRANULOCYTES # BLD AUTO: 0.1 X10E3/UL (ref 0–0.1)
IMM GRANULOCYTES NFR BLD AUTO: 1 %
LYMPHOCYTES # BLD AUTO: 4 X10E3/UL (ref 0.7–3.1)
LYMPHOCYTES NFR BLD AUTO: 35 %
MAGNESIUM SERPL-MCNC: 1.9 MG/DL (ref 1.6–2.3)
MCH RBC QN AUTO: 30 PG (ref 26.6–33)
MCHC RBC AUTO-ENTMCNC: 33.5 G/DL (ref 31.5–35.7)
MCV RBC AUTO: 89 FL (ref 79–97)
MONOCYTES # BLD AUTO: 0.8 X10E3/UL (ref 0.1–0.9)
MONOCYTES NFR BLD AUTO: 7 %
NEUTROPHILS # BLD AUTO: 6.3 X10E3/UL (ref 1.4–7)
NEUTROPHILS NFR BLD AUTO: 54 %
PHOSPHATE SERPL-MCNC: 3.3 MG/DL (ref 2.8–4.1)
PLATELET # BLD AUTO: 204 X10E3/UL (ref 150–450)
POTASSIUM SERPL-SCNC: 4.4 MMOL/L (ref 3.5–5.2)
RBC # BLD AUTO: 5.37 X10E6/UL (ref 4.14–5.8)
SODIUM SERPL-SCNC: 140 MMOL/L (ref 134–144)
SPECIMEN STATUS REPORT: NORMAL
TACROLIMUS BLD LC/MS/MS-MCNC: 5.1 NG/ML (ref 2–20)
WBC # BLD AUTO: 11.6 X10E3/UL (ref 3.4–10.8)

## 2021-12-20 ENCOUNTER — TELEPHONE (OUTPATIENT)
Dept: TRANSPLANT | Facility: CLINIC | Age: 51
End: 2021-12-20
Payer: MEDICARE

## 2022-01-04 ENCOUNTER — TELEPHONE (OUTPATIENT)
Dept: TRANSPLANT | Facility: CLINIC | Age: 52
End: 2022-01-04
Payer: MEDICARE

## 2022-02-01 NOTE — PROGRESS NOTES
Kidney Post-Transplant Assessment    Referring Physician: Leslie Roche Jr.  Current Nephrologist: Angel Portillo    ORGAN: LEFT KIDNEY  Donor Type: donation after circulatory death   PHS Increased Risk: no  Cold Ischemia: 1,261 mins  Induction Medications: simulect - basiliximab    Subjective:     CC:  Reassessment of renal allograft function and management of chronic immunosuppression.    HPI:  Mr. Azevedo is a 51 y.o. year old White male who received a donation after circulatory death  kidney transplant on 8/27/20.  He has CKD stage 3 - GFR 30-59 and his baseline creatinine is between 1.4 to 1.7. He takes mycophenolate mofetil, prednisone and tacrolimus for maintenance immunosuppression. He denies any recent hospitalizations or ER visits since his previous clinic visit.    Her feels well    Has some questions about his acid base balance    Questions about Vit D supplements, magnesium and non transplant related questions     We also discussed COVID vaccine     Current Outpatient Medications on File Prior to Visit   Medication Sig Dispense Refill    amLODIPine (NORVASC) 5 MG tablet TAKE 1 TABLET BY MOUTH DAILY 90 tablet 3    docusate sodium (COLACE) 100 MG capsule Take 1 capsule (100 mg total) by mouth 3 (three) times daily as needed for Constipation.  0    ergocalciferol (ERGOCALCIFEROL) 50,000 unit Cap TAKE 1 CAPSULE BY MOUTH EVERY 7 DAYS 12 capsule 1    famotidine (PEPCID) 20 MG tablet TAKE 1 TABLET BY MOUTH EVERY EVENING (Patient not taking: Reported on 6/2/2021) 30 tablet 11    fluticasone-salmeterol 250-50 mcg/dose (ADVAIR) 250-50 mcg/dose diskus inhaler Inhale 1 puff into the lungs 2 (two) times daily.      magnesium oxide (MAG-OX) 400 mg (241.3 mg magnesium) tablet TAKE 1 TABLET BY MOUTH ONCE DAILY 30 tablet 11    montelukast (SINGULAIR) 10 mg tablet Take 10 mg by mouth once daily.      mycophenolate (CELLCEPT) 250 mg Cap Take 3 capsules (750 mg total) by mouth 2 (two) times daily. 180  "capsule 11    paroxetine (PAXIL) 20 MG tablet Take 20 mg by mouth every evening.      predniSONE (DELTASONE) 5 MG tablet Take 1 tablet (5 mg total) by mouth once daily. 90 tablet 3    sertraline (ZOLOFT) 50 MG tablet Take 50 mg by mouth once daily.      sodium bicarbonate 650 MG tablet TAKE 1 TABLET BY MOUTH 3 TIMES DAILY 180 tablet 11    tacrolimus (PROGRAF) 1 MG Cap Take 2 capsules (2 mg total) by mouth every morning AND 1 capsule (1 mg total) every evening. Z94.0/Kidney txp. 90 capsule 11    valGANciclovir (VALCYTE) 450 mg Tab Take 900 mg by mouth once daily.       No current facility-administered medications on file prior to visit.        Review of Systems     Skin: no skin rash  CNS; no headaches, blurred vision, seizure, or syncope  ENT: No JVD,  Adenopathies,  nasal congestion. No oral lesions  Cardiac: No chest pain, dyspnea, claudication, edema or palpitations  Respiratory: No SOB, cough, hemoptysis   Gastro-intestinal: No diarrhea, constipation, abdominal pain, nausea, vomit. No ascitis  Genitourinary: no hematuria, dysuria, frequency, frequency  Musculoskeletal: joint pain, arthritis or vasculitic changes  Psych: alert awake, oriented, No cranial nerves deficit.      Objective:         Physical Exam     BP (!) 127/92 (BP Location: Right arm, Patient Position: Sitting, BP Method: Medium (Automatic))   Pulse 65   Temp 97.2 °F (36.2 °C) (Tympanic)   Resp 17   Ht 5' 5" (1.651 m)   Wt 69.4 kg (153 lb)   SpO2 98%   BMI 25.46 kg/m²       Head: normocephalic  Neck: No JVD, cervical axillary, or femoral adenopathies  Heart: no murmurs, Normal s1 and s2, No gallops, no rubs, No murmurs  Lungs; CTA, good respiratory effort, no crackles  Abdomen: soft, non tender, no splenomegaly or hepatomegaly, no massess, no bruits  Extremities: No edema, skin rash, joint pain  SNC: awake, alert oriented. Cranial nerves are intact, no focalized, sensitivity and strength preserved      Labs:  Lab Results   Component " Value Date    WBC 11.6 (H) 12/14/2021    HGB 16.1 12/14/2021    HCT 48.0 12/14/2021     12/14/2021    K 4.4 12/14/2021     12/14/2021    CO2 23 12/14/2021    BUN 20 12/14/2021    CREATININE 1.51 (H) 12/14/2021    EGFRNONAA 52 (L) 12/14/2021    EGFRIFAFRICA 60 12/14/2021    CALCIUM 9.6 12/14/2021    PHOS 3.3 12/14/2021    MG 1.9 12/14/2021    ALBUMIN 4.4 12/14/2021    AST 18 08/23/2021    ALT 16 08/23/2021    UTPCR 0.09 08/23/2021    PTH 93 (H) 11/24/2020    TACROLIMUS 5.1 12/14/2021       Lab Results   Component Value Date    EXTANC 259 (L) 03/09/2021    EXTWBC 11.6 (H) 12/14/2021    EXTSEGS 54 12/14/2021    EXTPLATELETS 204 12/14/2021    EXTHEMOGLOBI 16.1 12/14/2021    EXTHEMATOCRI 48.0 12/14/2021    EXTCREATININ 1.51 (H) 12/14/2021    EXTSODIUM 140 12/14/2021    EXTPOTASSIUM 4.4 12/14/2021    EXTBUN 20 12/14/2021    EXTCO2 23 12/14/2021    EXTCALCIUM 9.6 12/14/2021    EXTPHOSPHORU 3.3 12/14/2021    EXTGLUCOSE 95 12/14/2021    EXTALBUMIN 4.4 12/14/2021    EXTAST 23 05/03/2021    EXTALT 14 05/03/2021    EXTBILITOTAL 0.7 05/03/2021       Lab Results   Component Value Date    EXTTACROLVL 5.1 12/14/2021    EXTPROTCRE 0.07 05/03/2021    EXTPROTEINUA Neg 05/03/2021    EXTWBCUA 0 05/03/2021    EXTRBCUA 0 05/03/2021       Labs were reviewed with the patient.    Assessment:     1. Kidney replaced by transplant 8/27/2020    2. At risk for opportunistic infections    3. Stage 3a chronic kidney disease    4. Secondary hyperparathyroidism of renal origin    5. Prophylactic immunotherapy    6. Long-term use of immunosuppressant medication        Plan:   Will lower bicar to 650 bid and follow up with his nephrologist  Same dose of mag oxide  I asked patient to monitor his Vit D level with his nephrologist  No change with prograf dose  Same dose of MMF     1. CKD stage 3 with stable creatinine : will continue follow up as per our center guidelines. patient to continue close follow up with the local General  nephrologist. Education provided in appropriate fluid intake, potassium intake. Continue with oral hydration.        2. Immunosuppression: No change   Lab Results   Component Value Date    TACROLIMUS 5.1 12/14/2021    TACROLIMUS 8.6 10/12/2021    TACROLIMUS 8.1 09/14/2021     No results found for: CYCLOSPORINE  @   Will closely monitor for toxicities, education provided about adherence to medicines and need to communicate any side effect to the transplant nurse or physician.    3. Allograft Function:stable at baseline for the patient. Continue follow up as per our guidelines and with the local General nephrologist. Communication will be sent today.  Lab Results   Component Value Date    CREATININE 1.51 (H) 12/14/2021    CREATININE 1.54 (H) 10/12/2021    CREATININE 1.46 (H) 09/14/2021     No results found for: AMYLASE, LIPASE    4. Hypertension management: well controlled  Continue with home blood pressure monitoring, low salt and healthy life discussed with the patient..    5. Metabolic Bone Disease/Secondary Hyperparathyroidism: [patient will see her nephrologist to monitor Vit D and decide frequency of ergocalciferol if needed. calcium and phosphorus level discussed with the patient, patient will continue follow up with the general nephrologist for management of metabolic bone disease   calcium and phosphorus as per our center protocol. Will monitor PTH, Vit D level, calcium.      Lab Results   Component Value Date    PTH 93 (H) 11/24/2020    CALCIUM 9.6 12/14/2021    PHOS 3.3 12/14/2021    PHOS 3.6 10/12/2021    PHOS 3.7 09/14/2021       6. Electrolytes: see above changes  reviewed with the patient, essentially within the normal range no need for acute changes today, will monitor as per our center guidelines.     Lab Results   Component Value Date     12/14/2021    K 4.4 12/14/2021     12/14/2021    CO2 23 12/14/2021    CO2 27 10/12/2021    CO2 23 09/14/2021       7. Anemia:normal h/h  will continue  monitoring as per our center guidelines. No indication for acute intervention today.     Lab Results   Component Value Date    WBC 11.6 (H) 12/14/2021    HGB 16.1 12/14/2021    HCT 48.0 12/14/2021    MCV 89 12/14/2021     12/14/2021       8.Proteinuria: will continue with pr/cr ratio as per our center guidelines  Lab Results   Component Value Date    PROTEINURINE 8 08/23/2021    CREATRANDUR 88.0 08/23/2021    UTPCR 0.09 08/23/2021    UTPCR 0.09 06/01/2021    UTPCR 0.13 03/01/2021        9. BK virus infection screening: will continue with urine or blood PCR as per our guidelines to prevent BK virus viremia and allograft dysfunction  Lab Results   Component Value Date    BKVIRUSPCRQB <125 08/23/2021         10. Weight education: provided during the clinic visit.   Body mass index is 25.46 kg/m².       11.Patient safety education regarding immunosuppression including prophylaxis posttransplant for CMV, PCP : Education provided about vaccination and prevention of infections.    12.  Cytopenias: no significant cytopenias will monitor as per our guidelines. Medicine list reviewed including potential causes of drug-induced cytopenias     Lab Results   Component Value Date    WBC 11.6 (H) 12/14/2021    HGB 16.1 12/14/2021    HCT 48.0 12/14/2021    MCV 89 12/14/2021     12/14/2021       13. Post-transplant Prophylaxis; CMV Infection, PJP and Candida mucosistis and other indicated for this particular patient. OFF prophylaxis     I spoke with the patient for 30 minutes. More than half dedicated to counseling and education. All questions answered    Prakash Winchester MD  Transplant Nephrology            Follow-up:   Clinic: return to transplant clinic weekly for the first month after transplant; every 2 weeks during months 2-3; then at 6-, 9-, 12-, 18-, 24-, and 36- months post-transplant to reassess for complications from immunosuppression toxicity and monitor for rejection.  Annually thereafter.    Labs: since  patient remains at high risk for rejection and drug-related complications that warrant close monitoring, labs will be ordered as follows: continue twice weekly CBC, renal panel, and drug level for first month; then same labs once weekly through 3rd month post-transplant.  Urine for UA and protein/creatinine ratio monthly.  Serum BK - PCR at 1-, 3-, 6-, 9-, 12-, 18-, 24-, 36- 48-, and 60 months post-transplant.  Hepatic panel at 1-, 2-, 3-, 6-, 9-, 12-, 18-, 24-, and 36- months post-transplant.    Prakash Winchester MD       Education:   Material provided to the patient.  Patient reminded to call with any health changes since these can be early signs of significant complications.  Also, I advised the patient to be sure any new medications or changes of old medications are discussed with either a pharmacist or physician knowledgeable with transplant to avoid rejection/drug toxicity related to significant drug interactions.    Patient advised that it is recommended that all transplanted patients, and their close contacts and household members receive Covid vaccination.    UNOS Patient Status  Functional Status: 100% - Normal, no complaints, no evidence of disease  Physical Capacity: No Limitations

## 2022-02-02 ENCOUNTER — LAB VISIT (OUTPATIENT)
Dept: LAB | Facility: HOSPITAL | Age: 52
End: 2022-02-02
Attending: INTERNAL MEDICINE
Payer: MEDICARE

## 2022-02-02 ENCOUNTER — OFFICE VISIT (OUTPATIENT)
Dept: TRANSPLANT | Facility: CLINIC | Age: 52
End: 2022-02-02
Payer: MEDICARE

## 2022-02-02 VITALS
WEIGHT: 153 LBS | SYSTOLIC BLOOD PRESSURE: 127 MMHG | OXYGEN SATURATION: 98 % | RESPIRATION RATE: 17 BRPM | DIASTOLIC BLOOD PRESSURE: 92 MMHG | TEMPERATURE: 97 F | BODY MASS INDEX: 25.49 KG/M2 | HEIGHT: 65 IN | HEART RATE: 65 BPM

## 2022-02-02 DIAGNOSIS — N18.31 STAGE 3A CHRONIC KIDNEY DISEASE: ICD-10-CM

## 2022-02-02 DIAGNOSIS — Z79.60 LONG-TERM USE OF IMMUNOSUPPRESSANT MEDICATION: ICD-10-CM

## 2022-02-02 DIAGNOSIS — Z29.89 PROPHYLACTIC IMMUNOTHERAPY: ICD-10-CM

## 2022-02-02 DIAGNOSIS — Z94.0 KIDNEY REPLACED BY TRANSPLANT: ICD-10-CM

## 2022-02-02 DIAGNOSIS — Z91.89 AT RISK FOR OPPORTUNISTIC INFECTIONS: ICD-10-CM

## 2022-02-02 DIAGNOSIS — Z94.0 KIDNEY REPLACED BY TRANSPLANT: Primary | ICD-10-CM

## 2022-02-02 DIAGNOSIS — N25.81 SECONDARY HYPERPARATHYROIDISM OF RENAL ORIGIN: Chronic | ICD-10-CM

## 2022-02-02 LAB
ALBUMIN SERPL BCP-MCNC: 4 G/DL (ref 3.5–5.2)
ALBUMIN SERPL BCP-MCNC: 4 G/DL (ref 3.5–5.2)
ALP SERPL-CCNC: 69 U/L (ref 55–135)
ALT SERPL W/O P-5'-P-CCNC: 14 U/L (ref 10–44)
ANION GAP SERPL CALC-SCNC: 10 MMOL/L (ref 8–16)
AST SERPL-CCNC: 16 U/L (ref 10–40)
BASOPHILS # BLD AUTO: 0.03 K/UL (ref 0–0.2)
BASOPHILS NFR BLD: 0.3 % (ref 0–1.9)
BILIRUB DIRECT SERPL-MCNC: 0.4 MG/DL (ref 0.1–0.3)
BILIRUB SERPL-MCNC: 1.1 MG/DL (ref 0.1–1)
BUN SERPL-MCNC: 20 MG/DL (ref 6–20)
CALCIUM SERPL-MCNC: 9.3 MG/DL (ref 8.7–10.5)
CHLORIDE SERPL-SCNC: 102 MMOL/L (ref 95–110)
CO2 SERPL-SCNC: 27 MMOL/L (ref 23–29)
CREAT SERPL-MCNC: 1.6 MG/DL (ref 0.5–1.4)
DIFFERENTIAL METHOD: ABNORMAL
EOSINOPHIL # BLD AUTO: 0.1 K/UL (ref 0–0.5)
EOSINOPHIL NFR BLD: 1.4 % (ref 0–8)
ERYTHROCYTE [DISTWIDTH] IN BLOOD BY AUTOMATED COUNT: 12.6 % (ref 11.5–14.5)
EST. GFR  (AFRICAN AMERICAN): 56 ML/MIN/1.73 M^2
EST. GFR  (NON AFRICAN AMERICAN): 48.5 ML/MIN/1.73 M^2
GLUCOSE SERPL-MCNC: 94 MG/DL (ref 70–110)
HCT VFR BLD AUTO: 47.9 % (ref 40–54)
HGB BLD-MCNC: 15.5 G/DL (ref 14–18)
IMM GRANULOCYTES # BLD AUTO: 0.06 K/UL (ref 0–0.04)
IMM GRANULOCYTES NFR BLD AUTO: 0.6 % (ref 0–0.5)
LYMPHOCYTES # BLD AUTO: 3.6 K/UL (ref 1–4.8)
LYMPHOCYTES NFR BLD: 36.9 % (ref 18–48)
MAGNESIUM SERPL-MCNC: 1.9 MG/DL (ref 1.6–2.6)
MCH RBC QN AUTO: 29.7 PG (ref 27–31)
MCHC RBC AUTO-ENTMCNC: 32.4 G/DL (ref 32–36)
MCV RBC AUTO: 92 FL (ref 82–98)
MONOCYTES # BLD AUTO: 0.8 K/UL (ref 0.3–1)
MONOCYTES NFR BLD: 8.3 % (ref 4–15)
NEUTROPHILS # BLD AUTO: 5.2 K/UL (ref 1.8–7.7)
NEUTROPHILS NFR BLD: 52.5 % (ref 38–73)
NRBC BLD-RTO: 0 /100 WBC
PHOSPHATE SERPL-MCNC: 3.6 MG/DL (ref 2.7–4.5)
PLATELET # BLD AUTO: 216 K/UL (ref 150–450)
PMV BLD AUTO: 11.8 FL (ref 9.2–12.9)
POTASSIUM SERPL-SCNC: 3.9 MMOL/L (ref 3.5–5.1)
PROT SERPL-MCNC: 6.8 G/DL (ref 6–8.4)
RBC # BLD AUTO: 5.22 M/UL (ref 4.6–6.2)
SODIUM SERPL-SCNC: 139 MMOL/L (ref 136–145)
TACROLIMUS BLD-MCNC: 7.6 NG/ML (ref 5–15)
WBC # BLD AUTO: 9.87 K/UL (ref 3.9–12.7)

## 2022-02-02 PROCEDURE — 3008F PR BODY MASS INDEX (BMI) DOCUMENTED: ICD-10-PCS | Mod: CPTII,S$GLB,, | Performed by: INTERNAL MEDICINE

## 2022-02-02 PROCEDURE — 99215 PR OFFICE/OUTPT VISIT, EST, LEVL V, 40-54 MIN: ICD-10-PCS | Mod: S$GLB,,, | Performed by: INTERNAL MEDICINE

## 2022-02-02 PROCEDURE — 80197 ASSAY OF TACROLIMUS: CPT | Performed by: INTERNAL MEDICINE

## 2022-02-02 PROCEDURE — 3074F SYST BP LT 130 MM HG: CPT | Mod: CPTII,S$GLB,, | Performed by: INTERNAL MEDICINE

## 2022-02-02 PROCEDURE — 86832 HLA CLASS I HIGH DEFIN QUAL: CPT | Mod: PO | Performed by: INTERNAL MEDICINE

## 2022-02-02 PROCEDURE — 3008F BODY MASS INDEX DOCD: CPT | Mod: CPTII,S$GLB,, | Performed by: INTERNAL MEDICINE

## 2022-02-02 PROCEDURE — 86977 RBC SERUM PRETX INCUBJ/INHIB: CPT | Mod: PO | Performed by: INTERNAL MEDICINE

## 2022-02-02 PROCEDURE — 85025 COMPLETE CBC W/AUTO DIFF WBC: CPT | Performed by: INTERNAL MEDICINE

## 2022-02-02 PROCEDURE — 99999 PR PBB SHADOW E&M-EST. PATIENT-LVL IV: CPT | Mod: PBBFAC,,, | Performed by: INTERNAL MEDICINE

## 2022-02-02 PROCEDURE — 1159F PR MEDICATION LIST DOCUMENTED IN MEDICAL RECORD: ICD-10-PCS | Mod: CPTII,S$GLB,, | Performed by: INTERNAL MEDICINE

## 2022-02-02 PROCEDURE — 99215 OFFICE O/P EST HI 40 MIN: CPT | Mod: S$GLB,,, | Performed by: INTERNAL MEDICINE

## 2022-02-02 PROCEDURE — 84075 ASSAY ALKALINE PHOSPHATASE: CPT | Performed by: INTERNAL MEDICINE

## 2022-02-02 PROCEDURE — 3066F NEPHROPATHY DOC TX: CPT | Mod: CPTII,S$GLB,, | Performed by: INTERNAL MEDICINE

## 2022-02-02 PROCEDURE — 1159F MED LIST DOCD IN RCRD: CPT | Mod: CPTII,S$GLB,, | Performed by: INTERNAL MEDICINE

## 2022-02-02 PROCEDURE — 36415 COLL VENOUS BLD VENIPUNCTURE: CPT | Performed by: INTERNAL MEDICINE

## 2022-02-02 PROCEDURE — 86833 HLA CLASS II HIGH DEFIN QUAL: CPT | Mod: PO | Performed by: INTERNAL MEDICINE

## 2022-02-02 PROCEDURE — 83735 ASSAY OF MAGNESIUM: CPT | Performed by: INTERNAL MEDICINE

## 2022-02-02 PROCEDURE — 80069 RENAL FUNCTION PANEL: CPT | Performed by: INTERNAL MEDICINE

## 2022-02-02 PROCEDURE — 3080F PR MOST RECENT DIASTOLIC BLOOD PRESSURE >= 90 MM HG: ICD-10-PCS | Mod: CPTII,S$GLB,, | Performed by: INTERNAL MEDICINE

## 2022-02-02 PROCEDURE — 99999 PR PBB SHADOW E&M-EST. PATIENT-LVL IV: ICD-10-PCS | Mod: PBBFAC,,, | Performed by: INTERNAL MEDICINE

## 2022-02-02 PROCEDURE — 3074F PR MOST RECENT SYSTOLIC BLOOD PRESSURE < 130 MM HG: ICD-10-PCS | Mod: CPTII,S$GLB,, | Performed by: INTERNAL MEDICINE

## 2022-02-02 PROCEDURE — 3080F DIAST BP >= 90 MM HG: CPT | Mod: CPTII,S$GLB,, | Performed by: INTERNAL MEDICINE

## 2022-02-02 PROCEDURE — 87799 DETECT AGENT NOS DNA QUANT: CPT | Performed by: INTERNAL MEDICINE

## 2022-02-02 PROCEDURE — 3066F PR DOCUMENTATION OF TREATMENT FOR NEPHROPATHY: ICD-10-PCS | Mod: CPTII,S$GLB,, | Performed by: INTERNAL MEDICINE

## 2022-02-02 NOTE — LETTER
February 2, 2022      Bryant Azevedo  [address] [place] [address]      MRN:[id number]    Dear [facility] Transplant Patient,    Your [facility] transplant providers all agree that every transplant patient should be protected as much as possible from COVID-19 by getting vaccinated. Transplant recipients appear to have worse outcomes from SARS-CoV-2 infection compared to non-transplant recipients due to other illnesses or immunosuppression. Reliable medical data about vaccination of transplant recipients with the COVID-19 vaccine is increasing and hundreds of millions of doses have been given to people worldwide with very few adverse reactions. It is clear that the benefits of vaccination outweigh any risks.   The [facility] transplant team and providers recommends that all of our patients and close contacts and household members be fully vaccinated against COVID-19.    The FDA, CDC, and LDH have now authorized third doses of Pfizer and Moderna vaccines for certain immunocompromised individuals including transplant patients.   The [facility] transplant team and providers recommend that all transplant recipients receive a COVID-19 vaccination booster. Scheduling is now available via [employer].  You should be able to schedule an appointment for a third dose if you have not received monoclonal antibodies (such as Regeneron) or convalescent plasma in past 90 days, and if it has been 28 days since your second dose.  Please contact your transplant coordinator if you have any questions or need assistance.      Here is a link to more information:   [id number] [facility] COVID Vaccine Info Patients 08.13.21 FINAL (1).pdf ([email or web address])

## 2022-02-02 NOTE — LETTER
February 2, 2022      Bryant Azevedo  [address] [place] [address]      MRN:[id number]    Dear [facility] Transplant Patient,    Your [facility] transplant providers all agree that every transplant patient should be protected as much as possible from COVID-19 by getting vaccinated. Transplant recipients appear to have worse outcomes from SARS-CoV-2 infection compared to non-transplant recipients due to other illnesses or immunosuppression. Reliable medical data about vaccination of transplant recipients with the COVID-19 vaccine is increasing and hundreds of millions of doses have been given to people worldwide with very few adverse reactions. It is clear that the benefits of vaccination outweigh any risks.   The [facility] transplant team and providers recommends that all of our patients and close contacts and household members be fully vaccinated against COVID-19.    The FDA, CDC, and LDH have now authorized fourth doses of Pfizer and Moderna vaccines for certain immunocompromised individuals including transplant patients.   The [facility] transplant team and providers recommend that all transplant recipients receive a COVID-19 vaccination booster. Scheduling is now available via [employer].  You should be able to schedule an appointment for a fourth dose if you have not received monoclonal antibodies (such as Regeneron) or convalescent plasma in past 90 days, and if it has been 28 days since your second dose.  Please contact your transplant coordinator if you have any questions or need assistance.      Here is a link to more information:   [id number] [facility] COVID Vaccine Info Patients 08.13.21 FINAL (1).pdf ([email or web address])

## 2022-02-02 NOTE — LETTER
February 2, 2022        Angel Portillo  [address]  [address]  [place] [address]  Phone: [phone]  Fax: [phone]             [facility]- Transplant 1st Fl  [address]  [facility] [address]  Phone: [phone]   Patient: Bryant Azevedo   MR Number: [id number]   YOB: 1970   Date of Visit: 2/2/2022       Dear Dr. Angel Portillo    Thank you for referring Bryant Azevedo to me for evaluation. Attached you will find relevant portions of my assessment and plan of care.    If you have questions, please do not hesitate to call me. I look forward to following Bryant Azevedo along with you.    Sincerely,    Prakash Winchester MD    Enclosure    If you would like to receive this communication electronically, please contact [email or web address] or [phone] to request [facility] access.    [facility] is a tool which provides read-only access to select patient information with whom you have a relationship. Its easy to use and provides real time access to review your patients record including encounter summaries, notes, results, and demographic information.    If you feel you have received this communication in error or would no longer like to receive these types of communications, please e-mail [email or web address]

## 2022-02-04 LAB
BKV DNA SERPL NAA+PROBE-ACNC: <125 COPIES/ML
BKV DNA SERPL NAA+PROBE-LOG#: <2.1 LOG (10) COPIES/ML
BKV DNA SERPL QL NAA+PROBE: NOT DETECTED

## 2022-04-15 ENCOUNTER — PATIENT MESSAGE (OUTPATIENT)
Dept: TRANSPLANT | Facility: CLINIC | Age: 52
End: 2022-04-15
Payer: MEDICARE

## 2022-04-27 NOTE — TELEPHONE ENCOUNTER
Dialysis Adherence     [facility] attempted to complete an adherence check but dialysis unit's social workers and nurses were not available. [facility] left message with nurse Sandra for PADMINI Bradford nurse to call back.     [facility] remains available at [phone].    MONITOR response to TREATMENT.

## 2022-05-11 ENCOUNTER — PATIENT MESSAGE (OUTPATIENT)
Dept: RESEARCH | Facility: CLINIC | Age: 52
End: 2022-05-11
Payer: MEDICARE

## 2022-05-28 LAB
EXT ALBUMIN: ABNORMAL
EXT ALKALINE PHOSPHATASE: ABNORMAL
EXT ALLOSURE: ABNORMAL
EXT ALT: ABNORMAL
EXT AMYLASE: ABNORMAL
EXT ANC: ABNORMAL
EXT AST: ABNORMAL
EXT BACTERIA UA: ABNORMAL
EXT BANDS%: ABNORMAL
EXT BILIRUBIN DIRECT: ABNORMAL
EXT BILIRUBIN TOTAL: ABNORMAL
EXT BK VIRUS DNA QN PCR: ABNORMAL
EXT BK VIRUS DNA QUANT, PCR, URINE: ABNORMAL
EXT BUN: 19 (ref 6–24)
EXT C PEPTIDE: ABNORMAL
EXT CALCIUM: 9.3 (ref 8.7–10.2)
EXT CHLORIDE: 103 (ref 96–106)
EXT CHOLESTEROL (LIPID PANEL): ABNORMAL
EXT CHOLESTEROL: ABNORMAL
EXT CMV DNA QUANT. BY PCR: ABNORMAL
EXT CO2: 25 (ref 20–29)
EXT CREATININE: 1.56 MG/DL (ref 0.76–1.27)
EXT CYCLOSPORINE LVL: ABNORMAL
EXT EBV DNA BY PCR: ABNORMAL
EXT EBV DNA-COPIES/ML: ABNORMAL
EXT EGFR NO RACE VARIABLE: 53
EXT EOSINOPHIL%: 3
EXT FERRITIN: ABNORMAL
EXT GFR MDRD AF AMER: ABNORMAL
EXT GFR MDRD NON AF AMER: ABNORMAL
EXT GLUCOSE UA: ABNORMAL
EXT GLUCOSE: 90 (ref 65–99)
EXT HBV DNA QUANT PCR: ABNORMAL
EXT HCV QUANT: ABNORMAL
EXT HDL: ABNORMAL
EXT HEMATOCRIT: 50.1 (ref 37.5–51)
EXT HEMOGLOBIN A1C: ABNORMAL
EXT HEMOGLOBIN: 15.9 (ref 13–17.7)
EXT HIV RNA QUANT PCR: ABNORMAL
EXT HIV: ABNORMAL
EXT IMMUNKNOW (STIMULATED): ABNORMAL
EXT INR: ABNORMAL
EXT IRON SATURATION: ABNORMAL
EXT LDH, TOTAL: ABNORMAL
EXT LDL CHOLESTEROL: ABNORMAL
EXT LIPASE: ABNORMAL
EXT LYMPH%: 37
EXT MAGNESIUM: ABNORMAL
EXT MONOCYTES%: 8
EXT NITRITES UA: ABNORMAL
EXT PHOSPHORUS: 3.2 (ref 2.8–4.1)
EXT PLATELETS: 202 (ref 150–450)
EXT POTASSIUM: 5 (ref 3.5–5.2)
EXT PROT/CREAT RATIO UR: ABNORMAL
EXT PROTEIN TOTAL: ABNORMAL
EXT PROTEIN TOTAL: ABNORMAL
EXT PROTEIN UA: ABNORMAL
EXT PT: ABNORMAL
EXT PTH, INTACT: ABNORMAL
EXT RBC UA: ABNORMAL
EXT SARS COV-2 (COVID-19): ABNORMAL
EXT SEGS%: 51
EXT SERUM IRON: ABNORMAL
EXT SIROLIMUS LVL: ABNORMAL
EXT SODIUM: 141 MMOL/L (ref 134–144)
EXT STOOL CDIFF: ABNORMAL
EXT STOOL CMV: ABNORMAL
EXT STOOL CULTURE: ABNORMAL
EXT STOOL OCP: ABNORMAL
EXT TACROLIMUS LVL: 5.7 (ref 4–7)
EXT TIBC: ABNORMAL
EXT TRIGLYCERIDES: ABNORMAL
EXT UNSATURATED IRON BINDING CAP.: ABNORMAL
EXT URIC ACID: ABNORMAL
EXT URINE CULTURE: ABNORMAL
EXT VIT D 25 HYDROXY: ABNORMAL
EXT WBC UA: ABNORMAL
EXT WBC: 9.4 (ref 3.4–10.8)

## 2022-07-05 DIAGNOSIS — Z94.0 KIDNEY REPLACED BY TRANSPLANT: ICD-10-CM

## 2022-07-05 DIAGNOSIS — E83.42 HYPOMAGNESEMIA: ICD-10-CM

## 2022-07-05 DIAGNOSIS — Z94.0 KIDNEY TRANSPLANT STATUS: ICD-10-CM

## 2022-07-05 RX ORDER — PREDNISONE 5 MG/1
TABLET ORAL
Qty: 90 TABLET | Refills: 3 | Status: SHIPPED | OUTPATIENT
Start: 2022-07-05 | End: 2023-03-24

## 2022-07-05 RX ORDER — LANOLIN ALCOHOL/MO/W.PET/CERES
CREAM (GRAM) TOPICAL
Qty: 30 TABLET | Refills: 11 | Status: SHIPPED | OUTPATIENT
Start: 2022-07-05 | End: 2023-06-07

## 2022-07-06 DIAGNOSIS — Z94.0 KIDNEY REPLACED BY TRANSPLANT: Primary | ICD-10-CM

## 2022-08-29 ENCOUNTER — LAB VISIT (OUTPATIENT)
Dept: LAB | Facility: HOSPITAL | Age: 52
End: 2022-08-29
Attending: INTERNAL MEDICINE
Payer: MEDICARE

## 2022-08-29 ENCOUNTER — OFFICE VISIT (OUTPATIENT)
Dept: TRANSPLANT | Facility: CLINIC | Age: 52
End: 2022-08-29
Payer: MEDICARE

## 2022-08-29 VITALS
WEIGHT: 154.75 LBS | HEIGHT: 64 IN | TEMPERATURE: 97 F | SYSTOLIC BLOOD PRESSURE: 122 MMHG | OXYGEN SATURATION: 97 % | RESPIRATION RATE: 16 BRPM | HEART RATE: 71 BPM | DIASTOLIC BLOOD PRESSURE: 81 MMHG | BODY MASS INDEX: 26.42 KG/M2

## 2022-08-29 DIAGNOSIS — N18.31 STAGE 3A CHRONIC KIDNEY DISEASE: ICD-10-CM

## 2022-08-29 DIAGNOSIS — N25.81 SECONDARY HYPERPARATHYROIDISM OF RENAL ORIGIN: Chronic | ICD-10-CM

## 2022-08-29 DIAGNOSIS — Z94.0 KIDNEY REPLACED BY TRANSPLANT: ICD-10-CM

## 2022-08-29 DIAGNOSIS — Z94.0 S/P KIDNEY TRANSPLANT: Primary | ICD-10-CM

## 2022-08-29 DIAGNOSIS — Z79.60 LONG-TERM USE OF IMMUNOSUPPRESSANT MEDICATION: ICD-10-CM

## 2022-08-29 DIAGNOSIS — Z91.89 AT RISK FOR OPPORTUNISTIC INFECTIONS: ICD-10-CM

## 2022-08-29 LAB
ALBUMIN SERPL BCP-MCNC: 4.2 G/DL (ref 3.5–5.2)
ALBUMIN SERPL BCP-MCNC: 4.2 G/DL (ref 3.5–5.2)
ALP SERPL-CCNC: 57 U/L (ref 55–135)
ALT SERPL W/O P-5'-P-CCNC: 16 U/L (ref 10–44)
ANION GAP SERPL CALC-SCNC: 5 MMOL/L (ref 8–16)
AST SERPL-CCNC: 21 U/L (ref 10–40)
BASOPHILS # BLD AUTO: 0.03 K/UL (ref 0–0.2)
BASOPHILS NFR BLD: 0.4 % (ref 0–1.9)
BILIRUB DIRECT SERPL-MCNC: 0.4 MG/DL (ref 0.1–0.3)
BILIRUB SERPL-MCNC: 1.3 MG/DL (ref 0.1–1)
BUN SERPL-MCNC: 21 MG/DL (ref 6–20)
CALCIUM SERPL-MCNC: 9.6 MG/DL (ref 8.7–10.5)
CHLORIDE SERPL-SCNC: 109 MMOL/L (ref 95–110)
CO2 SERPL-SCNC: 24 MMOL/L (ref 23–29)
CREAT SERPL-MCNC: 1.3 MG/DL (ref 0.5–1.4)
DIFFERENTIAL METHOD: NORMAL
EOSINOPHIL # BLD AUTO: 0.2 K/UL (ref 0–0.5)
EOSINOPHIL NFR BLD: 2.6 % (ref 0–8)
ERYTHROCYTE [DISTWIDTH] IN BLOOD BY AUTOMATED COUNT: 12.5 % (ref 11.5–14.5)
EST. GFR  (NO RACE VARIABLE): >60 ML/MIN/1.73 M^2
GLUCOSE SERPL-MCNC: 87 MG/DL (ref 70–110)
HCT VFR BLD AUTO: 45.3 % (ref 40–54)
HGB BLD-MCNC: 15.4 G/DL (ref 14–18)
IMM GRANULOCYTES # BLD AUTO: 0.03 K/UL (ref 0–0.04)
IMM GRANULOCYTES NFR BLD AUTO: 0.4 % (ref 0–0.5)
LYMPHOCYTES # BLD AUTO: 2.7 K/UL (ref 1–4.8)
LYMPHOCYTES NFR BLD: 32 % (ref 18–48)
MAGNESIUM SERPL-MCNC: 1.8 MG/DL (ref 1.6–2.6)
MCH RBC QN AUTO: 30.1 PG (ref 27–31)
MCHC RBC AUTO-ENTMCNC: 34 G/DL (ref 32–36)
MCV RBC AUTO: 89 FL (ref 82–98)
MONOCYTES # BLD AUTO: 0.6 K/UL (ref 0.3–1)
MONOCYTES NFR BLD: 6.7 % (ref 4–15)
NEUTROPHILS # BLD AUTO: 4.9 K/UL (ref 1.8–7.7)
NEUTROPHILS NFR BLD: 57.9 % (ref 38–73)
NRBC BLD-RTO: 0 /100 WBC
PHOSPHATE SERPL-MCNC: 3.7 MG/DL (ref 2.7–4.5)
PLATELET # BLD AUTO: 196 K/UL (ref 150–450)
PMV BLD AUTO: 12.1 FL (ref 9.2–12.9)
POTASSIUM SERPL-SCNC: 4.5 MMOL/L (ref 3.5–5.1)
PROT SERPL-MCNC: 6.8 G/DL (ref 6–8.4)
RBC # BLD AUTO: 5.11 M/UL (ref 4.6–6.2)
SODIUM SERPL-SCNC: 138 MMOL/L (ref 136–145)
TACROLIMUS BLD-MCNC: 5.7 NG/ML (ref 5–15)
WBC # BLD AUTO: 8.52 K/UL (ref 3.9–12.7)

## 2022-08-29 PROCEDURE — 3066F PR DOCUMENTATION OF TREATMENT FOR NEPHROPATHY: ICD-10-PCS | Mod: CPTII,S$GLB,, | Performed by: INTERNAL MEDICINE

## 2022-08-29 PROCEDURE — 3079F PR MOST RECENT DIASTOLIC BLOOD PRESSURE 80-89 MM HG: ICD-10-PCS | Mod: CPTII,S$GLB,, | Performed by: INTERNAL MEDICINE

## 2022-08-29 PROCEDURE — 84075 ASSAY ALKALINE PHOSPHATASE: CPT | Performed by: INTERNAL MEDICINE

## 2022-08-29 PROCEDURE — 86832 HLA CLASS I HIGH DEFIN QUAL: CPT | Mod: PO | Performed by: INTERNAL MEDICINE

## 2022-08-29 PROCEDURE — 86977 RBC SERUM PRETX INCUBJ/INHIB: CPT | Mod: 59,PO | Performed by: INTERNAL MEDICINE

## 2022-08-29 PROCEDURE — 1159F PR MEDICATION LIST DOCUMENTED IN MEDICAL RECORD: ICD-10-PCS | Mod: CPTII,S$GLB,, | Performed by: INTERNAL MEDICINE

## 2022-08-29 PROCEDURE — 87799 DETECT AGENT NOS DNA QUANT: CPT | Performed by: INTERNAL MEDICINE

## 2022-08-29 PROCEDURE — 99214 PR OFFICE/OUTPT VISIT, EST, LEVL IV, 30-39 MIN: ICD-10-PCS | Mod: S$GLB,,, | Performed by: INTERNAL MEDICINE

## 2022-08-29 PROCEDURE — 3008F PR BODY MASS INDEX (BMI) DOCUMENTED: ICD-10-PCS | Mod: CPTII,S$GLB,, | Performed by: INTERNAL MEDICINE

## 2022-08-29 PROCEDURE — 3008F BODY MASS INDEX DOCD: CPT | Mod: CPTII,S$GLB,, | Performed by: INTERNAL MEDICINE

## 2022-08-29 PROCEDURE — 83735 ASSAY OF MAGNESIUM: CPT | Performed by: INTERNAL MEDICINE

## 2022-08-29 PROCEDURE — 3079F DIAST BP 80-89 MM HG: CPT | Mod: CPTII,S$GLB,, | Performed by: INTERNAL MEDICINE

## 2022-08-29 PROCEDURE — 80069 RENAL FUNCTION PANEL: CPT | Performed by: INTERNAL MEDICINE

## 2022-08-29 PROCEDURE — 3074F SYST BP LT 130 MM HG: CPT | Mod: CPTII,S$GLB,, | Performed by: INTERNAL MEDICINE

## 2022-08-29 PROCEDURE — 3066F NEPHROPATHY DOC TX: CPT | Mod: CPTII,S$GLB,, | Performed by: INTERNAL MEDICINE

## 2022-08-29 PROCEDURE — 99999 PR PBB SHADOW E&M-EST. PATIENT-LVL III: CPT | Mod: PBBFAC,,, | Performed by: INTERNAL MEDICINE

## 2022-08-29 PROCEDURE — 99214 OFFICE O/P EST MOD 30 MIN: CPT | Mod: S$GLB,,, | Performed by: INTERNAL MEDICINE

## 2022-08-29 PROCEDURE — 85025 COMPLETE CBC W/AUTO DIFF WBC: CPT | Performed by: INTERNAL MEDICINE

## 2022-08-29 PROCEDURE — 36415 COLL VENOUS BLD VENIPUNCTURE: CPT | Performed by: INTERNAL MEDICINE

## 2022-08-29 PROCEDURE — 86833 HLA CLASS II HIGH DEFIN QUAL: CPT | Mod: PO | Performed by: INTERNAL MEDICINE

## 2022-08-29 PROCEDURE — 3074F PR MOST RECENT SYSTOLIC BLOOD PRESSURE < 130 MM HG: ICD-10-PCS | Mod: CPTII,S$GLB,, | Performed by: INTERNAL MEDICINE

## 2022-08-29 PROCEDURE — 99999 PR PBB SHADOW E&M-EST. PATIENT-LVL III: ICD-10-PCS | Mod: PBBFAC,,, | Performed by: INTERNAL MEDICINE

## 2022-08-29 PROCEDURE — 1159F MED LIST DOCD IN RCRD: CPT | Mod: CPTII,S$GLB,, | Performed by: INTERNAL MEDICINE

## 2022-08-29 PROCEDURE — 80197 ASSAY OF TACROLIMUS: CPT | Performed by: INTERNAL MEDICINE

## 2022-08-29 NOTE — LETTER
August 29, 2022        Angel Portillo  [address]  [address]  [facility] [address]  Phone: [phone]  Fax: [phone]             [facility]- Transplant 1st Fl  [address]  [facility] [address]  Phone: [phone]   Patient: Bryant Azevedo   MR Number: [id number]   YOB: 1970   Date of Visit: 8/29/2022       Dear Dr. Angel Portillo    Thank you for referring Bryant Azevedo to me for evaluation. Attached you will find relevant portions of my assessment and plan of care.    If you have questions, please do not hesitate to call me. I look forward to following Bryant Azevedo along with you.    Sincerely,    Prakash Winchester MD    Enclosure    If you would like to receive this communication electronically, please contact [email or web address] or [phone] to request [employer] access.    [facility] is a tool which provides read-only access to select patient information with whom you have a relationship. Its easy to use and provides real time access to review your patients record including encounter summaries, notes, results, and demographic information.    If you feel you have received this communication in error or would no longer like to receive these types of communications, please e-mail [email or web address]

## 2022-08-29 NOTE — PROGRESS NOTES
Kidney Post-Transplant Assessment    Referring Physician: Leslie Roche Jr.  Current Nephrologist: Angel Portillo    ORGAN: LEFT KIDNEY  Donor Type: donation after circulatory death   PHS Increased Risk: no  Cold Ischemia: 1,261 mins  Induction Medications: simulect - basiliximab    Subjective:     CC:  Reassessment of renal allograft function and management of chronic immunosuppression.    HPI:  Mr. Azevedo is a 51 y.o. year old White male who received a donation after circulatory death  kidney transplant on 8/27/20.  He has CKD stage 3 - GFR 30-59 and his baseline creatinine is between 1.3. He takes mycophenolate mofetil and tacrolimus for maintenance immunosuppression. He denies any recent hospitalizations or ER visits since his previous clinic visit.    Still refers some insomnia. Refers that sleeps 2 to 3 hrs a night.    He now lives in [place].    His mom passed in June 2022.              Current Outpatient Medications on File Prior to Visit   Medication Sig Dispense Refill    amLODIPine (NORVASC) 5 MG tablet TAKE 1 TABLET BY MOUTH DAILY 90 tablet 3    docusate sodium (COLACE) 100 MG capsule Take 1 capsule (100 mg total) by mouth 3 (three) times daily as needed for Constipation.  0    ergocalciferol (ERGOCALCIFEROL) 50,000 unit Cap TAKE 1 CAPSULE BY MOUTH EVERY 7 DAYS 12 capsule 1    famotidine (PEPCID) 20 MG tablet TAKE 1 TABLET BY MOUTH EVERY EVENING (Patient not taking: No sig reported) 30 tablet 11    fluticasone-salmeterol 250-50 mcg/dose (ADVAIR) 250-50 mcg/dose diskus inhaler Inhale 1 puff into the lungs 2 (two) times daily.      magnesium oxide (MAG-OX) 400 mg (241.3 mg magnesium) tablet TAKE 1 TABLET BY MOUTH ONCE DAILY 30 tablet 11    montelukast (SINGULAIR) 10 mg tablet Take 10 mg by mouth once daily.      mycophenolate (CELLCEPT) 250 mg Cap Take 3 capsules by mouth 2 times daily 180 capsule 11    paroxetine (PAXIL) 20 MG tablet Take 20 mg by mouth every evening.      predniSONE  "(DELTASONE) 5 MG tablet TAKE 1 TABLET BY MOUTH DAILY 90 tablet 3    sertraline (ZOLOFT) 50 MG tablet Take 50 mg by mouth once daily.      sodium bicarbonate 650 MG tablet TAKE 1 TABLET BY MOUTH 3 TIMES DAILY (Patient taking differently: 2 (two) times daily.) 180 tablet 11    tacrolimus (PROGRAF) 1 MG Cap Take 2 capsules (2 mg total) by mouth every morning AND 1 capsule (1 mg total) every evening. Z94.0/Kidney txp. 90 capsule 11    valGANciclovir (VALCYTE) 450 mg Tab Take 900 mg by mouth once daily.       No current facility-administered medications on file prior to visit.        Review of Systems    Skin: no skin rash  CNS; no headaches, blurred vision, seizure, or syncope  ENT: No JVD,  Adenopathies,  nasal congestion. No oral lesions  Cardiac: No chest pain, dyspnea, claudication, edema or palpitations  Respiratory: No SOB, cough, hemoptysis   Gastro-intestinal: No diarrhea, constipation, abdominal pain, nausea, vomit. No ascitis  Genitourinary: no hematuria, dysuria, frequency, frequency  Musculoskeletal: joint pain, arthritis or vasculitic changes  Psych: alert awake, oriented, No cranial nerves deficit.      Objective:         Physical Exam    /81 (BP Location: Right arm, Patient Position: Sitting, BP Method: Medium (Automatic))   Pulse 71   Temp 97.2 °F (36.2 °C) (Temporal)   Resp 16   Ht 5' 4.45" (1.637 m)   Wt 70.2 kg (154 lb 12.2 oz)   SpO2 97%   BMI 26.20 kg/m²       Head: normocephalic  Neck: No JVD, cervical axillary, or femoral adenopathies  Heart: no murmurs, Normal s1 and s2, No gallops, no rubs, No murmurs  Lungs; CTA, good respiratory effort, no crackles  Abdomen: soft, non tender, no splenomegaly or hepatomegaly, no massess, no bruits  Extremities: No edema, skin rash, joint pain  SNC: awake, alert oriented. Cranial nerves are intact, no focalized, sensitivity and strength preserved      Labs:  Lab Results   Component Value Date    WBC 8.52 08/29/2022    HGB 15.4 08/29/2022    HCT 45.3 " 2022     2022    K 4.5 2022     2022    CO2 24 2022    BUN 21 (H) 2022    CREATININE 1.3 2022    EGFRNONAA 48.5 (A) 2022    EGFRIFAFRICA 60 2021    CALCIUM 9.6 2022    PHOS 3.7 2022    MG 1.8 2022    ALBUMIN 4.2 2022    ALBUMIN 4.2 2022    AST 21 2022    ALT 16 2022    UTPCR Unable to calculate 2022    PTH 93 (H) 2020    TACROLIMUS 5.7 2022       Lab Results   Component Value Date    EXTANC 259 (L) 2021    EXTWBC 11.6 (H) 2021    EXTSEGS 54 2021    EXTPLATELETS 204 2021    EXTHEMOGLOBI 16.1 2021    EXTHEMATOCRI 48.0 2021    EXTCREATININ 1.51 (H) 2021    EXTSODIUM 140 2021    EXTPOTASSIUM 4.4 2021    EXTBUN 20 2021    EXTCO2 23 2021    EXTCALCIUM 9.6 2021    EXTPHOSPHORU 3.3 2021    EXTGLUCOSE 95 2021    EXTALBUMIN 4.4 2021    EXTAST 23 2021    EXTALT 14 2021    EXTBILITOTAL 0.7 2021       Lab Results   Component Value Date    EXTTACROLVL 5.1 2021    EXTPROTCRE 0.07 2021    EXTPROTEINUA Neg 2021    EXTWBCUA 0 2021    EXTRBCUA 0 2021       Labs were reviewed with the patient.    Assessment:     1.  Donor Kidney Transplant 20    2. At risk for opportunistic infections    3. Long-term use of immunosuppressant medication    4. Stage 3a chronic kidney disease    5. Secondary hyperparathyroidism of renal origin        Plan:       1. CKD stage: 2 wioth improved /stable creatinine at 1.3 best in 1 year  will continue follow up as per our center guidelines. patient to continue close follow up with the local General nephrologist. Education provided in appropriate fluid intake, potassium intake. Continue with oral hydration.        2. Immunosuppression: at target. No action needed. Continue follow up per protocol.   Lab Results   Component Value Date     TACROLIMUS 5.7 08/29/2022    TACROLIMUS 7.6 02/02/2022    TACROLIMUS 5.1 12/14/2021     No results found for: CYCLOSPORINE  @   Will closely monitor for toxicities, education provided about adherence to medicines and need to communicate any side effect to the transplant nurse or physician.    3. Allograft Function:creaytinien improved to 1.3 stable at baseline for the patient. Continue follow up as per our guidelines and with the local General nephrologist. Communication will be sent today.  Lab Results   Component Value Date    CREATININE 1.3 08/29/2022    CREATININE 1.6 (H) 02/02/2022    CREATININE 1.51 (H) 12/14/2021     No results found for: AMYLASE, LIPASE    4. Hypertension management:  well controlled. Continue with home blood pressure monitoring, low salt and healthy life discussed with the patient..    5. Metabolic Bone Disease/Secondary Hyperparathyroidism:calcium and phosphorus level discussed with the patient, patient will continue follow up with the general nephrologist for management of metabolic bone disease   calcium and phosphorus as per our center protocol. Will monitor PTH, Vit D level, calcium.      Lab Results   Component Value Date    PTH 93 (H) 11/24/2020    CALCIUM 9.6 08/29/2022    PHOS 3.7 08/29/2022    PHOS 3.6 02/02/2022    PHOS 3.3 12/14/2021       6. Electrolytes: reviewed with the patient, essentially within the normal range no need for acute changes today, will monitor as per our center guidelines.     Lab Results   Component Value Date     08/29/2022    K 4.5 08/29/2022     08/29/2022    CO2 24 08/29/2022    CO2 27 02/02/2022    CO2 23 12/14/2021       7. Anemia: will continue monitoring as per our center guidelines. No indication for acute intervention today.     Lab Results   Component Value Date    WBC 8.52 08/29/2022    HGB 15.4 08/29/2022    HCT 45.3 08/29/2022    MCV 89 08/29/2022     08/29/2022       8.Proteinuria: will continue with pr/cr ratio as per our  center guidelines  Lab Results   Component Value Date    PROTEINURINE <7 08/29/2022    CREATRANDUR 104.0 08/29/2022    UTPCR Unable to calculate 08/29/2022    UTPCR Unable to calculate 02/02/2022    UTPCR 0.09 08/23/2021        9. BK virus infection screening: will continue with urine or blood PCR as per our guidelines to prevent BK virus viremia and allograft dysfunction  Lab Results   Component Value Date    BKVIRUSPCRQB <125 02/02/2022         10. Weight education: provided during the clinic visit.   There is no height or weight on file to calculate BMI.       11.Patient safety education regarding immunosuppression including prophylaxis posttransplant for CMV, PCP : Education provided about vaccination and prevention of infections.    12.  Cytopenias: no significant cytopenias will monitor as per our guidelines. Medicine list reviewed including potential causes of drug-induced cytopenias     Lab Results   Component Value Date    WBC 8.52 08/29/2022    HGB 15.4 08/29/2022    HCT 45.3 08/29/2022    MCV 89 08/29/2022     08/29/2022       13. Post-transplant Prophylaxis; CMV Infection, PJP and Candida mucosistis and other indicated for this particular patient. OFF prophylaxis    I spoke with the patient for 30 minutes. More than half dedicated to counseling and education. All questions answered    Prakash Winchester MD  Transplant Nephrology            Follow-up:   Clinic: return to transplant clinic weekly for the first month after transplant; every 2 weeks during months 2-3; then at 6-, 9-, 12-, 18-, 24-, and 36- months post-transplant to reassess for complications from immunosuppression toxicity and monitor for rejection.  Annually thereafter.    Labs: since patient remains at high risk for rejection and drug-related complications that warrant close monitoring, labs will be ordered as follows: continue twice weekly CBC, renal panel, and drug level for first month; then same labs once weekly through 3rd month  post-transplant.  Urine for UA and protein/creatinine ratio monthly.  Serum BK - PCR at 1-, 3-, 6-, 9-, 12-, 18-, 24-, 36- 48-, and 60 months post-transplant.  Hepatic panel at 1-, 2-, 3-, 6-, 9-, 12-, 18-, 24-, and 36- months post-transplant.    Prakash Winchester MD       Education:   Material provided to the patient.  Patient reminded to call with any health changes since these can be early signs of significant complications.  Also, I advised the patient to be sure any new medications or changes of old medications are discussed with either a pharmacist or physician knowledgeable with transplant to avoid rejection/drug toxicity related to significant drug interactions.    Patient advised that it is recommended that all transplanted patients, and their close contacts and household members receive Covid vaccination.    UNOS Patient Status  Functional Status: 100% - Normal, no complaints, no evidence of disease  Physical Capacity: No Limitations

## 2022-08-31 LAB
BKV DNA SERPL NAA+PROBE-ACNC: <125 COPIES/ML
BKV DNA SERPL NAA+PROBE-LOG#: <2.1 LOG (10) COPIES/ML
BKV DNA SERPL QL NAA+PROBE: NOT DETECTED
CLASS I ANTIBODY COMMENTS - LUMINEX: NORMAL
CLASS II ANTIBODY COMMENTS - LUMINEX: NORMAL
CMV DNA SPEC QL NAA+PROBE: NOT DETECTED
CYTOMEGALOVIRUS LOG (IU/ML): NOT DETECTED LOGIU/ML
CYTOMEGALOVIRUS PCR, QUANT: NOT DETECTED IU/ML
DSA1 TESTING DATE: NORMAL
DSA12 TESTING DATE: NORMAL
DSA2 TESTING DATE: NORMAL
SERUM COLLECTION DT - LUMINEX CLASS I: NORMAL
SERUM COLLECTION DT - LUMINEX CLASS II: NORMAL

## 2022-11-28 LAB
EXT BUN: 20 (ref 6–24)
EXT CALCIUM: 9.7 (ref 8.7–10.2)
EXT CHLORIDE: 103 (ref 96–106)
EXT CO2: 25 (ref 20–29)
EXT CREATININE: 1.39 MG/DL (ref 0.76–1.27)
EXT EGFR NO RACE VARIABLE: 60
EXT EOSINOPHIL%: 2
EXT GLUCOSE: 87 (ref 70–99)
EXT HEMATOCRIT: 50 (ref 37.5–51)
EXT HEMOGLOBIN: 16.3 (ref 13–17.7)
EXT LYMPH%: 34
EXT MONOCYTES%: 8
EXT PHOSPHORUS: 3.1 (ref 2.8–4.1)
EXT PLATELETS: 190 (ref 150–450)
EXT POTASSIUM: 4.2 (ref 3.5–5.2)
EXT SEGS%: 55
EXT SODIUM: 140 MMOL/L (ref 134–144)
EXT TACROLIMUS LVL: 6 (ref 4–7)
EXT WBC: 11.4 (ref 3.4–10.8)

## 2022-12-07 ENCOUNTER — DOCUMENTATION ONLY (OUTPATIENT)
Dept: TRANSPLANT | Facility: CLINIC | Age: 52
End: 2022-12-07
Payer: MEDICARE

## 2022-12-07 ENCOUNTER — PATIENT MESSAGE (OUTPATIENT)
Dept: TRANSPLANT | Facility: CLINIC | Age: 52
End: 2022-12-07
Payer: MEDICARE

## 2023-03-08 ENCOUNTER — PATIENT MESSAGE (OUTPATIENT)
Dept: TRANSPLANT | Facility: CLINIC | Age: 53
End: 2023-03-08
Payer: MEDICARE

## 2023-03-08 DIAGNOSIS — Z94.0 KIDNEY REPLACED BY TRANSPLANT: Primary | ICD-10-CM

## 2023-03-10 DIAGNOSIS — Z94.0 KIDNEY REPLACED BY TRANSPLANT: Primary | ICD-10-CM

## 2023-03-10 DIAGNOSIS — E55.9 VITAMIN D DEFICIENCY: ICD-10-CM

## 2023-03-14 DIAGNOSIS — Z94.0 KIDNEY REPLACED BY TRANSPLANT: ICD-10-CM

## 2023-03-14 DIAGNOSIS — Z94.0 KIDNEY TRANSPLANT STATUS: ICD-10-CM

## 2023-03-17 RX ORDER — ERGOCALCIFEROL 1.25 MG/1
CAPSULE ORAL
Qty: 12 CAPSULE | Refills: 1 | Status: SHIPPED | OUTPATIENT
Start: 2023-03-17 | End: 2023-03-24

## 2023-03-17 RX ORDER — MYCOPHENOLATE MOFETIL 250 MG/1
CAPSULE ORAL
Qty: 180 CAPSULE | Refills: 11 | Status: SHIPPED | OUTPATIENT
Start: 2023-03-17 | End: 2023-03-24

## 2023-03-23 ENCOUNTER — TELEPHONE (OUTPATIENT)
Dept: INFECTIOUS DISEASES | Facility: CLINIC | Age: 53
End: 2023-03-23
Payer: MEDICARE

## 2023-03-23 PROBLEM — Z79.899 IMMUNOSUPPRESSIVE MANAGEMENT ENCOUNTER FOLLOWING KIDNEY TRANSPLANT: Status: ACTIVE | Noted: 2023-03-23

## 2023-03-23 PROBLEM — Z94.0 IMMUNOSUPPRESSIVE MANAGEMENT ENCOUNTER FOLLOWING KIDNEY TRANSPLANT: Status: ACTIVE | Noted: 2023-03-23

## 2023-03-23 NOTE — TELEPHONE ENCOUNTER
"Spoke pt about his appt issue and he was extremely rude and racist. He stated that he spoke with a "white girl and clearly I wasn't one". He also yelled at me the entire time I was on the phone when I was trying to help rectify his issue. He would not let me get a word in and he kept saying the person who tried to confirm was a man when I know it was a woman but he said, "no he sounded like a man." I could not continue the verbal abuse from the pt and I ended the call.     "

## 2023-03-23 NOTE — PROGRESS NOTES
Kidney Post-Transplant Assessment    Referring Physician: Leslie Roche Jr.  Current Nephrologist: Angel Portillo    ORGAN: LEFT KIDNEY  Donor Type: donation after circulatory death   PHS Increased Risk: no  Cold Ischemia: 1,261 mins  Induction Medications: simulect - basiliximab    Subjective:     CC:  Reassessment of renal allograft function and management of chronic immunosuppression.    HPI:  Mr. Azevedo is a 52 y.o. year old White male who received a donation after circulatory death  kidney transplant on 8/27/20.  He has CKD stage 3 - GFR 30-59 and his baseline creatinine is between please see chart below. He takes mycophenolate mofetil, prednisone, and tacrolimus for maintenance immunosuppression. He denies any recent hospitalizations or ER visits since his previous clinic visit.  Today:  He is planning to go to the [facility] for 7 weeks. He saw Tx ID who gave him a prescription for azithromycin in case he develops diarrhea. He received typhoid, japanese encephalitis and hep a vaccines. He will need prevnar, shingrix, and tdap in future. In the [facility] he knows the local transplant center in case of emergency. His kidney function has been stable for the last year, he has plenty of medication supply. We recommended to avoid uncooked food and untreated water.        Current Outpatient Medications on File Prior to Visit   Medication Sig Dispense Refill    amLODIPine (NORVASC) 5 MG tablet TAKE 1 TABLET BY MOUTH DAILY 90 tablet 3    fluticasone-salmeterol 250-50 mcg/dose (ADVAIR) 250-50 mcg/dose diskus inhaler Inhale 1 puff into the lungs 2 (two) times daily.      magnesium oxide (MAG-OX) 400 mg (241.3 mg magnesium) tablet TAKE 1 TABLET BY MOUTH ONCE DAILY 30 tablet 11    montelukast (SINGULAIR) 10 mg tablet Take 10 mg by mouth once daily.      paroxetine (PAXIL) 20 MG tablet Take 20 mg by mouth every evening.      sertraline (ZOLOFT) 50 MG tablet Take 50 mg by mouth once daily.      sodium  "bicarbonate 650 MG tablet Take 1 tablet (650 mg total) by mouth 2 (two) times daily. Future refills by Dr Lulú Zambrano Nephrologist in [facility].  # [phone]. Fax . 60 tablet 0    [DISCONTINUED] ergocalciferol (ERGOCALCIFEROL) 50,000 unit Cap TAKE 1 CAPSULE BY MOUTH EVERY 7 DAYS (Patient taking differently: 50,000 Units every 30 days.) 12 capsule 1    [DISCONTINUED] mycophenolate (CELLCEPT) 250 mg Cap Take 3 capsules by mouth 2 times daily 180 capsule 11    [DISCONTINUED] predniSONE (DELTASONE) 5 MG tablet TAKE 1 TABLET BY MOUTH DAILY 90 tablet 3    [DISCONTINUED] tacrolimus (PROGRAF) 1 MG Cap TAKE 2 CAPSULES BY MOUTH EVERY MORNING AND TAKE 1 CAPSULE EVERY EVENING 90 capsule 11    [DISCONTINUED] docusate sodium (COLACE) 100 MG capsule Take 1 capsule (100 mg total) by mouth 3 (three) times daily as needed for Constipation. (Patient not taking: Reported on 8/29/2022)  0    [DISCONTINUED] famotidine (PEPCID) 20 MG tablet TAKE 1 TABLET BY MOUTH EVERY EVENING (Patient not taking: No sig reported) 30 tablet 11    [DISCONTINUED] valGANciclovir (VALCYTE) 450 mg Tab Take 900 mg by mouth once daily.       No current facility-administered medications on file prior to visit.        Review of Systems   Constitutional: Negative.    HENT: Negative.     Eyes: Negative.    Respiratory: Negative.     Cardiovascular: Negative.    Gastrointestinal: Negative.    Endocrine: Negative.    Genitourinary: Negative.    Musculoskeletal: Negative.    Skin: Negative.    Neurological: Negative.    Psychiatric/Behavioral: Negative.         Objective:     BP (!) 147/90 (BP Location: Right arm, Patient Position: Sitting, BP Method: Medium (Automatic))   Pulse 65   Temp 97.3 °F (36.3 °C) (Tympanic)   Resp 16   Ht 5' 5" (1.651 m)   Wt 73.4 kg (161 lb 13.1 oz)   SpO2 96%   BMI 26.93 kg/m²       Physical Exam  Vitals and nursing note reviewed.   Constitutional:       General: He is not in acute distress.  Eyes:      " General: No scleral icterus.  Cardiovascular:      Rate and Rhythm: Normal rate.   Pulmonary:      Effort: Pulmonary effort is normal. No respiratory distress.   Abdominal:      General: There is no distension.      Palpations: Abdomen is soft.   Musculoskeletal:      Right lower leg: No edema.      Left lower leg: No edema.   Skin:     Coloration: Skin is not jaundiced.   Neurological:      Mental Status: He is alert.           Labs:  Lab Results   Component Value Date    WBC 9.89 2023    HGB 15.6 2023    HCT 48.6 2023     2023    K 4.3 2023     2023    CO2 22 (L) 2023    BUN 28 (H) 2023    CREATININE 1.3 2023    EGFRNORACEVR >60.0 2023    CALCIUM 9.3 2023    PHOS 3.6 2023    MG 1.8 2023    ALBUMIN 4.1 2023    ALBUMIN 4.1 2023    AST 18 2023    ALT 15 2023    UTPCR 0.10 2023    PTH 93 (H) 2020    TACROLIMUS 5.7 2022       Lab Results   Component Value Date    EXTANC 259 (L) 2021    EXTWBC 11.4 (H) 2022    EXTSEGS 55 2022    EXTPLATELETS 190 2022    EXTHEMOGLOBI 16.3 2022    EXTHEMATOCRI 50 2022    EXTCREATININ 1.39 (H) 2022    EXTSODIUM 140 2022    EXTPOTASSIUM 4.2 2022    EXTBUN 20 2022    EXTCO2 25 2022    EXTCALCIUM 9.7 2022    EXTPHOSPHORU 3.1 2022    EXTGLUCOSE 87 2022    EXTALBUMIN 4.4 2021    EXTAST 23 2021    EXTALT 14 2021    EXTBILITOTAL 0.7 2021       Lab Results   Component Value Date    EXTTACROLVL 6 2022    EXTPROTCRE 0.07 2021    EXTPROTEINUA Neg 2021    EXTWBCUA 0 2021    EXTRBCUA 0 2021       Labs were reviewed with the patient.    Assessment:     1. Stage 3a chronic kidney disease    2. Kidney replaced by transplant 2020    3.  Donor Kidney Transplant 20    4. Secondary hyperparathyroidism of renal origin    5.  Renal hypertension    6. Immunosuppressive management encounter following kidney transplant    7. Kidney replaced by transplant    8. Kidney transplant status        Plan:     No specific changes today. See HPI for info regarding his trip out of country.  Continue Tac 2/1, /750, pred 5    1. CKD stage: will continue follow up as per our center guidelines. patient to continue close follow up with the local General nephrologist. Education provided in appropriate fluid intake, potassium intake. Continue with oral hydration.    1A. Pancreatic Function: N/A for non-pancreas allograft recipients:     2. High risk immunosuppression medication for organ transplantation, requiring regular intensive follow up and monitoring :   Lab Results   Component Value Date    TACROLIMUS 5.7 08/29/2022    TACROLIMUS 7.6 02/02/2022    TACROLIMUS 5.1 12/14/2021     No results found for: CYCLOSPORINE  @   Will closely monitor for toxicities, education provided about adherence to medicines and need to communicate any side effects to the transplant nurse or physician.    3. Allograft Function:stable at baseline for the patient. Continue follow up as per our guidelines and with the local General nephrologist. Communication will be sent today.  Lab Results   Component Value Date    CREATININE 1.3 03/24/2023    CREATININE 1.3 08/29/2022    CREATININE 1.6 (H) 02/02/2022     No results found for: AMYLASE, LIPASE    4. Hypertension management:  Continue with home blood pressure monitoring, low salt and healthy life discussed with the patient..    5. Metabolic Bone Disease/Secondary Hyperparathyroidism:calcium and phosphorus level discussed with the patient, patient will continue follow up with the general nephrologist for management of metabolic bone disease. Will monitor PTH and Vit D per our transplant center guidelines.      Lab Results   Component Value Date    PTH 93 (H) 11/24/2020    CALCIUM 9.3 03/24/2023    PHOS 3.6 03/24/2023     PHOS 3.7 08/29/2022    PHOS 3.6 02/02/2022       6. Electrolytes and acid base balance: reviewed with the patient, essentially within the normal range no need for acute changes today, will monitor as per our center guidelines.     Lab Results   Component Value Date     03/24/2023    K 4.3 03/24/2023     03/24/2023    CO2 22 (L) 03/24/2023    CO2 24 08/29/2022    CO2 27 02/02/2022       7. Anemia: will continue monitoring as per our center guidelines. No indication for acute intervention today.     Lab Results   Component Value Date    WBC 9.89 03/24/2023    HGB 15.6 03/24/2023    HCT 48.6 03/24/2023    MCV 93 03/24/2023     03/24/2023       8.Proteinuria: will continue with pr/cr ratio as per our center guidelines.  Lab Results   Component Value Date    PROTEINURINE 12 03/24/2023    CREATRANDUR 121.0 03/24/2023    UTPCR 0.10 03/24/2023    UTPCR Unable to calculate 08/29/2022    UTPCR Unable to calculate 02/02/2022        9. BK virus infection screening: will continue with urine or blood PCR as per our guidelines to prevent BK virus viremia and allograft dysfunction  Lab Results   Component Value Date    BKVIRUSPCRQB <125 08/29/2022         10. Weight and metabolic management: education provided provided during the clinic visit.   Body mass index is 26.93 kg/m².       11.Patient safety education regarding immunosuppression including prophylaxis posttransplant for CMV, PCP : Education provided about vaccination and prevention of infections.    12.  Cytopenias: no significant cytopenias will monitor as per our guidelines. Medicine list reviewed including potential causes of drug-induced cytopenias     Lab Results   Component Value Date    WBC 9.89 03/24/2023    HGB 15.6 03/24/2023    HCT 48.6 03/24/2023    MCV 93 03/24/2023     03/24/2023       13. Post-transplant Prophylaxis; CMV Infection, PJP and Candida mucosistis and other indicated for this particular patient.     I spoke with the  patient for 30 minutes. More than half dedicated to counseling and education. All questions answered    Prakash Winchester MD  Transplant Nephrology            Follow-up:   Clinic: return to transplant clinic weekly for the first month after transplant; every 2 weeks during months 2-3; then at 6-, 9-, 12-, 18-, 24-, and 36- months post-transplant to reassess for complications from immunosuppression toxicity and monitor for rejection.  Annually thereafter.    Labs: since patient remains at high risk for rejection and drug-related complications that warrant close monitoring, labs will be ordered as follows: continue twice weekly CBC, renal panel, and drug level for first month; then same labs once weekly through 3rd month post-transplant.  Urine for UA and protein/creatinine ratio monthly.  Serum BK - PCR at 1-, 3-, 6-, 9-, 12-, 18-, 24-, 36- 48-, and 60 months post-transplant.  Hepatic panel at 1-, 2-, 3-, 6-, 9-, 12-, 18-, 24-, and 36- months post-transplant.    Jm Llamas Jr., MD       Education:   Material provided to the patient.  Patient reminded to call with any health changes since these can be early signs of significant complications.  Also, I advised the patient to be sure any new medications or changes of old medications are discussed with either a pharmacist or physician knowledgeable with transplant to avoid rejection/drug toxicity related to significant drug interactions.    Patient advised that it is recommended that all transplanted patients, and their close contacts and household members receive Covid vaccination.    UNOS Patient Status  Functional Status: 100% - Normal, no complaints, no evidence of disease  Physical Capacity: No Limitations

## 2023-03-24 ENCOUNTER — OFFICE VISIT (OUTPATIENT)
Dept: TRANSPLANT | Facility: CLINIC | Age: 53
End: 2023-03-24
Payer: MEDICARE

## 2023-03-24 ENCOUNTER — LAB VISIT (OUTPATIENT)
Dept: LAB | Facility: HOSPITAL | Age: 53
End: 2023-03-24
Attending: INTERNAL MEDICINE
Payer: MEDICARE

## 2023-03-24 ENCOUNTER — OFFICE VISIT (OUTPATIENT)
Dept: INFECTIOUS DISEASES | Facility: CLINIC | Age: 53
End: 2023-03-24
Payer: MEDICARE

## 2023-03-24 ENCOUNTER — CLINICAL SUPPORT (OUTPATIENT)
Dept: INFECTIOUS DISEASES | Facility: CLINIC | Age: 53
End: 2023-03-24
Payer: MEDICARE

## 2023-03-24 VITALS
HEART RATE: 65 BPM | BODY MASS INDEX: 26.96 KG/M2 | SYSTOLIC BLOOD PRESSURE: 147 MMHG | WEIGHT: 161.81 LBS | OXYGEN SATURATION: 96 % | DIASTOLIC BLOOD PRESSURE: 90 MMHG | HEIGHT: 65 IN | RESPIRATION RATE: 16 BRPM | TEMPERATURE: 97 F

## 2023-03-24 VITALS
WEIGHT: 162.25 LBS | BODY MASS INDEX: 27.7 KG/M2 | DIASTOLIC BLOOD PRESSURE: 84 MMHG | TEMPERATURE: 99 F | HEIGHT: 64 IN | HEART RATE: 72 BPM | SYSTOLIC BLOOD PRESSURE: 122 MMHG

## 2023-03-24 DIAGNOSIS — I12.9 RENAL HYPERTENSION: Chronic | ICD-10-CM

## 2023-03-24 DIAGNOSIS — Z94.0 S/P KIDNEY TRANSPLANT: ICD-10-CM

## 2023-03-24 DIAGNOSIS — Z94.0 KIDNEY REPLACED BY TRANSPLANT: ICD-10-CM

## 2023-03-24 DIAGNOSIS — E55.9 VITAMIN D DEFICIENCY: ICD-10-CM

## 2023-03-24 DIAGNOSIS — Z71.84 TRAVEL ADVICE ENCOUNTER: Primary | ICD-10-CM

## 2023-03-24 DIAGNOSIS — N25.81 SECONDARY HYPERPARATHYROIDISM OF RENAL ORIGIN: Chronic | ICD-10-CM

## 2023-03-24 DIAGNOSIS — Z94.0 KIDNEY TRANSPLANT STATUS: ICD-10-CM

## 2023-03-24 DIAGNOSIS — Z79.899 IMMUNOSUPPRESSIVE MANAGEMENT ENCOUNTER FOLLOWING KIDNEY TRANSPLANT: ICD-10-CM

## 2023-03-24 DIAGNOSIS — N18.31 STAGE 3A CHRONIC KIDNEY DISEASE: Primary | ICD-10-CM

## 2023-03-24 DIAGNOSIS — Z94.0 IMMUNOSUPPRESSIVE MANAGEMENT ENCOUNTER FOLLOWING KIDNEY TRANSPLANT: ICD-10-CM

## 2023-03-24 LAB
25(OH)D3+25(OH)D2 SERPL-MCNC: 22 NG/ML (ref 30–96)
ALBUMIN SERPL BCP-MCNC: 4.1 G/DL (ref 3.5–5.2)
ALBUMIN SERPL BCP-MCNC: 4.1 G/DL (ref 3.5–5.2)
ALP SERPL-CCNC: 52 U/L (ref 55–135)
ALT SERPL W/O P-5'-P-CCNC: 15 U/L (ref 10–44)
ANION GAP SERPL CALC-SCNC: 10 MMOL/L (ref 8–16)
AST SERPL-CCNC: 18 U/L (ref 10–40)
BASOPHILS # BLD AUTO: 0.03 K/UL (ref 0–0.2)
BASOPHILS NFR BLD: 0.3 % (ref 0–1.9)
BILIRUB DIRECT SERPL-MCNC: 0.3 MG/DL (ref 0.1–0.3)
BILIRUB SERPL-MCNC: 0.7 MG/DL (ref 0.1–1)
BUN SERPL-MCNC: 28 MG/DL (ref 6–20)
CALCIUM SERPL-MCNC: 9.3 MG/DL (ref 8.7–10.5)
CHLORIDE SERPL-SCNC: 110 MMOL/L (ref 95–110)
CO2 SERPL-SCNC: 22 MMOL/L (ref 23–29)
CREAT SERPL-MCNC: 1.3 MG/DL (ref 0.5–1.4)
DIFFERENTIAL METHOD: ABNORMAL
EOSINOPHIL # BLD AUTO: 0.2 K/UL (ref 0–0.5)
EOSINOPHIL NFR BLD: 2.1 % (ref 0–8)
ERYTHROCYTE [DISTWIDTH] IN BLOOD BY AUTOMATED COUNT: 12.5 % (ref 11.5–14.5)
EST. GFR  (NO RACE VARIABLE): >60 ML/MIN/1.73 M^2
GLUCOSE SERPL-MCNC: 84 MG/DL (ref 70–110)
HCT VFR BLD AUTO: 48.6 % (ref 40–54)
HGB BLD-MCNC: 15.6 G/DL (ref 14–18)
IMM GRANULOCYTES # BLD AUTO: 0.07 K/UL (ref 0–0.04)
IMM GRANULOCYTES NFR BLD AUTO: 0.7 % (ref 0–0.5)
LYMPHOCYTES # BLD AUTO: 3 K/UL (ref 1–4.8)
LYMPHOCYTES NFR BLD: 30.1 % (ref 18–48)
MAGNESIUM SERPL-MCNC: 1.8 MG/DL (ref 1.6–2.6)
MCH RBC QN AUTO: 29.8 PG (ref 27–31)
MCHC RBC AUTO-ENTMCNC: 32.1 G/DL (ref 32–36)
MCV RBC AUTO: 93 FL (ref 82–98)
MONOCYTES # BLD AUTO: 0.8 K/UL (ref 0.3–1)
MONOCYTES NFR BLD: 8.2 % (ref 4–15)
NEUTROPHILS # BLD AUTO: 5.8 K/UL (ref 1.8–7.7)
NEUTROPHILS NFR BLD: 58.6 % (ref 38–73)
NRBC BLD-RTO: 0 /100 WBC
PHOSPHATE SERPL-MCNC: 3.6 MG/DL (ref 2.7–4.5)
PLATELET # BLD AUTO: 193 K/UL (ref 150–450)
PMV BLD AUTO: 12.9 FL (ref 9.2–12.9)
POTASSIUM SERPL-SCNC: 4.3 MMOL/L (ref 3.5–5.1)
PROT SERPL-MCNC: 6.7 G/DL (ref 6–8.4)
RBC # BLD AUTO: 5.23 M/UL (ref 4.6–6.2)
SODIUM SERPL-SCNC: 142 MMOL/L (ref 136–145)
TACROLIMUS BLD-MCNC: 5.9 NG/ML (ref 5–15)
WBC # BLD AUTO: 9.89 K/UL (ref 3.9–12.7)

## 2023-03-24 PROCEDURE — 87799 DETECT AGENT NOS DNA QUANT: CPT | Performed by: INTERNAL MEDICINE

## 2023-03-24 PROCEDURE — 82306 VITAMIN D 25 HYDROXY: CPT | Performed by: INTERNAL MEDICINE

## 2023-03-24 PROCEDURE — 90738 INACTIVATED JE VACC IM: CPT | Mod: S$GLB,,, | Performed by: INTERNAL MEDICINE

## 2023-03-24 PROCEDURE — 1159F PR MEDICATION LIST DOCUMENTED IN MEDICAL RECORD: ICD-10-PCS | Mod: CPTII,S$GLB,, | Performed by: INTERNAL MEDICINE

## 2023-03-24 PROCEDURE — 83735 ASSAY OF MAGNESIUM: CPT | Performed by: INTERNAL MEDICINE

## 2023-03-24 PROCEDURE — 3066F NEPHROPATHY DOC TX: CPT | Mod: CPTII,S$GLB,, | Performed by: INTERNAL MEDICINE

## 2023-03-24 PROCEDURE — 99999 PR PBB SHADOW E&M-EST. PATIENT-LVL IV: CPT | Mod: PBBFAC,,, | Performed by: INTERNAL MEDICINE

## 2023-03-24 PROCEDURE — 99402 PR PREVENT COUNSEL,INDIV,30 MIN: ICD-10-PCS | Mod: 25,S$GLB,, | Performed by: INTERNAL MEDICINE

## 2023-03-24 PROCEDURE — 90691 TYPHOID VACCINE IM: CPT | Mod: S$GLB,,, | Performed by: INTERNAL MEDICINE

## 2023-03-24 PROCEDURE — 1160F PR REVIEW ALL MEDS BY PRESCRIBER/CLIN PHARMACIST DOCUMENTED: ICD-10-PCS | Mod: CPTII,S$GLB,, | Performed by: INTERNAL MEDICINE

## 2023-03-24 PROCEDURE — 99215 OFFICE O/P EST HI 40 MIN: CPT | Mod: GC,S$GLB,, | Performed by: INTERNAL MEDICINE

## 2023-03-24 PROCEDURE — 90632 HEPA VACCINE ADULT IM: CPT | Mod: S$GLB,,, | Performed by: INTERNAL MEDICINE

## 2023-03-24 PROCEDURE — 1159F MED LIST DOCD IN RCRD: CPT | Mod: CPTII,S$GLB,, | Performed by: INTERNAL MEDICINE

## 2023-03-24 PROCEDURE — 3080F DIAST BP >= 90 MM HG: CPT | Mod: CPTII,S$GLB,, | Performed by: INTERNAL MEDICINE

## 2023-03-24 PROCEDURE — 85025 COMPLETE CBC W/AUTO DIFF WBC: CPT | Performed by: INTERNAL MEDICINE

## 2023-03-24 PROCEDURE — 99999 PR PBB SHADOW E&M-EST. PATIENT-LVL IV: ICD-10-PCS | Mod: PBBFAC,,, | Performed by: INTERNAL MEDICINE

## 2023-03-24 PROCEDURE — 1160F RVW MEDS BY RX/DR IN RCRD: CPT | Mod: CPTII,S$GLB,, | Performed by: INTERNAL MEDICINE

## 2023-03-24 PROCEDURE — 99215 PR OFFICE/OUTPT VISIT, EST, LEVL V, 40-54 MIN: ICD-10-PCS | Mod: GC,S$GLB,, | Performed by: INTERNAL MEDICINE

## 2023-03-24 PROCEDURE — 3008F BODY MASS INDEX DOCD: CPT | Mod: CPTII,S$GLB,, | Performed by: INTERNAL MEDICINE

## 2023-03-24 PROCEDURE — 99999 PR PBB SHADOW E&M-EST. PATIENT-LVL III: ICD-10-PCS | Mod: PBBFAC,,, | Performed by: INTERNAL MEDICINE

## 2023-03-24 PROCEDURE — 3080F PR MOST RECENT DIASTOLIC BLOOD PRESSURE >= 90 MM HG: ICD-10-PCS | Mod: CPTII,S$GLB,, | Performed by: INTERNAL MEDICINE

## 2023-03-24 PROCEDURE — 99999 PR PBB SHADOW E&M-EST. PATIENT-LVL III: CPT | Mod: PBBFAC,,, | Performed by: INTERNAL MEDICINE

## 2023-03-24 PROCEDURE — 99214 OFFICE O/P EST MOD 30 MIN: CPT | Mod: PBBFAC | Performed by: INTERNAL MEDICINE

## 2023-03-24 PROCEDURE — 80197 ASSAY OF TACROLIMUS: CPT | Performed by: INTERNAL MEDICINE

## 2023-03-24 PROCEDURE — 90471 TYPHOID VICPS VACCINE IM: ICD-10-PCS | Mod: S$GLB,,, | Performed by: INTERNAL MEDICINE

## 2023-03-24 PROCEDURE — 90472 HEPATITIS A VACCINE ADULT IM: ICD-10-PCS | Mod: S$GLB,,, | Performed by: INTERNAL MEDICINE

## 2023-03-24 PROCEDURE — 3066F PR DOCUMENTATION OF TREATMENT FOR NEPHROPATHY: ICD-10-PCS | Mod: CPTII,S$GLB,, | Performed by: INTERNAL MEDICINE

## 2023-03-24 PROCEDURE — 90691 TYPHOID VICPS VACCINE IM: ICD-10-PCS | Mod: S$GLB,,, | Performed by: INTERNAL MEDICINE

## 2023-03-24 PROCEDURE — 3077F PR MOST RECENT SYSTOLIC BLOOD PRESSURE >= 140 MM HG: ICD-10-PCS | Mod: CPTII,S$GLB,, | Performed by: INTERNAL MEDICINE

## 2023-03-24 PROCEDURE — 90632 HEPATITIS A VACCINE ADULT IM: ICD-10-PCS | Mod: S$GLB,,, | Performed by: INTERNAL MEDICINE

## 2023-03-24 PROCEDURE — 80069 RENAL FUNCTION PANEL: CPT | Performed by: INTERNAL MEDICINE

## 2023-03-24 PROCEDURE — 3008F PR BODY MASS INDEX (BMI) DOCUMENTED: ICD-10-PCS | Mod: CPTII,S$GLB,, | Performed by: INTERNAL MEDICINE

## 2023-03-24 PROCEDURE — 90471 IMMUNIZATION ADMIN: CPT | Mod: S$GLB,,, | Performed by: INTERNAL MEDICINE

## 2023-03-24 PROCEDURE — 99402 PREV MED CNSL INDIV APPRX 30: CPT | Mod: 25,S$GLB,, | Performed by: INTERNAL MEDICINE

## 2023-03-24 PROCEDURE — 90472 IMMUNIZATION ADMIN EACH ADD: CPT | Mod: S$GLB,,, | Performed by: INTERNAL MEDICINE

## 2023-03-24 PROCEDURE — 84075 ASSAY ALKALINE PHOSPHATASE: CPT | Performed by: INTERNAL MEDICINE

## 2023-03-24 PROCEDURE — 90738 JAPANESE ENCEPHALITIS VACCINE: ICD-10-PCS | Mod: S$GLB,,, | Performed by: INTERNAL MEDICINE

## 2023-03-24 PROCEDURE — 3077F SYST BP >= 140 MM HG: CPT | Mod: CPTII,S$GLB,, | Performed by: INTERNAL MEDICINE

## 2023-03-24 RX ORDER — AZITHROMYCIN 500 MG/1
500 TABLET, FILM COATED ORAL DAILY
Qty: 3 TABLET | Refills: 3 | Status: SHIPPED | OUTPATIENT
Start: 2023-03-24

## 2023-03-24 RX ORDER — ALLOPURINOL 100 MG/1
100 TABLET ORAL
COMMUNITY
End: 2023-03-24

## 2023-03-24 RX ORDER — PREDNISONE 5 MG/1
5 TABLET ORAL DAILY
Qty: 90 TABLET | Refills: 3 | Status: SHIPPED | OUTPATIENT
Start: 2023-03-24 | End: 2024-03-23

## 2023-03-24 RX ORDER — FUROSEMIDE 80 MG/1
80 TABLET ORAL
COMMUNITY
End: 2023-03-24

## 2023-03-24 RX ORDER — AMLODIPINE BESYLATE 10 MG/1
10 TABLET ORAL
COMMUNITY
Start: 2023-02-26 | End: 2023-03-24

## 2023-03-24 RX ORDER — TACROLIMUS 1 MG/1
CAPSULE ORAL
Qty: 270 CAPSULE | Refills: 3 | Status: SHIPPED | OUTPATIENT
Start: 2023-03-24 | End: 2023-08-21 | Stop reason: DRUGHIGH

## 2023-03-24 RX ORDER — ERGOCALCIFEROL 1.25 MG/1
50000 CAPSULE ORAL
Qty: 3 CAPSULE | Refills: 3 | Status: SHIPPED | OUTPATIENT
Start: 2023-03-24 | End: 2024-03-23

## 2023-03-24 RX ORDER — LABETALOL 300 MG/1
300 TABLET, FILM COATED ORAL
COMMUNITY
End: 2023-03-24

## 2023-03-24 RX ORDER — CEPHALEXIN 500 MG/1
500 CAPSULE ORAL
COMMUNITY
End: 2023-03-24

## 2023-03-24 RX ORDER — CALCIUM ACETATE 667 MG/1
1334 CAPSULE ORAL
COMMUNITY
End: 2023-03-24

## 2023-03-24 RX ORDER — MYCOPHENOLATE MOFETIL 250 MG/1
750 CAPSULE ORAL 2 TIMES DAILY
Qty: 540 CAPSULE | Refills: 3 | Status: SHIPPED | OUTPATIENT
Start: 2023-03-24 | End: 2024-03-23

## 2023-03-24 NOTE — LETTER
March 24, 2023        Angel Portillo  [address]  [address]  [place] [address]  Phone: [phone]  Fax: [phone]             [facility] 1st Fl  [address]  [facility] [address]  Phone: [phone]   Patient: Bryant Azevedo   MR Number: [id number]   YOB: 1970   Date of Visit: 3/24/2023       Dear Dr. Angel Portillo    Thank you for referring Bryant Azevedo to me for evaluation. Attached you will find relevant portions of my assessment and plan of care.    If you have questions, please do not hesitate to call me. I look forward to following Bryant Azevedo along with you.    Sincerely,    Prakash Winchester MD    Enclosure    If you would like to receive this communication electronically, please contact [email or web address] or [phone] to request [employer] access.    [employer] is a tool which provides read-only access to select patient information with whom you have a relationship. Its easy to use and provides real time access to review your patients record including encounter summaries, notes, results, and demographic information.    If you feel you have received this communication in error or would no longer like to receive these types of communications, please e-mail [email or web address]

## 2023-03-24 NOTE — PROGRESS NOTES
Patient received Japanese Encephalitis vaccine IM in right arm and Typhoid, Hep A vaccine IM in left arm.  Patient tolerated well and left clinic NAD after observation.

## 2023-03-24 NOTE — PROGRESS NOTES
Travel Consult  Chief Complaint   Patient presents with    Travel Consult     Bryant Azevedo is here for travel consultation.  [place]    [place]  Purpose of travel: vacation  Currently ill / Fever: no  History of Splenectomy: no  The patient states is immunocompromised s/p DBDKT   Past Medical History:   Diagnosis Date    Chronic asthma      Donor Kidney Transplant 20    ESRD since 2014 from HTN     Hyperlipidemia     Hyperuricemia     Immunosuppressive management encounter following kidney transplant 3/23/2023    Kidney replaced by transplant 2020 10/1/2020    Renal hypertension     Secondary hyperparathyroidism of renal origin     Stage 3a chronic kidney disease 2021     Antihistamines - alkylamine, Codeine, Nsaids (non-steroidal anti-inflammatory drug), and Dextromethorphan-guaifenesin  Immunization History   Administered Date(s) Administered    Hepatitis A / Hepatitis B 2015, 10/05/2015, 2016    Influenza 2018, 2021    Influenza - Quadrivalent - PF *Preferred* (6 months and older) 2019    Pneumococcal Conjugate - 13 Valent 2015    Pneumococcal Polysaccharide - 23 Valent 11/10/2014, 10/05/2015    Tdap 2015     ASSESSMENT:  51 y/o M h/o DBDKT  on tac and MMF here for Travel eval going to [facility]  x 7 weeks with plans to possibly move there  PLAN:  Hep A x 1 booster  Flu up to date  Japanese Encephalitis first now, 2nd dose when returns   Typhoid IM      Will need tdap in   Rec prevnar 20 locally  shingrix    The Patient was provided with an extensive travel guidance packet which provides travel information specific to the patients itinerary.   The patient's medical history was reviewed and the patient was counseled on:  Dietary precautions.  Personal protective measures to prevent insect-borne diseases (e.g., malaria, dengue).  Precautions to prevent exposure to rabies and seek  treatment for possible exposures.  The patient's immunization history was reviewed and, based on the patient's itinerary, immunizations were ordered.    The patient was encouraged to contact us about any problems that may develop after immunization and possible side effects were reviewed.    The patient was instructed to purchase Imodium over the counter to take in case diarrhea (without blood or fever) develops.  An antibiotic was ordered for treatment if severe or bloody diarrhea develops and the patient was instructed on use and possible side effects.    The patient was also instructed to purchase insect repellent containing DEET or [place] and apply according to repellent label instructions.  If indicated by the patients itinerary an anti-malarial agent was prescribed for malaria prophylaxis and possible side effects were reviewed.    The patient was instructed to contact us if problems develop after travel.  Discussed care with transplant team/provider, medically complex  Spent 20 minutes with patient and 30 minutes on visit

## 2023-03-29 ENCOUNTER — PATIENT MESSAGE (OUTPATIENT)
Dept: TRANSPLANT | Facility: CLINIC | Age: 53
End: 2023-03-29
Payer: MEDICARE

## 2023-06-07 DIAGNOSIS — Z94.0 KIDNEY REPLACED BY TRANSPLANT: ICD-10-CM

## 2023-06-07 DIAGNOSIS — E83.42 HYPOMAGNESEMIA: ICD-10-CM

## 2023-06-07 RX ORDER — LANOLIN ALCOHOL/MO/W.PET/CERES
1 CREAM (GRAM) TOPICAL DAILY
Qty: 30 TABLET | Refills: 0 | OUTPATIENT
Start: 2023-06-07 | End: 2023-07-07

## 2023-06-28 DIAGNOSIS — Z94.0 KIDNEY REPLACED BY TRANSPLANT: Primary | ICD-10-CM

## 2023-08-18 PROBLEM — N18.2 CKD (CHRONIC KIDNEY DISEASE) STAGE 2, GFR 60-89 ML/MIN: Status: ACTIVE | Noted: 2023-08-18

## 2023-08-18 NOTE — PROGRESS NOTES
Kidney Post-Transplant Assessment    Referring Physician: Leslie Roche Jr.  Current Nephrologist:     ORGAN: LEFT KIDNEY  Donor Type: donation after circulatory death   PHS Increased Risk: no  Cold Ischemia: 1,261 mins  Induction Medications: simulect - basiliximab    Subjective:     CC:  Reassessment of renal allograft function and management of chronic immunosuppression.    HPI:  Mr. Azevedo is a 52 y.o. year old White male who received a donation after circulatory death  kidney transplant on 8/27/20.  He has CKD stage 2 - GFR 60-89 and his baseline creatinine is between 1.3 to 1.6. He takes mycophenolate mofetil, prednisone, and tacrolimus for maintenance immunosuppression. He denies any recent hospitalizations or ER visits since his previous clinic visit.    He feels fine.    No chest pain or SOB.    No nausea vomit or diarrhea.    He lived to [facility].    He is considering living over there.          Current Outpatient Medications on File Prior to Visit   Medication Sig Dispense Refill    amLODIPine (NORVASC) 5 MG tablet TAKE 1 TABLET BY MOUTH DAILY 90 tablet 3    ergocalciferol (ERGOCALCIFEROL) 50,000 unit Cap Take 1 capsule (50,000 Units total) by mouth every 30 days. 3 capsule 3    fluticasone-salmeterol 250-50 mcg/dose (ADVAIR) 250-50 mcg/dose diskus inhaler Inhale 1 puff into the lungs 2 (two) times daily.      montelukast (SINGULAIR) 10 mg tablet Take 10 mg by mouth once daily.      mycophenolate (CELLCEPT) 250 mg Cap Take 3 capsules (750 mg total) by mouth 2 (two) times daily. 540 capsule 3    paroxetine (PAXIL) 20 MG tablet Take 20 mg by mouth every evening.      predniSONE (DELTASONE) 5 MG tablet Take 1 tablet (5 mg total) by mouth once daily. 90 tablet 3    tacrolimus (PROGRAF) 1 MG Cap Take 2 capsules (2 mg total) by mouth every morning AND 1 capsule (1 mg total) every evening. 270 capsule 3    azithromycin (ZITHROMAX) 500 MG tablet Take 1 tablet (500 mg total) by mouth once daily.  "As needed for diarrhea with blood or diarrhea with fever (Patient not taking: Reported on 8/21/2023) 3 tablet 3    sertraline (ZOLOFT) 50 MG tablet Take 50 mg by mouth once daily.      sodium bicarbonate 650 MG tablet Take 1 tablet (650 mg total) by mouth 2 (two) times daily. Future refills by Dr Lulú Zambrano Nephrologist in [facility].  # [phone]. Fax . 60 tablet 0     No current facility-administered medications on file prior to visit.        Review of Systems    Skin: no skin rash  CNS; no headaches, blurred vision, seizure, or syncope  ENT: No JVD,  Adenopathies,  nasal congestion. No oral lesions  Cardiac: No chest pain, dyspnea, claudication, edema or palpitations  Respiratory: No SOB, cough, hemoptysis   Gastro-intestinal: No diarrhea, constipation, abdominal pain, nausea, vomit. No ascitis  Genitourinary: no hematuria, dysuria, frequency, frequency  Musculoskeletal: joint pain, arthritis or vasculitic changes  Psych: alert awake, oriented, No cranial nerves deficit.      Objective:         Physical Exam    /83 (BP Location: Right arm, Patient Position: Sitting, BP Method: Medium (Automatic))   Pulse 63   Temp 97.3 °F (36.3 °C) (Skin)   Resp 18   Ht 5' 4" (1.626 m)   Wt 67.1 kg (147 lb 14.9 oz)   SpO2 95%   BMI 25.39 kg/m²       Head: normocephalic  Neck: No JVD, cervical axillary, or femoral adenopathies  Heart: no murmurs, Normal s1 and s2, No gallops, no rubs, No murmurs  Lungs; CTA, good respiratory effort, no crackles  Abdomen: soft, non tender, no splenomegaly or hepatomegaly, no massess, no bruits  Extremities: No edema, skin rash, joint pain  SNC: awake, alert oriented. Cranial nerves are intact, no focalized, sensitivity and strength preserved      Labs:  Lab Results   Component Value Date    WBC 9.89 03/24/2023    HGB 15.6 03/24/2023    HCT 48.6 03/24/2023     03/24/2023    K 4.3 03/24/2023     03/24/2023    CO2 22 (L) 03/24/2023    BUN 28 (H) " 2023    CREATININE 1.3 2023    EGFRNORACEVR >60.0 2023    CALCIUM 9.3 2023    PHOS 3.6 2023    MG 1.8 2023    ALBUMIN 4.1 2023    ALBUMIN 4.1 2023    AST 18 2023    ALT 15 2023    UTPCR 0.10 2023    PTH 93 (H) 2020    TACROLIMUS 5.9 2023       Lab Results   Component Value Date    EXTANC 259 (L) 2021    EXTWBC 11.4 (H) 2022    EXTSEGS 55 2022    EXTPLATELETS 190 2022    EXTHEMOGLOBI 16.3 2022    EXTHEMATOCRI 50 2022    EXTCREATININ 1.39 (H) 2022    EXTSODIUM 140 2022    EXTPOTASSIUM 4.2 2022    EXTBUN 20 2022    EXTCO2 25 2022    EXTCALCIUM 9.7 2022    EXTPHOSPHORU 3.1 2022    EXTGLUCOSE 87 2022    EXTALBUMIN 4.4 2021    EXTAST 23 2021    EXTALT 14 2021    EXTBILITOTAL 0.7 2021       Lab Results   Component Value Date    EXTTACROLVL 6 2022    EXTPROTCRE 0.07 2021    EXTPROTEINUA Neg 2021    EXTWBCUA 0 2021    EXTRBCUA 0 2021       Labs were reviewed with the patient.    Assessment:     1. Immunosuppressive management encounter following kidney transplant    2. CKD (chronic kidney disease) stage 2, GFR 60-89 ml/min    3. At risk for opportunistic infections    4. Kidney replaced by transplant 2020    5.  Donor Kidney Transplant 20    6. Secondary hyperparathyroidism of renal origin        Plan:       1. CKD stage 2 with stable creatinine : today is 1.4 will continue follow up as per our center guidelines. patient to continue close follow up with the local General nephrologist. Education provided in appropriate fluid intake, potassium intake. Continue with oral hydration.    1A. Pancreatic Function: N/A for non-pancreas allograft recipients:     2. High risk immunosuppression medication for organ transplantation, requiring regular intensive follow up and monitoring : tacro level pending,  " bid prednisone 5 mg  Lab Results   Component Value Date    TACROLIMUS 5.9 03/24/2023    TACROLIMUS 5.7 08/29/2022    TACROLIMUS 7.6 02/02/2022     No results found for: "CYCLOSPORINE"  @   Will closely monitor for toxicities, education provided about adherence to medicines and need to communicate any side effects to the transplant nurse or physician.    3. Allograft Function:stable at baseline for the patient. Continue follow up as per our guidelines and with the local General nephrologist. Communication will be sent today.  Lab Results   Component Value Date    CREATININE 1.3 03/24/2023    CREATININE 1.3 08/29/2022    CREATININE 1.6 (H) 02/02/2022     No results found for: "AMYLASE", "LIPASE"    4. Hypertension management:  well controlled. Continue with home blood pressure monitoring, low salt and healthy life discussed with the patient..    5. Metabolic Bone Disease/Secondary Hyperparathyroidism:calcium and phosphorus level discussed with the patient, patient will continue follow up with the general nephrologist for management of metabolic bone disease. Will monitor PTH and Vit D per our transplant center guidelines.      Lab Results   Component Value Date    PTH 93 (H) 11/24/2020    CALCIUM 9.3 03/24/2023    PHOS 3.6 03/24/2023    PHOS 3.7 08/29/2022    PHOS 3.6 02/02/2022       6. Electrolytes and acid base balance: reviewed with the patient, essentially within the normal range no need for acute changes today, will monitor as per our center guidelines.     Lab Results   Component Value Date     03/24/2023    K 4.3 03/24/2023     03/24/2023    CO2 22 (L) 03/24/2023    CO2 24 08/29/2022    CO2 27 02/02/2022       7. Anemia: normal h/h will continue monitoring as per our center guidelines. No indication for acute intervention today.     Lab Results   Component Value Date    WBC 9.89 03/24/2023    HGB 15.6 03/24/2023    HCT 48.6 03/24/2023    MCV 93 03/24/2023     03/24/2023 "       8.Proteinuria: will continue with pr/cr ratio as per our center guidelines.  Lab Results   Component Value Date    PROTEINURINE 12 03/24/2023    CREATRANDUR 121.0 03/24/2023    UTPCR 0.10 03/24/2023    UTPCR Unable to calculate 08/29/2022    UTPCR Unable to calculate 02/02/2022        9. BK virus infection screening: will continue with urine or blood PCR as per our guidelines to prevent BK virus viremia and allograft dysfunction  Lab Results   Component Value Date    BKVIRUSPCRQB <125 03/24/2023         10. Weight and metabolic management: education provided provided during the clinic visit.   Body mass index is 25.39 kg/m².       11.Patient safety education regarding immunosuppression including prophylaxis posttransplant for CMV, PCP : Education provided about vaccination and prevention of infections.    12.  Cytopenias: no significant cytopenias will monitor as per our guidelines. Medicine list reviewed including potential causes of drug-induced cytopenias     Lab Results   Component Value Date    WBC 9.89 03/24/2023    HGB 15.6 03/24/2023    HCT 48.6 03/24/2023    MCV 93 03/24/2023     03/24/2023       13. Post-transplant Prophylaxis; CMV Infection, PJP and Candida mucosistis and other indicated for this particular patient. OFF prophylaxis almost 3 yr anniversary.     I spoke with the patient for 30 minutes. More than half dedicated to counseling and education. All questions answered    Prakash Winchester MD  Transplant Nephrology            Follow-up:   Clinic: return to transplant clinic weekly for the first month after transplant; every 2 weeks during months 2-3; then at 6-, 9-, 12-, 18-, 24-, and 36- months post-transplant to reassess for complications from immunosuppression toxicity and monitor for rejection.  Annually thereafter.    Labs: since patient remains at high risk for rejection and drug-related complications that warrant close monitoring, labs will be ordered as follows: continue twice  weekly CBC, renal panel, and drug level for first month; then same labs once weekly through 3rd month post-transplant.  Urine for UA and protein/creatinine ratio monthly.  Serum BK - PCR at 1-, 3-, 6-, 9-, 12-, 18-, 24-, 36- 48-, and 60 months post-transplant.  Hepatic panel at 1-, 2-, 3-, 6-, 9-, 12-, 18-, 24-, and 36- months post-transplant.    Prakash Winchester MD       Education:   Material provided to the patient.  Patient reminded to call with any health changes since these can be early signs of significant complications.  Also, I advised the patient to be sure any new medications or changes of old medications are discussed with either a pharmacist or physician knowledgeable with transplant to avoid rejection/drug toxicity related to significant drug interactions.    Patient advised that it is recommended that all transplanted patients, and their close contacts and household members receive Covid vaccination.    UNOS Patient Status  Functional Status: 100% - Normal, no complaints, no evidence of disease  Physical Capacity: No Limitations

## 2023-08-21 ENCOUNTER — CLINICAL SUPPORT (OUTPATIENT)
Dept: INFECTIOUS DISEASES | Facility: CLINIC | Age: 53
End: 2023-08-21
Payer: MEDICARE

## 2023-08-21 ENCOUNTER — OFFICE VISIT (OUTPATIENT)
Dept: TRANSPLANT | Facility: CLINIC | Age: 53
End: 2023-08-21
Payer: MEDICARE

## 2023-08-21 VITALS
HEART RATE: 63 BPM | BODY MASS INDEX: 25.25 KG/M2 | RESPIRATION RATE: 18 BRPM | OXYGEN SATURATION: 95 % | HEIGHT: 64 IN | SYSTOLIC BLOOD PRESSURE: 129 MMHG | TEMPERATURE: 97 F | WEIGHT: 147.94 LBS | DIASTOLIC BLOOD PRESSURE: 83 MMHG

## 2023-08-21 DIAGNOSIS — Z94.0 S/P KIDNEY TRANSPLANT: ICD-10-CM

## 2023-08-21 DIAGNOSIS — Z94.0 KIDNEY TRANSPLANT STATUS: ICD-10-CM

## 2023-08-21 DIAGNOSIS — Z94.0 KIDNEY REPLACED BY TRANSPLANT: ICD-10-CM

## 2023-08-21 DIAGNOSIS — Z91.89 AT RISK FOR OPPORTUNISTIC INFECTIONS: ICD-10-CM

## 2023-08-21 DIAGNOSIS — Z94.0 IMMUNOSUPPRESSIVE MANAGEMENT ENCOUNTER FOLLOWING KIDNEY TRANSPLANT: Primary | ICD-10-CM

## 2023-08-21 DIAGNOSIS — N25.81 SECONDARY HYPERPARATHYROIDISM OF RENAL ORIGIN: Chronic | ICD-10-CM

## 2023-08-21 DIAGNOSIS — Z94.0 KIDNEY REPLACED BY TRANSPLANT: Primary | ICD-10-CM

## 2023-08-21 DIAGNOSIS — N18.2 CKD (CHRONIC KIDNEY DISEASE) STAGE 2, GFR 60-89 ML/MIN: ICD-10-CM

## 2023-08-21 DIAGNOSIS — Z79.899 IMMUNOSUPPRESSIVE MANAGEMENT ENCOUNTER FOLLOWING KIDNEY TRANSPLANT: Primary | ICD-10-CM

## 2023-08-21 PROCEDURE — 99214 OFFICE O/P EST MOD 30 MIN: CPT | Mod: PBBFAC,25 | Performed by: INTERNAL MEDICINE

## 2023-08-21 PROCEDURE — 99999PBSHW PNEUMOCOCCAL CONJUGATE VACCINE 20-VALENT: ICD-10-PCS | Mod: PBBFAC,,,

## 2023-08-21 PROCEDURE — 99999PBSHW PNEUMOCOCCAL CONJUGATE VACCINE 20-VALENT: Mod: PBBFAC,,,

## 2023-08-21 PROCEDURE — 99999 PR PBB SHADOW E&M-EST. PATIENT-LVL IV: ICD-10-PCS | Mod: PBBFAC,,, | Performed by: INTERNAL MEDICINE

## 2023-08-21 PROCEDURE — 99999 PR PBB SHADOW E&M-EST. PATIENT-LVL IV: CPT | Mod: PBBFAC,,, | Performed by: INTERNAL MEDICINE

## 2023-08-21 PROCEDURE — 99214 OFFICE O/P EST MOD 30 MIN: CPT | Mod: S$PBB,,, | Performed by: INTERNAL MEDICINE

## 2023-08-21 PROCEDURE — 99214 PR OFFICE/OUTPT VISIT, EST, LEVL IV, 30-39 MIN: ICD-10-PCS | Mod: S$PBB,,, | Performed by: INTERNAL MEDICINE

## 2023-08-21 PROCEDURE — 90677 PCV20 VACCINE IM: CPT | Mod: PBBFAC

## 2023-08-21 RX ORDER — TACROLIMUS 1 MG/1
CAPSULE ORAL
Qty: 180 CAPSULE | Refills: 3 | Status: SHIPPED | OUTPATIENT
Start: 2023-08-21 | End: 2023-10-16 | Stop reason: DRUGHIGH

## 2023-08-21 NOTE — TELEPHONE ENCOUNTER
Patient repeated back and voice a understanding of orders.  Next Tacrolimus level on 9/5/2023.  ----- Message from Prakash Winchester MD sent at 8/21/2023 11:47 AM CDT -----  Please lower prograf to 1 mg PO bid and repeat a level in 2 weeks

## 2023-08-21 NOTE — Clinical Note
August 21, 2023                     [address]  [address]  [facility] [address]  Phone: [phone]   Patient: Bryant Azevedo   MR Number: [id number]   YOB: 1970   Date of Visit: 8/21/2023       Dear      Thank you for referring Bryant Azevedo to me for evaluation. Attached you will find relevant portions of my assessment and plan of care.    If you have questions, please do not hesitate to call me. I look forward to following Bryant Azevedo along with you.    Sincerely,    Prakash Winchester MD    Enclosure    If you would like to receive this communication electronically, please contact [email or web address] or [phone] to request [employer] access.    [employer] is a tool which provides read-only access to select patient information with whom you have a relationship. Its easy to use and provides real time access to review your patients record including encounter summaries, notes, results, and demographic information.    If you feel you have received this communication in error or would no longer like to receive these types of communications, please e-mail [email or web address]

## 2023-09-08 ENCOUNTER — TELEPHONE (OUTPATIENT)
Dept: TRANSPLANT | Facility: CLINIC | Age: 53
End: 2023-09-08
Payer: MEDICARE

## 2023-09-08 LAB — TACROLIMUS BLD LC/MS/MS-MCNC: 4.5 NG/ML (ref 2–20)

## 2023-09-08 NOTE — TELEPHONE ENCOUNTER
Patient repeated back and voice a understanding of orders.  Next labs on 10/3/2023.  ----- Message from Prakash Winchester MD sent at 9/8/2023 10:24 AM CDT -----  Will repeat labs in 4 weeks including tacro level

## 2023-10-12 ENCOUNTER — DOCUMENTATION ONLY (OUTPATIENT)
Dept: TRANSPLANT | Facility: CLINIC | Age: 53
End: 2023-10-12

## 2023-10-12 LAB
EXT ALBUMIN: 4.6 (ref 3.8–4.9)
EXT ALKALINE PHOSPHATASE: ABNORMAL
EXT ALLOSURE: ABNORMAL
EXT ALT: ABNORMAL
EXT AMYLASE: ABNORMAL
EXT ANC: ABNORMAL
EXT AST: ABNORMAL
EXT BACTERIA UA: ABNORMAL
EXT BANDS%: ABNORMAL
EXT BILIRUBIN DIRECT: ABNORMAL
EXT BILIRUBIN TOTAL: ABNORMAL
EXT BK VIRUS DNA QN PCR: ABNORMAL
EXT BUN: 27 (ref 6–24)
EXT C PEPTIDE: ABNORMAL
EXT CALCIUM: 9.4 (ref 8.7–10.2)
EXT CHLORIDE: 108 (ref 96–106)
EXT CHOLESTEROL: ABNORMAL
EXT CMV DNA QUANT. BY PCR: ABNORMAL
EXT CO2: 24 (ref 20–29)
EXT CREATININE UA: ABNORMAL
EXT CREATININE: 1.38 MG/DL (ref 0.76–1.27)
EXT CYCLOSPORINE LVL: ABNORMAL
EXT EBV DNA BY PCR: ABNORMAL
EXT EBV IGG: ABNORMAL
EXT EGFR NO RACE VARIABLE: ABNORMAL
EXT EOSINOPHIL%: 4
EXT FERRITIN: ABNORMAL
EXT GFR MDRD AF AMER: ABNORMAL
EXT GFR MDRD NON AF AMER: ABNORMAL
EXT GLUCOSE UA: ABNORMAL
EXT GLUCOSE: 89 (ref 70–99)
EXT HBV DNA QUANT PCR: ABNORMAL
EXT HCV QUANT: ABNORMAL
EXT HDL: ABNORMAL
EXT HEMATOCRIT: 47.1 (ref 37.5–51)
EXT HEMOGLOBIN A1C: ABNORMAL
EXT HEMOGLOBIN: 15.3 (ref 13–17.7)
EXT HIV RNA QUANT PCR: ABNORMAL
EXT IMMUNKNOW (STIMULATED): ABNORMAL
EXT IRON SATURATION: ABNORMAL
EXT LDH, TOTAL: ABNORMAL
EXT LDL CHOLESTEROL: ABNORMAL
EXT LEFLUNOMIDE METABOLITE: ABNORMAL
EXT LIPASE: ABNORMAL
EXT LYMPH%: 34
EXT MAGNESIUM: 1.9 (ref 1.6–2.3)
EXT MONOCYTES%: 8
EXT NITRITES UA: ABNORMAL
EXT PHOSPHORUS: 4.1 (ref 2.8–4.1)
EXT PLATELETS: 203 (ref 150–450)
EXT POTASSIUM: 4.7 (ref 3.5–5.2)
EXT PROT/CREAT RATIO UR: ABNORMAL
EXT PROTEIN TOTAL: ABNORMAL
EXT PROTEIN UA: ABNORMAL
EXT PTH, INTACT: ABNORMAL
EXT RBC UA: ABNORMAL
EXT SEGS%: 53
EXT SERUM IRON: ABNORMAL
EXT SIROLIMUS LVL: ABNORMAL
EXT SODIUM: 144 MMOL/L (ref 134–144)
EXT TACROLIMUS LVL: ABNORMAL
EXT TIBC: ABNORMAL
EXT TRIGLYCERIDES: ABNORMAL
EXT URIC ACID: ABNORMAL
EXT URINE CULTURE: ABNORMAL
EXT URINE PROTEIN: ABNORMAL
EXT VIT D 25 HYDROXY: ABNORMAL
EXT WBC UA: ABNORMAL
EXT WBC: 9 (ref 3.4–10.8)
PARVOVIRUS B19 DNA BY PCR: ABNORMAL
QUANTIFERON GOLD TB: ABNORMAL

## 2023-10-13 LAB
ALBUMIN SERPL-MCNC: 4.6 G/DL (ref 3.8–4.9)
BASOPHILS # BLD AUTO: 0 X10E3/UL (ref 0–0.2)
BASOPHILS NFR BLD AUTO: 0 %
BUN SERPL-MCNC: 27 MG/DL (ref 6–24)
BUN/CREAT SERPL: 20 (ref 9–20)
CALCIUM SERPL-MCNC: 9.4 MG/DL (ref 8.7–10.2)
CHLORIDE SERPL-SCNC: 108 MMOL/L (ref 96–106)
CO2 SERPL-SCNC: 24 MMOL/L (ref 20–29)
CREAT SERPL-MCNC: 1.38 MG/DL (ref 0.76–1.27)
EOSINOPHIL # BLD AUTO: 0.4 X10E3/UL (ref 0–0.4)
EOSINOPHIL NFR BLD AUTO: 4 %
ERYTHROCYTE [DISTWIDTH] IN BLOOD BY AUTOMATED COUNT: 12.8 % (ref 11.6–15.4)
EST. GFR  (NO RACE VARIABLE): 61 ML/MIN/1.73
GLUCOSE SERPL-MCNC: 89 MG/DL (ref 70–99)
HCT VFR BLD AUTO: 47.1 % (ref 37.5–51)
HGB BLD-MCNC: 15.3 G/DL (ref 13–17.7)
IMM GRANULOCYTES # BLD AUTO: 0.1 X10E3/UL (ref 0–0.1)
IMM GRANULOCYTES NFR BLD AUTO: 1 %
LYMPHOCYTES # BLD AUTO: 3.1 X10E3/UL (ref 0.7–3.1)
LYMPHOCYTES NFR BLD AUTO: 34 %
MAGNESIUM SERPL-MCNC: 1.9 MG/DL (ref 1.6–2.3)
MCH RBC QN AUTO: 29.7 PG (ref 26.6–33)
MCHC RBC AUTO-ENTMCNC: 32.5 G/DL (ref 31.5–35.7)
MCV RBC AUTO: 92 FL (ref 79–97)
MONOCYTES # BLD AUTO: 0.7 X10E3/UL (ref 0.1–0.9)
MONOCYTES NFR BLD AUTO: 8 %
NEUTROPHILS # BLD AUTO: 4.8 X10E3/UL (ref 1.4–7)
NEUTROPHILS NFR BLD AUTO: 53 %
PHOSPHATE SERPL-MCNC: 4.1 MG/DL (ref 2.8–4.1)
PLATELET # BLD AUTO: 203 X10E3/UL (ref 150–450)
POTASSIUM SERPL-SCNC: 4.7 MMOL/L (ref 3.5–5.2)
RBC # BLD AUTO: 5.15 X10E6/UL (ref 4.14–5.8)
SODIUM SERPL-SCNC: 144 MMOL/L (ref 134–144)
SPECIMEN STATUS REPORT: NORMAL
TACROLIMUS BLD LC/MS/MS-MCNC: 3.7 NG/ML (ref 2–20)
WBC # BLD AUTO: 9 X10E3/UL (ref 3.4–10.8)

## 2023-10-16 ENCOUNTER — PATIENT MESSAGE (OUTPATIENT)
Dept: TRANSPLANT | Facility: CLINIC | Age: 53
End: 2023-10-16
Payer: MEDICARE

## 2023-10-16 ENCOUNTER — TELEPHONE (OUTPATIENT)
Dept: TRANSPLANT | Facility: CLINIC | Age: 53
End: 2023-10-16
Payer: MEDICARE

## 2023-10-16 DIAGNOSIS — Z94.0 KIDNEY REPLACED BY TRANSPLANT: ICD-10-CM

## 2023-10-16 DIAGNOSIS — Z94.0 KIDNEY TRANSPLANT STATUS: ICD-10-CM

## 2023-10-16 NOTE — TELEPHONE ENCOUNTER
Patient repeated back and voice a understanding of orders and will repeat tacro level on 11/13/2023.    ----- Message from Prakash Winchester MD sent at 10/13/2023  8:04 PM CDT -----  Please increase prograf to 2/1 repeat tacro level in 3 weeks

## 2023-10-16 NOTE — TELEPHONE ENCOUNTER
Not answering phone, voice mail is full.  Message sent via [employer].  ----- Message from Prakash Winchester MD sent at 10/13/2023  8:04 PM CDT -----  Please increase prograf to 2/1 repeat tacro level in 3 weeks

## 2023-10-17 RX ORDER — TACROLIMUS 1 MG/1
CAPSULE ORAL
Qty: 90 CAPSULE | Refills: 11 | Status: SHIPPED | OUTPATIENT
Start: 2023-10-17 | End: 2024-10-16

## 2023-11-19 LAB — TACROLIMUS BLD LC/MS/MS-MCNC: 6 NG/ML (ref 2–20)

## 2023-11-20 ENCOUNTER — DOCUMENTATION ONLY (OUTPATIENT)
Dept: TRANSPLANT | Facility: CLINIC | Age: 53
End: 2023-11-20

## 2023-11-21 ENCOUNTER — TELEPHONE (OUTPATIENT)
Dept: TRANSPLANT | Facility: CLINIC | Age: 53
End: 2023-11-21
Payer: MEDICARE

## 2023-11-21 ENCOUNTER — PATIENT MESSAGE (OUTPATIENT)
Dept: TRANSPLANT | Facility: CLINIC | Age: 53
End: 2023-11-21
Payer: MEDICARE

## 2023-11-21 LAB — EXT TACROLIMUS LVL: 6

## 2023-11-21 NOTE — TELEPHONE ENCOUNTER
Return call to patient 11/15 Tacro level reviewed.  Patient voice a understanding that that he just needs to now follow with his local Nephrologist Dr DAX Zambrano and the Transplant team will get involved again if something arises.Patient agrees with plan.  ----- Message from Avani Shafer sent at 11/21/2023  3:45 PM CST -----  Regarding: return call  Contact: Bryant Espinoer:Bryant        Returning call to: Antonio Reyes can be reached @:[phone] (home)          Note: not sure if this is the number to call pt back, pt refused to fully identify himself and hung up. But I was able to get a name and number.

## 2024-02-01 ENCOUNTER — TELEPHONE (OUTPATIENT)
Dept: TRANSPLANT | Facility: CLINIC | Age: 54
End: 2024-02-01
Payer: MEDICARE

## 2024-02-01 NOTE — TELEPHONE ENCOUNTER
Attempt to call patient back, Mail box is full.  ----- Message from Sandra Duque sent at 2/1/2024  3:33 PM CST -----  Regarding: PT requeting lab orders be mailed to him  Contact:  [phone]  The patient called requesting to speak to Nurse Alvarez regarding having labs mailed to him  E-mail to listed e-mail through portable  Needs lab orders as patient has tried to get with local nephrologist for 2 weeks with no reply      No further information provided      Patient can be contacted @#  [phone]

## 2024-02-02 ENCOUNTER — PATIENT MESSAGE (OUTPATIENT)
Dept: TRANSPLANT | Facility: CLINIC | Age: 54
End: 2024-02-02
Payer: MEDICARE

## 2024-02-05 ENCOUNTER — TELEPHONE (OUTPATIENT)
Dept: TRANSPLANT | Facility: CLINIC | Age: 54
End: 2024-02-05
Payer: MEDICARE

## 2024-02-05 NOTE — TELEPHONE ENCOUNTER
Return call to patient, notified that I just spoke with Ange at Dr Zambrano's office and they are open.  Patient notified to contact Dr Zambrano for labs and schedule his clinic appointment for CKD management.  Also voice a understanding that he needs to see Dr Zambrano Q 3-4 months for CKD management.    ----- Message from Zaida Diamond RN sent at 2/2/2024  4:11 PM CST -----  Regarding: FW: Patient advice  Contact: Pt  [phone]    ----- Message -----  From: Jenn Fuentes  Sent: 2/2/2024   3:41 PM CST  To: [facility] Post-Kidney Transplant Non-Clinical  Subject: Patient advice                                           Name of Caller: Carlos     Contact Preference:   [phone]    Nature of Call:  Requesting lab orders sent to [facility] and pt portal

## 2024-02-05 NOTE — TELEPHONE ENCOUNTER
Return call to patient, states that he will do labs for Dr Zambrano tomorrow 2/6/2024.  ----- Message from Antonio Wu RN sent at 2/5/2024 12:19 PM CST -----  Regarding: FW: return call  Contact: Carlos    ----- Message -----  From: Avani Shafer  Sent: 2/5/2024  12:12 PM CST  To: [facility] Post-Kidney Transplant Clinical  Subject: return call                                      Caller:Carlos        Returning call to: Antonio        Caller can be reached @: [phone] (home) . Calling back with the info he found out. Requesting a call back.

## 2024-08-12 ENCOUNTER — TELEPHONE (OUTPATIENT)
Dept: TRANSPLANT | Facility: CLINIC | Age: 54
End: 2024-08-12
Payer: MEDICARE

## 2024-08-12 ENCOUNTER — PATIENT MESSAGE (OUTPATIENT)
Dept: TRANSPLANT | Facility: CLINIC | Age: 54
End: 2024-08-12
Payer: MEDICARE

## 2024-08-12 NOTE — TELEPHONE ENCOUNTER
Return call to patient states he has lost his medicare and is trying to get it activated.  Also states he is unable to contact Dr Carmichael office to get refills for his IS meds.  Spoke with [facility] and IS refills phone in.  States he keep coordinator posted.   ----- Message from Antonio Wu RN sent at 8/12/2024  3:17 PM CDT -----  Regarding: FW: Questions    ----- Message -----  From: Senait Valle  Sent: 8/12/2024   3:15 PM CDT  To: [facility] Post-Kidney Transplant Clinical  Subject: Questions                                              Name Of Caller:   Bryant      Contact Preference:   [phone]       Nature of call:    Pt requesting to speak with Antonio. He has questions regarding his medications and insurance.

## 2024-08-13 ENCOUNTER — PATIENT MESSAGE (OUTPATIENT)
Dept: TRANSPLANT | Facility: CLINIC | Age: 54
End: 2024-08-13
Payer: MEDICARE

## 2024-08-13 DIAGNOSIS — Z94.0 KIDNEY TRANSPLANT STATUS: ICD-10-CM

## 2024-08-13 DIAGNOSIS — Z94.0 KIDNEY REPLACED BY TRANSPLANT: ICD-10-CM

## 2024-08-13 RX ORDER — TACROLIMUS 1 MG/1
CAPSULE ORAL
Qty: 90 CAPSULE | Refills: 11 | Status: SHIPPED | OUTPATIENT
Start: 2024-08-13 | End: 2025-08-13

## 2024-08-13 RX ORDER — PREDNISONE 5 MG/1
5 TABLET ORAL DAILY
Qty: 30 TABLET | Refills: 11 | Status: SHIPPED | OUTPATIENT
Start: 2024-08-13 | End: 2025-08-13

## 2024-08-13 RX ORDER — MYCOPHENOLATE MOFETIL 250 MG/1
750 CAPSULE ORAL 2 TIMES DAILY
Qty: 540 CAPSULE | Refills: 3 | Status: SHIPPED | OUTPATIENT
Start: 2024-08-13 | End: 2025-08-13

## 2024-08-13 NOTE — TELEPHONE ENCOUNTER
Return call to patient after speaking with Howard at Dr Zambrano's office.  Patient last seen by Dr Zambrano on 12/7/2022 and had labs on 2/6/2024 and 6/10/2024.   Per Howard, patient refuse to be seen in clinic.  Spoke with patient regarding follow up and states once he gets his insurance activated he wants to establish care with another Nephrologist locally.   States he is able to pay cash for Tacrtolimus and Prednisone via the Transplant pharmacy.  Patient asking for MMF RX sent to [facility] pharmacy.   ----- Message from Antonio Wu RN sent at 8/12/2024  3:17 PM CDT -----  Regarding: FW: Questions    ----- Message -----  From: Senait Valle  Sent: 8/12/2024   3:15 PM CDT  To: [facility] Post-Kidney Transplant Clinical  Subject: Questions                                              Name Of Caller:   Bryant      Contact Preference:   [phone]       Nature of call:    Pt requesting to speak with Antonio. He has questions regarding his medications and insurance.

## 2024-10-07 ENCOUNTER — TELEPHONE (OUTPATIENT)
Dept: TRANSPLANT | Facility: CLINIC | Age: 54
End: 2024-10-07
Payer: MEDICARE

## 2024-10-07 ENCOUNTER — PATIENT MESSAGE (OUTPATIENT)
Dept: TRANSPLANT | Facility: CLINIC | Age: 54
End: 2024-10-07
Payer: MEDICARE

## 2024-10-07 DIAGNOSIS — Z94.0 KIDNEY REPLACED BY TRANSPLANT: Primary | ICD-10-CM

## 2024-10-07 LAB
EXT ALBUMIN: 4
EXT ALKALINE PHOSPHATASE: 52
EXT ALT: 14
EXT AST: 32
EXT BILIRUBIN TOTAL: 0.6
EXT BUN: 18
EXT CALCIUM: 9.7
EXT CHLORIDE: 109
EXT CHOLESTEROL: 203
EXT CO2: 31
EXT CREATININE: 1.5 MG/DL
EXT GLUCOSE: 104
EXT HDL: 57
EXT HEMATOCRIT: 49.2
EXT HEMOGLOBIN: 16.1
EXT LDL CHOLESTEROL: 119
EXT LYMPH%: 22.5
EXT POTASSIUM: 5.5
EXT PROTEIN TOTAL: 6.8
EXT SEGS%: 71.9
EXT SODIUM: 141 MMOL/L
EXT TRIGLYCERIDES: 133
EXT WBC: 8.9

## 2024-10-07 NOTE — TELEPHONE ENCOUNTER
Incoming call from patient states he now has active insurance and would like to establish care with Dr Gerald Castellanos Nephrologist in [facility].

## 2024-10-08 ENCOUNTER — TELEPHONE (OUTPATIENT)
Dept: TRANSPLANT | Facility: CLINIC | Age: 54
End: 2024-10-08
Payer: MEDICARE

## 2024-10-08 NOTE — PROGRESS NOTES
Let's advise low K diet. No sure why suddenly went up. Other Ks in the last were acceptable  Advise to follow up with his local nephrologist   Repeat only K on Friday

## 2024-10-08 NOTE — TELEPHONE ENCOUNTER
Patient repeated back and voice a understanding of orders.  Low K diet reinforced.  States he will have labs drawn with Dr CAROLYN Castellanos and have results sent to the transplant team for review.  ----- Message from Prakash Winchester MD sent at 10/8/2024  8:48 AM CDT -----  Let's advise low K diet. No sure why suddenly went up. Other Ks in the last were acceptable  Advise to follow up with his local nephrologist   Repeat only K on Friday

## 2024-10-11 ENCOUNTER — TELEPHONE (OUTPATIENT)
Dept: TRANSPLANT | Facility: CLINIC | Age: 54
End: 2024-10-11
Payer: MEDICARE

## 2024-10-11 NOTE — TELEPHONE ENCOUNTER
Records re faxed to , also left voice message to call coordinator back.  ----- Message from Sandra sent at 10/11/2024  9:11 AM CDT -----  Regarding: labs & clinic notes needed  Contact: Call back   Surekha calling from [facility] they are having an issue with their fax and have not recieved Needs labs & office visit notes. Please send at earliest opportunity     [phone]     Call back

## 2024-11-04 ENCOUNTER — PATIENT MESSAGE (OUTPATIENT)
Dept: TRANSPLANT | Facility: CLINIC | Age: 54
End: 2024-11-04
Payer: MEDICARE

## 2024-11-22 ENCOUNTER — DOCUMENTATION ONLY (OUTPATIENT)
Dept: TRANSPLANT | Facility: CLINIC | Age: 54
End: 2024-11-22
Payer: MEDICARE

## 2024-11-22 LAB
EXT ALBUMIN: 4 (ref 3.5–5)
EXT ALKALINE PHOSPHATASE: 52 (ref 40–150)
EXT ALLOSURE: ABNORMAL
EXT ALT: 10 (ref 0–45)
EXT AMYLASE: ABNORMAL
EXT ANC: ABNORMAL
EXT AST: 30 (ref 11–34)
EXT BACTERIA UA: ABNORMAL
EXT BANDS%: ABNORMAL
EXT BILIRUBIN DIRECT: 0.3 MG/DL (ref 0–0.5)
EXT BILIRUBIN TOTAL: 0.8 (ref 0.2–1.2)
EXT BK VIRUS DNA QN PCR: NOT DETECTED
EXT BUN: 20.4 (ref 8.4–25.7)
EXT C PEPTIDE: ABNORMAL
EXT CALCIUM: 284 (ref 289–308)
EXT CHLORIDE: 107 (ref 98–107)
EXT CHOLESTEROL: ABNORMAL
EXT CMV DNA QUANT. BY PCR: ABNORMAL
EXT CO2: 27 (ref 22–29)
EXT CREATININE UA: 51.7
EXT CREATININE: 1.53 MG/DL (ref 0.72–1.2)
EXT CYCLOSPORINE LVL: ABNORMAL
EXT EBV DNA BY PCR: ABNORMAL
EXT EBV IGG: ABNORMAL
EXT EGFR NO RACE VARIABLE: 47
EXT EOSINOPHIL%: 2.2 (ref 0.8–7)
EXT FERRITIN: ABNORMAL
EXT GFR MDRD AF AMER: ABNORMAL
EXT GFR MDRD NON AF AMER: ABNORMAL
EXT GLUCOSE UA: NEGATIVE
EXT GLUCOSE: 97 (ref 74–100)
EXT HBV DNA QUANT PCR: ABNORMAL
EXT HCV QUANT: ABNORMAL
EXT HDL: ABNORMAL
EXT HEMATOCRIT: 47.8 (ref 40.1–51)
EXT HEMOGLOBIN A1C: ABNORMAL
EXT HEMOGLOBIN: 15.8 (ref 13.7–17.5)
EXT HIV RNA QUANT PCR: ABNORMAL
EXT IMMUNKNOW (STIMULATED): ABNORMAL
EXT IRON SATURATION: ABNORMAL
EXT LDH, TOTAL: ABNORMAL
EXT LDL CHOLESTEROL: ABNORMAL
EXT LEFLUNOMIDE METABOLITE: ABNORMAL
EXT LIPASE: ABNORMAL
EXT LYMPH%: 39.3 (ref 21.8–53.1)
EXT MAGNESIUM: ABNORMAL
EXT MONOCYTES%: 7.1 (ref 5.3–12.2)
EXT NITRITES UA: NEGATIVE
EXT PHOSPHORUS: 4.1 (ref 2.3–4.7)
EXT PLATELETS: 199 (ref 163–337)
EXT POTASSIUM: 4.9 (ref 3.5–5.1)
EXT PROT/CREAT RATIO UR: 0.13 (ref 0–0.2)
EXT PROTEIN TOTAL: ABNORMAL
EXT PROTEIN UA: NEGATIVE
EXT PTH, INTACT: ABNORMAL
EXT RBC UA: ABNORMAL
EXT SEGS%: 50.5 (ref 34–67.9)
EXT SERUM IRON: ABNORMAL
EXT SIROLIMUS LVL: ABNORMAL
EXT SODIUM: 141 MMOL/L (ref 136–145)
EXT TACROLIMUS LVL: 7 (ref 5–20)
EXT TIBC: ABNORMAL
EXT TRIGLYCERIDES: ABNORMAL
EXT URIC ACID: ABNORMAL
EXT URINE CULTURE: ABNORMAL
EXT URINE PROTEIN: 6.8 (ref 1–14)
EXT VIT D 25 HYDROXY: 28.9 (ref 30–100)
EXT WBC UA: ABNORMAL
EXT WBC: 8.1 (ref 4.2–9.1)
PARVOVIRUS B19 DNA BY PCR: ABNORMAL
QUANTIFERON GOLD TB: ABNORMAL

## 2024-11-26 ENCOUNTER — TELEPHONE (OUTPATIENT)
Dept: TRANSPLANT | Facility: CLINIC | Age: 54
End: 2024-11-26
Payer: MEDICARE

## 2024-11-26 NOTE — TELEPHONE ENCOUNTER
Results reviewed with patient and states he is schedule to be seen by Dr CAROLYN Castellanos on 1/29/2025 , with labs 1 week prior to appointment.  Voice a understanding that he needs to follow with Dr Castellanos q 3-4 months for CKD care.  ----- Message from Prakash Winchester MD sent at 11/26/2024  3:44 PM CST -----  I think he is stable to be d/c to his nephrologist

## 2024-12-05 ENCOUNTER — PATIENT MESSAGE (OUTPATIENT)
Dept: TRANSPLANT | Facility: CLINIC | Age: 54
End: 2024-12-05
Payer: MEDICARE

## 2025-02-10 ENCOUNTER — DOCUMENTATION ONLY (OUTPATIENT)
Dept: TRANSPLANT | Facility: CLINIC | Age: 55
End: 2025-02-10
Payer: MEDICARE

## 2025-02-12 LAB
EXT ALBUMIN: 1.5 (ref 1–2)
EXT ALKALINE PHOSPHATASE: 50 (ref 40–150)
EXT ALT: 10 (ref 0–45)
EXT AST: 21 (ref 11–34)
EXT BILIRUBIN TOTAL: 0.8 (ref 0.2–1.2)
EXT BK VIRUS DNA QN PCR: ABNORMAL
EXT BUN: 15.9 (ref 8.4–25.7)
EXT CALCIUM: 9.6 (ref 8.4–10.2)
EXT CHLORIDE: 109 (ref 98–107)
EXT CO2: 24 (ref 22–29)
EXT CREATININE UA: 95.7 (ref 63–166)
EXT CREATININE: 1.44 MG/DL (ref 0.72–1.2)
EXT EGFR NO RACE VARIABLE: 57
EXT EOSINOPHIL%: 3.9 (ref 0.8–7)
EXT GLUCOSE UA: NEGATIVE
EXT GLUCOSE: 94 (ref 74–100)
EXT HEMATOCRIT: 48.3 (ref 40.1–51)
EXT HEMOGLOBIN A1C: 5.2 % (ref 4–6)
EXT HEMOGLOBIN: 16.4 (ref 13.7–17.5)
EXT LYMPH%: 35.3 (ref 21.8–53.1)
EXT MAGNESIUM: 1.8 (ref 1.6–2.6)
EXT MONOCYTES%: 7.3 (ref 5.3–12.2)
EXT PHOSPHORUS: 4 (ref 2.3–4.7)
EXT PLATELETS: 206 (ref 163–337)
EXT POTASSIUM: 4.2 (ref 3.5–5.1)
EXT PROTEIN TOTAL: 6.8 (ref 6.4–8.3)
EXT PTH, INTACT: 199 (ref 15–88)
EXT SEGS%: 52.5 (ref 34–67.9)
EXT SODIUM: 141 MMOL/L (ref 136–145)
EXT TACROLIMUS LVL: 6.6
EXT URINE PROTEIN: NEGATIVE
EXT VIT D 25 HYDROXY: 55.6 (ref 30–100)
EXT WBC: 8.2 (ref 4.2–9.1)

## 2025-02-13 ENCOUNTER — TELEPHONE (OUTPATIENT)
Dept: TRANSPLANT | Facility: CLINIC | Age: 55
End: 2025-02-13
Payer: MEDICARE

## 2025-02-13 NOTE — TELEPHONE ENCOUNTER
Results reviewed with patient and agrees with plan.  ----- Message from Prakash Winchester MD sent at 2/13/2025  7:23 AM CST -----  Agree close chart

## (undated) DEVICE — TRAY FOLEY 16FR INFECTION CONT

## (undated) DEVICE — SUT 1 36IN PDS II VIO MONO

## (undated) DEVICE — SUT SILK 3-0 STRANDS 30IN

## (undated) DEVICE — SEE MEDLINE ITEM 156900

## (undated) DEVICE — SYR ONLY LUER LOCK 20CC

## (undated) DEVICE — SET DECANTER MEDICHOICE

## (undated) DEVICE — SUT ETHILON 3-0 PS2 18 BLK

## (undated) DEVICE — SOL NS 1000CC

## (undated) DEVICE — BLADE SURG CARBON STEEL SZ11

## (undated) DEVICE — DRESSING ADHESIVE ISLAND 3 X 6

## (undated) DEVICE — HEMOSTAT SURGICEL NU-KNIT 6X9

## (undated) DEVICE — CLIPPER BLADE MOD 4406 (CAREF)

## (undated) DEVICE — SEE MEDLINE ITEM 146417

## (undated) DEVICE — SUT PROLENE 6-0 BV-1 30IN

## (undated) DEVICE — FOLEY BLLN 20FR 3WAY 5CC

## (undated) DEVICE — SUT 2-0 12-18IN SILK

## (undated) DEVICE — ELECTRODE REM PLYHSV RETURN 9

## (undated) DEVICE — DRAIN CHANNEL ROUND 15FR

## (undated) DEVICE — STOCKINETTE 2INX36

## (undated) DEVICE — SUT PROLENE 5-0 36IN C-1

## (undated) DEVICE — SUT 6/0 30IN PDS II VIO MON

## (undated) DEVICE — STAPLER SKIN PROXIMATE WIDE

## (undated) DEVICE — DRAPE SLUSH WARMER WITH DISC

## (undated) DEVICE — SET IRR URLGY 2LINE UNIV SPIKE

## (undated) DEVICE — SUT 4-0 12-18IN SILK BLACK

## (undated) DEVICE — EVACUATOR WOUND BULB 100CC

## (undated) DEVICE — TOWEL OR XRAY WHITE 17X26IN

## (undated) DEVICE — SUT SILK 2-0 STRANDS 30IN

## (undated) DEVICE — SUT 3-0 12-18IN SILK

## (undated) DEVICE — PLUG CATHETER STERILE FOLEY

## (undated) DEVICE — SUT PDS BV 6-0